# Patient Record
Sex: FEMALE | Race: BLACK OR AFRICAN AMERICAN | NOT HISPANIC OR LATINO | Employment: OTHER | URBAN - METROPOLITAN AREA
[De-identification: names, ages, dates, MRNs, and addresses within clinical notes are randomized per-mention and may not be internally consistent; named-entity substitution may affect disease eponyms.]

---

## 2017-01-23 ENCOUNTER — TELEPHONE (OUTPATIENT)
Dept: SMOKING CESSATION | Facility: CLINIC | Age: 57
End: 2017-01-23

## 2017-01-23 RX ORDER — OXYBUTYNIN CHLORIDE 5 MG/1
TABLET ORAL
Qty: 90 TABLET | Refills: 0 | Status: SHIPPED | OUTPATIENT
Start: 2017-01-23 | End: 2017-03-22 | Stop reason: SDUPTHER

## 2017-01-30 ENCOUNTER — TELEPHONE (OUTPATIENT)
Dept: SMOKING CESSATION | Facility: CLINIC | Age: 57
End: 2017-01-30

## 2017-01-31 ENCOUNTER — TELEPHONE (OUTPATIENT)
Dept: SMOKING CESSATION | Facility: CLINIC | Age: 57
End: 2017-01-31

## 2017-02-14 ENCOUNTER — CLINICAL SUPPORT (OUTPATIENT)
Dept: AUDIOLOGY | Facility: CLINIC | Age: 57
End: 2017-02-14
Payer: COMMERCIAL

## 2017-02-14 ENCOUNTER — OFFICE VISIT (OUTPATIENT)
Dept: OTOLARYNGOLOGY | Facility: CLINIC | Age: 57
End: 2017-02-14
Payer: COMMERCIAL

## 2017-02-14 VITALS
DIASTOLIC BLOOD PRESSURE: 76 MMHG | HEIGHT: 62 IN | SYSTOLIC BLOOD PRESSURE: 133 MMHG | BODY MASS INDEX: 47.87 KG/M2 | WEIGHT: 260.13 LBS

## 2017-02-14 DIAGNOSIS — H69.93 ETD (EUSTACHIAN TUBE DYSFUNCTION), BILATERAL: Primary | ICD-10-CM

## 2017-02-14 DIAGNOSIS — H92.03 OTALGIA OF BOTH EARS: Primary | ICD-10-CM

## 2017-02-14 DIAGNOSIS — R42 DIZZINESS: ICD-10-CM

## 2017-02-14 PROCEDURE — 92557 COMPREHENSIVE HEARING TEST: CPT | Mod: S$GLB,,, | Performed by: AUDIOLOGIST

## 2017-02-14 PROCEDURE — 92567 TYMPANOMETRY: CPT | Mod: S$GLB,,, | Performed by: AUDIOLOGIST

## 2017-02-14 PROCEDURE — 99203 OFFICE O/P NEW LOW 30 MIN: CPT | Mod: S$GLB,,, | Performed by: OTOLARYNGOLOGY

## 2017-02-14 PROCEDURE — 3075F SYST BP GE 130 - 139MM HG: CPT | Mod: S$GLB,,, | Performed by: OTOLARYNGOLOGY

## 2017-02-14 PROCEDURE — 3078F DIAST BP <80 MM HG: CPT | Mod: S$GLB,,, | Performed by: OTOLARYNGOLOGY

## 2017-02-14 PROCEDURE — 99999 PR PBB SHADOW E&M-EST. PATIENT-LVL III: CPT | Mod: PBBFAC,,, | Performed by: OTOLARYNGOLOGY

## 2017-02-14 NOTE — MR AVS SNAPSHOT
[facility] Otorhinol[facility]  [address]  [facility] [address]  Phone: [phone]  Fax: [phone]                  Sonya Armendariz   2017 11:00 AM   Office Visit    Description:  Female : 1960   Provider:  Manny Almeida MD   Department:  [facility] - Otorhinolaryngology           Reason for Visit     Otalgia           Diagnoses this Visit        Comments    Otalgia of both ears                To Do List           Goals (5 Years of Data)     None      [facility]     [facility] -  Assistance  Registered nurses in the [facility] Center provide clinical advisement, health education, appointment booking, and other advisory services.  Call for this free service at [phone].             Medications           Message regarding Medications     Verify the changes and/or additions to your medication regime listed below are the same as discussed with your clinician today.  If any of these changes or additions are incorrect, please notify your healthcare provider.             Verify that the below list of medications is an accurate representation of the medications you are currently taking.  If none reported, the list may be blank. If incorrect, please contact your healthcare provider. Carry this list with you in case of emergency.           Current Medications     aspirin (ECOTRIN) 81 MG EC tablet Take 81 mg by mouth once daily.      atorvastatin (LIPITOR) 20 MG tablet TAKE 1 TABLET BY MOUTH EVERY DAY    fluticasone (FLONASE) 50 mcg/actuation nasal spray SPRAY ONCE IN EACH NOSTRIL EVERY DAY    hyoscyamine (ANASPAZ,LEVSIN) 0.125 mg Tab Take 1 tablet (125 mcg total) by mouth every 6 (six) hours as needed.    lorazepam (ATIVAN) 1 MG tablet Take 0.5 tablets (0.5 mg total) by mouth every 6 (six) hours as needed for Anxiety.    losartan-hydrochlorothiazide 50-12.5 mg (HYZAAR) 50-12.5 mg per tablet Take 1 tablet by mouth once daily.    meclizine  "(ANTIVERT) 25 mg tablet Take 1 tablet (25 mg total) by mouth 2 (two) times daily as needed for Dizziness.    metoprolol succinate (TOPROL-XL) 25 MG 24 hr tablet Take 1 tablet (25 mg total) by mouth once daily.    multivitamin (THERAGRAN) per tablet Take 1 tablet by mouth once daily.    NITROSTAT 0.4 mg SL tablet PLACE 1 TABLET UNDER THE TONGUE EVERY 5 MINUTES AS NEEDED    oxybutynin (DITROPAN) 5 MG Tab TAKE 1 TABLET BY MOUTH THREE TIMES DAILY    rabeprazole (ACIPHEX) 20 mg tablet Take 1 tablet (20 mg total) by mouth once daily.    sucralfate (CARAFATE) 1 gram tablet TAKE 1 TABLET BY MOUTH FOUR TIMES DAILY           Clinical Reference Information           Your Vitals Were     BP Height Weight Last Period BMI    133/76 (BP Location: Right arm, Patient Position: Sitting, BP Method: Automatic) 5' 2" (1.575 m) 118 kg (260 lb 2.3 oz) 06/30/2015 47.58 kg/m2      Blood Pressure          Most Recent Value    BP  133/76      Allergies as of 2/14/2017     Amoxicillin    Plavix [Clopidogrel]      Immunizations Administered on Date of Encounter - 2/14/2017     None      Smoking Cessation     If you would like to quit smoking:   You may be eligible for free services if you are a [place] resident and started smoking cigarettes before September 1, 1988.  Call the [employer]oking [employer]essation [employer] ([employer]) toll free at (673) 614-7020 or (966) 193-9805.   Call 1-800-QUIT-NOW if you do not meet the above criteria.            Language Assistance Services     ATTENTION: Language assistance services are available, free of charge. Please call [phone].      ATENCIÓN: Si habla español, tiene a carbone disposición servicios gratuitos de asistencia lingüística. Llame al [phone].     JOSHUA Ý: N?u b?n nói Ti?ng Vi?t, có các d?ch v? h? tr? ngôn ng? mi?n phí dành cho b?n. G?i s? [phone].         [facility] complies with applicable Federal civil rights laws and does not discriminate on the basis of race, color, national " origin, age, disability, or sex.

## 2017-02-14 NOTE — PROGRESS NOTES
Subjective:       Patient ID: Sonya Armendariz is a 56 y.o. female.    Chief Complaint: Otalgia    Otalgia    There is pain in both ears. This is a chronic problem. The current episode started more than 1 month ago. The problem occurs constantly. The problem has been unchanged. There has been no fever. The pain is mild. Pertinent negatives include no coughing, ear discharge, headaches, hearing loss, neck pain, rash, rhinorrhea or sore throat. She has tried nothing for the symptoms. The treatment provided no relief. There is no history of a chronic ear infection, hearing loss or a tympanostomy tube.     Review of Systems   Constitutional: Negative for activity change, appetite change, fatigue, fever and unexpected weight change.   HENT: Positive for ear pain. Negative for congestion, ear discharge, facial swelling, hearing loss, nosebleeds, postnasal drip, rhinorrhea, sinus pressure, sore throat, tinnitus, trouble swallowing and voice change.    Eyes: Negative for visual disturbance.   Respiratory: Negative for cough, chest tightness, shortness of breath, wheezing and stridor.    Cardiovascular: Negative for chest pain.   Musculoskeletal: Negative for gait problem and neck pain.   Skin: Negative for color change and rash.   Allergic/Immunologic: Negative for environmental allergies.   Neurological: Negative for dizziness, seizures, syncope, facial asymmetry, speech difficulty, weakness, light-headedness, numbness and headaches.   Psychiatric/Behavioral: Negative for agitation and confusion. The patient is not nervous/anxious.        Objective:        Constitutional:   Vital signs are normal. She appears well-developed and well-nourished.     Head:  Normocephalic and atraumatic.     Ears:  Hearing normal to normal and whispered voice; external ear normal without scars, lesions, or masses; ear canal, tympanic membrane, and middle ear normal..     Nose:  Nose normal including turbinates, nasal mucosa, sinuses and  nasal septum.     Mouth/Throat  Oropharynx not clear and moist and abnormal uvula midline. She has dentures.   Noted mal-fitting dentures and deep palpation tenderness overlying TMJ. The discomfort noted is the same as what the patient presented with upon evaluation.      Neck:  Neck normal without thyromegaly masses, asymmetry, normal tracheal structure, crepitus, and tenderness, thyroid normal, trachea normal, phonation normal and full range of motion with neck supple.     Cardiovascular:   Rate and rhythm, heart sounds, and pulses normal.      Pulmonary/Chest:   Effort and breath sounds normal.     Psychiatric:   She has a normal mood and affect. Her behavior is normal.     Neurological:   She has neurological normal, alert and oriented.     Skin:   No abrasions, lacerations, lesions, or rashes.         As a result of this patients history and examination findings, a comprehensive audiogram was ordered to determine the level of hearing/hearing loss.    Hearing thresholds are normal and symmetric bilaterally.      Assessment:       1. Otalgia of both ears        Plan:           1. TMJ Education and pamphlet provided (soft diet, warm compresses)  2. Hearing conservation strongly recommended.  3. Re-check of hearing in 5-10 years or sooner if subjective change noted.  4. F/U up with dentist for denture alignment/dental hygiene  4. F/U with PCP as per schedule.

## 2017-03-21 ENCOUNTER — TELEPHONE (OUTPATIENT)
Dept: GASTROENTEROLOGY | Facility: CLINIC | Age: 57
End: 2017-03-21

## 2017-03-21 RX ORDER — SUCRALFATE 1 G/1
TABLET ORAL
Qty: 120 TABLET | Refills: 0 | Status: SHIPPED | OUTPATIENT
Start: 2017-03-21 | End: 2017-06-05 | Stop reason: SDUPTHER

## 2017-03-21 NOTE — TELEPHONE ENCOUNTER
LM on pt VM to call the office back to schedule a f/u visit. I also stated that her Rx was sent to her pharmacy.

## 2017-03-21 NOTE — TELEPHONE ENCOUNTER
Spoke with Pt and was able to schedule a F/U visit with the NP on 4/11/17 at 9:00am. Verbal agreement.    ----- Message from Jessenia Jon sent at 3/21/2017  2:17 PM CDT -----  Contact: Self/[phone]  Patient is returning your call. Please advise

## 2017-03-21 NOTE — TELEPHONE ENCOUNTER
I refilled patient's carafate.  She has not been seen since 2015.  Please call to schedule routine fu appt

## 2017-03-22 NOTE — TELEPHONE ENCOUNTER
----- Message from Mila Turner sent at 3/22/2017 10:01 AM CDT -----  Contact: [phone]/ self   Pt requesting a refill on rx oxybutynin (DITROPAN) 5 MG Tab sent to [facility] [phone] . Please advise

## 2017-03-22 NOTE — TELEPHONE ENCOUNTER
----- Message from Mila Turner sent at 3/22/2017 12:59 PM CDT -----  Contact: [phone]/ self   Patient called in returning your call. Please advise

## 2017-03-22 NOTE — TELEPHONE ENCOUNTER
----- Message from Mila Turner sent at 3/22/2017 12:59 PM CDT -----  Contact: [phone]/ self   Patient called in returning your call. Please advise

## 2017-03-23 RX ORDER — OXYBUTYNIN CHLORIDE 5 MG/1
5 TABLET ORAL 3 TIMES DAILY
Qty: 90 TABLET | Refills: 0 | Status: SHIPPED | OUTPATIENT
Start: 2017-03-23 | End: 2017-06-08 | Stop reason: SDUPTHER

## 2017-03-23 NOTE — TELEPHONE ENCOUNTER
----- Message from Hina Cohen sent at 3/23/2017  8:57 AM CDT -----  Contact: [phone]/ self   Patient would like refill of oxybutynin (DITROPAN) 5 MG Tab sent to [facility] on [address]. Please advise.

## 2017-04-11 ENCOUNTER — LAB VISIT (OUTPATIENT)
Dept: LAB | Facility: HOSPITAL | Age: 57
End: 2017-04-11
Attending: NURSE PRACTITIONER
Payer: COMMERCIAL

## 2017-04-11 ENCOUNTER — TELEPHONE (OUTPATIENT)
Dept: GASTROENTEROLOGY | Facility: CLINIC | Age: 57
End: 2017-04-11

## 2017-04-11 ENCOUNTER — OFFICE VISIT (OUTPATIENT)
Dept: GASTROENTEROLOGY | Facility: CLINIC | Age: 57
End: 2017-04-11
Payer: COMMERCIAL

## 2017-04-11 VITALS
SYSTOLIC BLOOD PRESSURE: 144 MMHG | BODY MASS INDEX: 46.61 KG/M2 | HEIGHT: 62 IN | DIASTOLIC BLOOD PRESSURE: 91 MMHG | HEART RATE: 74 BPM | WEIGHT: 253.31 LBS

## 2017-04-11 DIAGNOSIS — R11.0 NAUSEA: Primary | ICD-10-CM

## 2017-04-11 DIAGNOSIS — R10.12 ABDOMINAL PAIN, LUQ (LEFT UPPER QUADRANT): ICD-10-CM

## 2017-04-11 DIAGNOSIS — R10.13 ABDOMINAL PAIN, EPIGASTRIC: ICD-10-CM

## 2017-04-11 DIAGNOSIS — R11.0 NAUSEA: ICD-10-CM

## 2017-04-11 LAB
ALBUMIN SERPL BCP-MCNC: 3.8 G/DL
ALP SERPL-CCNC: 76 U/L
ALT SERPL W/O P-5'-P-CCNC: 13 U/L
ANION GAP SERPL CALC-SCNC: 7 MMOL/L
AST SERPL-CCNC: 18 U/L
BASOPHILS # BLD AUTO: 0.01 K/UL
BASOPHILS NFR BLD: 0.2 %
BILIRUB SERPL-MCNC: 0.4 MG/DL
BUN SERPL-MCNC: 9 MG/DL
CALCIUM SERPL-MCNC: 9.6 MG/DL
CHLORIDE SERPL-SCNC: 110 MMOL/L
CO2 SERPL-SCNC: 25 MMOL/L
CREAT SERPL-MCNC: 0.9 MG/DL
DIFFERENTIAL METHOD: ABNORMAL
EOSINOPHIL # BLD AUTO: 0.3 K/UL
EOSINOPHIL NFR BLD: 4.9 %
ERYTHROCYTE [DISTWIDTH] IN BLOOD BY AUTOMATED COUNT: 12.8 %
EST. GFR  (AFRICAN AMERICAN): >60 ML/MIN/1.73 M^2
EST. GFR  (NON AFRICAN AMERICAN): >60 ML/MIN/1.73 M^2
GLUCOSE SERPL-MCNC: 90 MG/DL
HCT VFR BLD AUTO: 42.5 %
HGB BLD-MCNC: 14.7 G/DL
LIPASE SERPL-CCNC: 34 U/L
LYMPHOCYTES # BLD AUTO: 2.5 K/UL
LYMPHOCYTES NFR BLD: 43.6 %
MCH RBC QN AUTO: 31.8 PG
MCHC RBC AUTO-ENTMCNC: 34.6 %
MCV RBC AUTO: 92 FL
MONOCYTES # BLD AUTO: 0.5 K/UL
MONOCYTES NFR BLD: 9.1 %
NEUTROPHILS # BLD AUTO: 2.4 K/UL
NEUTROPHILS NFR BLD: 42 %
PLATELET # BLD AUTO: 209 K/UL
PMV BLD AUTO: 11.4 FL
POTASSIUM SERPL-SCNC: 4 MMOL/L
PROT SERPL-MCNC: 7.4 G/DL
RBC # BLD AUTO: 4.62 M/UL
SODIUM SERPL-SCNC: 142 MMOL/L
WBC # BLD AUTO: 5.74 K/UL

## 2017-04-11 PROCEDURE — 99214 OFFICE O/P EST MOD 30 MIN: CPT | Mod: S$GLB,,, | Performed by: NURSE PRACTITIONER

## 2017-04-11 PROCEDURE — 80053 COMPREHEN METABOLIC PANEL: CPT

## 2017-04-11 PROCEDURE — 36415 COLL VENOUS BLD VENIPUNCTURE: CPT

## 2017-04-11 PROCEDURE — 3080F DIAST BP >= 90 MM HG: CPT | Mod: S$GLB,,, | Performed by: NURSE PRACTITIONER

## 2017-04-11 PROCEDURE — 99999 PR PBB SHADOW E&M-EST. PATIENT-LVL III: CPT | Mod: PBBFAC,,, | Performed by: NURSE PRACTITIONER

## 2017-04-11 PROCEDURE — 1160F RVW MEDS BY RX/DR IN RCRD: CPT | Mod: S$GLB,,, | Performed by: NURSE PRACTITIONER

## 2017-04-11 PROCEDURE — 85025 COMPLETE CBC W/AUTO DIFF WBC: CPT

## 2017-04-11 PROCEDURE — 83690 ASSAY OF LIPASE: CPT

## 2017-04-11 PROCEDURE — 3077F SYST BP >= 140 MM HG: CPT | Mod: S$GLB,,, | Performed by: NURSE PRACTITIONER

## 2017-04-11 NOTE — TELEPHONE ENCOUNTER
LM on Pt VM to call the clinic back    ----- Message from ZEV Gore sent at 4/11/2017  3:15 PM CDT -----  Let pt know that labs are ok

## 2017-04-11 NOTE — PROGRESS NOTES
"Subjective:       Patient ID: Sonya Armendariz is a 56 y.o. female.    Chief Complaint: Follow-up    HPI  Reports increased belching and nausea and vomiting occurring over the last week  Continued bloating and epigastric pain that has been a chronic complaint.  I saw her last year with similar complaints.  She reported at that time that she would have relief in her complaints with the use of PPI and carafate    She reports that she is currently using aciphex and carafate with continued complaints.  She will use TUMS in addition.  She denies diarrhea but reports a looser "grainy" stool.  Denies blood with bowel movements or black stools.   She reports dysphagia episodically and will also note pain with swallowing and have to drink water in order for food to pass.    She had EGD in 2015 revealing gastritis and duodenitis. Biopsies negative for h.pylori. US negative.   Reports concern as her mother and sister both have had pancreatic cancer.  She had colonoscopy 5 years ago with polyp removed.    Review of Systems   Constitutional: Negative.  Negative for activity change, appetite change, fatigue, fever and unexpected weight change.   HENT: Positive for trouble swallowing. Negative for sore throat.    Respiratory: Negative.  Negative for cough, choking and shortness of breath.    Cardiovascular: Negative.  Negative for chest pain.   Gastrointestinal: Positive for abdominal distention, abdominal pain and nausea. Negative for blood in stool, constipation and diarrhea.   Genitourinary: Negative.  Negative for difficulty urinating, dysuria and hematuria.   Musculoskeletal: Negative.  Negative for neck pain and neck stiffness.   Skin: Negative.    Neurological: Negative.  Negative for dizziness, syncope, weakness and light-headedness.   Psychiatric/Behavioral: Negative.        Objective:      Physical Exam   Constitutional: She is oriented to person, place, and time. She appears well-developed and well-nourished. No " distress.   HENT:   Head: Normocephalic.   Eyes: No scleral icterus.   Neck: Neck supple.   Cardiovascular: Normal rate.    Pulmonary/Chest: Effort normal. No respiratory distress.   Abdominal: Soft. Bowel sounds are normal. She exhibits no distension and no mass. There is tenderness in the epigastric area and left upper quadrant.   Musculoskeletal: Normal range of motion.   Neurological: She is alert and oriented to person, place, and time.   Skin: Skin is warm and dry. She is not diaphoretic. No erythema. No pallor.   Psychiatric: She has a normal mood and affect. Her behavior is normal. Judgment and thought content normal.   Vitals reviewed.      Assessment:       1. Nausea    2. Abdominal pain, epigastric    3. Abdominal pain, LUQ (left upper quadrant)        Plan:     Sonya was seen today for follow-up.    Diagnoses and all orders for this visit:    Nausea  -     US Abdomen Complete; Future  -     CBC auto differential; Future  -     Comprehensive metabolic panel; Future  -     Lipase; Future  -     Case request GI: ESOPHAGOGASTRODUODENOSCOPY (EGD), COLONOSCOPY    Abdominal pain, epigastric  -     US Abdomen Complete; Future  -     CBC auto differential; Future  -     Comprehensive metabolic panel; Future  -     Lipase; Future  -     Case request GI: ESOPHAGOGASTRODUODENOSCOPY (EGD), COLONOSCOPY    Abdominal pain, LUQ (left upper quadrant)  -     US Abdomen Complete; Future  -     CBC auto differential; Future  -     Comprehensive metabolic panel; Future  -     Lipase; Future  -     Case request GI: ESOPHAGOGASTRODUODENOSCOPY (EGD), COLONOSCOPY        Will schedule abdominal US and labs.  EGD and colonoscopy in July per patient request.  Continue current medications.  May use maalox prn.  Could consider trial of FDgard.    Add fiber supplement for stool bulking.

## 2017-04-12 ENCOUNTER — TELEPHONE (OUTPATIENT)
Dept: GASTROENTEROLOGY | Facility: CLINIC | Age: 57
End: 2017-04-12

## 2017-04-12 RX ORDER — ATORVASTATIN CALCIUM 20 MG/1
TABLET, FILM COATED ORAL
Qty: 30 TABLET | Refills: 0 | Status: SHIPPED | OUTPATIENT
Start: 2017-04-12 | End: 2017-05-28 | Stop reason: SDUPTHER

## 2017-04-12 NOTE — TELEPHONE ENCOUNTER
Spoke with pt to let her know that her lab results were normal . Verbal Understanding.       ----- Message from Hina Cohen sent at 4/12/2017  9:05 AM CDT -----  Contact: [phone]/ self   Patient called in returning your call. Please advise.

## 2017-05-29 RX ORDER — ATORVASTATIN CALCIUM 20 MG/1
TABLET, FILM COATED ORAL
Qty: 30 TABLET | Refills: 0 | Status: SHIPPED | OUTPATIENT
Start: 2017-05-29 | End: 2017-07-04 | Stop reason: SDUPTHER

## 2017-05-29 NOTE — TELEPHONE ENCOUNTER
----- Message from Jessenia Jon sent at 5/29/2017  9:16 AM CDT -----  Contact: Self/[phone]  Patient said she has an appointment on 5/30/17 but has to reschedule because of her job. She said she is available starting on 6/1/17. Please advise

## 2017-06-06 RX ORDER — SUCRALFATE 1 G/1
TABLET ORAL
Qty: 120 TABLET | Refills: 1 | Status: SHIPPED | OUTPATIENT
Start: 2017-06-06 | End: 2017-12-05

## 2017-06-08 RX ORDER — OXYBUTYNIN CHLORIDE 5 MG/1
5 TABLET ORAL 3 TIMES DAILY
Qty: 90 TABLET | Refills: 0 | Status: SHIPPED | OUTPATIENT
Start: 2017-06-08 | End: 2017-08-16 | Stop reason: SDUPTHER

## 2017-06-14 ENCOUNTER — TELEPHONE (OUTPATIENT)
Dept: GASTROENTEROLOGY | Facility: CLINIC | Age: 57
End: 2017-06-14

## 2017-06-14 NOTE — TELEPHONE ENCOUNTER
Spoke with pt and she informed me that she lost he prop and instruction. Pt also stated that she has not picked up her prep from her pharmacy. I will e-mail prep and information to her e-mail address at [email or web address]. Verbal Understanding.       ----- Message from Jessenia Jon sent at 6/14/2017 11:19 AM CDT -----  Contact: Self/[phone]  Patient said she would like to speak with you regarding questions about up coming procedure. Please advise

## 2017-06-22 ENCOUNTER — HOSPITAL ENCOUNTER (OUTPATIENT)
Dept: RADIOLOGY | Facility: HOSPITAL | Age: 57
Discharge: HOME | End: 2017-06-22
Attending: NURSE PRACTITIONER
Payer: COMMERCIAL

## 2017-06-22 DIAGNOSIS — R10.12 ABDOMINAL PAIN, LUQ (LEFT UPPER QUADRANT): ICD-10-CM

## 2017-06-22 DIAGNOSIS — R11.0 NAUSEA: ICD-10-CM

## 2017-06-22 DIAGNOSIS — R10.13 ABDOMINAL PAIN, EPIGASTRIC: ICD-10-CM

## 2017-06-22 PROCEDURE — 76700 US EXAM ABDOM COMPLETE: CPT | Mod: TC

## 2017-06-22 PROCEDURE — 76700 US EXAM ABDOM COMPLETE: CPT | Mod: 26,,, | Performed by: RADIOLOGY

## 2017-06-22 RX ORDER — POLYETHYLENE GLYCOL 3350, SODIUM SULFATE ANHYDROUS, SODIUM BICARBONATE, SODIUM CHLORIDE, POTASSIUM CHLORIDE 236; 22.74; 6.74; 5.86; 2.97 G/4L; G/4L; G/4L; G/4L; G/4L
4 POWDER, FOR SOLUTION ORAL SEE ADMIN INSTRUCTIONS
Qty: 4000 ML | Refills: 0 | Status: SHIPPED | OUTPATIENT
Start: 2017-06-22 | End: 2017-12-05

## 2017-06-26 ENCOUNTER — OFFICE VISIT (OUTPATIENT)
Dept: FAMILY MEDICINE | Facility: CLINIC | Age: 57
End: 2017-06-26
Payer: COMMERCIAL

## 2017-06-26 VITALS
BODY MASS INDEX: 47.39 KG/M2 | DIASTOLIC BLOOD PRESSURE: 84 MMHG | WEIGHT: 257.5 LBS | HEIGHT: 62 IN | OXYGEN SATURATION: 98 % | SYSTOLIC BLOOD PRESSURE: 138 MMHG | HEART RATE: 77 BPM

## 2017-06-26 DIAGNOSIS — Z00.00 ROUTINE GENERAL MEDICAL EXAMINATION AT A HEALTH CARE FACILITY: ICD-10-CM

## 2017-06-26 DIAGNOSIS — E78.5 HYPERLIPIDEMIA, UNSPECIFIED HYPERLIPIDEMIA TYPE: ICD-10-CM

## 2017-06-26 DIAGNOSIS — R06.09 DOE (DYSPNEA ON EXERTION): ICD-10-CM

## 2017-06-26 DIAGNOSIS — F43.0 ACUTE STRESS REACTION: ICD-10-CM

## 2017-06-26 DIAGNOSIS — L84 CALLUS OF FOOT: Primary | ICD-10-CM

## 2017-06-26 DIAGNOSIS — I10 BENIGN ESSENTIAL HTN: ICD-10-CM

## 2017-06-26 DIAGNOSIS — I25.10 CORONARY ARTERY DISEASE, ANGINA PRESENCE UNSPECIFIED, UNSPECIFIED VESSEL OR LESION TYPE, UNSPECIFIED WHETHER NATIVE OR TRANSPLANTED HEART: ICD-10-CM

## 2017-06-26 PROCEDURE — 99214 OFFICE O/P EST MOD 30 MIN: CPT | Mod: 25,S$GLB,, | Performed by: FAMILY MEDICINE

## 2017-06-26 PROCEDURE — 93005 ELECTROCARDIOGRAM TRACING: CPT | Mod: S$GLB,,, | Performed by: FAMILY MEDICINE

## 2017-06-26 PROCEDURE — 99396 PREV VISIT EST AGE 40-64: CPT | Mod: S$GLB,,, | Performed by: FAMILY MEDICINE

## 2017-06-26 PROCEDURE — 93010 ELECTROCARDIOGRAM REPORT: CPT | Mod: S$GLB,,, | Performed by: INTERNAL MEDICINE

## 2017-06-26 PROCEDURE — 99999 PR PBB SHADOW E&M-EST. PATIENT-LVL IV: CPT | Mod: PBBFAC,,, | Performed by: FAMILY MEDICINE

## 2017-06-26 NOTE — PROGRESS NOTES
"(Portions of this note were dictated using voice recognition software and may contain dictation related errors in spelling/grammar/syntax not found on text review)    CC:   Chief Complaint   Patient presents with    Annual Exam       HPI: 56 y.o. female here for annual health maintenance exam although does have several other concerns    1.  Dyspnea on exertion going on for 4 months, denies any chest pain associated.  Denies any diaphoresis or nausea.  CAD history as below.  She has not seen her cardiologist in about a year, does not have an appointment set up at this time but plans to make an appointment.  No significant weight gain and a very short period of time although she does complain of some "fluid in my feet".  Denies significant dyspnea at rest although she feels like her "breath is not normal".  She attributes this to resuming smoking    2.  Foot pain.  Had calluses that were excised many years ago by podiatry but they recurred.  Sometimes will have some difficulty walking long distances.  She is agreeable to seeing podiatry again for other option on treatment    3.  Under a lot of stress.  She feels like a lot of people depend on her a lot and she has little time to take care of herself.  One of her brothers who lived in [place] was severely affected by flooding their last year.  She had to take him in her home and this can be very stressful as well.  He was involved in a bad car accident this morning and is in the emergency room but is doing okay.  She just found this very overwhelming.  She hangs that despite the stresses, she feels like she has a fairly good disposition most of the time he denies any chronic depressive type symptoms just states that "today was a hard day".    Past Medical History:   Diagnosis Date    CAD (coronary artery disease)     s/p LAD stent    Gastroduodenitis     Helicobacter pylori (H. pylori) infection     Hyperlipidemia     Hypertension     MI (myocardial " infarction)     x2    Obesity     Tobacco use     Urinary tract infection     Vaginal infection        Past Surgical History:   Procedure Laterality Date    NO PAST SURGERIES         Family History   Problem Relation Age of Onset    Hypertension Other      multiple    Diabetes Other      multiple    Stomach cancer Mother     Cancer Father      unknown    Breast cancer Sister     Throat cancer Brother     Cancer Brother      x 2,. unknown    Coronary artery disease Cousin        Social History     Social History    Marital status:      Spouse name: N/A    Number of children: N/A    Years of education: N/A     Occupational History     [employer]     Social History Main Topics    Smoking status: Current Every Day Smoker     Packs/day: 1.00     Years: 40.00     Types: Cigarettes     Last attempt to quit: 6/28/2016    Smokeless tobacco: Never Used    Alcohol use No    Drug use: No    Sexual activity: Yes     Partners: Male     Other Topics Concern    Not on file     Social History Narrative    No narrative on file       ROS:  GENERAL: No fever, chills, fatigability or weight loss.  SKIN: No rashes, no itching.  HEAD: No headaches.  EYES: No visual changes  EARS: No ear pain or changes in hearing.  NOSE: No congestion or rhinorrhea.  MOUTH & THROAT: No hoarseness, change in voice, or sore throat.  NODES: Denies swollen glands.  CHEST: Above.  CARDIOVASCULAR: Above.  ABDOMEN: No nausea, vomiting, or changes in bowel function.  URINARY: No flank pain, dysuria or hematuria.  PERIPHERAL VASCULAR: Above.  MUSCULOSKELETAL: No joint stiffness or swelling. Denies back pain.  NEUROLOGIC: No weakness or numbness.  PSYCHIATRIC: Above    Vital signs reviewed  PE:   APPEARANCE: Well nourished, well developed, in no acute distress.    HEAD: Normocephalic, atraumatic.  EYES: PERRL. EOMI.   Conjunctivae noninjected.  EARS: TM's intact. Light reflex normal. No retraction or  perforation  NOSE: Mucosa pink. Airway clear.  MOUTH & THROAT: No tonsillar enlargement. No pharyngeal erythema or exudate.   NECK: Supple with no cervical lymphadenopathy.  No thyromegaly.  No carotid bruits  CHEST: Good inspiratory effort. Lungs clear to auscultation with no wheezes or crackles.  CARDIOVASCULAR: Normal S1, S2. No rubs, murmurs, or gallops.  ABDOMEN: Bowel sounds normal. Not distended. Soft. No tenderness or masses. No organomegaly.  EXTREMITIES: Trace pitting edema bilateral lower extremity.  She does have some painful calluses noted at the plantar aspect of her first MTP on the left foot      IMPRESSION  Dyspnea on exertion  Acute stress  Callus      PLAN  SCOTT: Patient feels like her smoking could have something to do with this although given her heart disease history, EKG was done in the office.  This is essentially unchanged from prior assessments with normal sinus rhythm and evidence of septal infarct age undetermined.  No acute changes noted on personal review.  She will be set up for stress test since she does not have any immediate upcoming appointment with her cardiologist.  She is advised to continue follow-up with her cardiologist given her CAD history    Acute stress: Discussed in detail.  Patient does not feel that this is a chronic issue although her stresses are fairly chronic.  She feels like her disposition is overall more euthymic and denies any concern for depression.  She just feels like acutely she got overwhelmed today.  She is advised to consider further referral for counseling if symptoms become more persistent    Foot calluses: Discussed podiatry referral for further treatment options.  She is agreeable to this    Orders Placed This Encounter   Procedures    NM Myocardial Perfusion Spect Multi Exer    CBC auto differential    Lipid panel    TSH    Comprehensive metabolic panel    Ambulatory referral to Podiatry    NM Multi Study Stress Exer Card Component    EKG  12-lead

## 2017-06-26 NOTE — PROGRESS NOTES
(Portions of this note were dictated using voice recognition software and may contain dictation related errors in spelling/grammar/syntax not found on text review)    CC:   Chief Complaint   Patient presents with    Annual Exam       HPI: 56 y.o. female LOV with me over 2 years ago.  She presents for an annual exam today but has separate concerns noted in the attached evaluation and management note.  Chronic health conditions are as follows:    CAD: followed by cardiology, LOV with cardiology was last year.  Therapy as below.  Continues to smoke.  Had enrolled in smoking cessation clinic, she did quit for some time but resumed because of some stresses    Hypertension on metoprolol 25 mg daily plus Hyzaar 50/12.5 daily    Hyperlipidemia on Lipitor 20 mg daily    GERD with gastroduodenitis noted on prior testing.  Saw GI in April of this year.  On AcipHex 20 mg daily.  EGD is to be done later this year along with colonoscopy      Past Medical History:   Diagnosis Date    CAD (coronary artery disease)     s/p LAD stent    Gastroduodenitis     Helicobacter pylori (H. pylori) infection     Hyperlipidemia     Hypertension     MI (myocardial infarction)     x2    Obesity     Tobacco use     Urinary tract infection     Vaginal infection        Past Surgical History:   Procedure Laterality Date    NO PAST SURGERIES         Family History   Problem Relation Age of Onset    Hypertension Other      multiple    Diabetes Other      multiple    Stomach cancer Mother     Cancer Father      unknown    Breast cancer Sister     Throat cancer Brother     Cancer Brother      x 2,. unknown    Coronary artery disease Cousin        Social History     Social History    Marital status:      Spouse name: N/A    Number of children: N/A    Years of education: N/A     Occupational History     [employer]     Social History Main Topics    Smoking status: Current Every Day Smoker     Packs/day:  1.00     Years: 40.00     Types: Cigarettes     Last attempt to quit: 6/28/2016    Smokeless tobacco: Never Used    Alcohol use No    Drug use: No    Sexual activity: Yes     Partners: Male     Other Topics Concern    Not on file     Social History Narrative    No narrative on file         HEALTH SCREENINGS  Immunizations:  Tdap 2012     Age/Gender Appropriate screenings:  MMG 7/20/16  GYN:  Pap 2013 Dr. Phelan: benign, trich noted.     Colonoscopy: 7/13/12, 6mm polyp ret 5 yrs.    Lab Results   Component Value Date    WBC 5.74 04/11/2017    HGB 14.7 04/11/2017    HCT 42.5 04/11/2017     04/11/2017    CHOL 218 (H) 06/12/2015    TRIG 156 (H) 06/12/2015    HDL 40 06/12/2015    ALT 13 04/11/2017    AST 18 04/11/2017     04/11/2017    K 4.0 04/11/2017     04/11/2017    CREATININE 0.9 04/11/2017    BUN 9 04/11/2017    CO2 25 04/11/2017    TSH 0.967 06/12/2015    INR 1.0 08/02/2011    HGBA1C 6.1 06/12/2015    LDLCALC 146.8 06/12/2015    GLU 90 04/11/2017         Vital signs reviewed  PE:   APPEARANCE: Well nourished, well developed, in no acute distress.    HEAD: Normocephalic, atraumatic.  EYES: PERRL. EOMI.   Conjunctivae noninjected.  EARS: TM's intact. Light reflex normal. No retraction or perforation.    NOSE: Mucosa pink. Airway clear.  MOUTH & THROAT: No tonsillar enlargement. No pharyngeal erythema or exudate.   NECK: Supple with no cervical lymphadenopathy.  No carotid bruits.  No thyromegaly  CHEST: Good inspiratory effort. Lungs clear to auscultation with no wheezes or crackles.  CARDIOVASCULAR: Normal S1, S2. No rubs, murmurs, or gallops.  ABDOMEN: Bowel sounds normal. Not distended. Soft. No tenderness or masses. No organomegaly.  EXTREMITIES: Trace pitting edema bilateral lower extremities      IMPRESSION  Routine medical exam  CAD  Hypertension  Hyperlipidemia    PLAN   on healthy diet, regular exercise, weight loss  CBC, CMP, lipid, TSH ordered for screening  purposes  Blood pressure controlled.  Continue current therapy  Lipids: Check labs  Maintain routine eye doctor visits and dental visits  Encourage repeat follow-up with tobacco cessation clinic to help with smoking cessation  Reviewed health maintenance with patient.  Immunizations as above.  Her last Pap smear was over 3 years ago.  Discussed current Pap smear guidelines.  She will return in the next 3 months for repeat Pap smear.  Colonoscopy is being set up for this year.  Please see attached evaluation and management note for further details on acute symptomology

## 2017-06-27 ENCOUNTER — OFFICE VISIT (OUTPATIENT)
Dept: PODIATRY | Facility: CLINIC | Age: 57
End: 2017-06-27
Payer: COMMERCIAL

## 2017-06-27 DIAGNOSIS — M79.671 FOOT PAIN, BILATERAL: ICD-10-CM

## 2017-06-27 DIAGNOSIS — B07.0 PLANTAR WART OF BOTH FEET: Primary | ICD-10-CM

## 2017-06-27 DIAGNOSIS — M79.672 FOOT PAIN, BILATERAL: ICD-10-CM

## 2017-06-27 PROCEDURE — 99213 OFFICE O/P EST LOW 20 MIN: CPT | Mod: S$GLB,,, | Performed by: PODIATRIST

## 2017-06-27 PROCEDURE — 99999 PR PBB SHADOW E&M-EST. PATIENT-LVL II: CPT | Mod: PBBFAC,,, | Performed by: PODIATRIST

## 2017-06-27 NOTE — LETTER
June 28, 2017      Sivakumar Wing MD  [address]  [address]  [facility] [address]           [facility]  [address] [facility] [address]  Phone: [phone]          Patient: Sonya Armendariz   MR Number: [id number]   YOB: 1960   Date of Visit: 6/27/2017       Dear Dr. Sivakumar Wing:    Thank you for referring Sonya Armendariz to me for evaluation. Attached you will find relevant portions of my assessment and plan of care.    If you have questions, please do not hesitate to call me. I look forward to following Sonya Armendariz along with you.    Sincerely,    Alfredo Ramirez, DPTRESSA    Enclosure  CC:  No Recipients    If you would like to receive this communication electronically, please contact [email or web address] or [phone] to request more information on [employer] Link access.    For providers and/or their staff who would like to refer a patient to [facility], please contact us through our one-stop-shop provider referral line, Yoseph Patterson, at [phone].    If you feel you have received this communication in error or would no longer like to receive these types of communications, please e-mail [email or web address]

## 2017-06-28 NOTE — PROGRESS NOTES
Subjective:      Patient ID: Sonya Armendariz is a 56 y.o. female.    Chief Complaint: No chief complaint on file.    C/O painful raised calluses on both feet present since 2005 that she routinely shaves down herself for some relief. History of excision per a Podiatrist many years ago, however she states the lesions recurred and got larger. Denies trauma.     There were no vitals filed for this visit.   Past Medical History:   Diagnosis Date    CAD (coronary artery disease)     s/p LAD stent    Gastroduodenitis     Helicobacter pylori (H. pylori) infection     Hyperlipidemia     Hypertension     MI (myocardial infarction)     x2    Obesity     Tobacco use     Urinary tract infection     Vaginal infection        Past Surgical History:   Procedure Laterality Date    NO PAST SURGERIES         Family History   Problem Relation Age of Onset    Hypertension Other      multiple    Diabetes Other      multiple    Stomach cancer Mother     Cancer Father      unknown    Breast cancer Sister     Throat cancer Brother     Cancer Brother      x 2,. unknown    Coronary artery disease Cousin        Social History     Social History    Marital status:      Spouse name: N/A    Number of children: N/A    Years of education: N/A     Occupational History     [employer]     Social History Main Topics    Smoking status: Current Every Day Smoker     Packs/day: 1.00     Years: 40.00     Types: Cigarettes     Last attempt to quit: 6/28/2016    Smokeless tobacco: Never Used    Alcohol use No    Drug use: No    Sexual activity: Yes     Partners: Male     Other Topics Concern    Not on file     Social History Narrative    No narrative on file       Current Outpatient Prescriptions   Medication Sig Dispense Refill    aspirin (ECOTRIN) 81 MG EC tablet Take 81 mg by mouth once daily.        atorvastatin (LIPITOR) 20 MG tablet TAKE 1 TABLET BY MOUTH EVERY DAY 30 tablet 0     fluticasone (FLONASE) 50 mcg/actuation nasal spray SPRAY ONCE IN EACH NOSTRIL EVERY DAY 16 g 3    losartan-hydrochlorothiazide 50-12.5 mg (HYZAAR) 50-12.5 mg per tablet Take 1 tablet by mouth once daily. 30 tablet 6    meclizine (ANTIVERT) 25 mg tablet Take 1 tablet (25 mg total) by mouth 2 (two) times daily as needed for Dizziness. 20 tablet 0    metoprolol succinate (TOPROL-XL) 25 MG 24 hr tablet Take 1 tablet (25 mg total) by mouth once daily. 30 tablet 11    multivitamin (THERAGRAN) per tablet Take 1 tablet by mouth once daily.      NITROSTAT 0.4 mg SL tablet PLACE 1 TABLET UNDER THE TONGUE EVERY 5 MINUTES AS NEEDED 25 tablet 0    oxybutynin (DITROPAN) 5 MG Tab Take 1 tablet (5 mg total) by mouth 3 (three) times daily. 90 tablet 0    polyethylene glycol (GOLYTELY,NULYTELY) 236-22.74-6.74 -5.86 gram suspension Take 4,000 mLs (4 L total) by mouth As instructed. 4000 mL 0    rabeprazole (ACIPHEX) 20 mg tablet Take 1 tablet (20 mg total) by mouth once daily. 30 tablet 11    sucralfate (CARAFATE) 1 gram tablet TAKE 1 TABLET BY MOUTH FOUR TIMES DAILY 120 tablet 1     No current facility-administered medications for this visit.        Review of patient's allergies indicates:   Allergen Reactions    Amoxicillin Diarrhea    Plavix [clopidogrel]          Review of Systems   Constitution: Negative for chills, fever, weakness and malaise/fatigue.   Cardiovascular: Negative for chest pain, claudication and leg swelling.   Respiratory: Negative for cough and shortness of breath.    Skin: Positive for dry skin. Negative for itching, poor wound healing and rash.   Musculoskeletal: Negative for back pain, joint pain, muscle cramps and muscle weakness.   Gastrointestinal: Negative for nausea and vomiting.   Neurological: Negative for numbness and paresthesias.   Psychiatric/Behavioral: Negative for altered mental status.           Objective:      Physical Exam   Constitutional: She is oriented to person, place, and  time. No distress.   Cardiovascular:   Pulses:       Dorsalis pedis pulses are 2+ on the right side, and 2+ on the left side.        Posterior tibial pulses are 2+ on the right side, and 2+ on the left side.   CFT< 3 secs all toes bilateral foot, skin temp warm bilateral foot, diminished digital hair growth bilateral foot, no lower extremity edema bilateral.     Musculoskeletal:   Mild forefoot valgus with plantar flexed first metatarsal bilateral. Supinated foot structure bilateral.    Localized pain on palpation of raised hyperkeratotic lesions x 3 left foot and plantar right fifth met head.   Neurological: She is alert and oriented to person, place, and time. She has normal strength. Gait normal.   Skin: Skin is warm, dry and intact. Capillary refill takes less than 2 seconds. No ecchymosis and no rash noted. She is not diaphoretic. No cyanosis or erythema. Nails show no clubbing.   Raised hyperkeratotic lesions plantar first and fifth metatarsal heads and hallux left foot, and plantar right fifth met head with disappearance of RSTLs, spongy core and pain during squeeze test.    Hyperkeratotic skin with maintenance of RSTLs plantar right hallux and no pain.    No open lesions or macerations bilateral lower extremity.                         Assessment:       Encounter Diagnoses   Name Primary?    Plantar wart of both feet Yes    Foot pain, bilateral          Plan:       Diagnoses and all orders for this visit:    Plantar wart of both feet    Foot pain, bilateral      I counseled the patient on her conditions, their implications and medical management.    Discussed treatment options for painful plantar warts to include OTC salicylic acid, liquid nitrogen and surgical excision in detail. She opted to return for candida injection if available.    Discussed treating contact surfaces and shoes with Lysol and exposing to direct sun light.    Discussed proper foot and hand hygiene.    RTC 1-2 weeks or prn.      .

## 2017-07-03 ENCOUNTER — PATIENT MESSAGE (OUTPATIENT)
Dept: FAMILY MEDICINE | Facility: CLINIC | Age: 57
End: 2017-07-03

## 2017-07-03 ENCOUNTER — LAB VISIT (OUTPATIENT)
Dept: LAB | Facility: HOSPITAL | Age: 57
End: 2017-07-03
Attending: FAMILY MEDICINE
Payer: COMMERCIAL

## 2017-07-03 DIAGNOSIS — Z00.00 ROUTINE GENERAL MEDICAL EXAMINATION AT A HEALTH CARE FACILITY: ICD-10-CM

## 2017-07-03 LAB
ALBUMIN SERPL BCP-MCNC: 3.6 G/DL
ALP SERPL-CCNC: 79 U/L
ALT SERPL W/O P-5'-P-CCNC: 12 U/L
ANION GAP SERPL CALC-SCNC: 7 MMOL/L
AST SERPL-CCNC: 17 U/L
BASOPHILS # BLD AUTO: 0.01 K/UL
BASOPHILS NFR BLD: 0.2 %
BILIRUB SERPL-MCNC: 0.4 MG/DL
BUN SERPL-MCNC: 9 MG/DL
CALCIUM SERPL-MCNC: 9.3 MG/DL
CHLORIDE SERPL-SCNC: 109 MMOL/L
CHOLEST/HDLC SERPL: 4.1 {RATIO}
CO2 SERPL-SCNC: 24 MMOL/L
CREAT SERPL-MCNC: 0.9 MG/DL
DIFFERENTIAL METHOD: NORMAL
EOSINOPHIL # BLD AUTO: 0.3 K/UL
EOSINOPHIL NFR BLD: 5 %
ERYTHROCYTE [DISTWIDTH] IN BLOOD BY AUTOMATED COUNT: 13.4 %
EST. GFR  (AFRICAN AMERICAN): >60 ML/MIN/1.73 M^2
EST. GFR  (NON AFRICAN AMERICAN): >60 ML/MIN/1.73 M^2
GLUCOSE SERPL-MCNC: 106 MG/DL
HCT VFR BLD AUTO: 41.3 %
HDL/CHOLESTEROL RATIO: 24.1 %
HDLC SERPL-MCNC: 145 MG/DL
HDLC SERPL-MCNC: 35 MG/DL
HGB BLD-MCNC: 13.9 G/DL
LDLC SERPL CALC-MCNC: 86.8 MG/DL
LYMPHOCYTES # BLD AUTO: 2.3 K/UL
LYMPHOCYTES NFR BLD: 44 %
MCH RBC QN AUTO: 30.9 PG
MCHC RBC AUTO-ENTMCNC: 33.7 %
MCV RBC AUTO: 92 FL
MONOCYTES # BLD AUTO: 0.4 K/UL
MONOCYTES NFR BLD: 7.3 %
NEUTROPHILS # BLD AUTO: 2.3 K/UL
NEUTROPHILS NFR BLD: 43.3 %
NONHDLC SERPL-MCNC: 110 MG/DL
PLATELET # BLD AUTO: 225 K/UL
PMV BLD AUTO: 11.3 FL
POTASSIUM SERPL-SCNC: 4.2 MMOL/L
PROT SERPL-MCNC: 7.3 G/DL
RBC # BLD AUTO: 4.5 M/UL
SODIUM SERPL-SCNC: 140 MMOL/L
TRIGL SERPL-MCNC: 116 MG/DL
TSH SERPL DL<=0.005 MIU/L-ACNC: 0.64 UIU/ML
WBC # BLD AUTO: 5.23 K/UL

## 2017-07-03 PROCEDURE — 80061 LIPID PANEL: CPT

## 2017-07-03 PROCEDURE — 84443 ASSAY THYROID STIM HORMONE: CPT

## 2017-07-03 PROCEDURE — 85025 COMPLETE CBC W/AUTO DIFF WBC: CPT

## 2017-07-03 PROCEDURE — 36415 COLL VENOUS BLD VENIPUNCTURE: CPT

## 2017-07-03 PROCEDURE — 80053 COMPREHEN METABOLIC PANEL: CPT

## 2017-07-05 RX ORDER — ATORVASTATIN CALCIUM 20 MG/1
TABLET, FILM COATED ORAL
Qty: 30 TABLET | Refills: 11 | Status: SHIPPED | OUTPATIENT
Start: 2017-07-05 | End: 2018-07-07 | Stop reason: SDUPTHER

## 2017-07-06 ENCOUNTER — OFFICE VISIT (OUTPATIENT)
Dept: PODIATRY | Facility: CLINIC | Age: 57
End: 2017-07-06
Payer: COMMERCIAL

## 2017-07-06 VITALS
BODY MASS INDEX: 47.29 KG/M2 | DIASTOLIC BLOOD PRESSURE: 77 MMHG | HEART RATE: 71 BPM | SYSTOLIC BLOOD PRESSURE: 128 MMHG | WEIGHT: 257 LBS | HEIGHT: 62 IN

## 2017-07-06 DIAGNOSIS — B07.0 PLANTAR WART OF BOTH FEET: Primary | ICD-10-CM

## 2017-07-06 PROCEDURE — 99499 UNLISTED E&M SERVICE: CPT | Mod: S$GLB,,, | Performed by: PODIATRIST

## 2017-07-06 PROCEDURE — 17000 DESTRUCT PREMALG LESION: CPT | Mod: S$GLB,,, | Performed by: PODIATRIST

## 2017-07-06 PROCEDURE — 17003 DESTRUCT PREMALG LES 2-14: CPT | Mod: S$GLB,,, | Performed by: PODIATRIST

## 2017-07-06 PROCEDURE — 99999 PR PBB SHADOW E&M-EST. PATIENT-LVL III: CPT | Mod: PBBFAC,,, | Performed by: PODIATRIST

## 2017-07-06 RX ORDER — TRAMADOL HYDROCHLORIDE 50 MG/1
50 TABLET ORAL EVERY 6 HOURS PRN
Qty: 30 TABLET | Refills: 0 | Status: SHIPPED | OUTPATIENT
Start: 2017-07-06 | End: 2017-07-16

## 2017-07-06 NOTE — LETTER
July 8, 2017      Sivakumar Wing MD  [address]  [address]  [facility] [address]           [facility]  [address] [facility] [address]  Phone: [phone]          Patient: Sonya Armendariz   MR Number: [id number]   YOB: 1960   Date of Visit: 7/6/2017       Dear Dr. Sivakumar Wing:    Thank you for referring Sonya Armendariz to me for evaluation. Attached you will find relevant portions of my assessment and plan of care.    If you have questions, please do not hesitate to call me. I look forward to following Sonya Armendariz along with you.    Sincerely,    Alfredo Ramirez, DPTRESSA    Enclosure  CC:  No Recipients    If you would like to receive this communication electronically, please contact [email or web address] or [phone] to request more information on [employer] Link access.    For providers and/or their staff who would like to refer a patient to [facility], please contact us through our one-stop-shop provider referral line, Yoseph Patterson, at [phone].    If you feel you have received this communication in error or would no longer like to receive these types of communications, please e-mail [email or web address]

## 2017-07-08 NOTE — PROGRESS NOTES
"Subjective:      Patient ID: Sonya Armendariz is a 56 y.o. female.    Chief Complaint: Plantar Warts    C/O painful raised calluses on both feet present since 2005 that she routinely shaves down herself for some relief. History of excision per a Podiatrist many years ago, however she states the lesions recurred and got larger. Denies trauma.     7/6/17: Returns for Candida injections to both her feet. No new complaints. Says pain significantly improved since the lesions were trimmed.    Vitals:    07/06/17 0923   BP: 128/77   Pulse: 71   Weight: 116.6 kg (257 lb)   Height: 5' 2" (1.575 m)   PainSc: 0-No pain      Past Medical History:   Diagnosis Date    CAD (coronary artery disease)     s/p LAD stent    Gastroduodenitis     Helicobacter pylori (H. pylori) infection     Hyperlipidemia     Hypertension     MI (myocardial infarction)     x2    Obesity     Tobacco use     Urinary tract infection     Vaginal infection        Past Surgical History:   Procedure Laterality Date    NO PAST SURGERIES         Family History   Problem Relation Age of Onset    Hypertension Other      multiple    Diabetes Other      multiple    Stomach cancer Mother     Cancer Father      unknown    Breast cancer Sister     Throat cancer Brother     Cancer Brother      x 2,. unknown    Coronary artery disease Cousin        Social History     Social History    Marital status:      Spouse name: N/A    Number of children: N/A    Years of education: N/A     Occupational History     [employer]     Social History Main Topics    Smoking status: Current Every Day Smoker     Packs/day: 1.00     Years: 40.00     Types: Cigarettes     Last attempt to quit: 6/28/2016    Smokeless tobacco: Never Used    Alcohol use No    Drug use: No    Sexual activity: Yes     Partners: Male     Other Topics Concern    None     Social History Narrative    None       Current Outpatient Prescriptions   Medication " Sig Dispense Refill    aspirin (ECOTRIN) 81 MG EC tablet Take 81 mg by mouth once daily.        atorvastatin (LIPITOR) 20 MG tablet TAKE 1 TABLET BY MOUTH EVERY DAY 30 tablet 11    fluticasone (FLONASE) 50 mcg/actuation nasal spray SPRAY ONCE IN EACH NOSTRIL EVERY DAY 16 g 3    losartan-hydrochlorothiazide 50-12.5 mg (HYZAAR) 50-12.5 mg per tablet Take 1 tablet by mouth once daily. 30 tablet 6    meclizine (ANTIVERT) 25 mg tablet Take 1 tablet (25 mg total) by mouth 2 (two) times daily as needed for Dizziness. 20 tablet 0    metoprolol succinate (TOPROL-XL) 25 MG 24 hr tablet Take 1 tablet (25 mg total) by mouth once daily. 30 tablet 11    multivitamin (THERAGRAN) per tablet Take 1 tablet by mouth once daily.      NITROSTAT 0.4 mg SL tablet PLACE 1 TABLET UNDER THE TONGUE EVERY 5 MINUTES AS NEEDED 25 tablet 0    oxybutynin (DITROPAN) 5 MG Tab Take 1 tablet (5 mg total) by mouth 3 (three) times daily. 90 tablet 0    polyethylene glycol (GOLYTELY,NULYTELY) 236-22.74-6.74 -5.86 gram suspension Take 4,000 mLs (4 L total) by mouth As instructed. 4000 mL 0    rabeprazole (ACIPHEX) 20 mg tablet Take 1 tablet (20 mg total) by mouth once daily. 30 tablet 11    sucralfate (CARAFATE) 1 gram tablet TAKE 1 TABLET BY MOUTH FOUR TIMES DAILY 120 tablet 1    tramadol (ULTRAM) 50 mg tablet Take 1 tablet (50 mg total) by mouth every 6 (six) hours as needed for Pain. 30 tablet 0     No current facility-administered medications for this visit.        Review of patient's allergies indicates:   Allergen Reactions    Amoxicillin Diarrhea    Plavix [clopidogrel]          Review of Systems   Constitution: Negative for chills, fever, weakness and malaise/fatigue.   Cardiovascular: Negative for chest pain, claudication and leg swelling.   Respiratory: Negative for cough and shortness of breath.    Skin: Positive for dry skin. Negative for itching, poor wound healing and rash.   Musculoskeletal: Negative for back pain, joint  pain, muscle cramps and muscle weakness.   Gastrointestinal: Negative for nausea and vomiting.   Neurological: Negative for numbness and paresthesias.   Psychiatric/Behavioral: Negative for altered mental status.           Objective:      Physical Exam   Constitutional: She is oriented to person, place, and time. No distress.   Cardiovascular:   Pulses:       Dorsalis pedis pulses are 2+ on the right side, and 2+ on the left side.        Posterior tibial pulses are 2+ on the right side, and 2+ on the left side.   CFT< 3 secs all toes bilateral foot, skin temp warm bilateral foot, diminished digital hair growth bilateral foot, no lower extremity edema bilateral.     Musculoskeletal:   Mild forefoot valgus with plantar flexed first metatarsal bilateral. Supinated foot structure bilateral.    Localized pain on palpation of raised hyperkeratotic lesions x 3 left foot and plantar right fifth met head.   Neurological: She is alert and oriented to person, place, and time. She has normal strength. Gait normal.   Skin: Skin is warm, dry and intact. Capillary refill takes less than 2 seconds. No ecchymosis and no rash noted. She is not diaphoretic. No cyanosis or erythema. Nails show no clubbing.   Raised hyperkeratotic lesions plantar first and fifth metatarsal heads and hallux left foot, and plantar right fifth met head with disappearance of RSTLs, spongy core and pain during squeeze test.    Hyperkeratotic skin with maintenance of RSTLs plantar right hallux and no pain.    No open lesions or macerations bilateral lower extremity.                         Assessment:       Encounter Diagnosis   Name Primary?    Plantar wart of both feet Yes         Plan:       Osnya was seen today for plantar warts.    Diagnoses and all orders for this visit:    Plantar wart of both feet    Other orders  -     tramadol (ULTRAM) 50 mg tablet; Take 1 tablet (50 mg total) by mouth every 6 (six) hours as needed for Pain.      I counseled  the patient on her conditions, their implications and medical management.    With the patient's verbal consent the skin overlying each lesion was prepped with alcohol followed by anesthesia with ethyl chloride. 0.1 mg Candida was injected to the SQ deep to each of the lesions described above. She tolerated the procedure well without complication. Band aids were applied over each site.    Discussed good foot hygiene and avoiding barefoot walking, treating her shoe gear with Lysol.    RTC 4 weeks or prn.      .

## 2017-07-10 RX ORDER — LOSARTAN POTASSIUM AND HYDROCHLOROTHIAZIDE 12.5; 5 MG/1; MG/1
1 TABLET ORAL DAILY
Qty: 30 TABLET | Refills: 0 | Status: SHIPPED | OUTPATIENT
Start: 2017-07-10 | End: 2017-08-13 | Stop reason: SDUPTHER

## 2017-07-14 ENCOUNTER — ANESTHESIA EVENT (OUTPATIENT)
Dept: ENDOSCOPY | Facility: HOSPITAL | Age: 57
End: 2017-07-14
Payer: COMMERCIAL

## 2017-07-14 ENCOUNTER — TELEPHONE (OUTPATIENT)
Dept: GASTROENTEROLOGY | Facility: CLINIC | Age: 57
End: 2017-07-14

## 2017-07-14 ENCOUNTER — SURGERY (OUTPATIENT)
Age: 57
End: 2017-07-14

## 2017-07-14 ENCOUNTER — HOSPITAL ENCOUNTER (OUTPATIENT)
Facility: HOSPITAL | Age: 57
Discharge: HOME | End: 2017-07-14
Attending: INTERNAL MEDICINE | Admitting: INTERNAL MEDICINE
Payer: COMMERCIAL

## 2017-07-14 ENCOUNTER — ANESTHESIA (OUTPATIENT)
Dept: ENDOSCOPY | Facility: HOSPITAL | Age: 57
End: 2017-07-14
Payer: COMMERCIAL

## 2017-07-14 VITALS
RESPIRATION RATE: 21 BRPM | HEIGHT: 62 IN | TEMPERATURE: 98 F | OXYGEN SATURATION: 98 % | WEIGHT: 250 LBS | SYSTOLIC BLOOD PRESSURE: 150 MMHG | BODY MASS INDEX: 46.01 KG/M2 | DIASTOLIC BLOOD PRESSURE: 72 MMHG | HEART RATE: 67 BPM

## 2017-07-14 DIAGNOSIS — R10.11 ABDOMINAL PAIN, RUQ (RIGHT UPPER QUADRANT): Primary | ICD-10-CM

## 2017-07-14 DIAGNOSIS — R10.9 ABDOMINAL PAIN, UNSPECIFIED LOCATION: Primary | ICD-10-CM

## 2017-07-14 DIAGNOSIS — R11.0 NAUSEA: ICD-10-CM

## 2017-07-14 PROCEDURE — 88305 TISSUE EXAM BY PATHOLOGIST: CPT | Mod: 26,,, | Performed by: PATHOLOGY

## 2017-07-14 PROCEDURE — 25000003 PHARM REV CODE 250: Performed by: NURSE ANESTHETIST, CERTIFIED REGISTERED

## 2017-07-14 PROCEDURE — 63600175 PHARM REV CODE 636 W HCPCS: Performed by: NURSE ANESTHETIST, CERTIFIED REGISTERED

## 2017-07-14 PROCEDURE — 37000009 HC ANESTHESIA EA ADD 15 MINS: Performed by: INTERNAL MEDICINE

## 2017-07-14 PROCEDURE — 27201012 HC FORCEPS, HOT/COLD, DISP: Performed by: INTERNAL MEDICINE

## 2017-07-14 PROCEDURE — 37000008 HC ANESTHESIA 1ST 15 MINUTES: Performed by: INTERNAL MEDICINE

## 2017-07-14 PROCEDURE — 88305 TISSUE EXAM BY PATHOLOGIST: CPT | Performed by: PATHOLOGY

## 2017-07-14 PROCEDURE — 43235 EGD DIAGNOSTIC BRUSH WASH: CPT | Mod: 51,,, | Performed by: INTERNAL MEDICINE

## 2017-07-14 PROCEDURE — 45380 COLONOSCOPY AND BIOPSY: CPT | Mod: 33,,, | Performed by: INTERNAL MEDICINE

## 2017-07-14 PROCEDURE — 25000003 PHARM REV CODE 250: Performed by: INTERNAL MEDICINE

## 2017-07-14 PROCEDURE — 43235 EGD DIAGNOSTIC BRUSH WASH: CPT

## 2017-07-14 PROCEDURE — 45380 COLONOSCOPY AND BIOPSY: CPT | Performed by: INTERNAL MEDICINE

## 2017-07-14 RX ORDER — LIDOCAINE HCL/PF 100 MG/5ML
SYRINGE (ML) INTRAVENOUS
Status: DISCONTINUED | OUTPATIENT
Start: 2017-07-14 | End: 2017-07-14

## 2017-07-14 RX ORDER — PROPOFOL 10 MG/ML
VIAL (ML) INTRAVENOUS
Status: DISCONTINUED | OUTPATIENT
Start: 2017-07-14 | End: 2017-07-14

## 2017-07-14 RX ORDER — PROPOFOL 10 MG/ML
VIAL (ML) INTRAVENOUS CONTINUOUS PRN
Status: DISCONTINUED | OUTPATIENT
Start: 2017-07-14 | End: 2017-07-14

## 2017-07-14 RX ORDER — SODIUM CHLORIDE 9 MG/ML
INJECTION, SOLUTION INTRAVENOUS CONTINUOUS
Status: DISCONTINUED | OUTPATIENT
Start: 2017-07-14 | End: 2017-07-14 | Stop reason: HOSPADM

## 2017-07-14 RX ADMIN — PROPOFOL 150 MCG/KG/MIN: 10 INJECTION, EMULSION INTRAVENOUS at 02:07

## 2017-07-14 RX ADMIN — TOPICAL ANESTHETIC 1 EACH: 200 SPRAY DENTAL; PERIODONTAL at 02:07

## 2017-07-14 RX ADMIN — LIDOCAINE HYDROCHLORIDE 50 MG: 20 INJECTION, SOLUTION INTRAVENOUS at 02:07

## 2017-07-14 RX ADMIN — PROPOFOL 100 MG: 10 INJECTION, EMULSION INTRAVENOUS at 02:07

## 2017-07-14 RX ADMIN — SODIUM CHLORIDE: 0.9 INJECTION, SOLUTION INTRAVENOUS at 02:07

## 2017-07-14 NOTE — H&P (VIEW-ONLY)
(Portions of this note were dictated using voice recognition software and may contain dictation related errors in spelling/grammar/syntax not found on text review)    CC:   Chief Complaint   Patient presents with    Annual Exam       HPI: 56 y.o. female LOV with me over 2 years ago.  She presents for an annual exam today but has separate concerns noted in the attached evaluation and management note.  Chronic health conditions are as follows:    CAD: followed by cardiology, LOV with cardiology was last year.  Therapy as below.  Continues to smoke.  Had enrolled in smoking cessation clinic, she did quit for some time but resumed because of some stresses    Hypertension on metoprolol 25 mg daily plus Hyzaar 50/12.5 daily    Hyperlipidemia on Lipitor 20 mg daily    GERD with gastroduodenitis noted on prior testing.  Saw GI in April of this year.  On AcipHex 20 mg daily.  EGD is to be done later this year along with colonoscopy      Past Medical History:   Diagnosis Date    CAD (coronary artery disease)     s/p LAD stent    Gastroduodenitis     Helicobacter pylori (H. pylori) infection     Hyperlipidemia     Hypertension     MI (myocardial infarction)     x2    Obesity     Tobacco use     Urinary tract infection     Vaginal infection        Past Surgical History:   Procedure Laterality Date    NO PAST SURGERIES         Family History   Problem Relation Age of Onset    Hypertension Other      multiple    Diabetes Other      multiple    Stomach cancer Mother     Cancer Father      unknown    Breast cancer Sister     Throat cancer Brother     Cancer Brother      x 2,. unknown    Coronary artery disease Cousin        Social History     Social History    Marital status:      Spouse name: N/A    Number of children: N/A    Years of education: N/A     Occupational History     [employer]     Social History Main Topics    Smoking status: Current Every Day Smoker     Packs/day:  1.00     Years: 40.00     Types: Cigarettes     Last attempt to quit: 6/28/2016    Smokeless tobacco: Never Used    Alcohol use No    Drug use: No    Sexual activity: Yes     Partners: Male     Other Topics Concern    Not on file     Social History Narrative    No narrative on file         HEALTH SCREENINGS  Immunizations:  Tdap 2012     Age/Gender Appropriate screenings:  MMG 7/20/16  GYN:  Pap 2013 Dr. Phelan: benign, trich noted.     Colonoscopy: 7/13/12, 6mm polyp ret 5 yrs.    Lab Results   Component Value Date    WBC 5.74 04/11/2017    HGB 14.7 04/11/2017    HCT 42.5 04/11/2017     04/11/2017    CHOL 218 (H) 06/12/2015    TRIG 156 (H) 06/12/2015    HDL 40 06/12/2015    ALT 13 04/11/2017    AST 18 04/11/2017     04/11/2017    K 4.0 04/11/2017     04/11/2017    CREATININE 0.9 04/11/2017    BUN 9 04/11/2017    CO2 25 04/11/2017    TSH 0.967 06/12/2015    INR 1.0 08/02/2011    HGBA1C 6.1 06/12/2015    LDLCALC 146.8 06/12/2015    GLU 90 04/11/2017         Vital signs reviewed  PE:   APPEARANCE: Well nourished, well developed, in no acute distress.    HEAD: Normocephalic, atraumatic.  EYES: PERRL. EOMI.   Conjunctivae noninjected.  EARS: TM's intact. Light reflex normal. No retraction or perforation.    NOSE: Mucosa pink. Airway clear.  MOUTH & THROAT: No tonsillar enlargement. No pharyngeal erythema or exudate.   NECK: Supple with no cervical lymphadenopathy.  No carotid bruits.  No thyromegaly  CHEST: Good inspiratory effort. Lungs clear to auscultation with no wheezes or crackles.  CARDIOVASCULAR: Normal S1, S2. No rubs, murmurs, or gallops.  ABDOMEN: Bowel sounds normal. Not distended. Soft. No tenderness or masses. No organomegaly.  EXTREMITIES: Trace pitting edema bilateral lower extremities      IMPRESSION  Routine medical exam  CAD  Hypertension  Hyperlipidemia    PLAN   on healthy diet, regular exercise, weight loss  CBC, CMP, lipid, TSH ordered for screening  purposes  Blood pressure controlled.  Continue current therapy  Lipids: Check labs  Maintain routine eye doctor visits and dental visits  Encourage repeat follow-up with tobacco cessation clinic to help with smoking cessation  Reviewed health maintenance with patient.  Immunizations as above.  Her last Pap smear was over 3 years ago.  Discussed current Pap smear guidelines.  She will return in the next 3 months for repeat Pap smear.  Colonoscopy is being set up for this year.  Please see attached evaluation and management note for further details on acute symptomology

## 2017-07-14 NOTE — INTERVAL H&P NOTE
The patient has been examined and the H&P has been reviewed:    I concur with the findings and no changes have occurred since H&P was written.    Anesthesia/Surgery risks, benefits and alternative options discussed and understood by patient/family.          Active Hospital Problems    Diagnosis  POA    Nausea [R11.0]  Yes      Resolved Hospital Problems    Diagnosis Date Resolved POA   No resolved problems to display.

## 2017-07-14 NOTE — ANESTHESIA PREPROCEDURE EVALUATION
07/14/2017  Sonya Armendariz is a 56 y.o., female for EGD and colonoscopy under MAC. Pt is morbidly obese with sleep apnea.     Anesthesia Evaluation     I have reviewed the Nursing Notes.   I have reviewed the Medications.     Review of Systems  Anesthesia Hx:  No problems with previous Anesthesia  Denies Personal Hx of Anesthesia complications.   Social:  Smoker, Non-Smoker    Cardiovascular:   Exercise tolerance: good Hypertension Past MI (2010) CAD asymptomatic CABG/stent (stents X 2 in 2010)  Angina (stable) CHF (hx, nml EF last echo) hyperlipidemia ECG has been reviewed.    Pulmonary:   Sleep Apnea, CPAP        Physical Exam  General:  Morbid Obesity       Chest/Lungs:  Chest/Lungs Clear    Heart/Vascular:  Heart Findings: Normal          Lab Results   Component Value Date    WBC 5.23 07/03/2017    HGB 13.9 07/03/2017    HCT 41.3 07/03/2017     07/03/2017    CHOL 145 07/03/2017    TRIG 116 07/03/2017    HDL 35 (L) 07/03/2017    ALT 12 07/03/2017    AST 17 07/03/2017     07/03/2017    K 4.2 07/03/2017     07/03/2017    CREATININE 0.9 07/03/2017    BUN 9 07/03/2017    CO2 24 07/03/2017    TSH 0.636 07/03/2017    INR 1.0 08/02/2011    HGBA1C 6.1 06/12/2015     Normal sinus rhythm  Normal ECG  When compared with ECG of 24-JUL-2015 09:03,  No significant change was found  Confirmed by David PRECIADO, Paulino ARMIJO ([id number]) on 6/29/2017 4:57:04 PM    ECHO CONCLUSIONS     1 - Normal left ventricular systolic function (EF 60-65%).     2 - Normal left ventricular diastolic function.     3 - Normal right ventricular systolic function .     4 - Concentric remodeling.   This document has been electronically    SIGNED BY: Doris Coleman MD On: 07/20/2016 09:42    Anesthesia Plan  Type of Anesthesia, risks & benefits discussed:  Anesthesia Type:  MAC  Patient's Preference:   Intra-op Monitoring Plan:    Intra-op Monitoring Plan Comments:   Post Op Pain Control Plan:   Post Op Pain Control Plan Comments:   Induction:    Beta Blocker:  Patient is on a Beta-Blocker and has received one dose within the past 24 hours (No further documentation required).       Informed Consent: Patient understands risks and agrees with Anesthesia plan.  Questions answered. Anesthesia consent signed with patient.  ASA Score: 3     Day of Surgery Review of History & Physical:            Ready For Surgery From Anesthesia Perspective.

## 2017-07-14 NOTE — ANESTHESIA POSTPROCEDURE EVALUATION
"Anesthesia Post Evaluation    Patient: Sonya Armendariz    Procedure(s) Performed: Procedure(s) (LRB):  COLONOSCOPY (N/A)  ESOPHAGOGASTRODUODENOSCOPY (EGD) (N/A)    Final Anesthesia Type: MAC  Patient location during evaluation: GI PACU  Patient participation: Yes- Able to Participate  Level of consciousness: oriented and awake and alert  Post-procedure vital signs: reviewed and stable  Pain management: adequate  Airway patency: patent  PONV status at discharge: No PONV  Anesthetic complications: no      Cardiovascular status: blood pressure returned to baseline  Respiratory status: unassisted, spontaneous ventilation and room air  Hydration status: euvolemic  Follow-up not needed.        Visit Vitals  BP (!) 140/66   Pulse 71   Temp 36.9 °C (98.5 °F)   Resp 18   Ht 5' 2" (1.575 m)   Wt 113.4 kg (250 lb)   LMP 06/30/2015   SpO2 96%   Breastfeeding? No   BMI 45.73 kg/m²       Pain/Keely Score: Pain Assessment Performed: Yes (7/14/2017 12:40 PM)  Presence of Pain: denies (7/14/2017 12:40 PM)  Pain Rating Prior to Med Admin: 0 (7/14/2017 12:40 PM)  Pain Rating Post Med Admin: 0 (7/14/2017 12:40 PM)      "

## 2017-07-14 NOTE — TRANSFER OF CARE
"Anesthesia Transfer of Care Note    Patient: Sonya Armendariz    Procedure(s) Performed: Procedure(s) (LRB):  COLONOSCOPY (N/A)  ESOPHAGOGASTRODUODENOSCOPY (EGD) (N/A)    Patient location: GI    Anesthesia Type: MAC    Transport from OR: Transported from OR on room air with adequate spontaneous ventilation    Post pain: adequate analgesia    Post assessment: no apparent anesthetic complications    Post vital signs: stable    Level of consciousness: awake and alert    Nausea/Vomiting: no nausea/vomiting    Complications: none          Last vitals:   Visit Vitals  BP (!) 140/66   Pulse 71   Temp 36.9 °C (98.5 °F)   Resp 18   Ht 5' 2" (1.575 m)   Wt 113.4 kg (250 lb)   LMP 06/30/2015   SpO2 96%   Breastfeeding? No   BMI 45.73 kg/m²     "

## 2017-07-24 ENCOUNTER — PATIENT MESSAGE (OUTPATIENT)
Dept: GASTROENTEROLOGY | Facility: CLINIC | Age: 57
End: 2017-07-24

## 2017-08-03 ENCOUNTER — CLINICAL SUPPORT (OUTPATIENT)
Dept: SMOKING CESSATION | Facility: CLINIC | Age: 57
End: 2017-08-03
Payer: COMMERCIAL

## 2017-08-03 DIAGNOSIS — F17.200 NICOTINE DEPENDENCE: Primary | ICD-10-CM

## 2017-08-03 PROCEDURE — 99407 BEHAV CHNG SMOKING > 10 MIN: CPT | Mod: S$GLB,,, | Performed by: INTERNAL MEDICINE

## 2017-08-13 RX ORDER — OXYBUTYNIN CHLORIDE 5 MG/1
TABLET ORAL
Qty: 90 TABLET | Refills: 0 | OUTPATIENT
Start: 2017-08-13

## 2017-08-13 RX ORDER — LOSARTAN POTASSIUM AND HYDROCHLOROTHIAZIDE 12.5; 5 MG/1; MG/1
1 TABLET ORAL DAILY
Qty: 30 TABLET | Refills: 0 | Status: SHIPPED | OUTPATIENT
Start: 2017-08-13 | End: 2017-09-10 | Stop reason: SDUPTHER

## 2017-08-16 ENCOUNTER — TELEPHONE (OUTPATIENT)
Dept: FAMILY MEDICINE | Facility: CLINIC | Age: 57
End: 2017-08-16

## 2017-08-16 DIAGNOSIS — Z12.39 BREAST SCREENING: Primary | ICD-10-CM

## 2017-08-16 RX ORDER — OXYBUTYNIN CHLORIDE 5 MG/1
5 TABLET ORAL 3 TIMES DAILY
Qty: 90 TABLET | Refills: 1 | Status: SHIPPED | OUTPATIENT
Start: 2017-08-16 | End: 2017-12-18 | Stop reason: SDUPTHER

## 2017-08-16 NOTE — TELEPHONE ENCOUNTER
----- Message from Kaia Greene sent at 8/16/2017 10:04 AM CDT -----  No. [phone]   Patient has an appointment with Dr. Vaz on Tuesday, 9/19/17.  She would like to have a mammo done on the same day at 7:00am.   She also has a question about her bladder medication.   Please call.

## 2017-08-16 NOTE — TELEPHONE ENCOUNTER
----- Message from Kasandra Grant sent at 8/16/2017  1:51 PM CDT -----  Contact: Self [phone]  Patient Returning Your Phone Call

## 2017-09-06 ENCOUNTER — TELEPHONE (OUTPATIENT)
Dept: PODIATRY | Facility: CLINIC | Age: 57
End: 2017-09-06

## 2017-09-06 NOTE — TELEPHONE ENCOUNTER
----- Message from Mila Turner sent at 9/6/2017 12:55 PM CDT -----  Contact: [phone]/ self   Patient called in requesting to speak with you. Did not specify why. Please advise.

## 2017-09-08 ENCOUNTER — OFFICE VISIT (OUTPATIENT)
Dept: PODIATRY | Facility: CLINIC | Age: 57
End: 2017-09-08
Payer: COMMERCIAL

## 2017-09-08 VITALS
HEART RATE: 67 BPM | DIASTOLIC BLOOD PRESSURE: 79 MMHG | SYSTOLIC BLOOD PRESSURE: 123 MMHG | HEIGHT: 62 IN | BODY MASS INDEX: 46.01 KG/M2 | WEIGHT: 250 LBS

## 2017-09-08 DIAGNOSIS — M79.671 FOOT PAIN, BILATERAL: ICD-10-CM

## 2017-09-08 DIAGNOSIS — B07.0 PLANTAR WART OF BOTH FEET: Primary | ICD-10-CM

## 2017-09-08 DIAGNOSIS — M79.672 FOOT PAIN, BILATERAL: ICD-10-CM

## 2017-09-08 PROCEDURE — 99999 PR PBB SHADOW E&M-EST. PATIENT-LVL III: CPT | Mod: PBBFAC,,, | Performed by: PODIATRIST

## 2017-09-08 PROCEDURE — 17000 DESTRUCT PREMALG LESION: CPT | Mod: S$GLB,,, | Performed by: PODIATRIST

## 2017-09-08 PROCEDURE — 3078F DIAST BP <80 MM HG: CPT | Mod: S$GLB,,, | Performed by: PODIATRIST

## 2017-09-08 PROCEDURE — 3008F BODY MASS INDEX DOCD: CPT | Mod: S$GLB,,, | Performed by: PODIATRIST

## 2017-09-08 PROCEDURE — 99213 OFFICE O/P EST LOW 20 MIN: CPT | Mod: 25,S$GLB,, | Performed by: PODIATRIST

## 2017-09-08 PROCEDURE — 17003 DESTRUCT PREMALG LES 2-14: CPT | Mod: S$GLB,,, | Performed by: PODIATRIST

## 2017-09-08 PROCEDURE — 3074F SYST BP LT 130 MM HG: CPT | Mod: S$GLB,,, | Performed by: PODIATRIST

## 2017-09-09 NOTE — PROGRESS NOTES
"Subjective:      Patient ID: Sonya Armendariz is a 57 y.o. female.    Chief Complaint: Follow-up    C/O painful raised calluses on both feet present since 2005 that she routinely shaves down herself for some relief. History of excision per a Podiatrist many years ago, however she states the lesions recurred and got larger. Denies trauma.     7/6/17: Returns for Candida injections to both her feet. No new complaints. Says pain significantly improved since the lesions were trimmed.    9/8/17: Returns for candida injections B/L feet. Reports improvement in pain to lesions x4 B/L feet.     Vitals:    09/08/17 1359   BP: 123/79   Pulse: 67   Weight: 113.4 kg (250 lb)   Height: 5' 2" (1.575 m)   PainSc:   8   PainLoc: Foot      Past Medical History:   Diagnosis Date    Anticoagulant long-term use     CAD (coronary artery disease)     s/p LAD stent    CHF (congestive heart failure)     Gastroduodenitis     Helicobacter pylori (H. pylori) infection     Hyperlipidemia     Hypertension     MI (myocardial infarction)     x2    Obesity     Thyroid disease     Tobacco use     Urinary tract infection     Vaginal infection        Past Surgical History:   Procedure Laterality Date    COLONOSCOPY N/A 7/14/2017    Procedure: COLONOSCOPY;  Surgeon: Cathy Collier MD;  [facility];  Service: Endoscopy;  Laterality: N/A;    NO PAST SURGERIES         Family History   Problem Relation Age of Onset    Hypertension Other      multiple    Diabetes Other      multiple    Stomach cancer Mother     Cancer Father      unknown    Breast cancer Sister     Throat cancer Brother     Cancer Brother      x 2,. unknown    Coronary artery disease Cousin        Social History     Social History    Marital status:      Spouse name: N/A    Number of children: N/A    Years of education: N/A     Occupational History     [employer]     Social History Main Topics    Smoking status: " Current Every Day Smoker     Packs/day: 1.00     Years: 40.00     Types: Cigarettes     Last attempt to quit: 6/28/2016    Smokeless tobacco: Never Used    Alcohol use No    Drug use: No    Sexual activity: Yes     Partners: Male     Other Topics Concern    None     Social History Narrative    None       Current Outpatient Prescriptions   Medication Sig Dispense Refill    aspirin (ECOTRIN) 81 MG EC tablet Take 81 mg by mouth once daily.        atorvastatin (LIPITOR) 20 MG tablet TAKE 1 TABLET BY MOUTH EVERY DAY 30 tablet 11    fluticasone (FLONASE) 50 mcg/actuation nasal spray SPRAY ONCE IN EACH NOSTRIL EVERY DAY 16 g 3    losartan-hydrochlorothiazide 50-12.5 mg (HYZAAR) 50-12.5 mg per tablet TAKE 1 TABLET BY MOUTH ONCE DAILY 30 tablet 0    meclizine (ANTIVERT) 25 mg tablet Take 1 tablet (25 mg total) by mouth 2 (two) times daily as needed for Dizziness. 20 tablet 0    metoprolol succinate (TOPROL-XL) 25 MG 24 hr tablet Take 1 tablet (25 mg total) by mouth once daily. 30 tablet 11    multivitamin (THERAGRAN) per tablet Take 1 tablet by mouth once daily.      NITROSTAT 0.4 mg SL tablet PLACE 1 TABLET UNDER THE TONGUE EVERY 5 MINUTES AS NEEDED 25 tablet 0    oxybutynin (DITROPAN) 5 MG Tab Take 1 tablet (5 mg total) by mouth 3 (three) times daily. 90 tablet 1    polyethylene glycol (GOLYTELY,NULYTELY) 236-22.74-6.74 -5.86 gram suspension Take 4,000 mLs (4 L total) by mouth As instructed. 4000 mL 0    rabeprazole (ACIPHEX) 20 mg tablet Take 1 tablet (20 mg total) by mouth once daily. 30 tablet 11    sucralfate (CARAFATE) 1 gram tablet TAKE 1 TABLET BY MOUTH FOUR TIMES DAILY 120 tablet 1     No current facility-administered medications for this visit.        Review of patient's allergies indicates:   Allergen Reactions    Amoxicillin Diarrhea    Plavix [clopidogrel]          Review of Systems   Constitution: Negative for chills, fever, weakness and malaise/fatigue.   Cardiovascular: Negative for chest  pain, claudication and leg swelling.   Respiratory: Negative for cough and shortness of breath.    Skin: Positive for dry skin. Negative for itching, poor wound healing and rash.   Musculoskeletal: Negative for back pain, joint pain, muscle cramps and muscle weakness.   Gastrointestinal: Negative for nausea and vomiting.   Neurological: Negative for numbness and paresthesias.   Psychiatric/Behavioral: Negative for altered mental status.           Objective:      Physical Exam   Constitutional: She is oriented to person, place, and time. No distress.   Cardiovascular:   Pulses:       Dorsalis pedis pulses are 2+ on the right side, and 2+ on the left side.        Posterior tibial pulses are 2+ on the right side, and 2+ on the left side.   CFT< 3 secs all toes bilateral foot, skin temp warm bilateral foot, diminished digital hair growth bilateral foot, no lower extremity edema bilateral.     Musculoskeletal:   Mild forefoot valgus with plantar flexed first metatarsal bilateral. Supinated foot structure bilateral.    Localized pain on palpation of raised hyperkeratotic lesions x 3 left foot and plantar right fifth met head.   Neurological: She is alert and oriented to person, place, and time. She has normal strength. Gait normal.   Skin: Skin is warm, dry and intact. Capillary refill takes less than 2 seconds. No ecchymosis and no rash noted. She is not diaphoretic. No cyanosis or erythema. Nails show no clubbing.   Raised hyperkeratotic lesions plantar first and fifth metatarsal heads and hallux left foot, and plantar right fifth met head with disappearance of RSTLs, spongy core and pain during squeeze test.    Hyperkeratotic skin with maintenance of RSTLs plantar right hallux and no pain.    No open lesions or macerations bilateral lower extremity.                         Assessment:       Encounter Diagnoses   Name Primary?    Plantar wart of both feet Yes    Foot pain, bilateral          Plan:       Sonya was  seen today for follow-up.    Diagnoses and all orders for this visit:    Plantar wart of both feet    Foot pain, bilateral      I counseled the patient on her conditions, their implications and medical management.    With patient's permission a #15 blade was used to trim calluses x4 revealing plantar warts.    With the patient's verbal consent the skin overlying each lesion was prepped with alcohol followed by anesthesia with ethyl chloride. 0.1 mg 1:1 mixture with 1% lidocaine plain. Candida was injected to the SQ deep to each of the lesions (x4) described above. She tolerated the procedure well without complication. Band aids were applied over each site.    Discussed good foot hygiene and avoiding barefoot walking, treating her shoe gear with Lysol.    RTC 4 weeks or prn.      .Cherise Nova DPM PGY-3    I have personally taken the history and examined this patient and agree with the resident's note as stated as above.   Alfredo Ramirez DPM, FACFAS

## 2017-09-10 RX ORDER — LOSARTAN POTASSIUM AND HYDROCHLOROTHIAZIDE 12.5; 5 MG/1; MG/1
1 TABLET ORAL DAILY
Qty: 30 TABLET | Refills: 2 | Status: SHIPPED | OUTPATIENT
Start: 2017-09-10 | End: 2018-01-18 | Stop reason: SDUPTHER

## 2017-10-02 RX ORDER — METOPROLOL SUCCINATE 25 MG/1
TABLET, EXTENDED RELEASE ORAL
Qty: 30 TABLET | Refills: 0 | Status: SHIPPED | OUTPATIENT
Start: 2017-10-02 | End: 2017-11-08 | Stop reason: SDUPTHER

## 2017-10-04 NOTE — PROGRESS NOTES
After Visit Summary   10/4/2017    Jonna Bloom    MRN: [id number]           Patient Information     Date Of Birth          1938        Visit Information        Provider Department      10/4/2017 1:30 PM Goltz, Bennett Ezra, PA-C [facility]        Today's Diagnoses     Obstructive sleep apnea    -  1    Restless legs syndrome (RLS)        Chronic insomnia          Care Instructions    Switch from zolpidem to gabapentin. Take it about 15-30 minutes prior to typical onset of restlessness. Start with 100 mg (1 capsule). You may increase every 5 days by 1 capsule as needed. If you get to 300 mg, you can call me and I will prescribe a 300 mg capsule and you can go up from there ([phone]). The max is 1200 mg.             Follow-ups after your visit        Follow-up notes from your care team     Return in about 2 months (around 12/4/2017) for RLS Recheck, Medication check-up.      Your next 10 appointments already scheduled     Dec 15, 2017  1:30 PM CST   Return Sleep Patient with Bennett Ezra Goltz, PA-C   [facility] ([facility])    [address] [address] [phone]              Who to contact     If you have questions or need follow up information about today's clinic visit or your schedule please contact [facility] directly at [phone].  Normal or non-critical lab and imaging results will be communicated to you by MyChart, letter or phone within 4 business days after the clinic has received the results. If you do not hear from us within 7 days, please contact the clinic through MyChart or phone. If you have a critical or abnormal lab result, we will notify you by phone as soon as possible.  Submit refill requests through [employer] or call your pharmacy and they will forward the refill request to us. Please allow 3 business days for your refill to be completed.          Additional    2/14/2017    AUDIOLOGICAL EVALUATION:    Sonya Armendariz was seen for an audiological evaluation on 2/14/2017 for dizziness.  She first noticed the dizziness in December and reported a recent recurrence.    Pure tone threshold testing revealed normal hearing sensitivity bilaterally.  Speech reception thresholds were obtained at 20dBHL for the right ear and 15dBHL for the left ear.  Speech discrimination scores were obtained at 100% for the right ear and 100% for the left ear.    Tympanometry revealed slight negative pressure peak tympanograms bilaterally.    Recommend:  1.  Otologic evaluation.  2.  Follow up audio as needed.           "Information About Your Visit        MyChart Information     [employer] lets you send messages to your doctor, view your test results, renew your prescriptions, schedule appointments and more. To sign up, go to [email or web address] . Click on \"Log in\" on the left side of the screen, which will take you to the Welcome page. Then click on \"Sign up Now\" on the right side of the page.     You will be asked to enter the access code listed below, as well as some personal information. Please follow the directions to create your username and password.     Your access code is: [email or web address]  Expires: 10/31/2017  3:15 PM     Your access code will  in 90 days. If you need help or a new code, please call your [facility] clinic or [phone].        Care EveryWhere ID     This is your Care EveryWhere ID. This could be used by other organizations to access your [facility] medical records  [id number]        Your Vitals Were     Pulse Respirations Height Pulse Oximetry BMI (Body Mass Index)       58 14 1.702 m (5' 7\") 98% 19.89 kg/m2        Blood Pressure from Last 3 Encounters:   10/04/17 129/74   17 123/73   16 129/72    Weight from Last 3 Encounters:   10/04/17 57.6 kg (127 lb)   17 58.5 kg (129 lb)   16 60.8 kg (134 lb)              Today, you had the following     No orders found for display         Today's Medication Changes          These changes are accurate as of: 10/4/17  2:08 PM.  If you have any questions, ask your nurse or doctor.               Start taking these medicines.        Dose/Directions    gabapentin 100 MG capsule   Commonly known as:  NEURONTIN   Used for:  Restless legs syndrome (RLS)        Take 1 capsule by mouth before typical onset of restlessness. May increase every 5 days by 1 capsule.   Quantity:  90 capsule   Refills:  1         Stop taking these medicines if you haven't already. Please contact your care team if you have questions.     Multi-vitamin Tabs tablet           "      Where to get your medicines      These medications were sent to [id number] - [place], [place] - [address]  [address], [place] [address]     Phone:  [phone]     gabapentin 100 MG capsule                Primary Care Provider Office Phone # Fax #    Isai Gin Castro [phone]       [facility]   [facility] [place] [address]        Equal Access to Services     [facility] : Hadii aad ku hadasho Soomaali, waaxda luqadaha, qaybta kaalmada adeegyada, waxay idiin hayaan adeeg yristaiwojose mcfarland . So [facility] [phone].    ATENCIÓN: Si habla español, tiene a fuentes disposición servicios gratuitos de asistencia lingüística. [facility] [phone].    We comply with applicable federal civil rights laws and [place] laws. We do not discriminate on the basis of race, color, national origin, age, disability, sex, sexual orientation, or gender identity.            Thank you!     Thank you for choosing [facility] [place]  for your care. Our goal is always to provide you with excellent care. Hearing back from our patients is one way we can continue to improve our services. Please take a few minutes to complete the written survey that you may receive in the mail after your visit with us. Thank you!             Your Updated Medication List - Protect others around you: Learn how to safely use, store and throw away your medicines at www.disposemymeds.org.          This list is accurate as of: 10/4/17  2:08 PM.  Always use your most recent med list.                   Brand Name Dispense Instructions for use Diagnosis    ATIVAN PO      Take 0.5 mg by mouth as needed        CALCIUM 600 PO      Take 600 mg by mouth daily        calcium polycarbophil 625 MG tablet    FIBERCON     Take 3 tablets by mouth daily        gabapentin 100 MG capsule    NEURONTIN    90 capsule    Take 1 capsule by mouth before typical onset of restlessness. May increase every 5 days by 1 capsule.     Restless legs syndrome (RLS)       IRON SUPPLEMENT PO           MELATONIN PO      Take by mouth At Bedtime        NONFORMULARY      Biotin 2500mcg, Acidolphilus daily        OMEGA-3 FISH OIL PO      Take 1 g by mouth        RED YEAST RICE PO      Take by mouth daily        Urea 40 % Crea      Externally apply topically 2 times daily

## 2017-11-01 ENCOUNTER — OFFICE VISIT (OUTPATIENT)
Dept: CARDIOLOGY | Facility: CLINIC | Age: 57
End: 2017-11-01
Payer: COMMERCIAL

## 2017-11-01 ENCOUNTER — HOSPITAL ENCOUNTER (OUTPATIENT)
Dept: RADIOLOGY | Facility: HOSPITAL | Age: 57
Discharge: HOME | End: 2017-11-01
Attending: FAMILY MEDICINE
Payer: COMMERCIAL

## 2017-11-01 ENCOUNTER — OFFICE VISIT (OUTPATIENT)
Dept: OBSTETRICS AND GYNECOLOGY | Facility: CLINIC | Age: 57
End: 2017-11-01
Payer: COMMERCIAL

## 2017-11-01 VITALS
SYSTOLIC BLOOD PRESSURE: 124 MMHG | BODY MASS INDEX: 46.78 KG/M2 | HEART RATE: 64 BPM | OXYGEN SATURATION: 98 % | HEIGHT: 62 IN | DIASTOLIC BLOOD PRESSURE: 78 MMHG | WEIGHT: 254.19 LBS

## 2017-11-01 VITALS
BODY MASS INDEX: 46.33 KG/M2 | SYSTOLIC BLOOD PRESSURE: 128 MMHG | DIASTOLIC BLOOD PRESSURE: 78 MMHG | WEIGHT: 253.31 LBS

## 2017-11-01 DIAGNOSIS — I25.10 CORONARY ARTERY DISEASE INVOLVING NATIVE CORONARY ARTERY OF NATIVE HEART WITHOUT ANGINA PECTORIS: Primary | ICD-10-CM

## 2017-11-01 DIAGNOSIS — Z01.419 ENCOUNTER FOR GYNECOLOGICAL EXAMINATION WITHOUT ABNORMAL FINDING: Primary | ICD-10-CM

## 2017-11-01 DIAGNOSIS — Z95.5 HISTORY OF PLACEMENT OF STENT IN LAD CORONARY ARTERY: ICD-10-CM

## 2017-11-01 DIAGNOSIS — I10 ESSENTIAL HYPERTENSION: ICD-10-CM

## 2017-11-01 DIAGNOSIS — Z72.0 TOBACCO USE: ICD-10-CM

## 2017-11-01 DIAGNOSIS — E78.2 MIXED HYPERLIPIDEMIA: ICD-10-CM

## 2017-11-01 DIAGNOSIS — Z12.39 BREAST SCREENING: ICD-10-CM

## 2017-11-01 DIAGNOSIS — N76.0 ACUTE VAGINITIS: ICD-10-CM

## 2017-11-01 DIAGNOSIS — Z12.4 CERVICAL CANCER SCREENING: ICD-10-CM

## 2017-11-01 PROCEDURE — 88175 CYTOPATH C/V AUTO FLUID REDO: CPT

## 2017-11-01 PROCEDURE — 77067 SCR MAMMO BI INCL CAD: CPT | Mod: TC

## 2017-11-01 PROCEDURE — 99999 PR PBB SHADOW E&M-EST. PATIENT-LVL III: CPT | Mod: PBBFAC,,, | Performed by: OBSTETRICS & GYNECOLOGY

## 2017-11-01 PROCEDURE — 99214 OFFICE O/P EST MOD 30 MIN: CPT | Mod: S$GLB,,, | Performed by: INTERNAL MEDICINE

## 2017-11-01 PROCEDURE — 99999 PR PBB SHADOW E&M-EST. PATIENT-LVL III: CPT | Mod: PBBFAC,,, | Performed by: INTERNAL MEDICINE

## 2017-11-01 PROCEDURE — 99396 PREV VISIT EST AGE 40-64: CPT | Mod: S$GLB,,, | Performed by: OBSTETRICS & GYNECOLOGY

## 2017-11-01 PROCEDURE — 77063 BREAST TOMOSYNTHESIS BI: CPT | Mod: 26,,, | Performed by: RADIOLOGY

## 2017-11-01 PROCEDURE — 77067 SCR MAMMO BI INCL CAD: CPT | Mod: 26,,, | Performed by: RADIOLOGY

## 2017-11-01 RX ORDER — FLUCONAZOLE 150 MG/1
150 TABLET ORAL
Qty: 2 TABLET | Refills: 0 | Status: SHIPPED | OUTPATIENT
Start: 2017-11-01 | End: 2017-12-05

## 2017-11-01 NOTE — PROGRESS NOTES
"58 yo postmenopausal female who presents for routine gyn visit.  No episodes of postmenopausal bleeding.  Mammogram was completed today. Results pending per the patient.  No sexual activity since last visit here. Declines STD testing.  Colonoscopy was completed recently and was normal - per the patient.  Reports external vaginal itching. Unsure if it's due to yeast. Reports that she did change "scent" on her laundry detergent recently.    ROS:  GENERAL: Denies weight gain or weight loss. Feeling well overall.   SKIN: Denies rash or lesions.   HEAD: Denies head injury or headache.   CHEST: Denies chest pain or shortness of breath.   CARDIOVASCULAR: Denies palpitations or left sided chest pain.   ABDOMEN: No abdominal pain, constipation, diarrhea, nausea, vomiting or rectal bleeding.   URINARY: No frequency, dysuria, hematuria, or burning on urination.  REPRODUCTIVE: no complaints  BREASTS:  denies pain, lumps, or nipple discharge.   HEMATOLOGIC: No easy bruisability or excessive bleeding.   MUSCULOSKELETAL: Denies joint pain or swelling.   NEUROLOGIC: Denies syncope or weakness.   PSYCHIATRIC: Denies depression, anxiety or mood swings.     PE:   Vitals: /78   Wt 114.9 kg (253 lb 4.9 oz)   LMP 06/30/2015   BMI 46.33 kg/m²   APPEARANCE: Well nourished, well developed, in no acute distress.  SKIN: Normal skin turgor, no lesions.  HEART: Regular rate and rhythm, no murmurs, rubs or gallops.  ABDOMEN: Soft. No tenderness or masses. No hepatosplenomegaly. No hernias.  BREASTS: Symmetrical, no skin changes or visible lesions. No palpable masses, nipple discharge or adenopathy bilaterally.  PELVIC: Normal external female genitalia without lesions. Normal hair distribution. Adequate perineal body, normal urethral meatus. Atrophic vagina without lesions or discharge. Cervix pink and stenotic and without lesions. No significant cystocele or rectocele. Bimanual exam showed uterus normal size, shape, position, mobile and " nontender. Adnexa without masses or tenderness. Urethra and bladder normal.  EXTREMITIES: No clubbing cyanosis or edema.        AP  Routine gyn  -s/p normal breast exam: mammogram uptodate   -s/p normal pelvic exam:   -Pap collected  -STD testing: declined  -colonoscopy: uptodate  -rx for diflucan provided    BHUMI Vaz MD

## 2017-11-01 NOTE — PROGRESS NOTES
Subjective:   Patient ID:  Sonya Armendariz is a 57 y.o. female who presents for follow-up of Follow-up      Problem List Items Addressed This Visit        Cardiac/Vascular    CAD (coronary artery disease) - Primary    Hyperlipidemia    Hypertension    History of placement of stent in LAD coronary artery       Endocrine    BMI 45.0-49.9, adult    Relevant Orders    Ambulatory consult to Bariatric Surgery       Other    Tobacco use         HT, HLP Obese smoker.  CAD with old ASMI 2010 with LEIGH to LAD x2  Had false positive stress test with atypical chest pains 2011 with patent stent, normal EF and minimal CAD    HPI: Patient is here for f/u of CAD with stent to LAD in 2010 after MI x2 2 days apart after reaction to medications. She denies chest pain but have SCOTT. She is able to walk about 2 blocks. She continues to smoke and is back up to 1 pck daily. Weight is stable. She is compliant with medications. 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    Review of Systems   Constitution: Negative.   HENT: Negative.    Eyes: Negative.    Cardiovascular: Negative.    Respiratory: Negative.    Endocrine: Negative.    Hematologic/Lymphatic: Negative.    Skin: Negative.    Musculoskeletal: Negative.    Gastrointestinal: Negative.    Neurological: Negative.      Patient's Medications   New Prescriptions    No medications on file   Previous Medications    ASPIRIN (ECOTRIN) 81 MG EC TABLET    Take 81 mg by mouth once daily.      ATORVASTATIN (LIPITOR) 20 MG TABLET    TAKE 1 TABLET BY MOUTH EVERY DAY    FLUTICASONE (FLONASE) 50 MCG/ACTUATION NASAL SPRAY    SPRAY ONCE IN EACH NOSTRIL EVERY DAY    LOSARTAN-HYDROCHLOROTHIAZIDE 50-12.5 MG (HYZAAR) 50-12.5 MG PER TABLET    TAKE 1 TABLET BY MOUTH ONCE DAILY    MECLIZINE (ANTIVERT) 25 MG TABLET    Take 1 tablet (25 mg total) by mouth 2 (two) times daily as needed for Dizziness.    METOPROLOL SUCCINATE (TOPROL-XL) 25 MG 24 HR TABLET    TAKE 1 TABLET BY MOUTH ONCE DAILY     MULTIVITAMIN (THERAGRAN) PER TABLET    Take 1 tablet by mouth once daily.    NITROSTAT 0.4 MG SL TABLET    PLACE 1 TABLET UNDER THE TONGUE EVERY 5 MINUTES AS NEEDED    OXYBUTYNIN (DITROPAN) 5 MG TAB    Take 1 tablet (5 mg total) by mouth 3 (three) times daily.    POLYETHYLENE GLYCOL (GOLYTELY,NULYTELY) 236-22.74-6.74 -5.86 GRAM SUSPENSION    Take 4,000 mLs (4 L total) by mouth As instructed.    RABEPRAZOLE (ACIPHEX) 20 MG TABLET    Take 1 tablet (20 mg total) by mouth once daily.    SUCRALFATE (CARAFATE) 1 GRAM TABLET    TAKE 1 TABLET BY MOUTH FOUR TIMES DAILY   Modified Medications    No medications on file   Discontinued Medications    No medications on file       Objective:   Physical Exam   Constitutional: She is oriented to person, place, and time. She appears well-developed and well-nourished. No distress.   Examination of the digits showed no clubbing or cyanosis   HENT:   Head: Normocephalic and atraumatic.   Eyes: Conjunctivae are normal. Pupils are equal, round, and reactive to light. Right eye exhibits no discharge.   Neck: Normal range of motion. Neck supple. No JVD present. No thyromegaly present.   No carotid bruits   Cardiovascular: Normal rate, regular rhythm, S1 normal, S2 normal, normal heart sounds, intact distal pulses and normal pulses.  PMI is not displaced.  Exam reveals no gallop, no friction rub and no opening snap.    No murmur heard.  Pulmonary/Chest: Effort normal and breath sounds normal. No respiratory distress. She has no wheezes. She has no rales. She exhibits no tenderness.   Abdominal: Soft. Bowel sounds are normal. She exhibits no distension and no mass. There is no tenderness. There is no guarding.   No hepatosplenomegaly   Musculoskeletal: Normal range of motion. She exhibits no edema or tenderness.   Lymphadenopathy:     She has no cervical adenopathy.   Neurological: She is alert and oriented to person, place, and time.   Skin: Skin is warm. No rash noted. She is not  diaphoretic. No erythema.   Psychiatric: She has a normal mood and affect.   Nursing note and vitals reviewed.      ECGs reviewed-NSR  LABS reviewed-normal  Echo: normal ef    Assessment:     1. Coronary artery disease involving native coronary artery of native heart without angina pectoris    2. Mixed hyperlipidemia    3. Essential hypertension    4. Tobacco use    5. History of placement of stent in LAD coronary artery    6. BMI 45.0-49.9, adult        Plan:     Continue current cardiac medications including asa, lipitor, metoprolol and losartan/HCTZ  Low salt diet  Increase activity as tolerated, weight loss highly encourage  Smoking cessation highly encouraged and risk for repeat MI discussed with patient  F/u in 12 months    Lifestyle Modifications was discussed with patient in details including diet, exercise and abstinence from smoking. Diet plan discussed include low carb diet with Dash recommendation for daily protein, fruits and vegetables. Patient counseled to exercise at least 30 minutes 5 times weekly.

## 2017-11-02 ENCOUNTER — TELEPHONE (OUTPATIENT)
Dept: CARDIOLOGY | Facility: CLINIC | Age: 57
End: 2017-11-02

## 2017-11-02 NOTE — TELEPHONE ENCOUNTER
----- Message from Mila Turner sent at 11/2/2017 10:29 AM CDT -----  Contact: [phone]/ self   Patient called in returning your call. Please advise

## 2017-11-03 ENCOUNTER — TELEPHONE (OUTPATIENT)
Dept: CARDIOLOGY | Facility: CLINIC | Age: 57
End: 2017-11-03

## 2017-11-03 NOTE — TELEPHONE ENCOUNTER
----- Message from Doreen May sent at 11/3/2017  8:54 AM CDT -----  Contact: [phone] self  Patient called in returning your call. Please advise.

## 2017-11-08 ENCOUNTER — PATIENT MESSAGE (OUTPATIENT)
Dept: RESEARCH | Facility: HOSPITAL | Age: 57
End: 2017-11-08

## 2017-11-09 RX ORDER — METOPROLOL SUCCINATE 25 MG/1
TABLET, EXTENDED RELEASE ORAL
Qty: 30 TABLET | Refills: 0 | Status: SHIPPED | OUTPATIENT
Start: 2017-11-09 | End: 2017-12-18 | Stop reason: SDUPTHER

## 2017-12-05 ENCOUNTER — OFFICE VISIT (OUTPATIENT)
Dept: FAMILY MEDICINE | Facility: CLINIC | Age: 57
End: 2017-12-05
Payer: COMMERCIAL

## 2017-12-05 VITALS
WEIGHT: 248.25 LBS | DIASTOLIC BLOOD PRESSURE: 68 MMHG | HEIGHT: 62 IN | HEART RATE: 103 BPM | BODY MASS INDEX: 45.68 KG/M2 | SYSTOLIC BLOOD PRESSURE: 101 MMHG | TEMPERATURE: 102 F | OXYGEN SATURATION: 95 %

## 2017-12-05 DIAGNOSIS — R06.02 SOB (SHORTNESS OF BREATH): ICD-10-CM

## 2017-12-05 DIAGNOSIS — J10.1 INFLUENZA B: Primary | ICD-10-CM

## 2017-12-05 LAB
CTP QC/QA: YES
FLUAV AG NPH QL: NEGATIVE
FLUBV AG NPH QL: POSITIVE

## 2017-12-05 PROCEDURE — 87804 INFLUENZA ASSAY W/OPTIC: CPT | Mod: 59,QW,S$GLB, | Performed by: FAMILY MEDICINE

## 2017-12-05 PROCEDURE — 99214 OFFICE O/P EST MOD 30 MIN: CPT | Mod: 25,S$GLB,, | Performed by: FAMILY MEDICINE

## 2017-12-05 PROCEDURE — 99999 PR PBB SHADOW E&M-EST. PATIENT-LVL III: CPT | Mod: PBBFAC,,, | Performed by: FAMILY MEDICINE

## 2017-12-05 RX ORDER — OSELTAMIVIR PHOSPHATE 75 MG/1
75 CAPSULE ORAL 2 TIMES DAILY
Qty: 10 CAPSULE | Refills: 0 | Status: SHIPPED | OUTPATIENT
Start: 2017-12-05 | End: 2017-12-10

## 2017-12-05 NOTE — PROGRESS NOTES
(Portions of this note were dictated using voice recognition software and may contain dictation related errors in spelling/grammar/syntax not found on text review)    CC:   Chief Complaint   Patient presents with    Cough     since saturday    Sore Throat    Nasal Congestion       HPI: 57 y.o. female Annual exam June 26, 2017.  Presents with urgent care visit today for cough, sore throat, nasal congestion, body aches.  Duration of symptoms between 48 and 72 hours.  She does not have any chest pain but reports some shortness of breath.  Reports productive cough.  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  Symptoms started rather abruptly.  She is taken some over-the-counter remedies like TheraFlu and Yajaira-Gap plus.  She reports body aches.  Reports vomiting.  No diarrhea.  Reports headache . Medical history below.  She did not receive a flu shot this        Past Medical History:   Diagnosis Date    Anticoagulant long-term use     CAD (coronary artery disease)     s/p LAD stent    CHF (congestive heart failure)     Gastroduodenitis     Helicobacter pylori (H. pylori) infection     Hyperlipidemia     Hypertension     MI (myocardial infarction)     x2    Obesity     Thyroid disease     Tobacco use     Urinary tract infection     Vaginal infection        Past Surgical History:   Procedure Laterality Date    COLONOSCOPY N/A 7/14/2017    Procedure: COLONOSCOPY;  Surgeon: Cathy Collier MD;  Location: [facility];  Service: Endoscopy;  Laterality: N/A;    NO PAST SURGERIES         Family History   Problem Relation Age of Onset    Hypertension Other      multiple    Diabetes Other      multiple    Stomach cancer Mother     Cancer Father      unknown    Breast cancer Sister     Throat cancer Brother     Cancer Brother      x 2,. unknown    Coronary artery disease Cousin        Social History     Social History    Marital status:      Spouse name: N/A     Number of children: N/A    Years of education: N/A     Occupational History     [employer]     Social History Main Topics    Smoking status: Current Every Day Smoker     Packs/day: 1.00     Years: 40.00     Types: Cigarettes     Last attempt to quit: 6/28/2016    Smokeless tobacco: Never Used    Alcohol use No    Drug use: No    Sexual activity: Yes     Partners: Male     Other Topics Concern    Not on file     Social History Narrative    No narrative on file     Lab Results   Component Value Date    WBC 5.23 07/03/2017    HGB 13.9 07/03/2017    HCT 41.3 07/03/2017     07/03/2017    CHOL 145 07/03/2017    TRIG 116 07/03/2017    HDL 35 (L) 07/03/2017    ALT 12 07/03/2017    AST 17 07/03/2017     07/03/2017    K 4.2 07/03/2017     07/03/2017    CREATININE 0.9 07/03/2017    CALCIUM 9.3 07/03/2017    BUN 9 07/03/2017    CO2 24 07/03/2017    TSH 0.636 07/03/2017    INR 1.0 08/02/2011    HGBA1C 6.1 06/12/2015    LDLCALC 86.8 07/03/2017     07/03/2017       ]    ROS:  GENERAL: Reports subjective fevers at home  SKIN: No rashes, no itching.  HEAD: Above  EYES: No visual changes  EARS: No ear pain or changes in hearing.  NOSE: No congestion or rhinorrhea.  MOUTH & THROAT: Above  NODES: Denies swollen glands.  CHEST: Above.  CARDIOVASCULAR: Denies chest pain, PND, orthopnea.  ABDOMEN: No nausea, vomiting, or changes in bowel function.  URINARY: No flank pain, dysuria or hematuria.  PERIPHERAL VASCULAR: No claudication or cyanosis.  MUSCULOSKELETAL: Above  NEUROLOGIC: No weakness or numbness.    Vital signs reviewed, temperature is 101.5.  Pulse ox was 95 and later 96% on room air  PE:   APPEARANCE: Well nourished, well developed, in moderate distress, fatigued, appears to increase work of breathing, coughing  HEAD: Normocephalic, atraumatic.  No sinus tenderness  EYES: PERRL. EOMI.   Conjunctivae noninjected.  EARS: TM's intact. Light reflex normal. No retraction or  perforation  NOSE: Mucosa pink. Airway clear.  MOUTH & THROAT: No tonsillar enlargement. No pharyngeal erythema or exudate.   NECK: Supple with no cervical lymphadenopathy.    CHEST: Good inspiratory effort. Lungs clear to auscultation with no wheezes or crackles.  CARDIOVASCULAR: Normal S1, S2. No rubs, murmurs, or gallops.  ABDOMEN: Bowel sounds normal. Not distended. Soft. No tenderness or masses. No organomegaly.  EXTREMITIES: No edema      IMPRESSION  1. SOB (shortness of breath)    2. Influenza B            PLAN  Rapid flu positive for influenza B.  Given symptoms above, despite being just outside the 48 hour treatment window, will treat with Tamiflu 75 mg twice a day for 5 days    Symptomatic treatment with acetaminophen as needed for any aches, Claritin-D for needed for congestion, Mucinex DM if needed for cough.  Would advise holding off on NSAIDs if possible given her heart disease history but sparing use of NSAIDs very temporarily might be acceptable for severe pains.  She does report some shortness of breath and does show an increase work of breathing clinically.  Pulse ox is stable but will send for chest x-ray to rule out any influenza-related pneumonia complication.    Work note given to return next Monday, December 11 to allow enough time to treat, feel better and afebrile prior to returning to work.

## 2017-12-05 NOTE — PATIENT INSTRUCTIONS
SORE THROAT:  You may take throat lozenges such as Halls cough drops for sore throat pain.  Also, gargling with warm salt water may help with pain symptoms also.  Chloraseptic Spray is an over-the-counter anesthetic spray that may provide relief for sore throat pain. Over-the-counter pain relievers such as Tylenol (acetaminophen), Motrin/Advil  (ibuprofen), or Aleve (naproxen) may also provide relief for sore throat pain. These medications will also help for body aches and/or fever.      NASAL CONGESTION  You may try an oral decongestant such as pseudoephedrine ( brand-name Sudafed).  Note that this medication is available only behind the pharmacy counter.  There are some over-the-counter decongestants with a medicine called phenylephrine but these may not be as effective.  There are some antihistamine/decongestant combination medications (for example, Claritin-D, Zyrtec-D, Allegra-D).  Since these medications already have pseudoephedrine in them, you may take these in place of plain Sudafed.  These may be effective additionally if there is early element of allergy symptoms contributing to your nasal congestion. Although there is a caution  about using decongestants in individuals with hypertension, if you are stable on blood pressure medications and her blood pressure is well-controlled, temporary use of these medications should be okay just as long as you continue to monitor your blood pressures.  If your blood pressure is not controlled on medication therapy, you may need to avoid these pseudoephedrine-containing medications and try antihistamines alone such as plain Claritin, Zyrtec, Allegra.     You may also use a device called a Neti Pot to help with nasal congestion.  This is an effective treatment for helping clear out nasal congestion and drainage.  A Neti pot is a device in which a saltwater solution is flushed through the nasal passages to help clear out any congestion and help relieve swelling.   You may buy this at any pharmacy.   It is safe to take even if you  have uncontrolled hypertension or are taking certain medications and cannot take other therapies as listed above. However, it is important that she is sterile or distilled water to mix with the solution given to avoid any chance of contamination during the flushing process.    Cough  Cough is a common symptom with most upper respiratory tract infections. Many times this can occur late in the process of a cold and may be the last symptom to resolve.  This is usually a typical pattern for the upper respiratory virus, and usually not a sign that you have a chest infection.  You may have some coughing with mucus.  While no treatments are proven to be absolutely beneficial for cough, some available therapies include guaifenesin with dextromethorphan ( Mucinex DM, Robitussin-DM). This may help to settle the cough and to thin out any mucus associated with the cough.  It is important to stay well hydrated to avoid having the mucus become very thick and difficult to cough up. Many times the cough aspect of an upper respiratory infection may take up to 2 weeks to completely clear.          Influenza (Adult)    Influenza is also called the flu. It is a viral illness that affects the air passages of your lungs. It is different from the common cold. The flu can easily be passed from one to person to another. It may be spread through the air by coughing and sneezing. Or it can be spread by touching the sick person and then touching your own eyes, nose, or mouth.  The flu starts 1 to 3 days after you are exposed to the flu virus. It may last for 1 to 2 weeks but many people feel tired or fatigued for many weeks afterward. You usually dont need to take antibiotics unless you have a complication. This might be an ear or sinus infection or pneumonia.  Symptoms of the flu may be mild or severe. They can include extreme tiredness (wanting to stay in bed all day),  chills, fevers, muscle aches, soreness with eye movement, headache, and a dry, hacking cough.  Home care  Follow these guidelines when caring for yourself at home:  · Avoid being around cigarette smoke, whether yours or other peoples.  · Acetaminophen or ibuprofen will help ease your fever, muscle aches, and headache. Dont give aspirin to anyone younger than 18 who has the flu. Aspirin can harm the liver.  · Nausea and loss of appetite are common with the flu. Eat light meals. Drink 6 to 8 glasses of liquids every day. Good choices are water, sport drinks, soft drinks without caffeine, juices, tea, and soup. Extra fluids will also help loosen secretions in your nose and lungs.  · Over-the-counter cold medicines will not make the flu go away faster. But the medicines may help with coughing, sore throat, and congestion in your nose and sinuses. Dont use a decongestant if you have high blood pressure.  · Stay home until your fever has been gone for at least 24 hours without using medicine to reduce fever.  Follow-up care  Follow up with your healthcare provider, or as advised, if you are not getting better over the next week.  If you are age 65 or older, talk with your provider about getting a pneumococcal vaccine every 5 years. You should also get this vaccine if you have chronic asthma or COPD. All adults should get a flu vaccine every fall. Ask your provider about this.  When to seek medical advice  Call your healthcare provider right away if any of these occur:  · Cough with lots of colored mucus (sputum) or blood in your mucus  · Chest pain, shortness of breath, wheezing, or trouble breathing  · Severe headache, or face, neck, or ear pain  · New rash with fever  · Fever of 100.4°F (38°C) or higher, or as directed by your healthcare provider  · Confusion, behavior change, or seizure  · Severe weakness or dizziness  · You get a new fever or cough after getting better for a few days  Date Last Reviewed:  1/1/2017  © 5938-7842 [employer]. [address] [place] [address], [place] [address]. All rights reserved. This information is not intended as a substitute for professional medical care. Always follow your healthcare professional's instructions.

## 2017-12-19 RX ORDER — OXYBUTYNIN CHLORIDE 5 MG/1
TABLET ORAL
Qty: 90 TABLET | Refills: 0 | Status: SHIPPED | OUTPATIENT
Start: 2017-12-19 | End: 2018-02-27 | Stop reason: SDUPTHER

## 2017-12-19 RX ORDER — METOPROLOL SUCCINATE 25 MG/1
TABLET, EXTENDED RELEASE ORAL
Qty: 30 TABLET | Refills: 0 | Status: SHIPPED | OUTPATIENT
Start: 2017-12-19 | End: 2018-01-29 | Stop reason: SDUPTHER

## 2018-01-04 RX ORDER — SUCRALFATE 1 G/1
TABLET ORAL
Qty: 120 TABLET | Refills: 1 | Status: SHIPPED | OUTPATIENT
Start: 2018-01-04 | End: 2018-06-20 | Stop reason: SDUPTHER

## 2018-01-18 RX ORDER — LOSARTAN POTASSIUM AND HYDROCHLOROTHIAZIDE 12.5; 5 MG/1; MG/1
1 TABLET ORAL DAILY
Qty: 30 TABLET | Refills: 11 | Status: SHIPPED | OUTPATIENT
Start: 2018-01-18 | End: 2019-01-25 | Stop reason: SDUPTHER

## 2018-01-31 RX ORDER — METOPROLOL SUCCINATE 25 MG/1
TABLET, EXTENDED RELEASE ORAL
Qty: 30 TABLET | Refills: 0 | Status: SHIPPED | OUTPATIENT
Start: 2018-01-31 | End: 2018-03-17 | Stop reason: SDUPTHER

## 2018-02-27 RX ORDER — OXYBUTYNIN CHLORIDE 5 MG/1
TABLET ORAL
Qty: 90 TABLET | Refills: 0 | Status: SHIPPED | OUTPATIENT
Start: 2018-02-27 | End: 2018-05-07 | Stop reason: SDUPTHER

## 2018-02-27 RX ORDER — OXYBUTYNIN CHLORIDE 5 MG/1
TABLET ORAL
Qty: 90 TABLET | Refills: 0 | OUTPATIENT
Start: 2018-02-27

## 2018-03-18 RX ORDER — METOPROLOL SUCCINATE 25 MG/1
TABLET, EXTENDED RELEASE ORAL
Qty: 30 TABLET | Refills: 0 | Status: SHIPPED | OUTPATIENT
Start: 2018-03-18 | End: 2018-04-22 | Stop reason: SDUPTHER

## 2018-04-23 RX ORDER — METOPROLOL SUCCINATE 25 MG/1
TABLET, EXTENDED RELEASE ORAL
Qty: 30 TABLET | Refills: 0 | Status: SHIPPED | OUTPATIENT
Start: 2018-04-23 | End: 2018-05-30 | Stop reason: SDUPTHER

## 2018-05-07 RX ORDER — OXYBUTYNIN CHLORIDE 5 MG/1
TABLET ORAL
Qty: 90 TABLET | Refills: 0 | Status: SHIPPED | OUTPATIENT
Start: 2018-05-07 | End: 2018-07-09 | Stop reason: SDUPTHER

## 2018-05-15 ENCOUNTER — OFFICE VISIT (OUTPATIENT)
Dept: URGENT CARE | Facility: CLINIC | Age: 58
End: 2018-05-15
Payer: COMMERCIAL

## 2018-05-15 VITALS
OXYGEN SATURATION: 96 % | BODY MASS INDEX: 42.33 KG/M2 | HEART RATE: 77 BPM | TEMPERATURE: 98 F | SYSTOLIC BLOOD PRESSURE: 149 MMHG | HEIGHT: 62 IN | WEIGHT: 230 LBS | DIASTOLIC BLOOD PRESSURE: 92 MMHG | RESPIRATION RATE: 18 BRPM

## 2018-05-15 DIAGNOSIS — M54.9 BACK PAIN, UNSPECIFIED BACK LOCATION, UNSPECIFIED BACK PAIN LATERALITY, UNSPECIFIED CHRONICITY: Primary | ICD-10-CM

## 2018-05-15 LAB
BILIRUB UR QL STRIP: NEGATIVE
GLUCOSE UR QL STRIP: NEGATIVE
KETONES UR QL STRIP: NEGATIVE
LEUKOCYTE ESTERASE UR QL STRIP: NEGATIVE
PH, POC UA: 5.5 (ref 5–8)
POC BLOOD, URINE: NEGATIVE
POC NITRATES, URINE: NEGATIVE
PROT UR QL STRIP: NEGATIVE
SP GR UR STRIP: 1.02 (ref 1–1.03)
UROBILINOGEN UR STRIP-ACNC: NORMAL (ref 0.1–1.1)

## 2018-05-15 PROCEDURE — 3008F BODY MASS INDEX DOCD: CPT | Mod: CPTII,S$GLB,, | Performed by: FAMILY MEDICINE

## 2018-05-15 PROCEDURE — 3080F DIAST BP >= 90 MM HG: CPT | Mod: CPTII,S$GLB,, | Performed by: FAMILY MEDICINE

## 2018-05-15 PROCEDURE — 3077F SYST BP >= 140 MM HG: CPT | Mod: CPTII,S$GLB,, | Performed by: FAMILY MEDICINE

## 2018-05-15 PROCEDURE — 99214 OFFICE O/P EST MOD 30 MIN: CPT | Mod: 25,S$GLB,, | Performed by: FAMILY MEDICINE

## 2018-05-15 PROCEDURE — 81003 URINALYSIS AUTO W/O SCOPE: CPT | Mod: QW,S$GLB,, | Performed by: FAMILY MEDICINE

## 2018-05-15 RX ORDER — IBUPROFEN 800 MG/1
800 TABLET ORAL EVERY 8 HOURS PRN
Qty: 30 TABLET | Refills: 1 | Status: SHIPPED | OUTPATIENT
Start: 2018-05-15 | End: 2018-07-31

## 2018-05-15 RX ORDER — CYCLOBENZAPRINE HCL 10 MG
10 TABLET ORAL 3 TIMES DAILY PRN
Qty: 15 TABLET | Refills: 0 | Status: SHIPPED | OUTPATIENT
Start: 2018-05-15 | End: 2018-05-25

## 2018-05-15 NOTE — PATIENT INSTRUCTIONS

## 2018-05-15 NOTE — LETTER
May 15, 2018      [facility]  [address]  [facility] [address]  Phone: [phone]  Fax: [phone]       Patient: Sonya Armendariz   YOB: 1960  Date of Visit: 05/15/2018    To Whom It May Concern:    Neal Armendariz  was at [facility] on 05/15/2018. She may return to work/school on 05/18/2018 with no restrictions. If you have any questions or concerns, or if I can be of further assistance, please do not hesitate to contact me.    Sincerely,          Rudolph Hernandez MD

## 2018-05-15 NOTE — PROGRESS NOTES
"Subjective:       Patient ID: Sonya Armendariz is a 57 y.o. female.    Vitals:  height is 5' 2" (1.575 m) and weight is 104.3 kg (230 lb). Her oral temperature is 97.7 °F (36.5 °C). Her blood pressure is 149/92 (abnormal) and her pulse is 77. Her respiration is 18 and oxygen saturation is 96%.     Chief Complaint: Back Pain    This is a 57 y.o. female with Past Medical History:  No date: Anticoagulant long-term use  No date: CAD (coronary artery disease)      Comment: s/p LAD stent  No date: CHF (congestive heart failure)  No date: Gastroduodenitis  No date: Helicobacter pylori (H. pylori) infection  No date: Hyperlipidemia  No date: Hypertension  No date: MI (myocardial infarction)      Comment: x2  No date: Obesity  No date: Thyroid disease  No date: Tobacco use  No date: Urinary tract infection  No date: Vaginal infection   Past Surgical History:  7/14/2017: COLONOSCOPY N/A      Comment: Procedure: COLONOSCOPY;  Surgeon: Cathy Collier MD;  Location: [facility];  Service:                Endoscopy;  Laterality: N/A;  No date: NO PAST SURGERIES  who presents today with a chief complaint of back pain for 5 days. Lifts heavy pots and pans. Worse with walking. Applied heat which helped. No analgesics.      Back Pain   This is a new problem. The current episode started in the past 7 days. The problem occurs constantly. The problem has been gradually worsening since onset. The pain is present in the lumbar spine. The quality of the pain is described as shooting. The pain radiates to the right knee, right foot and right thigh. The pain is at a severity of 8/10. The pain is severe. The pain is the same all the time. The symptoms are aggravated by twisting. Associated symptoms include leg pain and numbness. Pertinent negatives include no abdominal pain, bladder incontinence, bowel incontinence, dysuria or tingling. Risk factors include obesity. She has tried heat for the symptoms. The treatment " provided mild relief.     Review of Systems   Constitution: Negative for malaise/fatigue.   Skin: Negative for color change and rash.   Musculoskeletal: Positive for back pain. Negative for muscle cramps, muscle weakness and stiffness.   Gastrointestinal: Negative for abdominal pain and bowel incontinence.   Genitourinary: Negative for bladder incontinence, dysuria, hematuria and urgency.   Neurological: Positive for numbness. Negative for disturbances in coordination and tingling.       Objective:      Physical Exam   Constitutional: She appears well-developed and well-nourished.   HENT:   Head: Normocephalic and atraumatic.   Eyes: EOM are normal. Pupils are equal, round, and reactive to light.   Neck: Normal range of motion. Neck supple.   Cardiovascular: Normal rate, regular rhythm and normal heart sounds.    Pulmonary/Chest: Effort normal and breath sounds normal.   Musculoskeletal: She exhibits tenderness ( straight leg raise - right).   Skin: No erythema ( + straight leg raise - right).   Nursing note and vitals reviewed.      Assessment:       1. Back pain, unspecified back location, unspecified back pain laterality, unspecified chronicity        Plan:         Back pain, unspecified back location, unspecified back pain laterality, unspecified chronicity  -     POCT Urinalysis, Dipstick, Automated, W/O Scope  -     ibuprofen (ADVIL,MOTRIN) 800 MG tablet; Take 1 tablet (800 mg total) by mouth every 8 (eight) hours as needed.  Dispense: 30 tablet; Refill: 1  -     cyclobenzaprine (FLEXERIL) 10 MG tablet; Take 1 tablet (10 mg total) by mouth 3 (three) times daily as needed.  Dispense: 15 tablet; Refill: 0

## 2018-05-31 RX ORDER — METOPROLOL SUCCINATE 25 MG/1
TABLET, EXTENDED RELEASE ORAL
Qty: 30 TABLET | Refills: 0 | Status: SHIPPED | OUTPATIENT
Start: 2018-05-31 | End: 2018-07-06 | Stop reason: SDUPTHER

## 2018-06-20 RX ORDER — SUCRALFATE 1 G/1
TABLET ORAL
Qty: 120 TABLET | Refills: 0 | Status: SHIPPED | OUTPATIENT
Start: 2018-06-20 | End: 2018-07-31 | Stop reason: SDUPTHER

## 2018-06-21 ENCOUNTER — TELEPHONE (OUTPATIENT)
Dept: FAMILY MEDICINE | Facility: CLINIC | Age: 58
End: 2018-06-21

## 2018-06-21 NOTE — TELEPHONE ENCOUNTER
----- Message from Khoa Stuart sent at 6/21/2018  3:07 PM CDT -----  Contact: [phone]/self  Pt's 2nd call.   Patient is requesting to speak with you concerning patches for smoking cessation.    Please call and advise

## 2018-06-21 NOTE — TELEPHONE ENCOUNTER
Patient called in requesting nicotine patches.  States she called smoking cessation and was informed to call PCP for prescription.  Please advise.

## 2018-06-21 NOTE — TELEPHONE ENCOUNTER
----- Message from Kasandra Grant sent at 6/21/2018  8:37 AM CDT -----  Contact: Self [phone]  Patient is calling to see if Dr. Wing can call in a prescription for her patches to help her stop smoking, please send to her local pharmacy. Please advice

## 2018-06-22 RX ORDER — IBUPROFEN 200 MG
1 TABLET ORAL DAILY
Qty: 42 PATCH | Refills: 0 | Status: SHIPPED | OUTPATIENT
Start: 2018-06-22 | End: 2019-02-03 | Stop reason: SDUPTHER

## 2018-06-22 RX ORDER — NICOTINE 7MG/24HR
1 PATCH, TRANSDERMAL 24 HOURS TRANSDERMAL DAILY
Qty: 14 PATCH | Refills: 0 | Status: SHIPPED | OUTPATIENT
Start: 2018-06-22 | End: 2019-02-26

## 2018-06-22 RX ORDER — IBUPROFEN 200 MG
1 TABLET ORAL DAILY
Qty: 14 PATCH | Refills: 0 | Status: SHIPPED | OUTPATIENT
Start: 2018-06-22 | End: 2019-02-03

## 2018-06-22 NOTE — TELEPHONE ENCOUNTER
Alek, 3 different prescriptions for patches.  21 mg patch initially daily for 6 weeks, then the 14 mg patch daily for 2 weeks, then the 7 mg patch daily for 2 weeks

## 2018-07-06 RX ORDER — METOPROLOL SUCCINATE 25 MG/1
TABLET, EXTENDED RELEASE ORAL
Qty: 30 TABLET | Refills: 0 | Status: SHIPPED | OUTPATIENT
Start: 2018-07-06 | End: 2018-07-31 | Stop reason: SDUPTHER

## 2018-07-08 RX ORDER — ATORVASTATIN CALCIUM 20 MG/1
TABLET, FILM COATED ORAL
Qty: 30 TABLET | Refills: 11 | Status: SHIPPED | OUTPATIENT
Start: 2018-07-08 | End: 2019-07-26 | Stop reason: SDUPTHER

## 2018-07-09 RX ORDER — OXYBUTYNIN CHLORIDE 5 MG/1
TABLET ORAL
Qty: 90 TABLET | Refills: 0 | Status: SHIPPED | OUTPATIENT
Start: 2018-07-09 | End: 2018-07-31

## 2018-07-31 ENCOUNTER — OFFICE VISIT (OUTPATIENT)
Dept: FAMILY MEDICINE | Facility: CLINIC | Age: 58
End: 2018-07-31
Payer: COMMERCIAL

## 2018-07-31 ENCOUNTER — OFFICE VISIT (OUTPATIENT)
Dept: CARDIOLOGY | Facility: CLINIC | Age: 58
End: 2018-07-31
Payer: COMMERCIAL

## 2018-07-31 VITALS
HEIGHT: 62 IN | HEART RATE: 70 BPM | SYSTOLIC BLOOD PRESSURE: 116 MMHG | DIASTOLIC BLOOD PRESSURE: 74 MMHG | WEIGHT: 258.88 LBS | BODY MASS INDEX: 47.64 KG/M2

## 2018-07-31 VITALS
HEART RATE: 64 BPM | TEMPERATURE: 98 F | SYSTOLIC BLOOD PRESSURE: 120 MMHG | DIASTOLIC BLOOD PRESSURE: 72 MMHG | BODY MASS INDEX: 47.59 KG/M2 | HEIGHT: 62 IN | WEIGHT: 258.63 LBS | OXYGEN SATURATION: 97 %

## 2018-07-31 DIAGNOSIS — I10 ESSENTIAL HYPERTENSION: ICD-10-CM

## 2018-07-31 DIAGNOSIS — I21.02 ST ELEVATION MYOCARDIAL INFARCTION INVOLVING LEFT ANTERIOR DESCENDING (LAD) CORONARY ARTERY: ICD-10-CM

## 2018-07-31 DIAGNOSIS — I25.10 CORONARY ARTERY DISEASE INVOLVING NATIVE CORONARY ARTERY OF NATIVE HEART WITHOUT ANGINA PECTORIS: Primary | ICD-10-CM

## 2018-07-31 DIAGNOSIS — K21.9 GASTROESOPHAGEAL REFLUX DISEASE, ESOPHAGITIS PRESENCE NOT SPECIFIED: ICD-10-CM

## 2018-07-31 DIAGNOSIS — N39.41 URGE INCONTINENCE: ICD-10-CM

## 2018-07-31 DIAGNOSIS — R73.01 IFG (IMPAIRED FASTING GLUCOSE): ICD-10-CM

## 2018-07-31 DIAGNOSIS — Z95.5 HISTORY OF PLACEMENT OF STENT IN LAD CORONARY ARTERY: ICD-10-CM

## 2018-07-31 DIAGNOSIS — Z00.00 ROUTINE GENERAL MEDICAL EXAMINATION AT A HEALTH CARE FACILITY: Primary | ICD-10-CM

## 2018-07-31 DIAGNOSIS — E78.5 HYPERLIPIDEMIA, UNSPECIFIED HYPERLIPIDEMIA TYPE: ICD-10-CM

## 2018-07-31 DIAGNOSIS — I10 HYPERTENSION, UNSPECIFIED TYPE: ICD-10-CM

## 2018-07-31 DIAGNOSIS — E78.2 MIXED HYPERLIPIDEMIA: ICD-10-CM

## 2018-07-31 DIAGNOSIS — Z72.0 TOBACCO USE: ICD-10-CM

## 2018-07-31 DIAGNOSIS — I25.10 CORONARY ARTERY DISEASE INVOLVING NATIVE CORONARY ARTERY OF NATIVE HEART WITHOUT ANGINA PECTORIS: ICD-10-CM

## 2018-07-31 PROCEDURE — 3078F DIAST BP <80 MM HG: CPT | Mod: CPTII,S$GLB,, | Performed by: FAMILY MEDICINE

## 2018-07-31 PROCEDURE — 99214 OFFICE O/P EST MOD 30 MIN: CPT | Mod: S$GLB,,, | Performed by: INTERNAL MEDICINE

## 2018-07-31 PROCEDURE — 3074F SYST BP LT 130 MM HG: CPT | Mod: CPTII,S$GLB,, | Performed by: INTERNAL MEDICINE

## 2018-07-31 PROCEDURE — 3008F BODY MASS INDEX DOCD: CPT | Mod: CPTII,S$GLB,, | Performed by: INTERNAL MEDICINE

## 2018-07-31 PROCEDURE — 99999 PR PBB SHADOW E&M-EST. PATIENT-LVL IV: CPT | Mod: PBBFAC,,, | Performed by: FAMILY MEDICINE

## 2018-07-31 PROCEDURE — 3074F SYST BP LT 130 MM HG: CPT | Mod: CPTII,S$GLB,, | Performed by: FAMILY MEDICINE

## 2018-07-31 PROCEDURE — 3078F DIAST BP <80 MM HG: CPT | Mod: CPTII,S$GLB,, | Performed by: INTERNAL MEDICINE

## 2018-07-31 PROCEDURE — 99999 PR PBB SHADOW E&M-EST. PATIENT-LVL III: CPT | Mod: PBBFAC,,, | Performed by: INTERNAL MEDICINE

## 2018-07-31 PROCEDURE — 99396 PREV VISIT EST AGE 40-64: CPT | Mod: S$GLB,,, | Performed by: FAMILY MEDICINE

## 2018-07-31 RX ORDER — TOLTERODINE 4 MG/1
4 CAPSULE, EXTENDED RELEASE ORAL DAILY
Qty: 30 CAPSULE | Refills: 11 | Status: SHIPPED | OUTPATIENT
Start: 2018-07-31 | End: 2019-02-26

## 2018-07-31 RX ORDER — ORLISTAT 120 MG/1
120 CAPSULE ORAL
Qty: 90 CAPSULE | Refills: 11 | Status: SHIPPED | OUTPATIENT
Start: 2018-07-31 | End: 2019-02-03

## 2018-07-31 RX ORDER — METOPROLOL SUCCINATE 25 MG/1
25 TABLET, EXTENDED RELEASE ORAL DAILY
Qty: 30 TABLET | Refills: 11 | Status: SHIPPED | OUTPATIENT
Start: 2018-07-31 | End: 2019-07-26 | Stop reason: SDUPTHER

## 2018-07-31 NOTE — PATIENT INSTRUCTIONS
Alternative to prescription orlistat (xenical); over the counter DONNIE    Over the counter Rogaine cream (minoxidil) for hair growth      Nutrition: How to Make Healthier Food Choices     Nutrition: How to Make Healthier Food Choices     Why is healthy eating important?  When combined with exercise, a healthy diet can help you lose weight, lower your cholesterol level and improve the way your body functions on a daily basis.  The U.S. Department of Agricultures (USDA) Food Guide Pyramid divides food into 6 basic food groups, consisting of 1) grains, 2) fruits, 3) vegetables, 4) meats and beans, 5) dairy and 6) fats.  The USDA recommends an adult daily diet include the following:  3 ounces of whole grains, and 6 ounces of grains total   2 cups of fruit   2 1/2 cups of vegetables   3 cups fat-free or low-fat dairy   The following are some ways to make healthier food choices and to get the recommended amounts of whole grains, fruits, vegetables and dairy.    Grains  Whole-grain breads are low in fat; they're also high in fiber and complex carbohydrates, which help you feel gatica longer and prevent overeating. Choose breads whose first ingredient says whole in front of the grain, for example, whole wheat flour or whole white flour; enriched or other types of flour have the important fiber and nutrients removed. Choose whole grain breads for sandwiches and as additions to meals.  Avoid rich bakery foods such as donuts, sweet rolls and muffins. These foods can contain more than 50% fat calories. Snacks such as katelyn food cake and gingersnap cookies can satisfy your sweet tooth without adding fat to your diet.  Hot and cold cereals are usually low in fat. But instant cereals with cream may contain high-fat oils or butterfat. Granola cereals may also contain high-fat oils and extra sugars. Look for low-sugar options for both instant and granola cereals.  Avoid fried snacks such as potato chips and tortilla chips. Try  "the low-fat or baked versions instead.  Instead of this: Try this:   Croissants, biscuits, white breads and rolls Low-fat whole grain breads and rolls (wheat, rye and pumpernickel)   Doughnuts, pastries and scones English muffins and small whole grain bagels   Fried tortillas Soft tortillas (corn or whole wheat)   Sugar cereals and regular granola Oatmeal, low-fat granola and whole-grain cereal   Snack crackers Crackers (animal, israel, rye, soda, saltine, oyster)   Potato or corn chips and buttered popcorn Pretzels (unsalted) and popcorn (unbuttered)   White pasta Whole-wheat pasta   White rice Brown rice   Fried rice, or pasta and rice mixes that contain high-fat sauces Rice or pasta (without egg yolk) with vegetable sauces   All-purpose white flour 100% whole-wheat flour     Fruits and Vegetables  Fruits and vegetables are naturally low in fat. They add flavor and variety to your diet. They also contain fiber, vitamins and minerals.  Margarine, butter, mayonnaise and sour cream add fat to vegetables and fruits. Try using nonfat or low-fat versions of these foods. You can also use nonfat or low-fat yogurt or herbs as seasonings instead.  Instead of this: Try this:   Fried vegetables or vegetables served with cream, cheese or butter sauces All vegetables raw, steamed, broiled, baked or tossed with a very small amount of olive oil and salt and pepper   Coconut Fruit (fresh)   French fries, hash browns and potato   chips Baked white or sweet potatoes     Meat, Poultry and Fish  Beef, Pork, Veal and Lamb  Baking, broiling and roasting are the healthiest ways to prepare meat. Lean cuts can be pan-broiled or stir-fried. Use either a nonstick pan or nonstick spray coating instead of butter or margarine.  Trim outside fat before cooking. Trim any inside, separable fat before eating. Select low-fat, lean cuts of meat. Lean beef and veal cuts have the word "loin" or "round" in their names. Lean pork cuts have the word " ""loin" or "leg" in their names.  Use herbs, spices, fresh vegetables and nonfat marinades to season meat. Avoid high-fat sauces and gravies.  Poultry  Baking, broiling and roasting are the healthiest ways to prepare poultry. Skinless poultry can be pan-broiled or stir-fried. Use either a nonstick pan or nonstick spray coating instead of butter or margarine.  Remove skin and visible fat before cooking. Chicken breasts are a good choice because they are low in fat and high in protein. Use domestic goose and duck only once in a while because both are high in fat.  Fish  Poaching, steaming, baking and broiling are the healthiest ways to prepare fish. Fresh fish should have a clear color, a moist look, a clean smell and firm, springy flesh. If good-quality fresh fish isn't available, buy frozen fish.  Most seafood is high in healthy polyunsaturated fat. Omega-3 fatty acids are also found in some fatty fish, such as salmon and cold-water trout. They may help lower the risk of heart disease in some people.  Cross-over Foods  Dry beans, peas and lentils offer protein and fiber without the cholesterol and fat of meats. Once in a while, try substituting beans for meat in a favorite recipe, such as lasagna or chili.  TVP, or textured vegetable protein, is widely available in many foods. Vegetarian "hot dogs," "hamburger" and "chicken nuggets" are low-fat, cholesterol-free alternatives to meat.  Instead of this: Try this:   Regular or breaded fish sticks or cakes, fish canned in oil, seafood prepared with butter or served in high-fat sauce Fish (fresh, frozen, canned in water), low-fat fish sticks or cakes and shellfish (such as shrimp)   Prime and marbled cuts Select-grade lean beef (round, sirloin and loin)   Pork spare ribs and vega Lean pork (tenderloin and loin chop) and turkey vega   Regular ground beef Lean or extra-lean ground beef, ground chicken and turkey breast   Lunch meats such as pepperoni, salami, bologna and " liverwurst Lean lunch meats such as turkey, chicken and ham   Regular hot dogs or sausage Fat-free hot dogs and turkey dogs     Dairy  Choose skim milk or low-fat buttermilk. Substitute evaporated skim milk for cream in recipes for soups, sauces and coffee.  Try low-fat cheeses. Skim ricotta can replace cream cheese on a bagel or in a vegetable dip. Use part-skim cheeses in recipes. Use 1% cottage cheese for salads and cooking. String cheese is a low-fat, high-calcium snack option.  Plain nonfat yogurt can replace sour cream in many recipes. (To maintain texture, stir 1 tablespoon of cornstarch into each cup of yogurt that you use in cooking.) Try mixing frozen nonfat or low-fat yogurt with fruit for dessert.  Skim sherbet is an alternative to ice cream. Soft-serve and regular ice creams are also lower in fat than premium styles.  Instead of this: Try this:   Whole or 2% milk Non-fat or 1% milk   Evaporated milk Evaporated non-fat milk   Regular buttermilk Buttermilk made from non-fat (or 1%) milk   Yogurt made with whole milk Nonfat or low-fat yogurt   Regular cheese (examples: American, blue, Brie, cheddar, Mario and Parmesan) Low-fat cheese with less than 3 grams of fat per serving (example: natural cheese, processed cheese and nondairy cheese such as soy cheese)   Regular cottage cheese Low-fat, nonfat, and dry-curd cottage cheese with less than 2% fat   Regular cream cheese Low-fat cream cheese (no more than 3 grams of fat per ounce)   Regular ice cream Sorbet, sherbet and nonfat or low-fat ice cream (no more than 3 grams of fat per 1/2 cup serving)     Fats, Oils and Sweets  Eating too many high-fat foods not only adds excess calories (which can lead to obesity and weight gain), but can increase your risk factor for several diseases. Heart disease, diabetes, certain types of cancer and osteoarthritis have all been linked to diets too high in fat. If you consume too much saturated and trans fats, you are more  "likely to develop high cholesterol and coronary artery disease.  Sugar-sweetened drinks, such as fruit juice, fruit drinks, regular soft drinks, sports drinks, energy drinks, sweetened or flavored milk and sweetened iced tea can add lots of sugar and calories to your diet. But staying hydrated is important for good health. Substitute water, zero-calorie flavored water, non-fat or reduced-fat milk, unsweetened tea or diet soda for sweetened drinks. Talk with your family doctor or a dietitian if you have questions about your diet or healthy eating for your family.  Instead of this: Try this:   Cookies Fig bars, gingersnaps and molasses cookies   Shortening, butter or margarine Olive, soybean and canola oils   Regular mayonnaise Nonfat or light mayonnaise   Regular salad dressing Nonfat or light salad dressing   Using fat (including butter) to grease pan Nonstick cooking spray       What it Takes to Lose Weight     What it Takes to Lose Weight     What does it take to lose weight?  To lose weight, you have to eat fewer calories than your body uses. Calories are the amount of energy in the food you eat. Some foods have more calories than others. For example, foods that are high in fat and sugar are also high in calories. If you eat more calories than your body uses, the extra calories will be stored as body excess fat.  A pound of fat is about 3,500 calories. To lose 1 pound of fat in a week, you have to eat 3,500 fewer calories (that is 500 fewer calories a day), or you have to "burn off" an extra 3,500 calories. You can burn off calories by exercising or just by being more active. (Talk to your family doctor before you begin any type of exercise program. Your doctor can help you determine what kind of exercise program is right for you.)  The best way to lose weight and keep it off is to eat fewer calories and be active. If you cut 250 calories from your diet each day and exercise enough to burn off 250 calories, that " "adds up to 500 fewer calories in one day. If you do this for 7 days, you can lose 1 pound of fat in a week.  Many experts believe you should not try to lose more than 2 pounds per week. Losing more than 2 pounds in a week usually means that you are losing water weight and lean muscle mass instead of losing excess fat. If you do this, you will have less energy, and you will most likely gain the weight back.    How often should I eat?  Most people can eat 3 regular meals and 1 snack every day. The 3 meals should be about the same size and should be low in fat. Try to eat 1 to 2 cups of fruits and vegetables, 2 to 3 ounces of whole grains and 1 to 2 ounces of meat (or a meat alternative) at most meals.  Some people benefit more if they eat 5 to 6 smaller meals throughout the day, about 2 to 3 hours apart. For example, their first meal of the day might be a cup of low-fat or nonfat yogurt and a banana. Three hours later they might eat a simple deli sandwich with whole-grain bread and fat-free mayonnaise.  Eat breakfast and don't skip meals. While skipping meals may help you lose weight in the beginning, it fails in the long run. Skipping meals may make you feel too hungry later in the day, causing you to overeat at your next meal.  After about a month of eating a normal breakfast, lunch and a light dinner, your body will adjust.    What is so bad about high-fat foods?  Foods high in fat are usually high in calories, which could lead to unwanted weight gain. Consuming too much saturated and trans fats may increase LDL cholesterol ("bad" cholesterol) level, and increase your risk of heart disease. The USDA suggests that you eat no more than 20 grams of saturated fat, and that you limit the amount of trans fat to as close to 0 grams as possible. Click here for more information on reading nutrition labels.  It is important to remember that some fats can be beneficial to your overall health. "Good" fat, such as " "polyunsaturated and monounsaturated fats are found in fish, nuts, and low- or nonfat dairy products.    What are empty calories?  Some foods are referred to as "empty calories" because they add lots of calories to your diet without providing much nutritional benefit. An example is sugar-sweetened drinks, such as fruit juice, fruit drinks, regular soft drinks, sports drinks, energy drinks, sweetened or flavored milk and sweetened iced tea. These drinks can add lots of sugar and calories to your diet.  But staying hydrated is important for good health. To reduce the calories and sugar in your diet, substitute water, zero-calorie flavored water, non-fat or reduced-fat milk, unsweetened tea or diet soda for sweetened drinks. Talk with your family doctor or a dietitian if you have questions about your diet or healthy eating for your family.    Can I trust nutrition information I get from newspapers and magazines?  Nutrition tips and diet information from different sources often conflict with each other. You should always check with your doctor first. Also, keep in mind the following advice:  There is no "magic bullet" when it comes to nutrition. There isn't one single diet that works for every person. You need to find an eating plan that works for you.   Good nutrition doesn't come in a vitamin supplement. Only take a vitamin with your doctor's recommendation, as your body benefits the most when you eat healthy foods.   Eating all different kinds of foods is best for your body, so learn to try new foods.   Fad diets offer short-term changes, but good health comes from long-term effort and commitment.   Stories from people who have used a diet program or product, especially in commercials and [employer], are advertisements. These people are usually paid to endorse what the ad is selling. Remember, regained weight or other problems that develop after someone has completed the diet program are never talked about in those " ads.     Will diet drugs help?  Although diet drugs may help you lose weight at first, they usually don't help you keep the weight off and may have damaging side effects. Most diet pills have not been tested by the Food and Drug Administration, which means you can't be sure if the drugs are safe. Taking drugs also does not help you learn how to change your eating and exercise habits. Making lasting changes in these habits is the way to lose weight and keep it off.

## 2018-07-31 NOTE — PROGRESS NOTES
(Portions of this note were dictated using voice recognition software and may contain dictation related errors in spelling/grammar/syntax not found on text review)    CC:   Chief Complaint   Patient presents with    Annual Exam       HPI: 57 y.o. female     CAD: followed by cardiology, LOV with cardiology was last year.  Therapy as below.  Continues to smoke.  Had enrolled in smoking cessation clinic, she did quit for some time but resumed because of some stresses.  Currently back on nicotine patches to try to quit again.  Denies any current dyspnea on exertion, or chest pain     Hypertension on metoprolol 25 mg daily plus Hyzaar 50/12.5 daily     Hyperlipidemia on Lipitor 20 mg daily     GERD with gastroduodenitis noted on prior testing.  Saw GI in April of 2017.  Was on AcipHex in the past but currently maintained on sucralfate.  Sounds like she takes this on a regular basis instead of just p.r.n...      Concerned about her weight.  States that she was prescribed orlistat from her cardiologist but has not tried filled this yet.  Not sure how much it would cost.  Does admit to on healthy diet, no significant exercise.  Pre contemplative about making lifestyle management changes    Does notice some urinary incontinence, has been on Ditropan regularly but does not find that is helping.  Does get some urgent continence and also some stress incontinence symptoms.  No dysuria.  Symptoms are fairly chronic in nature        Past Medical History:   Diagnosis Date    Anticoagulant long-term use     CAD (coronary artery disease)     s/p LAD stent    CHF (congestive heart failure)     Gastroduodenitis     Helicobacter pylori (H. pylori) infection     Hyperlipidemia     Hypertension     MI (myocardial infarction)     x2    Obesity     Thyroid disease     Tobacco use     Urinary tract infection     Vaginal infection        Past Surgical History:   Procedure Laterality Date    COLONOSCOPY N/A 7/14/2017     Procedure: COLONOSCOPY;  Surgeon: Cathy Collier MD;  Location[facility];  Service: Endoscopy;  Laterality: N/A;    NO PAST SURGERIES         Family History   Problem Relation Age of Onset    Hypertension Other         multiple    Diabetes Other         multiple    Stomach cancer Mother     Cancer Father         unknown    Breast cancer Sister     Throat cancer Brother     Cancer Brother         x 2,. unknown    Coronary artery disease Cousin        Social History     Social History    Marital status:      Spouse name: N/A    Number of children: N/A    Years of education: N/A     Occupational History     [employer]     Social History Main Topics    Smoking status: Current Every Day Smoker     Packs/day: 1.00     Years: 40.00     Types: Cigarettes     Last attempt to quit: 6/28/2016    Smokeless tobacco: Never Used      Comment: Restarted 1 .5 yrs. ago    Alcohol use No    Drug use: No    Sexual activity: Yes     Partners: Male     Other Topics Concern    Not on file     Social History Narrative    No narrative on file     Lab Results   Component Value Date    WBC 5.23 07/03/2017    HGB 13.9 07/03/2017    HCT 41.3 07/03/2017     07/03/2017    CHOL 145 07/03/2017    TRIG 116 07/03/2017    HDL 35 (L) 07/03/2017    ALT 12 07/03/2017    AST 17 07/03/2017     07/03/2017    K 4.2 07/03/2017     07/03/2017    CREATININE 0.9 07/03/2017    CALCIUM 9.3 07/03/2017    BUN 9 07/03/2017    CO2 24 07/03/2017    TSH 0.636 07/03/2017    INR 1.0 08/02/2011    HGBA1C 6.1 06/12/2015    LDLCALC 86.8 07/03/2017     07/03/2017           Vital signs reviewed  PE:   APPEARANCE: Well nourished, well developed, in no acute distress.    HEAD: Normocephalic, atraumatic.  EYES: PERRL. EOMI.   Conjunctivae noninjected.  EARS: TM's intact. Light reflex normal. No retraction or perforation  NOSE: Mucosa pink. Airway clear.  MOUTH & THROAT: No tonsillar enlargement. No  pharyngeal erythema or exudate.   NECK: Supple with no cervical lymphadenopathy.  No carotid bruits.  No thyromegaly  CHEST: Good inspiratory effort. Lungs clear to auscultation with no wheezes or crackles.  CARDIOVASCULAR: Normal S1, S2. No rubs, murmurs, or gallops.  ABDOMEN: Bowel sounds normal. Not distended. Soft. No tenderness or masses. No organomegaly.  EXTREMITIES: No edema, cyanosis, or clubbing.      IMPRESSION  1. Routine general medical examination at a health care facility    2. Hyperlipidemia, unspecified hyperlipidemia type    3. BMI 45.0-49.9, adult    4. Coronary artery disease involving native coronary artery of native heart without angina pectoris    5. IFG (impaired fasting glucose)    6. Hypertension, unspecified type    7. Urge incontinence    8. Gastroesophageal reflux disease, esophagitis presence not specified            PLAN  Significant counseling on lifestyle management including healthy diet and regular exercise the patient is pre contemplative to making any changes at this time.  Discussed positive affect on her other health parameters    Hypertension controlled.  Continue current therapy    IFG:  Recheck labs    CAD:  Continue secondary prevention.  No acute symptoms    GERD:  Offered trial back of H2 antagonist or PPI as she was on in the past with sucralfate just as an add on treatment if needed but patient declines any further adjustment of her therapy    UI/SI: urine testing, trial of detrol LA 4 mg daily instead of short actiing ditropan (tried XR ditropan in past but didn't help)    HEALTH SCREENINGS  Immunizations:  Tdap 2012   Offered PVX but pt declines. Counseling provided    Age/Gender Appropriate screenings:  MMG 11/01/2017  GYN:  Pap 2017 Dr. Vaz       Colonoscopy:  7/14/17:2 diminutive polyps rectum (hyperplastic), diverticulosis sigmoid and descending colon

## 2018-07-31 NOTE — PROGRESS NOTES
Subjective:   Patient ID:  Sonya Armendariz is a 57 y.o. female who presents for follow-up of Follow-up      Problem List Items Addressed This Visit        Cardiac/Vascular    MI (myocardial infarction)    CAD (coronary artery disease) - Primary    Hyperlipidemia    Hypertension    History of placement of stent in LAD coronary artery       Endocrine    BMI 45.0-49.9, adult       Other    Tobacco use          CAD with old ASMI 2010 with LEIGH to LAD x2  Had false positive stress test with atypical chest pains 2011 with patent stent, normal EF and minimal CAD    HPI: Patient is here for f/u of CAD with stent to LAD in 2010 after MI x2, 2 days apart after reaction to medications. She is doing well since previous visit 1 year ago. She denies chest pain but have SCOTT. She is able to walk about 2 blocks. She continues to smoke and is down to 0.5 pck daily. Weight is up 4 lbs.  She is compliant with medications. She is not active.     Review of Systems   Constitution: Negative.   HENT: Negative.    Eyes: Negative.    Cardiovascular: Negative.    Respiratory: Negative.    Endocrine: Negative.    Hematologic/Lymphatic: Negative.    Skin: Negative.    Musculoskeletal: Negative.    Gastrointestinal: Negative.    Neurological: Negative.      Patient's Medications   New Prescriptions    No medications on file   Previous Medications    ASPIRIN (ECOTRIN) 81 MG EC TABLET    Take 81 mg by mouth once daily.      ATORVASTATIN (LIPITOR) 20 MG TABLET    TAKE 1 TABLET BY MOUTH EVERY DAY    FLUTICASONE (FLONASE) 50 MCG/ACTUATION NASAL SPRAY    SPRAY ONCE IN EACH NOSTRIL EVERY DAY    LOSARTAN-HYDROCHLOROTHIAZIDE 50-12.5 MG (HYZAAR) 50-12.5 MG PER TABLET    TAKE 1 TABLET BY MOUTH ONCE DAILY    METOPROLOL SUCCINATE (TOPROL-XL) 25 MG 24 HR TABLET    TAKE 1 TABLET BY MOUTH ONCE DAILY    MULTIVITAMIN (THERAGRAN) PER TABLET    Take 1 tablet by mouth once daily.    NICOTINE (NICODERM CQ) 14 MG/24 HR    Place 1 patch onto the skin once daily.  For 2 weeks following the 21 mg patch    NICOTINE (NICODERM CQ) 21 MG/24 HR    Place 1 patch onto the skin once daily. For 6 weeks    NICOTINE (NICODERM CQ) 7 MG/24 HR    Place 1 patch onto the skin once daily. For 2 weeks following the 14 mg patch    NITROSTAT 0.4 MG SL TABLET    PLACE 1 TABLET UNDER THE TONGUE EVERY 5 MINUTES AS NEEDED    OXYBUTYNIN (DITROPAN) 5 MG TAB    TAKE 1 TABLET(5 MG) BY MOUTH THREE TIMES DAILY    SUCRALFATE (CARAFATE) 1 GRAM TABLET    TAKE 1 TABLET BY MOUTH FOUR TIMES DAILY   Modified Medications    No medications on file   Discontinued Medications    IBUPROFEN (ADVIL,MOTRIN) 800 MG TABLET    Take 1 tablet (800 mg total) by mouth every 8 (eight) hours as needed.       Objective:   Physical Exam   Constitutional: She is oriented to person, place, and time. She appears well-developed and well-nourished. No distress.   Examination of the digits showed no clubbing or cyanosis   HENT:   Head: Normocephalic and atraumatic.   Eyes: Conjunctivae are normal. Pupils are equal, round, and reactive to light. Right eye exhibits no discharge.   Neck: Normal range of motion. Neck supple. No JVD present. No thyromegaly present.   No carotid bruits   Cardiovascular: Normal rate, regular rhythm, S1 normal, S2 normal, normal heart sounds, intact distal pulses and normal pulses.  PMI is not displaced.  Exam reveals no gallop, no friction rub and no opening snap.    No murmur heard.  Pulmonary/Chest: Effort normal and breath sounds normal. No respiratory distress. She has no wheezes. She has no rales. She exhibits no tenderness.   Abdominal: Soft. Bowel sounds are normal. She exhibits no distension and no mass. There is no tenderness. There is no guarding.   No hepatosplenomegaly   Musculoskeletal: Normal range of motion. She exhibits no edema or tenderness.   Lymphadenopathy:     She has no cervical adenopathy.   Neurological: She is alert and oriented to person, place, and time.   Skin: Skin is warm. No rash  noted. She is not diaphoretic. No erythema.   Psychiatric: She has a normal mood and affect.   Nursing note and vitals reviewed.      ECGs reviewed-NSR  LABS reviewed-normal  Echo: normal ef    Assessment:     1. Coronary artery disease involving native coronary artery of native heart without angina pectoris    2. ST elevation myocardial infarction involving left anterior descending (LAD) coronary artery    3. Mixed hyperlipidemia    4. Essential hypertension    5. History of placement of stent in LAD coronary artery    6. BMI 45.0-49.9, adult    7. Tobacco use        Plan:     Continue current cardiac medications including asa, lipitor, metoprolol and losartan/HCTZ  Low salt diet  Increase activity as tolerated, weight loss highly encourage  Smoking cessation highly encouraged and risk for repeat MI discussed with patient  F/u in 1 year    Lifestyle Modifications was discussed with patient in details including diet, exercise and abstinence from smoking. Diet plan discussed include low carb diet with Dash recommendation for daily protein, fruits and vegetables. Patient counseled to exercise at least 30 minutes 5 times weekly.

## 2018-08-01 RX ORDER — SUCRALFATE 1 G/1
TABLET ORAL
Qty: 120 TABLET | Refills: 0 | Status: SHIPPED | OUTPATIENT
Start: 2018-08-01 | End: 2019-02-26

## 2018-08-02 ENCOUNTER — LAB VISIT (OUTPATIENT)
Dept: LAB | Facility: HOSPITAL | Age: 58
End: 2018-08-02
Attending: INTERNAL MEDICINE
Payer: COMMERCIAL

## 2018-08-02 DIAGNOSIS — Z00.00 ROUTINE GENERAL MEDICAL EXAMINATION AT A HEALTH CARE FACILITY: ICD-10-CM

## 2018-08-02 DIAGNOSIS — E78.5 HYPERLIPIDEMIA, UNSPECIFIED HYPERLIPIDEMIA TYPE: ICD-10-CM

## 2018-08-02 DIAGNOSIS — I25.10 CORONARY ARTERY DISEASE INVOLVING NATIVE CORONARY ARTERY OF NATIVE HEART WITHOUT ANGINA PECTORIS: ICD-10-CM

## 2018-08-02 DIAGNOSIS — N39.41 URGE INCONTINENCE: ICD-10-CM

## 2018-08-02 DIAGNOSIS — I10 HYPERTENSION, UNSPECIFIED TYPE: ICD-10-CM

## 2018-08-02 DIAGNOSIS — R73.01 IFG (IMPAIRED FASTING GLUCOSE): ICD-10-CM

## 2018-08-02 LAB
ALBUMIN SERPL BCP-MCNC: 3.8 G/DL
ALBUMIN SERPL BCP-MCNC: 3.8 G/DL
ALP SERPL-CCNC: 77 U/L
ALP SERPL-CCNC: 77 U/L
ALT SERPL W/O P-5'-P-CCNC: 17 U/L
ALT SERPL W/O P-5'-P-CCNC: 17 U/L
ANION GAP SERPL CALC-SCNC: 7 MMOL/L
ANION GAP SERPL CALC-SCNC: 7 MMOL/L
AST SERPL-CCNC: 19 U/L
AST SERPL-CCNC: 19 U/L
BASOPHILS # BLD AUTO: 0.02 K/UL
BASOPHILS NFR BLD: 0.4 %
BILIRUB SERPL-MCNC: 0.6 MG/DL
BILIRUB SERPL-MCNC: 0.6 MG/DL
BUN SERPL-MCNC: 10 MG/DL
BUN SERPL-MCNC: 10 MG/DL
CALCIUM SERPL-MCNC: 9.7 MG/DL
CALCIUM SERPL-MCNC: 9.7 MG/DL
CHLORIDE SERPL-SCNC: 109 MMOL/L
CHLORIDE SERPL-SCNC: 109 MMOL/L
CHOLEST SERPL-MCNC: 152 MG/DL
CHOLEST/HDLC SERPL: 3.5 {RATIO}
CO2 SERPL-SCNC: 24 MMOL/L
CO2 SERPL-SCNC: 24 MMOL/L
CREAT SERPL-MCNC: 0.8 MG/DL
CREAT SERPL-MCNC: 0.8 MG/DL
DIFFERENTIAL METHOD: ABNORMAL
EOSINOPHIL # BLD AUTO: 0.3 K/UL
EOSINOPHIL NFR BLD: 5.7 %
ERYTHROCYTE [DISTWIDTH] IN BLOOD BY AUTOMATED COUNT: 13.3 %
EST. GFR  (AFRICAN AMERICAN): >60 ML/MIN/1.73 M^2
EST. GFR  (AFRICAN AMERICAN): >60 ML/MIN/1.73 M^2
EST. GFR  (NON AFRICAN AMERICAN): >60 ML/MIN/1.73 M^2
EST. GFR  (NON AFRICAN AMERICAN): >60 ML/MIN/1.73 M^2
ESTIMATED AVG GLUCOSE: 120 MG/DL
GLUCOSE SERPL-MCNC: 110 MG/DL
GLUCOSE SERPL-MCNC: 110 MG/DL
HBA1C MFR BLD HPLC: 5.8 %
HCT VFR BLD AUTO: 42.5 %
HDLC SERPL-MCNC: 44 MG/DL
HDLC SERPL: 28.9 %
HGB BLD-MCNC: 14.2 G/DL
LDLC SERPL CALC-MCNC: 85.4 MG/DL
LYMPHOCYTES # BLD AUTO: 2.3 K/UL
LYMPHOCYTES NFR BLD: 43 %
MCH RBC QN AUTO: 31.8 PG
MCHC RBC AUTO-ENTMCNC: 33.4 G/DL
MCV RBC AUTO: 95 FL
MONOCYTES # BLD AUTO: 0.5 K/UL
MONOCYTES NFR BLD: 8.3 %
NEUTROPHILS # BLD AUTO: 2.3 K/UL
NEUTROPHILS NFR BLD: 42.6 %
NONHDLC SERPL-MCNC: 108 MG/DL
PLATELET # BLD AUTO: 194 K/UL
PMV BLD AUTO: 11.5 FL
POTASSIUM SERPL-SCNC: 4.2 MMOL/L
POTASSIUM SERPL-SCNC: 4.2 MMOL/L
PROT SERPL-MCNC: 7.2 G/DL
PROT SERPL-MCNC: 7.2 G/DL
RBC # BLD AUTO: 4.47 M/UL
SODIUM SERPL-SCNC: 140 MMOL/L
SODIUM SERPL-SCNC: 140 MMOL/L
TRIGL SERPL-MCNC: 113 MG/DL
TSH SERPL DL<=0.005 MIU/L-ACNC: 0.47 UIU/ML
WBC # BLD AUTO: 5.4 K/UL

## 2018-08-02 PROCEDURE — 84443 ASSAY THYROID STIM HORMONE: CPT

## 2018-08-02 PROCEDURE — 83036 HEMOGLOBIN GLYCOSYLATED A1C: CPT

## 2018-08-02 PROCEDURE — 36415 COLL VENOUS BLD VENIPUNCTURE: CPT

## 2018-08-02 PROCEDURE — 80053 COMPREHEN METABOLIC PANEL: CPT

## 2018-08-02 PROCEDURE — 85025 COMPLETE CBC W/AUTO DIFF WBC: CPT

## 2018-08-02 PROCEDURE — 80061 LIPID PANEL: CPT

## 2018-08-11 ENCOUNTER — PATIENT MESSAGE (OUTPATIENT)
Dept: FAMILY MEDICINE | Facility: CLINIC | Age: 58
End: 2018-08-11

## 2018-11-02 ENCOUNTER — HOSPITAL ENCOUNTER (OUTPATIENT)
Dept: RADIOLOGY | Facility: HOSPITAL | Age: 58
Discharge: HOME | End: 2018-11-02
Attending: FAMILY MEDICINE
Payer: COMMERCIAL

## 2018-11-02 ENCOUNTER — TELEPHONE (OUTPATIENT)
Dept: FAMILY MEDICINE | Facility: CLINIC | Age: 58
End: 2018-11-02

## 2018-11-02 DIAGNOSIS — Z12.31 ENCOUNTER FOR SCREENING MAMMOGRAM FOR BREAST CANCER: Primary | ICD-10-CM

## 2018-11-02 DIAGNOSIS — Z12.31 ENCOUNTER FOR SCREENING MAMMOGRAM FOR BREAST CANCER: ICD-10-CM

## 2018-11-02 PROCEDURE — 77067 SCR MAMMO BI INCL CAD: CPT | Mod: TC

## 2018-11-02 PROCEDURE — 77063 BREAST TOMOSYNTHESIS BI: CPT | Mod: 26,,, | Performed by: RADIOLOGY

## 2018-11-02 PROCEDURE — 77063 BREAST TOMOSYNTHESIS BI: CPT | Mod: TC

## 2018-11-02 PROCEDURE — 77067 SCR MAMMO BI INCL CAD: CPT | Mod: 26,,, | Performed by: RADIOLOGY

## 2018-11-02 NOTE — TELEPHONE ENCOUNTER
----- Message from Ivory Sebastian sent at 11/2/2018  9:47 AM CDT -----  Contact: self  Patient requesting Mammogram would like appointment today after 2pm if possible.   Please call pt at [phone]

## 2018-12-10 RX ORDER — SUCRALFATE 1 G/1
TABLET ORAL
Qty: 120 TABLET | Refills: 0 | OUTPATIENT
Start: 2018-12-10

## 2018-12-23 ENCOUNTER — PATIENT MESSAGE (OUTPATIENT)
Dept: FAMILY MEDICINE | Facility: CLINIC | Age: 58
End: 2018-12-23

## 2018-12-23 DIAGNOSIS — Z12.39 BREAST CANCER SCREENING, HIGH RISK PATIENT: Primary | ICD-10-CM

## 2018-12-24 NOTE — TELEPHONE ENCOUNTER
Elevated breast cancer risk score, mmg was neg but will place consult with breast center to discuss potential benefit from more indepth screening protocol.

## 2019-01-25 RX ORDER — LOSARTAN POTASSIUM AND HYDROCHLOROTHIAZIDE 12.5; 5 MG/1; MG/1
1 TABLET ORAL DAILY
Qty: 30 TABLET | Refills: 5 | Status: SHIPPED | OUTPATIENT
Start: 2019-01-25 | End: 2019-07-26 | Stop reason: SDUPTHER

## 2019-02-03 ENCOUNTER — HOSPITAL ENCOUNTER (EMERGENCY)
Facility: HOSPITAL | Age: 59
Discharge: HOME | End: 2019-02-03
Attending: EMERGENCY MEDICINE
Payer: COMMERCIAL

## 2019-02-03 VITALS
HEIGHT: 62 IN | WEIGHT: 210 LBS | SYSTOLIC BLOOD PRESSURE: 170 MMHG | HEART RATE: 77 BPM | TEMPERATURE: 98 F | OXYGEN SATURATION: 96 % | DIASTOLIC BLOOD PRESSURE: 84 MMHG | BODY MASS INDEX: 38.64 KG/M2 | RESPIRATION RATE: 16 BRPM

## 2019-02-03 DIAGNOSIS — M25.561 ACUTE PAIN OF RIGHT KNEE: Primary | ICD-10-CM

## 2019-02-03 DIAGNOSIS — R52 PAIN: ICD-10-CM

## 2019-02-03 DIAGNOSIS — M79.606 LEG PAIN: ICD-10-CM

## 2019-02-03 PROCEDURE — 63600175 PHARM REV CODE 636 W HCPCS: Performed by: EMERGENCY MEDICINE

## 2019-02-03 PROCEDURE — 96372 THER/PROPH/DIAG INJ SC/IM: CPT | Mod: 59

## 2019-02-03 PROCEDURE — 99284 EMERGENCY DEPT VISIT MOD MDM: CPT | Mod: 25

## 2019-02-03 PROCEDURE — 25000003 PHARM REV CODE 250: Performed by: EMERGENCY MEDICINE

## 2019-02-03 RX ORDER — HYDROCODONE BITARTRATE AND ACETAMINOPHEN 5; 325 MG/1; MG/1
1 TABLET ORAL
Status: COMPLETED | OUTPATIENT
Start: 2019-02-03 | End: 2019-02-03

## 2019-02-03 RX ORDER — KETOROLAC TROMETHAMINE 30 MG/ML
30 INJECTION, SOLUTION INTRAMUSCULAR; INTRAVENOUS
Status: COMPLETED | OUTPATIENT
Start: 2019-02-03 | End: 2019-02-03

## 2019-02-03 RX ORDER — TRAMADOL HYDROCHLORIDE 50 MG/1
50 TABLET ORAL EVERY 6 HOURS PRN
Qty: 12 TABLET | Refills: 0 | Status: SHIPPED | OUTPATIENT
Start: 2019-02-03 | End: 2019-02-26

## 2019-02-03 RX ORDER — IBUPROFEN 200 MG
TABLET ORAL
Qty: 42 PATCH | Refills: 0 | Status: SHIPPED | OUTPATIENT
Start: 2019-02-03 | End: 2019-06-28 | Stop reason: SDUPTHER

## 2019-02-03 RX ADMIN — KETOROLAC TROMETHAMINE 30 MG: 30 INJECTION, SOLUTION INTRAMUSCULAR at 04:02

## 2019-02-03 RX ADMIN — HYDROCODONE BITARTRATE AND ACETAMINOPHEN 1 TABLET: 5; 325 TABLET ORAL at 04:02

## 2019-02-03 NOTE — ED PROVIDER NOTES
Encounter Date: 2/3/2019    SCRIBE #1 NOTE: I, Ashley Jo, am scribing for, and in the presence of,  Dr. Soares. I have scribed the entire note.       History     Chief Complaint   Patient presents with    Leg Pain     pt presents to ed wt c/o right leg pain beginning 2 days ago with worsening pain and swellling beginning this morning. right calf larger than left. denies numbness or tingling. denies injury. distal pulses palpable at triage. + increase in pain with ambulation     Leg Swelling     Time seen by provider: 3:54 PM    This is a 58 y.o. female who presents with complaint of right leg pain since this morning. Pt states that the pain is mainly in her knee and calf but also runs down the back of her leg from her buttock to her ankle. Pt denies any numbness/tingling, recent trauma, lifting or straining, or any other concerning symptom. She notes that she can not bend her knee properly and that the pain is worsened with ambulation. Hx MI, HLD, and HTN.        The history is provided by the patient.     Review of patient's allergies indicates:   Allergen Reactions    Amoxicillin Diarrhea    Plavix [clopidogrel]      Past Medical History:   Diagnosis Date    Anticoagulant long-term use     CAD (coronary artery disease)     s/p LAD stent    CHF (congestive heart failure)     Gastroduodenitis     Helicobacter pylori (H. pylori) infection     Hyperlipidemia     Hypertension     MI (myocardial infarction)     x2    Obesity     Thyroid disease     Tobacco use     Urinary tract infection     Vaginal infection      Past Surgical History:   Procedure Laterality Date    COLONOSCOPY N/A 7/14/2017    [facility]    ESOPHAGOGASTRODUODENOSCOPY (EGD) N/A 7/14/2017    [facility]    ESOPHAGOGASTRODUODENOSCOPY (EGD) N/A 12/23/2014    [facility]    NO PAST SURGERIES       Family History    Problem Relation Age of Onset    Hypertension Other         multiple    Diabetes Other         multiple    Stomach cancer Mother     Cancer Father         unknown    Breast cancer Sister     Throat cancer Brother     Cancer Brother         x 2,. unknown    Coronary artery disease Cousin      Social History     Tobacco Use    Smoking status: Current Every Day Smoker     Packs/day: 1.00     Years: 40.00     Pack years: 40.00     Types: Cigarettes     Last attempt to quit: 2016     Years since quittin.6    Smokeless tobacco: Never Used    Tobacco comment: Restarted 1 .5 yrs. ago   Substance Use Topics    Alcohol use: No     Alcohol/week: 0.0 oz    Drug use: No     Review of Systems   Musculoskeletal: Positive for arthralgias, gait problem and myalgias.   All other systems reviewed and are negative.      Physical Exam     Initial Vitals [19 1450]   BP Pulse Resp Temp SpO2   (!) 151/82 82 20 98.5 °F (36.9 °C) 99 %      MAP       --         Physical Exam    Nursing note and vitals reviewed.  Constitutional: She appears well-developed and well-nourished. She is not diaphoretic. No distress.   Obese   HENT:   Head: Normocephalic and atraumatic.   Eyes: Conjunctivae and EOM are normal.   Neck: Normal range of motion. Neck supple.   Cardiovascular: Normal rate, regular rhythm, normal heart sounds and intact distal pulses.   Distal pulses (DP/PT) 2/4   Pulmonary/Chest: Breath sounds normal. No respiratory distress.   Abdominal: Soft. There is no tenderness.   Musculoskeletal: Normal range of motion. She exhibits tenderness. She exhibits no edema.   Minimal edema of right calf  DP/PT is 2/4  Flexion and passive ROM in normal range  No laxity of joint  MCL and LCL strain test negative   No definite effusion  No hip irritability with ROM  gait is antalgic  Positive pain with active knee flexion though able to flex at least ~50 degrees   Neurological: She is alert and oriented to person, place, and  time. She has normal strength and normal reflexes. No sensory deficit.   Skin: Skin is warm and dry. Capillary refill takes less than 2 seconds. No erythema.   No erythema, no skin changes  No increase in warmth or overlying erythema of knee joint         ED Course   Procedures  Labs Reviewed - No data to display         X-Rays:   Independently Interpreted Readings:   Other Readings:  Reviewed by myself, read by radiology.     Imaging Results          X-Ray Tibia Fibula 2 View Right (Final result)  Result time 02/03/19 16:25:25    Final result by Odessa Ayala MD (02/03/19 16:25:25)                 Impression:      No fracture or dislocation.      Electronically signed by: Odessa Ayala MD  Date:    02/03/2019  Time:    16:25             Narrative:    EXAMINATION:  XR TIBIA FIBULA 2 VIEW RIGHT    CLINICAL HISTORY:  Pain, unspecified    TECHNIQUE:  AP and lateral views of the right tibia and fibula were performed.    COMPARISON:  None.    FINDINGS:  No bone, joint or soft tissue abnormality.                               X-Ray Knee 3 View Right (In process)                US Lower Extremity Veins Right (Final result)  Result time 02/03/19 15:13:04    Final result by Rogers Rivera MD (02/03/19 15:13:04)                 Impression:      No evidence of deep venous thrombosis in the right lower extremity.      Electronically signed by: Rogers Rivera MD  Date:    02/03/2019  Time:    15:13             Narrative:    EXAMINATION:  US LOWER EXTREMITY VEINS RIGHT    CLINICAL HISTORY:  Pain in leg, unspecified    TECHNIQUE:  Duplex and color flow Doppler evaluation and graded compression of the right lower extremity veins was performed.    COMPARISON:  None    FINDINGS:  Right thigh veins: The common femoral, femoral, popliteal, upper greater saphenous, and deep femoral veins are patent and free of thrombus. The veins are normally compressible and have normal phasic flow and augmentation response.    Right calf veins:  The visualized calf veins are patent.    Contralateral CFV: The contralateral (left) common femoral vein is patent and free of thrombus.    Miscellaneous: None                                Medical Decision Making:   Initial Assessment:   This is a 58 y.o. female who presents with complaint of right leg pain since this morning.  Clinical Tests:   Radiological Study: Ordered and Reviewed  ED Management:  6:07 PM  Patient improved. Low clinical suspicion for septic joint. Discussed possibility of a lumbar radiculopathy though patient also demonstrates pain in the knee aspect as well. Questionable edema to right calf. No DVT on today's study. No evidence of a circulatory issue. Patient non-toxic in appearance. Will discharge to home and recommend close follow-up with PCP/Orthopedist.               Attending Attestation:           Physician Attestation for Scribe:  Physician Attestation Statement for Scribe #1: I, Dr. Soares, reviewed documentation, as scribed by Ashley Jo in my presence, and it is both accurate and complete.                    Clinical Impression:     1. Acute pain of right knee    2. Leg pain    3. Leg edema                                  Andreas Soares MD  02/03/19 [id number]

## 2019-02-03 NOTE — ED NOTES
Pt reports that pain is constant and worsens with weight bearing.  Friend at bedside, head of bed adjusted for comfort

## 2019-02-03 NOTE — ED NOTES
Pt presents with c/o soreness to right knee x 1 week. Today she woke up to pain/swelling to right knee and right calf now radiating to left buttock. Pedal and popliteal pulses palpable. Pt rates pain 7/10.

## 2019-02-19 ENCOUNTER — TELEPHONE (OUTPATIENT)
Dept: FAMILY MEDICINE | Facility: CLINIC | Age: 59
End: 2019-02-19

## 2019-02-19 NOTE — TELEPHONE ENCOUNTER
Patient requesting to speak with you regarding getting a new prescription written for cyclobenzaprine (FLEXERIL) 5 MG tablet.  Please advise         Returning pt call. No answer left message requesting a call back.

## 2019-02-19 NOTE — TELEPHONE ENCOUNTER
----- Message from Estella Thakkar sent at 2/19/2019  9:01 AM CST -----  Contact: [phone]/self  Patient requesting to speak with you regarding getting a new prescription written for cyclobenzaprine (FLEXERIL) 5 MG tablet.  Please advise

## 2019-02-20 ENCOUNTER — TELEPHONE (OUTPATIENT)
Dept: FAMILY MEDICINE | Facility: CLINIC | Age: 59
End: 2019-02-20

## 2019-02-20 NOTE — TELEPHONE ENCOUNTER
----- Message from Aide Hernandez sent at 2/19/2019  1:22 PM CST -----  Contact: [phone]/ self   Patient called in returning your call. Please advise.

## 2019-02-20 NOTE — TELEPHONE ENCOUNTER
----- Message from Kasandra Junior sent at 2/20/2019 10:48 AM CST -----  Contact: Self [phone]  Patient Returning Your Phone Call

## 2019-02-20 NOTE — TELEPHONE ENCOUNTER
----- Message from Gena Wu sent at 2/20/2019  2:40 PM CST -----  Contact: self / [phone]  Patient is returning a call from your office. Please advise

## 2019-02-20 NOTE — TELEPHONE ENCOUNTER
Patient scheduled for 2/26/2019 at 2:20pm.  Needs evaluation before being prescribed flexeril due to it being a few years since last prescription was filled.  Patient verbalized understanding.

## 2019-02-26 ENCOUNTER — OFFICE VISIT (OUTPATIENT)
Dept: FAMILY MEDICINE | Facility: CLINIC | Age: 59
End: 2019-02-26
Payer: COMMERCIAL

## 2019-02-26 VITALS
SYSTOLIC BLOOD PRESSURE: 108 MMHG | DIASTOLIC BLOOD PRESSURE: 77 MMHG | HEART RATE: 87 BPM | TEMPERATURE: 99 F | BODY MASS INDEX: 45.88 KG/M2 | WEIGHT: 249.31 LBS | OXYGEN SATURATION: 99 % | HEIGHT: 62 IN

## 2019-02-26 DIAGNOSIS — I25.10 CORONARY ARTERY DISEASE INVOLVING NATIVE CORONARY ARTERY OF NATIVE HEART WITHOUT ANGINA PECTORIS: ICD-10-CM

## 2019-02-26 DIAGNOSIS — I10 HYPERTENSION, UNSPECIFIED TYPE: ICD-10-CM

## 2019-02-26 DIAGNOSIS — S46.819A STRAIN OF TRAPEZIUS MUSCLE, UNSPECIFIED LATERALITY, INITIAL ENCOUNTER: Primary | ICD-10-CM

## 2019-02-26 DIAGNOSIS — M17.11 ARTHRITIS OF RIGHT KNEE: ICD-10-CM

## 2019-02-26 PROCEDURE — 3074F PR MOST RECENT SYSTOLIC BLOOD PRESSURE < 130 MM HG: ICD-10-PCS | Mod: CPTII,S$GLB,, | Performed by: FAMILY MEDICINE

## 2019-02-26 PROCEDURE — 99999 PR PBB SHADOW E&M-EST. PATIENT-LVL IV: CPT | Mod: PBBFAC,,, | Performed by: FAMILY MEDICINE

## 2019-02-26 PROCEDURE — 3078F PR MOST RECENT DIASTOLIC BLOOD PRESSURE < 80 MM HG: ICD-10-PCS | Mod: CPTII,S$GLB,, | Performed by: FAMILY MEDICINE

## 2019-02-26 PROCEDURE — 3008F PR BODY MASS INDEX (BMI) DOCUMENTED: ICD-10-PCS | Mod: CPTII,S$GLB,, | Performed by: FAMILY MEDICINE

## 2019-02-26 PROCEDURE — 3078F DIAST BP <80 MM HG: CPT | Mod: CPTII,S$GLB,, | Performed by: FAMILY MEDICINE

## 2019-02-26 PROCEDURE — 99215 PR OFFICE/OUTPT VISIT, EST, LEVL V, 40-54 MIN: ICD-10-PCS | Mod: S$GLB,,, | Performed by: FAMILY MEDICINE

## 2019-02-26 PROCEDURE — 99215 OFFICE O/P EST HI 40 MIN: CPT | Mod: S$GLB,,, | Performed by: FAMILY MEDICINE

## 2019-02-26 PROCEDURE — 99999 PR PBB SHADOW E&M-EST. PATIENT-LVL IV: ICD-10-PCS | Mod: PBBFAC,,, | Performed by: FAMILY MEDICINE

## 2019-02-26 PROCEDURE — 3074F SYST BP LT 130 MM HG: CPT | Mod: CPTII,S$GLB,, | Performed by: FAMILY MEDICINE

## 2019-02-26 PROCEDURE — 3008F BODY MASS INDEX DOCD: CPT | Mod: CPTII,S$GLB,, | Performed by: FAMILY MEDICINE

## 2019-02-26 RX ORDER — NITROGLYCERIN 0.4 MG/1
TABLET SUBLINGUAL
Qty: 25 TABLET | Refills: 0 | Status: SHIPPED | OUTPATIENT
Start: 2019-02-26 | End: 2022-04-08

## 2019-02-26 RX ORDER — CYCLOBENZAPRINE HCL 5 MG
5 TABLET ORAL 3 TIMES DAILY PRN
Qty: 30 TABLET | Refills: 0 | Status: SHIPPED | OUTPATIENT
Start: 2019-02-26 | End: 2019-10-30 | Stop reason: SDUPTHER

## 2019-02-26 NOTE — PROGRESS NOTES
(Portions of this note were dictated using voice recognition software and may contain dictation related errors in spelling/grammar/syntax not found on text review)    CC:   Chief Complaint   Patient presents with    Spasms     LDCT lung screen, flu       HPI: 58 y.o. female Annual exam in  July 2018.  Here for urgent care concern for muscle spasms.  States that for the past 2 months she has had muscle pain in her upper back and shoulder region proximally.  No pain down her arms.  States that her muscle spasm.  She had an old prescription of Flexeril and was requesting renewal but given no recent evaluation for this was advised appointment to come in.  Denies any trauma.  Does occasional lifting at work.  No dedicated exercise.  No paresthesias.  Did take ibuprofen once since she was out of the Flexeril but we did discuss given her CAD history that she should not take NSAIDs if at all possible      Chronic medical history below.  Reviewed medical chart.  Went to ED on 02/03/2019 with right leg pain and swelling. No reviewed as follows:  Presented with concern of right leg pain 2 days prior to evaluation along with swelling.  Pain worse with ambulation.  Exam showed pain with active knee flexion, no erythema or skin changes, no increase in warmth, minimal edema to right calf.  Venous Doppler was negative for DVT.  Knee x-ray showed tricompartmental degenerative disease.  Scattered vascular calcifications noted. Ill-defined hyperdensity along medial patellar facet concerning for chondrocalcinosis.  No bony erosions.  Tibia/fibula films were negative for fracture.  Consideration of primary knee related pain.  Was discharged home .  She states that she is not aware of any arthritis of the knee.  We looked at her knee images together in the office and pointed out moderate degenerative disease with significant osteophytes noted specifically on sunrise view.  Sometimes does report some pain in the popliteal region.   Recently she has been using some support stockings and she feels like calf pain is slightly better.  Does standup for 7 hr at a time at work, again as above no dedicated exercise when she gets home.  Trying to lose weight by eating 1 meal a day.  Current weight is 249 lb.  She has a 210 lb weight designation listed on 2019 but I think that this may be   erroneously entered as her prior weight in 2018 was 258 and she denies any significant weight loss since that time frame.  Denies any chest pain.  Sometimes feels short of breath mildly but states that she did go back to smoking, trying to quit with nicotine patches.    Also has  nitroglycerin tablets.  Has not needed this but would like a refill of this    Past Medical History:   Diagnosis Date    Anticoagulant long-term use     CAD (coronary artery disease)     s/p LAD stent    CHF (congestive heart failure)     Gastroduodenitis     Helicobacter pylori (H. pylori) infection     Hyperlipidemia     Hypertension     MI (myocardial infarction)     x2    Obesity     Tobacco use     Urinary tract infection     Vaginal infection        Past Surgical History:   Procedure Laterality Date    COLONOSCOPY N/A 2017    [facility]    ESOPHAGOGASTRODUODENOSCOPY (EGD) N/A 2017    [facility]    ESOPHAGOGASTRODUODENOSCOPY (EGD) N/A 2014    [facility]    NO PAST SURGERIES         Family History   Problem Relation Age of Onset    Hypertension Other         multiple    Diabetes Other         multiple    Stomach cancer Mother     Cancer Father         unknown    Breast cancer Sister     Throat cancer Brother     Cancer Brother         x 2,. unknown    Coronary artery disease Cousin        Social History     Socioeconomic History    Marital status:      Spouse name: Not on file    Number of children: Not  on file    Years of education: Not on file    Highest education level: Not on file   Social Needs    Financial resource strain: Not on file    Food insecurity - worry: Not on file    Food insecurity - inability: Not on file    Transportation needs - medical: Not on file    Transportation needs - non-medical: Not on file   Occupational History     Employer: [employer]   Tobacco Use    Smoking status: Current Every Day Smoker     Packs/day: 1.00     Years: 40.00     Pack years: 40.00     Types: Cigarettes     Last attempt to quit: 2016     Years since quittin.6    Smokeless tobacco: Never Used    Tobacco comment: Restarted 1 .5 yrs. ago   Substance and Sexual Activity    Alcohol use: No     Alcohol/week: 0.0 oz    Drug use: No    Sexual activity: Yes     Partners: Male   Other Topics Concern    Not on file   Social History Narrative    Not on file     Lab Results   Component Value Date    WBC 5.40 2018    HGB 14.2 2018    HCT 42.5 2018     2018    CHOL 152 2018    TRIG 113 2018    HDL 44 2018    ALT 17 2018    ALT 17 2018    AST 19 2018    AST 19 2018     2018     2018    K 4.2 2018    K 4.2 2018     2018     2018    CREATININE 0.8 2018    CREATININE 0.8 2018    CALCIUM 9.7 2018    CALCIUM 9.7 2018    ALBUMIN 3.8 2018    ALBUMIN 3.8 2018    BUN 10 2018    BUN 10 2018    CO2 24 2018    CO2 24 2018    TSH 0.471 2018    INR 1.0 2011    HGBA1C 5.8 (H) 2018    LDLCALC 85.4 2018     2018     2018           ROS:  GENERAL: No fever, chills, fatigability or weight loss.  SKIN: No rashes, no itching.  HEAD: No headaches.  EYES: No visual changes  EARS: No ear pain or changes in hearing.  NOSE: No congestion or rhinorrhea.  MOUTH & THROAT: No  hoarseness, change in voice, or sore throat.  NODES: Denies swollen glands.  CHEST:  Above  CARDIOVASCULAR: Denies chest pain, PND, orthopnea.  ABDOMEN: No nausea, vomiting, or changes in bowel function.  URINARY: No flank pain, dysuria or hematuria.  PERIPHERAL VASCULAR:  Above.  MUSCULOSKELETAL:  Above  NEUROLOGIC: No weakness or numbness.    Vital signs reviewed  PE:   APPEARANCE: Well nourished, well developed, in no acute distress.    HEAD: Normocephalic, atraumatic.  EYES: PERRL. EOMI.   Conjunctivae noninjected.  EARS: TM's intact. Light reflex normal. No retraction or perforation  NOSE: Mucosa pink. Airway clear.  MOUTH & THROAT: No tonsillar enlargement. No pharyngeal erythema or exudate.   NECK: Supple with no cervical lymphadenopathy.    CHEST: Good inspiratory effort. Lungs clear to auscultation with no wheezes or crackles.  CARDIOVASCULAR: Normal S1, S2. No rubs, murmurs, or gallops.  ABDOMEN: Bowel sounds normal. Not distended. Soft. No tenderness or masses. No organomegaly.  EXTREMITIES: No edema, cyanosis, or clubbing.  MSK:  She does have bilateral trapezius tenderness to palpation along with some slight tenderness to palpation of midline thoracic and lumbar spine.  Has bilateral paraspinal muscle tenderness of the lumbar spine.  No arm tenderness to palpation.  Strength testing demonstrates normal  strength, biceps strength, deltoid strength bilaterally. Normal neck range of motion without restriction or pain. 2+ biceps, brachioradialis, patellar, ankle reflexes.  Lower extremity testing demonstrates 5/5 hip flexor strength bilaterally. 5/5 quad strength bilaterally. 4+/5 hamstring strength bilaterally.    Knee exam demonstrates no effusion on the right.  No popliteal fullness.  She does have lateral and medial joint line tenderness. Negative Lachman's and Rocio's test.  Negative varus or valgus stress pain or laxity.  No skin changes overlying        IMPRESSION  1. Strain of trapezius  muscle, unspecified laterality, initial encounter    2. Arthritis of right knee    3. BMI 45.0-49.9, adult    4. Coronary artery disease involving native coronary artery of native heart without angina pectoris    5. Hypertension, unspecified type            PLAN  Likely trapezius strain.  Advise heat application.  Will refill Flexeril but counseled against routine chronic use of this.  Have advise neck and shoulder stretching and conditioning exercises.  Advise proper lifting technique at work    Right leg pain.  Reviewed ER documentation and imaging as discussed above.  I reviewed her knee film images with her in the office today.  Her ultrasound was negative for DVT and patient was reassured.  However, we did point out her knee degenerative disease and likely contribution of her leg pain from this.  Have strongly advised weight loss.  Discussed not skipping meals but eating small healthy meals throughout the day.  Also advise regular aerobic exercise.  We did discuss that standing up for 7 hr a day will not constitute aerobic exercise.  Avoid NSAIDs secondary to her heart disease history.  She was given some exercises on quadriceps and hamstrings conditioning.    Hypertension stable.  Continue Hyzaar    CAD:  No acute issues but have refilled her nitroglycerin tablets to have on hand in case she starts with anginal pain.       HEALTH SCREENINGS  Immunizations:  Tdap 2012   Offered PVX but pt declines. Counseling provided     Age/Gender Appropriate screenings:  MMG 11/2018  GYN:  Pap 2017 Dr. Vaz       Colonoscopy:  7/14/17:2 diminutive polyps rectum (hyperplastic), diverticulosis sigmoid and descending colon

## 2019-02-26 NOTE — PATIENT INSTRUCTIONS
Shoulder Clock Exercise    To start, stand tall with your ears, shoulders, and hips in line. Your feet should be slightly apart, positioned just under your hips. Focus your eyes directly in front of you.  this position for a few seconds before starting your exercise. This helps increase your awareness of proper posture.  · Imagine that your right shoulder is the center of a clock. With the outer point of your shoulder, roll it around to slowly trace the outer edge of the clock.  · Move clockwise first, then counterclockwise.  · Repeat 3 to 5 times. Switch shoulders.   Date Last Reviewed: 10/2/2015  © 8148-2903 [employer]. [address] [place] [address]. All rights reserved. This information is not intended as a substitute for professional medical care. Always follow your healthcare professional's instructions.        Shoulder Shrug Exercise    To start, sit in a chair with your feet flat on the floor. Shift your weight slightly forward to avoid rounding your back. Relax. Keep your ears, shoulders, and hips aligned:  · Raise both of your shoulders as high as you can, as if you were trying to touch them to your ears. Keep your head and neck still and relaxed.  · Hold for a count of 10. Release.  · Repeat 5 times.  For your safety, check with your healthcare provider before starting an exercise program.   Date Last Reviewed: 8/16/2015  © 6149-9938 [employer]. [address] [place] [address]. All rights reserved. This information is not intended as a substitute for professional medical care. Always follow your healthcare professional's instructions.        Neck Exercises: Neck Flex  To start, sit in a chair with your feet flat on the floor. Your weight should be slightly forward so that youre balanced evenly on your buttocks. Relax your shoulders and keep your head level. Avoid arching your back or rounding your shoulders. Using a chair with arms may help you  keep your balance:  · Rest the back of your left hand against your lower back. Place your right palm on the top of your head.  · Gently pull your head forward and down until you feel a stretch in the muscles in the back of your neck. Dont force the motion.  · Hold for 20 seconds, then return to starting position. Switch arms.    Date Last Reviewed: 10/2/2015  © 4188-0030 [employer]. [address] [place] [address]. All rights reserved. This information is not intended as a substitute for professional medical care. Always follow your healthcare professional's instructions.        Neck Clock (Flexibility)    1. Lie on your back on the floor, with your knees bent and your feet flat on the floor. Place a rolled-up towel or neck roll under your neck.  2. Close your eyes and imagine a clock face. With your nose, slowly trace the outer edge of the clock in a clockwise direction. Move your neck smoothly. Dont force your head or neck.  3. Repeat 5 times, or as instructed.  4. Then switch to a counterclockwise direction, and repeat the exercise 5 times, or as instructed.     Challenge yourself  You can also do this exercise while sitting at your work desk. Sit up straight with your back supported firmly against your chair. You can do the exercise several times throughout your day.   Date Last Reviewed: 3/10/2016  © 8300-7447 [employer]. [address] [place] [address]. All rights reserved. This information is not intended as a substitute for professional medical care. Always follow your healthcare professional's instructions.        Head Tilt / Upper Trapezius Stretch (Flexibility)    5. Sit up straight in a chair with your head and neck in a neutral position, ears in line with shoulders. Hold the edge of your chair seat with your right hand. Tuck your chin in slightly.  6. Tilt your head to the left, while looking straight ahead.  7. Put your left hand on the right side of  your head. Gently pull your head to the left. Hold for 30 to 60 seconds. Use gentle pressure to increase the stretch. Dont force your head into position.  8. Return your head and neck to the neutral position.  9. Repeat this exercise 2 times, or as instructed.  10. Switch sides and repeat 2 times, or as instructed.  Challenge yourself  Tuck one end of a towel under your left arm. Then bring the other end over your right shoulder. Pull the towel down on your right shoulder with both hands as you side-bend your head to the left. Repeat with the other side.   Date Last Reviewed: 3/10/2016  © 3008-0578 [employer]. [address] [place] [address], [place] [address]. All rights reserved. This information is not intended as a substitute for professional medical care. Always follow your healthcare professional's instructions.        LOW BACK PAIN EXERCISES    Exercises that stretch and strengthen the muscles of your abdomen and spine can help prevent back problems. Strong back and abdominal muscles help you keep good posture, with your spine in its correct position.  If your muscles are tight, take a warm shower or bath before doing the exercises. Exercise on a rug or mat. Wear loose clothing. Dont wear shoes. Stop doing any exercise that causes pain until you have talked with your healthcare provider.  Ask your provider or physical therapist to help you develop an exercise program. Ask your provider how many times a week you need to do the exercises. Remember to start slowly.   Exercises  These exercises are intended only as suggestions. Be sure to check with your provider before starting the exercises.   Standing hamstring stretch: Put the heel of one leg on a stool about 15 inches high. Keep your leg straight. Lean forward, bending at the hips until you feel a mild stretch in the back of your thigh. Make sure you do not roll your shoulders or bend at the waist when doing this. You want to stretch your leg, not  your lower back. Hold the stretch for 15 to 30 seconds. Repeat with each leg 3 times.   Cat and camel: Get down on your hands and knees. Let your stomach sag, allowing your back to curve downward. Hold this position for 5 seconds. Then arch your back and hold for 5 seconds. Do 2 sets of 15.   Quadruped arm and leg raise: Get down on your hands and knees. Pull in your belly button and tighten your abdominal muscles to stiffen your spine. While keeping your abdominals tight, raise one arm and the opposite leg away from you. Hold this position for 5 seconds. Lower your arm and leg slowly and change sides. Do this 10 times on each side.   Pelvic tilt: Lie on your back with your knees bent and your feet flat on the floor. Pull your belly button in towards your spine and push your lower back into the floor, flattening your back. Hold this position for 15 seconds, then relax. Repeat 5 to 10 times.   Partial curl: Lie on your back with your knees bent and your feet flat on the floor. Draw in your abdomen and tighten your stomach muscles. With your hands stretched out in front of you, curl your upper body forward until your shoulders clear the floor. Hold this position for 3 seconds. Don't hold your breath. It helps to breathe out as you lift your shoulders. Relax back to the floor. Repeat 10 times. Build to 2 sets of 15. To challenge yourself, clasp your hands behind your head and keep your elbows out to your sides.   Gluteal stretch: Lie on your back with both knees bent. Rest your right ankle over the knee of your left leg. Grasp the thigh of the left leg and pull toward your chest. You will feel a stretch along the buttocks and possibly along the outside of your hip. Hold the stretch for 15 to 30 seconds. Then repeat the exercise with your left ankle over your right knee. Do the exercise 3 times with each leg.   Extension exercise   Lie face down on the floor for 5 minutes. If this hurts too much, lie face down with a  pillow under your stomach. This should relieve your leg or back pain. When you can lie on your stomach for 5 minutes without a pillow, you can continue with Part B of this exercise.   After lying on your stomach for 5 minutes, prop yourself up on your elbows for another 5 minutes. If you can do this without having more leg or buttock pain, you can start doing part C of this exercise.   Lie on your stomach with your hands under your shoulders. Then press down on your hands and extend your elbows while keeping your hips flat on the floor. Hold for 1 second and lower yourself to the floor. Do 3 to 5 sets of 10 repetitions. Rest for 1 minute between sets. You should have no pain in your legs when you do this, but it is normal to feel some pain in your lower back.   Do this exercise several times a day.  Side plank: Lie on your side with your legs, hips, and shoulders in a straight line. Prop yourself up onto your forearm with your elbow directly under your shoulder. Lift your hips off the floor and balance on your forearm and the outside of your foot. Try to hold this position for 15 seconds and then slowly lower your hip to the ground. Switch sides and repeat. Work up to holding for 1 minute. This exercise can be made easier by starting with your knees and hips flexed toward your chest.            Back Exercises: Leg Pull        To start, lie on your back with your knees bent and feet flat on the floor. Dont press your neck or lower back to the floor. Breathe deeply. You should feel comfortable and relaxed in this position.  · Pull one knee to your chest.  · Hold for 30-60 seconds. Return to starting position.  · Repeat 2 times.  · Switch legs.  · For a double leg pull, pull both legs to your chest at the same time. Repeat 2 times.  For your safety, check with your healthcare provider before starting an exercise program.   © 2273-9465 [employer]. [address] [place] [address], [place] [address]. All rights  reserved. This information is not intended as a substitute for professional medical care. Always follow your healthcare professional's instructions.

## 2019-03-06 ENCOUNTER — TELEPHONE (OUTPATIENT)
Dept: FAMILY MEDICINE | Facility: CLINIC | Age: 59
End: 2019-03-06

## 2019-03-06 RX ORDER — TOLTERODINE 4 MG/1
4 CAPSULE, EXTENDED RELEASE ORAL DAILY
Qty: 30 CAPSULE | Refills: 11 | Status: SHIPPED | OUTPATIENT
Start: 2019-03-06 | End: 2020-01-16

## 2019-03-06 NOTE — TELEPHONE ENCOUNTER
Patient states when she came in for her last appointment, she stated that she was no longer taking Detrol however that was incorrect.  Would need another prescription since the original has been discontinued at the pharmacy.

## 2019-06-27 ENCOUNTER — TELEPHONE (OUTPATIENT)
Dept: FAMILY MEDICINE | Facility: CLINIC | Age: 59
End: 2019-06-27

## 2019-06-27 NOTE — TELEPHONE ENCOUNTER
Advised that she can use OTC Cortisone.  Patient states she has been taking Benedryl but it only takes the edge off for a while.  Patient scheduled for 6/28/2019 at 11:20am for evaluation.

## 2019-06-27 NOTE — TELEPHONE ENCOUNTER
----- Message from Luanne Maldonado sent at 6/27/2019  9:07 AM CDT -----  Contact: Self/ [phone]  Patient called in asking is prescription can be called in for itchy skin.    Please call.

## 2019-06-28 ENCOUNTER — OFFICE VISIT (OUTPATIENT)
Dept: FAMILY MEDICINE | Facility: CLINIC | Age: 59
End: 2019-06-28
Payer: COMMERCIAL

## 2019-06-28 VITALS
WEIGHT: 255.06 LBS | SYSTOLIC BLOOD PRESSURE: 130 MMHG | OXYGEN SATURATION: 99 % | HEART RATE: 70 BPM | DIASTOLIC BLOOD PRESSURE: 86 MMHG | BODY MASS INDEX: 46.93 KG/M2 | TEMPERATURE: 98 F | HEIGHT: 62 IN

## 2019-06-28 DIAGNOSIS — L29.9 PRURITIC CONDITION: ICD-10-CM

## 2019-06-28 DIAGNOSIS — F32.1 CURRENT MODERATE EPISODE OF MAJOR DEPRESSIVE DISORDER WITHOUT PRIOR EPISODE: Primary | ICD-10-CM

## 2019-06-28 PROCEDURE — 3075F PR MOST RECENT SYSTOLIC BLOOD PRESS GE 130-139MM HG: ICD-10-PCS | Mod: CPTII,S$GLB,, | Performed by: FAMILY MEDICINE

## 2019-06-28 PROCEDURE — 3075F SYST BP GE 130 - 139MM HG: CPT | Mod: CPTII,S$GLB,, | Performed by: FAMILY MEDICINE

## 2019-06-28 PROCEDURE — 3079F DIAST BP 80-89 MM HG: CPT | Mod: CPTII,S$GLB,, | Performed by: FAMILY MEDICINE

## 2019-06-28 PROCEDURE — 3079F PR MOST RECENT DIASTOLIC BLOOD PRESSURE 80-89 MM HG: ICD-10-PCS | Mod: CPTII,S$GLB,, | Performed by: FAMILY MEDICINE

## 2019-06-28 PROCEDURE — 99999 PR PBB SHADOW E&M-EST. PATIENT-LVL IV: CPT | Mod: PBBFAC,,, | Performed by: FAMILY MEDICINE

## 2019-06-28 PROCEDURE — 99214 PR OFFICE/OUTPT VISIT, EST, LEVL IV, 30-39 MIN: ICD-10-PCS | Mod: S$GLB,,, | Performed by: FAMILY MEDICINE

## 2019-06-28 PROCEDURE — 3008F BODY MASS INDEX DOCD: CPT | Mod: CPTII,S$GLB,, | Performed by: FAMILY MEDICINE

## 2019-06-28 PROCEDURE — 99214 OFFICE O/P EST MOD 30 MIN: CPT | Mod: S$GLB,,, | Performed by: FAMILY MEDICINE

## 2019-06-28 PROCEDURE — 3008F PR BODY MASS INDEX (BMI) DOCUMENTED: ICD-10-PCS | Mod: CPTII,S$GLB,, | Performed by: FAMILY MEDICINE

## 2019-06-28 PROCEDURE — 99999 PR PBB SHADOW E&M-EST. PATIENT-LVL IV: ICD-10-PCS | Mod: PBBFAC,,, | Performed by: FAMILY MEDICINE

## 2019-06-28 RX ORDER — ESCITALOPRAM OXALATE 10 MG/1
10 TABLET ORAL DAILY
Qty: 30 TABLET | Refills: 11 | Status: SHIPPED | OUTPATIENT
Start: 2019-06-28 | End: 2020-07-24 | Stop reason: SDUPTHER

## 2019-06-28 RX ORDER — IBUPROFEN 200 MG
TABLET ORAL
Qty: 42 PATCH | Refills: 0 | Status: SHIPPED | OUTPATIENT
Start: 2019-06-28 | End: 2019-10-08 | Stop reason: SDUPTHER

## 2019-06-28 RX ORDER — HYDROXYZINE HYDROCHLORIDE 25 MG/1
25 TABLET, FILM COATED ORAL 3 TIMES DAILY PRN
Qty: 60 TABLET | Refills: 3 | Status: ON HOLD | OUTPATIENT
Start: 2019-06-28 | End: 2022-04-01 | Stop reason: CLARIF

## 2019-06-28 NOTE — PATIENT INSTRUCTIONS
"Counselin. Check with your work human resources dept for "employee assistance program (EAP)" services that usually include a number of free mental health counseling sessions per year    2. You can check with [employer] regarding counselors/social workers on their plan that you can make an appointment with.         CETAPHIL cream moisturizer--apply daily         "

## 2019-06-28 NOTE — PROGRESS NOTES
(Portions of this note were dictated using voice recognition software and may contain dictation related errors in spelling/grammar/syntax not found on text review)    CC:   Chief Complaint   Patient presents with    itching     pneum, shingles       HPI: 58 y.o. female annual exam in July 2018.  Here for urgent care visit for itching.  Started a month ago.  Itching head to toe.  No rash.  No change in skin care products, medications, clothing.  On further reflection, she feels like this could be related to depression and anxiety.  She states that she  from her  about 8-9 months ago.  This has happened before but feels like they will not get back together at this point.  She also followed for bankruptcy recently.  Finds herself sitting her room all day and not going out much.  She has been eating more.  She has been smoking more up to 1 and half packs a day.  She does feel nervous and anxious at times.  Has not been sleeping.  Describes low energy, anhedonia.  She denies any suicidal ideation.  Dealt with depression at 1 time in the past when she had her heart attack and got on some antidepressants for few months did seem to help.  She does not recall which she was taking at that time.  She denies any knowledge of significant adverse effects.  After further discussion she is interested in trying pharmacotherapy again.  She is not in any kind of counseling.  Does have some family members and friends around but feels guilty about talking to them about her problems because they have their own issues to deal with.    Past Medical History:   Diagnosis Date    Anticoagulant long-term use     CAD (coronary artery disease)     s/p LAD stent    CHF (congestive heart failure)     Gastroduodenitis     Helicobacter pylori (H. pylori) infection     Hyperlipidemia     Hypertension     MI (myocardial infarction)     x2    Obesity     Tobacco use     Urinary tract infection     Vaginal infection         Past Surgical History:   Procedure Laterality Date    COLONOSCOPY N/A 7/14/2017    [facility]    ESOPHAGOGASTRODUODENOSCOPY (EGD) N/A 7/14/2017    [facility]    ESOPHAGOGASTRODUODENOSCOPY (EGD) N/A 12/23/2014    [facility]    NO PAST SURGERIES         Family History   Problem Relation Age of Onset    Hypertension Other         multiple    Diabetes Other         multiple    Stomach cancer Mother     Cancer Father         unknown    Breast cancer Sister     Throat cancer Brother     Cancer Brother         x 2,. unknown    Coronary artery disease Cousin        Social History     Tobacco Use    Smoking status: Current Every Day Smoker     Packs/day: 1.00     Years: 40.00     Pack years: 40.00     Types: Cigarettes     Last attempt to quit: 6/28/2016     Years since quitting: 3.0    Smokeless tobacco: Never Used    Tobacco comment: Restarted 1 .5 yrs. ago   Substance Use Topics    Alcohol use: No     Alcohol/week: 0.0 oz    Drug use: No     Lab Results   Component Value Date    WBC 5.40 08/02/2018    HGB 14.2 08/02/2018    HCT 42.5 08/02/2018    MCV 95 08/02/2018     08/02/2018    CHOL 152 08/02/2018    TRIG 113 08/02/2018    HDL 44 08/02/2018    ALT 17 08/02/2018    ALT 17 08/02/2018    AST 19 08/02/2018    AST 19 08/02/2018    BILITOT 0.6 08/02/2018    BILITOT 0.6 08/02/2018    ALKPHOS 77 08/02/2018    ALKPHOS 77 08/02/2018     08/02/2018     08/02/2018    K 4.2 08/02/2018    K 4.2 08/02/2018     08/02/2018     08/02/2018    CREATININE 0.8 08/02/2018    CREATININE 0.8 08/02/2018    CALCIUM 9.7 08/02/2018    CALCIUM 9.7 08/02/2018    ALBUMIN 3.8 08/02/2018    ALBUMIN 3.8 08/02/2018    BUN 10 08/02/2018    BUN 10 08/02/2018    CO2 24 08/02/2018    CO2 24 08/02/2018    TSH 0.471 08/02/2018    INR 1.0 08/02/2011    HGBA1C 5.8 (H) 08/02/2018    LDLCALC  85.4 08/02/2018     08/02/2018     08/02/2018           ROS:  GENERAL:  Fatigue  SKIN:  Above  HEAD: No headaches.  EYES: No visual changes  EARS: No ear pain or changes in hearing.  NOSE: No congestion or rhinorrhea.  MOUTH & THROAT: No hoarseness, change in voice, or sore throat.  NODES: Denies swollen glands.  CHEST: Denies SCOTT, cyanosis, wheezing, cough and sputum production.  CARDIOVASCULAR: Denies chest pain, PND, orthopnea.  ABDOMEN: No nausea, vomiting, or changes in bowel function.  URINARY: No flank pain, dysuria or hematuria.  PERIPHERAL VASCULAR: No claudication or cyanosis.  MUSCULOSKELETAL: No joint stiffness or swelling. Denies back pain.  NEUROLOGIC: No weakness or numbness.  PSYCHIATRIC:  Above    Vital signs reviewed  PE:   APPEARANCE: Well nourished, well developed.  Tearful during conversation.  HEAD: Normocephalic, atraumatic.  EYES:  Conjunctival injection from crying.  NECK: Supple with no cervical lymphadenopathy.    CHEST: Good inspiratory effort. Lungs clear to auscultation with no wheezes or crackles.  CARDIOVASCULAR: Normal S1, S2. No rubs, murmurs, or gallops.  ABDOMEN: Bowel sounds normal. Not distended. Soft. No tenderness or masses. No organomegaly.  EXTREMITIES: No edema.  PSYCHIATRIC:  Mood is depressed.  Affect is flattened.  Patient is tearful.  SKIN:  No significant rashes noted on arms, legs, back, abdomen although patient does describe pruritus in these locations .  She does have some dry skin generalized.    IMPRESSION  1. Current moderate episode of major depressive disorder without prior episode    2. Pruritic condition            PLAN  Consideration that her chronic parotid is conditions could be stemming from underlying major depression and anxiety.  She also has been smoking more and does have some dry skin which could be contributing.    Advise Cetaphil applied after bathing, avoidance of excessively hot bath water    Advise treatment of underlying major  depression.  Discussed talk therapy, list of counselors in the area provided.  She can check into her job for EA services for counseling if possible.  Will start pharmacotherapy with Lexapro 10 mg daily.  Risks and benefits and time to action discussed.  Can prescribe hydroxyzine more acutely as needed for itching or anxiety or sleep.    Follow-up in 3-4 weeks or sooner as needed for any worsening issues

## 2019-07-26 ENCOUNTER — OFFICE VISIT (OUTPATIENT)
Dept: FAMILY MEDICINE | Facility: CLINIC | Age: 59
End: 2019-07-26
Payer: COMMERCIAL

## 2019-07-26 VITALS
HEIGHT: 62 IN | OXYGEN SATURATION: 96 % | WEIGHT: 255.06 LBS | BODY MASS INDEX: 46.93 KG/M2 | DIASTOLIC BLOOD PRESSURE: 76 MMHG | TEMPERATURE: 98 F | HEART RATE: 71 BPM | SYSTOLIC BLOOD PRESSURE: 138 MMHG

## 2019-07-26 DIAGNOSIS — L29.9 PRURITIC CONDITION: ICD-10-CM

## 2019-07-26 DIAGNOSIS — I25.10 CORONARY ARTERY DISEASE INVOLVING NATIVE CORONARY ARTERY OF NATIVE HEART WITHOUT ANGINA PECTORIS: ICD-10-CM

## 2019-07-26 DIAGNOSIS — E78.5 HYPERLIPIDEMIA, UNSPECIFIED HYPERLIPIDEMIA TYPE: ICD-10-CM

## 2019-07-26 DIAGNOSIS — I10 HYPERTENSION, UNSPECIFIED TYPE: ICD-10-CM

## 2019-07-26 DIAGNOSIS — Z00.00 ROUTINE GENERAL MEDICAL EXAMINATION AT A HEALTH CARE FACILITY: Primary | ICD-10-CM

## 2019-07-26 DIAGNOSIS — F32.1 CURRENT MODERATE EPISODE OF MAJOR DEPRESSIVE DISORDER WITHOUT PRIOR EPISODE: ICD-10-CM

## 2019-07-26 DIAGNOSIS — R73.01 IFG (IMPAIRED FASTING GLUCOSE): ICD-10-CM

## 2019-07-26 PROCEDURE — 99396 PREV VISIT EST AGE 40-64: CPT | Mod: S$GLB,,, | Performed by: FAMILY MEDICINE

## 2019-07-26 PROCEDURE — 99999 PR PBB SHADOW E&M-EST. PATIENT-LVL IV: ICD-10-PCS | Mod: PBBFAC,,, | Performed by: FAMILY MEDICINE

## 2019-07-26 PROCEDURE — 99396 PR PREVENTIVE VISIT,EST,40-64: ICD-10-PCS | Mod: S$GLB,,, | Performed by: FAMILY MEDICINE

## 2019-07-26 PROCEDURE — 3078F DIAST BP <80 MM HG: CPT | Mod: CPTII,S$GLB,, | Performed by: FAMILY MEDICINE

## 2019-07-26 PROCEDURE — 3075F SYST BP GE 130 - 139MM HG: CPT | Mod: CPTII,S$GLB,, | Performed by: FAMILY MEDICINE

## 2019-07-26 PROCEDURE — 3075F PR MOST RECENT SYSTOLIC BLOOD PRESS GE 130-139MM HG: ICD-10-PCS | Mod: CPTII,S$GLB,, | Performed by: FAMILY MEDICINE

## 2019-07-26 PROCEDURE — 3078F PR MOST RECENT DIASTOLIC BLOOD PRESSURE < 80 MM HG: ICD-10-PCS | Mod: CPTII,S$GLB,, | Performed by: FAMILY MEDICINE

## 2019-07-26 PROCEDURE — 99999 PR PBB SHADOW E&M-EST. PATIENT-LVL IV: CPT | Mod: PBBFAC,,, | Performed by: FAMILY MEDICINE

## 2019-07-26 RX ORDER — ATORVASTATIN CALCIUM 20 MG/1
20 TABLET, FILM COATED ORAL DAILY
Qty: 30 TABLET | Refills: 11 | Status: SHIPPED | OUTPATIENT
Start: 2019-07-26 | End: 2020-01-02 | Stop reason: SDUPTHER

## 2019-07-26 RX ORDER — LOSARTAN POTASSIUM AND HYDROCHLOROTHIAZIDE 12.5; 5 MG/1; MG/1
1 TABLET ORAL DAILY
Qty: 30 TABLET | Refills: 11 | Status: SHIPPED | OUTPATIENT
Start: 2019-07-26 | End: 2020-08-30 | Stop reason: SDUPTHER

## 2019-07-26 RX ORDER — METOPROLOL SUCCINATE 25 MG/1
25 TABLET, EXTENDED RELEASE ORAL DAILY
Qty: 30 TABLET | Refills: 11 | Status: SHIPPED | OUTPATIENT
Start: 2019-07-26 | End: 2020-08-30 | Stop reason: SDUPTHER

## 2019-07-26 NOTE — PROGRESS NOTES
(Portions of this note were dictated using voice recognition software and may contain dictation related errors in spelling/grammar/syntax not found on text review)    CC:   Chief Complaint   Patient presents with    Follow-up       HPI: 58 y.o. female     Seen urgently last month for itching head to toe for a month.  On further discussion sounded like this may be more psychogenic in nature as patient had describe significant depression and anxiety.  Multiple stressors.  As discussed at last visit.  Started on Lexapro 10 mg a day.  Feels that this is working very well for anxiety and depression.  Still itching however, slight improvement though with treatment above.  Did not get the Cetaphil cream as discussed at last visit.  Has tried to decrease temperature of her bath water..    She was prescribed hydroxyzine more acutely as needed for itching or anxiety or sleep, although never picked this up.     CAD: followed by cardiology, LOV with cardiology was last year. .  Continues to smoke.  Had enrolled in smoking cessation clinic, have counseled patient on quitting.   Denies any current dyspnea on exertion, or chest pain     Hypertension on metoprolol 25 mg daily plus Hyzaar 50/12.5 daily.  Blood pressure is controlled     Hyperlipidemia on Lipitor 20 mg daily     GERD with gastroduodenitis noted on prior testing.  Saw GI in April of 2017.  Was on AcipHex in the past but currently maintained on sucralfate.  Sounds like she takes this on a regular basis instead of just p.r.n...      On healthy diet, no significant exercise      Occasional Detrol for urinary incontinence         Past Medical History:   Diagnosis Date    Anticoagulant long-term use     CAD (coronary artery disease)     s/p LAD stent    CHF (congestive heart failure)     Gastroduodenitis     Helicobacter pylori (H. pylori) infection     Hyperlipidemia     Hypertension     MI (myocardial infarction)     x2    Obesity     Tobacco use     Urinary  tract infection     Vaginal infection        Past Surgical History:   Procedure Laterality Date    COLONOSCOPY N/A 7/14/2017    [facility]    ESOPHAGOGASTRODUODENOSCOPY (EGD) N/A 7/14/2017    [facility]    ESOPHAGOGASTRODUODENOSCOPY (EGD) N/A 12/23/2014    [facility]    NO PAST SURGERIES         Family History   Problem Relation Age of Onset    Hypertension Other         multiple    Diabetes Other         multiple    Stomach cancer Mother     Cancer Father         unknown    Breast cancer Sister     Throat cancer Brother     Cancer Brother         x 2,. unknown    Coronary artery disease Cousin        Social History     Tobacco Use    Smoking status: Current Every Day Smoker     Packs/day: 1.00     Years: 40.00     Pack years: 40.00     Types: Cigarettes     Last attempt to quit: 6/28/2016     Years since quitting: 3.0    Smokeless tobacco: Never Used    Tobacco comment: Restarted 1 .5 yrs. ago   Substance Use Topics    Alcohol use: No     Alcohol/week: 0.0 oz    Drug use: No     Lab Results   Component Value Date    WBC 5.40 08/02/2018    HGB 14.2 08/02/2018    HCT 42.5 08/02/2018    MCV 95 08/02/2018     08/02/2018    CHOL 152 08/02/2018    TRIG 113 08/02/2018    HDL 44 08/02/2018    ALT 17 08/02/2018    ALT 17 08/02/2018    AST 19 08/02/2018    AST 19 08/02/2018    BILITOT 0.6 08/02/2018    BILITOT 0.6 08/02/2018    ALKPHOS 77 08/02/2018    ALKPHOS 77 08/02/2018     08/02/2018     08/02/2018    K 4.2 08/02/2018    K 4.2 08/02/2018     08/02/2018     08/02/2018    CREATININE 0.8 08/02/2018    CREATININE 0.8 08/02/2018    CALCIUM 9.7 08/02/2018    CALCIUM 9.7 08/02/2018    ALBUMIN 3.8 08/02/2018    ALBUMIN 3.8 08/02/2018    BUN 10 08/02/2018    BUN 10 08/02/2018    CO2 24 08/02/2018    CO2 24 08/02/2018    TSH 0.471 08/02/2018    INR 1.0 08/02/2011     HGBA1C 5.8 (H) 08/02/2018    LDLCALC 85.4 08/02/2018     08/02/2018     08/02/2018               Vital signs reviewed  PE:   APPEARANCE: Well nourished, well developed, in no acute distress.    HEAD: Normocephalic, atraumatic.  EYES: PERRL. EOMI.   Conjunctivae noninjected.  EARS: TM's intact. Light reflex normal. No retraction or perforation  NOSE: Mucosa pink. Airway clear.  MOUTH & THROAT: No tonsillar enlargement. No pharyngeal erythema or exudate.   NECK: Supple with no cervical lymphadenopathy.  No carotid bruits.  No thyromegaly  CHEST: Good inspiratory effort. Lungs clear to auscultation with no wheezes or crackles.  CARDIOVASCULAR: Normal S1, S2. No rubs, murmurs, or gallops.  ABDOMEN: Bowel sounds normal. Not distended. Soft. No tenderness or masses. No organomegaly.  EXTREMITIES: No edema, cyanosis, or clubbing.  PSYCHIATRIC:  Affect improved.  Mood more euthymic  SKIN:  No significant rashes noted    IMPRESSION  1. Routine general medical examination at a health care facility    2. Coronary artery disease involving native coronary artery of native heart without angina pectoris    3. IFG (impaired fasting glucose)    4. Hyperlipidemia, unspecified hyperlipidemia type    5. Hypertension, unspecified type    6. Pruritic condition    7. Current moderate episode of major depressive disorder without prior episode    8. BMI 45.0-49.9, adult            PLAN  Orders Placed This Encounter   Procedures    CBC auto differential    Comprehensive metabolic panel    Lipid panel    TSH    Hemoglobin A1c     Hypertension controlled.  Continue current therapy    IFG:  Will reassess labs    Hyperlipidemia:  Continue statin.  Reassess labs    Depression and anxiety with pruritus coinciding.  Significant improvement at least of depression anxiety with Lexapro 10 mg.  Still with some pruritus, slightly improved from addition of med.  Advised again importance of doing daily moisturization with Cetaphil.   Will check labs above to assess for any metabolic abnormalities like hepatic dysfunction or thyroid dysfunction that could be contributing    CAD:  Currently asymptomatic.  Secondary prevention to be continued.    Strong counseling on importance of healthy diet, exercise, weight loss        HEALTH SCREENINGS  Immunizations:  Tdap 2012   Offered PVX but pt declines. Counseling provided     Age/Gender Appropriate screenings:  MMG 11/02/2018  GYN:  Pap 2017 Dr. Vaz       Colonoscopy:  7/14/17:2 diminutive polyps rectum (hyperplastic), diverticulosis sigmoid and descending colon

## 2019-07-29 ENCOUNTER — LAB VISIT (OUTPATIENT)
Dept: LAB | Facility: HOSPITAL | Age: 59
End: 2019-07-29
Attending: FAMILY MEDICINE
Payer: COMMERCIAL

## 2019-07-29 DIAGNOSIS — I25.10 CORONARY ARTERY DISEASE INVOLVING NATIVE CORONARY ARTERY OF NATIVE HEART WITHOUT ANGINA PECTORIS: ICD-10-CM

## 2019-07-29 DIAGNOSIS — R73.01 IFG (IMPAIRED FASTING GLUCOSE): ICD-10-CM

## 2019-07-29 DIAGNOSIS — I10 HYPERTENSION, UNSPECIFIED TYPE: ICD-10-CM

## 2019-07-29 DIAGNOSIS — E78.5 HYPERLIPIDEMIA, UNSPECIFIED HYPERLIPIDEMIA TYPE: ICD-10-CM

## 2019-07-29 LAB
ALBUMIN SERPL BCP-MCNC: 3.8 G/DL (ref 3.5–5.2)
ALP SERPL-CCNC: 84 U/L (ref 55–135)
ALT SERPL W/O P-5'-P-CCNC: 13 U/L (ref 10–44)
ANION GAP SERPL CALC-SCNC: 8 MMOL/L (ref 8–16)
AST SERPL-CCNC: 17 U/L (ref 10–40)
BASOPHILS # BLD AUTO: 0.01 K/UL (ref 0–0.2)
BASOPHILS NFR BLD: 0.2 % (ref 0–1.9)
BILIRUB SERPL-MCNC: 0.5 MG/DL (ref 0.1–1)
BUN SERPL-MCNC: 11 MG/DL (ref 6–20)
CALCIUM SERPL-MCNC: 9.8 MG/DL (ref 8.7–10.5)
CHLORIDE SERPL-SCNC: 107 MMOL/L (ref 95–110)
CHOLEST SERPL-MCNC: 149 MG/DL (ref 120–199)
CHOLEST/HDLC SERPL: 4.1 {RATIO} (ref 2–5)
CO2 SERPL-SCNC: 24 MMOL/L (ref 23–29)
CREAT SERPL-MCNC: 0.9 MG/DL (ref 0.5–1.4)
DIFFERENTIAL METHOD: ABNORMAL
EOSINOPHIL # BLD AUTO: 0.2 K/UL (ref 0–0.5)
EOSINOPHIL NFR BLD: 4.9 % (ref 0–8)
ERYTHROCYTE [DISTWIDTH] IN BLOOD BY AUTOMATED COUNT: 12.9 % (ref 11.5–14.5)
EST. GFR  (AFRICAN AMERICAN): >60 ML/MIN/1.73 M^2
EST. GFR  (NON AFRICAN AMERICAN): >60 ML/MIN/1.73 M^2
ESTIMATED AVG GLUCOSE: 123 MG/DL (ref 68–131)
GLUCOSE SERPL-MCNC: 107 MG/DL (ref 70–110)
HBA1C MFR BLD HPLC: 5.9 % (ref 4–5.6)
HCT VFR BLD AUTO: 42.2 % (ref 37–48.5)
HDLC SERPL-MCNC: 36 MG/DL (ref 40–75)
HDLC SERPL: 24.2 % (ref 20–50)
HGB BLD-MCNC: 13.9 G/DL (ref 12–16)
LDLC SERPL CALC-MCNC: 85 MG/DL (ref 63–159)
LYMPHOCYTES # BLD AUTO: 2.4 K/UL (ref 1–4.8)
LYMPHOCYTES NFR BLD: 51.1 % (ref 18–48)
MCH RBC QN AUTO: 30.6 PG (ref 27–31)
MCHC RBC AUTO-ENTMCNC: 32.9 G/DL (ref 32–36)
MCV RBC AUTO: 93 FL (ref 82–98)
MONOCYTES # BLD AUTO: 0.2 K/UL (ref 0.3–1)
MONOCYTES NFR BLD: 4.7 % (ref 4–15)
NEUTROPHILS # BLD AUTO: 1.8 K/UL (ref 1.8–7.7)
NEUTROPHILS NFR BLD: 39.1 % (ref 38–73)
NONHDLC SERPL-MCNC: 113 MG/DL
PLATELET # BLD AUTO: 202 K/UL (ref 150–350)
PMV BLD AUTO: 11.2 FL (ref 9.2–12.9)
POTASSIUM SERPL-SCNC: 4.4 MMOL/L (ref 3.5–5.1)
PROT SERPL-MCNC: 7 G/DL (ref 6–8.4)
RBC # BLD AUTO: 4.54 M/UL (ref 4–5.4)
SODIUM SERPL-SCNC: 139 MMOL/L (ref 136–145)
T4 FREE SERPL-MCNC: 0.88 NG/DL (ref 0.71–1.51)
TRIGL SERPL-MCNC: 140 MG/DL (ref 30–150)
TSH SERPL DL<=0.005 MIU/L-ACNC: 0.34 UIU/ML (ref 0.4–4)
WBC # BLD AUTO: 4.66 K/UL (ref 3.9–12.7)

## 2019-07-29 PROCEDURE — 84443 ASSAY THYROID STIM HORMONE: CPT

## 2019-07-29 PROCEDURE — 84439 ASSAY OF FREE THYROXINE: CPT

## 2019-07-29 PROCEDURE — 85025 COMPLETE CBC W/AUTO DIFF WBC: CPT

## 2019-07-29 PROCEDURE — 80053 COMPREHEN METABOLIC PANEL: CPT

## 2019-07-29 PROCEDURE — 36415 COLL VENOUS BLD VENIPUNCTURE: CPT

## 2019-07-29 PROCEDURE — 80061 LIPID PANEL: CPT

## 2019-07-29 PROCEDURE — 83036 HEMOGLOBIN GLYCOSYLATED A1C: CPT

## 2019-08-10 ENCOUNTER — PATIENT MESSAGE (OUTPATIENT)
Dept: FAMILY MEDICINE | Facility: CLINIC | Age: 59
End: 2019-08-10

## 2019-08-10 NOTE — TELEPHONE ENCOUNTER
Dear Sonya Armendariz    Your recent labs were reviewed and released to your account. Your lab results were overall within normal range.  The diabetes test was not suggestive of diabetes but could be considered borderline.  Healthy diet and regular exercise and weight loss should help with this.  The thyroid test was very minimally abnormal but should not need any further treatment at this time.  Many times a thyroid test can fluctuate when we checked the lab.  Will check this with you next set of labs just to make sure it has normalized by next time.  Please let me know if you have any questions.    Sivakumar Wing MD

## 2019-10-08 RX ORDER — IBUPROFEN 200 MG
TABLET ORAL
Qty: 42 PATCH | Refills: 0 | Status: ON HOLD | OUTPATIENT
Start: 2019-10-08 | End: 2022-04-08 | Stop reason: HOSPADM

## 2019-10-30 RX ORDER — CYCLOBENZAPRINE HCL 5 MG
TABLET ORAL
Qty: 30 TABLET | Refills: 0 | Status: SHIPPED | OUTPATIENT
Start: 2019-10-30 | End: 2020-12-08 | Stop reason: SDUPTHER

## 2020-01-02 RX ORDER — ATORVASTATIN CALCIUM 20 MG/1
TABLET, FILM COATED ORAL
Qty: 30 TABLET | Refills: 11 | Status: SHIPPED | OUTPATIENT
Start: 2020-01-02 | End: 2020-08-30 | Stop reason: SDUPTHER

## 2020-01-15 ENCOUNTER — TELEPHONE (OUTPATIENT)
Dept: FAMILY MEDICINE | Facility: CLINIC | Age: 60
End: 2020-01-15

## 2020-01-15 NOTE — TELEPHONE ENCOUNTER
----- Message from Danelle Adam sent at 1/15/2020 11:58 AM CST -----  Contact: pt  Pt missed a call and would like the nurse to return their call.        Pt can be reached at [phone].

## 2020-01-15 NOTE — TELEPHONE ENCOUNTER
----- Message from Jing Woods sent at 1/15/2020 10:29 AM CST -----  Contact: self, [phone]  Patient requests to be seen on Friday if possible and prefers to see her PCP only. States she wants to come in for her knee and a couple of other issues. Please advise.

## 2020-01-16 ENCOUNTER — PATIENT MESSAGE (OUTPATIENT)
Dept: FAMILY MEDICINE | Facility: CLINIC | Age: 60
End: 2020-01-16

## 2020-01-16 ENCOUNTER — HOSPITAL ENCOUNTER (OUTPATIENT)
Dept: RADIOLOGY | Facility: HOSPITAL | Age: 60
Discharge: HOME | End: 2020-01-16
Attending: FAMILY MEDICINE
Payer: COMMERCIAL

## 2020-01-16 ENCOUNTER — OFFICE VISIT (OUTPATIENT)
Dept: FAMILY MEDICINE | Facility: CLINIC | Age: 60
End: 2020-01-16
Payer: COMMERCIAL

## 2020-01-16 VITALS
TEMPERATURE: 99 F | DIASTOLIC BLOOD PRESSURE: 84 MMHG | WEIGHT: 258.81 LBS | BODY MASS INDEX: 47.62 KG/M2 | HEIGHT: 62 IN | OXYGEN SATURATION: 97 % | SYSTOLIC BLOOD PRESSURE: 130 MMHG | HEART RATE: 78 BPM

## 2020-01-16 DIAGNOSIS — R39.15 URGENCY OF URINATION: ICD-10-CM

## 2020-01-16 DIAGNOSIS — F17.200 TOBACCO DEPENDENCE: ICD-10-CM

## 2020-01-16 DIAGNOSIS — Z12.31 SCREENING MAMMOGRAM, ENCOUNTER FOR: ICD-10-CM

## 2020-01-16 DIAGNOSIS — R73.01 IFG (IMPAIRED FASTING GLUCOSE): ICD-10-CM

## 2020-01-16 DIAGNOSIS — M25.561 CHRONIC PAIN OF RIGHT KNEE: ICD-10-CM

## 2020-01-16 DIAGNOSIS — R06.09 DOE (DYSPNEA ON EXERTION): ICD-10-CM

## 2020-01-16 DIAGNOSIS — M25.561 CHRONIC PAIN OF RIGHT KNEE: Primary | ICD-10-CM

## 2020-01-16 DIAGNOSIS — G89.29 CHRONIC PAIN OF RIGHT KNEE: ICD-10-CM

## 2020-01-16 DIAGNOSIS — I25.10 CORONARY ARTERY DISEASE, ANGINA PRESENCE UNSPECIFIED, UNSPECIFIED VESSEL OR LESION TYPE, UNSPECIFIED WHETHER NATIVE OR TRANSPLANTED HEART: ICD-10-CM

## 2020-01-16 DIAGNOSIS — R06.00 DYSPNEA, UNSPECIFIED TYPE: ICD-10-CM

## 2020-01-16 DIAGNOSIS — G89.29 CHRONIC PAIN OF RIGHT KNEE: Primary | ICD-10-CM

## 2020-01-16 DIAGNOSIS — R79.89 ABNORMAL TSH: ICD-10-CM

## 2020-01-16 PROCEDURE — 99215 OFFICE O/P EST HI 40 MIN: CPT | Mod: 25,S$GLB,, | Performed by: FAMILY MEDICINE

## 2020-01-16 PROCEDURE — 3075F SYST BP GE 130 - 139MM HG: CPT | Mod: CPTII,S$GLB,, | Performed by: FAMILY MEDICINE

## 2020-01-16 PROCEDURE — 3079F PR MOST RECENT DIASTOLIC BLOOD PRESSURE 80-89 MM HG: ICD-10-PCS | Mod: CPTII,S$GLB,, | Performed by: FAMILY MEDICINE

## 2020-01-16 PROCEDURE — 3008F PR BODY MASS INDEX (BMI) DOCUMENTED: ICD-10-PCS | Mod: CPTII,S$GLB,, | Performed by: FAMILY MEDICINE

## 2020-01-16 PROCEDURE — 73562 X-RAY EXAM OF KNEE 3: CPT | Mod: TC,FY,RT

## 2020-01-16 PROCEDURE — 3075F PR MOST RECENT SYSTOLIC BLOOD PRESS GE 130-139MM HG: ICD-10-PCS | Mod: CPTII,S$GLB,, | Performed by: FAMILY MEDICINE

## 2020-01-16 PROCEDURE — 3079F DIAST BP 80-89 MM HG: CPT | Mod: CPTII,S$GLB,, | Performed by: FAMILY MEDICINE

## 2020-01-16 PROCEDURE — 3008F BODY MASS INDEX DOCD: CPT | Mod: CPTII,S$GLB,, | Performed by: FAMILY MEDICINE

## 2020-01-16 PROCEDURE — 93010 EKG 12-LEAD: ICD-10-PCS | Mod: S$GLB,,, | Performed by: INTERNAL MEDICINE

## 2020-01-16 PROCEDURE — 73562 X-RAY EXAM OF KNEE 3: CPT | Mod: 26,RT,, | Performed by: RADIOLOGY

## 2020-01-16 PROCEDURE — 93010 ELECTROCARDIOGRAM REPORT: CPT | Mod: S$GLB,,, | Performed by: INTERNAL MEDICINE

## 2020-01-16 PROCEDURE — 93005 EKG 12-LEAD: ICD-10-PCS | Mod: S$GLB,,, | Performed by: FAMILY MEDICINE

## 2020-01-16 PROCEDURE — 99999 PR PBB SHADOW E&M-EST. PATIENT-LVL V: ICD-10-PCS | Mod: PBBFAC,,, | Performed by: FAMILY MEDICINE

## 2020-01-16 PROCEDURE — 99215 PR OFFICE/OUTPT VISIT, EST, LEVL V, 40-54 MIN: ICD-10-PCS | Mod: 25,S$GLB,, | Performed by: FAMILY MEDICINE

## 2020-01-16 PROCEDURE — 99999 PR PBB SHADOW E&M-EST. PATIENT-LVL V: CPT | Mod: PBBFAC,,, | Performed by: FAMILY MEDICINE

## 2020-01-16 PROCEDURE — 93005 ELECTROCARDIOGRAM TRACING: CPT | Mod: S$GLB,,, | Performed by: FAMILY MEDICINE

## 2020-01-16 PROCEDURE — 73562 XR KNEE 3 VIEW RIGHT: ICD-10-PCS | Mod: 26,RT,, | Performed by: RADIOLOGY

## 2020-01-16 RX ORDER — DICLOFENAC SODIUM 10 MG/G
2 GEL TOPICAL 3 TIMES DAILY PRN
Qty: 100 G | Refills: 3 | Status: ON HOLD | OUTPATIENT
Start: 2020-01-16 | End: 2022-03-21 | Stop reason: HOSPADM

## 2020-01-16 RX ORDER — DICLOFENAC SODIUM 10 MG/G
2 GEL TOPICAL DAILY
Qty: 100 G | Refills: 0 | Status: SHIPPED | OUTPATIENT
Start: 2020-01-16 | End: 2020-01-16

## 2020-01-16 NOTE — PROGRESS NOTES
(Portions of this note were dictated using voice recognition software and may contain dictation related errors in spelling/grammar/syntax not found on text review)    CC:   Chief Complaint   Patient presents with    Knee Pain       HPI: 59 y.o. female Seen last in July of 2019 for medical follow-up.  Here for urgent care visit for fluid on the knee.  Multiple other medical problems    1.  Right knee pain. Went to ER last year in February for acute pain.  X-ray showed degenerative change, possible chondrocalcinosis.  Symptoms had improved although 2 months ago she started back with right knee pain and some swelling. No heat or redness.  Pain worse when standing up out of the seat and walking.  Has some pain in the front and back of the knee.  No trauma related.  No left knee symptoms.  Today it actually feels better.  She has not taking any medication for this.    2.  Has gained weight.  Still smoking.  Would like referral to smoking cessation again.  Has a history of CAD and CHF.  States that she has been getting some muscle spasms in my chest, and sometimes shortness of breath.  She feels like it could be just from smoking and weight gain.  Was followed up with Cardiology but has not seen in a while.  No recent stress testing noted on file.  Last EKG from 2017    3. Has urinary urgency, chronic.  History of UTI in the past.  States that she often times cannot make it to the bathroom in time because of urgency.  Denies any dysuria.  No other urine color or consistency changes.  No fevers or chills or systemic symptoms related to this.  Was prescribed Detrol, does not really feel like it is helping    4.  Would like referral to Bariatric Clinic to discuss possible bariatric surgery        Past Medical History:   Diagnosis Date    Anticoagulant long-term use     CAD (coronary artery disease)     s/p LAD stent    CHF (congestive heart failure)     Gastroduodenitis     Helicobacter pylori (H. pylori) infection      Hyperlipidemia     Hypertension     MI (myocardial infarction)     x2    Obesity     Tobacco use     Urinary tract infection     Vaginal infection        Past Surgical History:   Procedure Laterality Date    COLONOSCOPY N/A 7/14/2017    Procedure: COLONOSCOPY;  Surgeon: Cathy Collier MD;  Location: [facility];  Service: Endoscopy;  Laterality: N/A;    NO PAST SURGERIES         Family History   Problem Relation Age of Onset    Hypertension Other         multiple    Diabetes Other         multiple    Stomach cancer Mother     Cancer Father         unknown    Breast cancer Sister     Throat cancer Brother     Cancer Brother         x 2,. unknown    Coronary artery disease Cousin        Social History     Tobacco Use    Smoking status: Current Every Day Smoker     Packs/day: 1.00     Years: 40.00     Pack years: 40.00     Types: Cigarettes     Last attempt to quit: 6/28/2016     Years since quitting: 3.5    Smokeless tobacco: Never Used    Tobacco comment: Restarted 1 .5 yrs. ago   Substance Use Topics    Alcohol use: No     Alcohol/week: 0.0 standard drinks    Drug use: No       Lab Results   Component Value Date    WBC 4.66 07/29/2019    HGB 13.9 07/29/2019    HCT 42.2 07/29/2019    MCV 93 07/29/2019     07/29/2019    CHOL 149 07/29/2019    TRIG 140 07/29/2019    HDL 36 (L) 07/29/2019    ALT 13 07/29/2019    AST 17 07/29/2019    BILITOT 0.5 07/29/2019    ALKPHOS 84 07/29/2019     07/29/2019    K 4.4 07/29/2019     07/29/2019    CREATININE 0.9 07/29/2019    ESTGFRAFRICA >60 07/29/2019    EGFRNONAA >60 07/29/2019    CALCIUM 9.8 07/29/2019    ALBUMIN 3.8 07/29/2019    BUN 11 07/29/2019    CO2 24 07/29/2019    TSH 0.339 (L) 07/29/2019    INR 1.0 08/02/2011    HGBA1C 5.9 (H) 07/29/2019    LDLCALC 85.0 07/29/2019     07/29/2019                 ROS:  GENERAL: No fever, chills.  Positive for weight gain.  SKIN: No rashes, no itching.  HEAD: No headaches.  EYES: No  visual changes  EARS: No ear pain or changes in hearing.  NOSE: No congestion or rhinorrhea.  MOUTH & THROAT: No hoarseness, change in voice, or sore throat.  NODES: Denies swollen glands.  CHEST:  Above.  CARDIOVASCULAR:  Above  ABDOMEN: No nausea, vomiting, or changes in bowel function.  URINARY:  Above.  PERIPHERAL VASCULAR: No claudication or cyanosis.  MUSCULOSKELETAL:  Above  NEUROLOGIC: No weakness or numbness.    Vital signs reviewed  PE:   APPEARANCE: Well nourished, well developed, in no acute distress.    HEAD: Normocephalic, atraumatic.  EYES: PERRL. EOMI.   Conjunctivae noninjected.  EARS: TM's intact. Light reflex normal. No retraction or perforation  NOSE: Mucosa pink. Airway clear.  MOUTH & THROAT: No tonsillar enlargement. No pharyngeal erythema or exudate.   NECK: Supple with no cervical lymphadenopathy.  No carotid bruits.  No thyromegaly  CHEST: Good inspiratory effort. Lungs clear to auscultation with no wheezes or crackles.  CARDIOVASCULAR: Normal S1, S2. No rubs, murmurs, or gallops.  ABDOMEN: Bowel sounds normal. Not distended. Soft. No tenderness or masses. No organomegaly.  EXTREMITIES: No edema  MSK:  Right knee exam demonstrates patellar crepitus to flexion extension passively.  Pain at quad tendon and patellar tendon.  Pain at both lateral and medial joint lines.  Popliteal pain but no fullness.  No erythema.  No increased warmth of the right knee.  Lachman's and Rocio's test do not objectively show any laxity or clicking concerns although she does complain of some pain during these maneuvers.  Mild varus stress pain at the lateral patella border but not at the lateral joint line.  No laxity on varus or valgus stress.  Does show some easy muscle fatigue on quad and hamstring testing on the right      IMPRESSION  1. Chronic pain of right knee    2. Tobacco dependence    3. SCOTT (dyspnea on exertion)    4. Urgency of urination    5. Abnormal TSH    6. IFG (impaired fasting glucose)    7.  Coronary artery disease, angina presence unspecified, unspecified vessel or lesion type, unspecified whether native or transplanted heart    8. Dyspnea, unspecified type    9. BMI 45.0-49.9, adult            PLAN  Right knee pain:  Update x-ray.  Advised on quad and hamstring exercises.  Advised on weight loss.  Literature provided.  Diclofenac gel topically applied 3 to 4 times a day as needed.  Avoid oral NSAIDs given her CAD history.  Could consider intra-articular CS injection later if needed for continued pain along with physical therapy consideration    SCOTT, patient attributes to weight gain and tobacco use.  However, given her CAD history, EKG was done in the office today, independently reviewed:  Normal sinus rhythm, normal axis and intervals.  No ectopy.   Septal infarct age undetermined.  No acute ST or T-wave changes.  Compared to 2017 study, unchanged    Will send for nuclear stress test.  Feels like she would be able to walk on a treadmill but can convert to pharmacologic if her leg is still bothering her and prohibiting proper treadmill test    Update labs below including urine studies to check for infection.  Will send over Myrbetriq in place of her Detrol to see if this helps with her urge incontinence especially if UTI not noted    Abnormal TSH noted on prior lab.  Update as below    She would also like referral to Bariatric Clinic, provided      HEALTH SCREENINGS  Immunizations:  Tdap 2012   Offered PVX but pt declines. Counseling provided   Flu      Age/Gender Appropriate screenings:  MMG 11/02/2018, repeat ordered  GYN:  Pap 2017 Dr. Vaz       Colonoscopy:  7/14/17:2 diminutive polyps rectum (hyperplastic), diverticulosis sigmoid and descending colon

## 2020-01-31 ENCOUNTER — HOSPITAL ENCOUNTER (OUTPATIENT)
Dept: CARDIOLOGY | Facility: HOSPITAL | Age: 60
Discharge: HOME | End: 2020-01-31
Attending: FAMILY MEDICINE
Payer: COMMERCIAL

## 2020-01-31 ENCOUNTER — HOSPITAL ENCOUNTER (OUTPATIENT)
Dept: RADIOLOGY | Facility: HOSPITAL | Age: 60
Discharge: HOME | End: 2020-01-31
Attending: FAMILY MEDICINE
Payer: COMMERCIAL

## 2020-01-31 DIAGNOSIS — R06.09 DOE (DYSPNEA ON EXERTION): ICD-10-CM

## 2020-01-31 DIAGNOSIS — R06.00 DYSPNEA, UNSPECIFIED TYPE: ICD-10-CM

## 2020-01-31 DIAGNOSIS — I25.10 CORONARY ARTERY DISEASE, ANGINA PRESENCE UNSPECIFIED, UNSPECIFIED VESSEL OR LESION TYPE, UNSPECIFIED WHETHER NATIVE OR TRANSPLANTED HEART: ICD-10-CM

## 2020-01-31 DIAGNOSIS — Z12.31 SCREENING MAMMOGRAM, ENCOUNTER FOR: ICD-10-CM

## 2020-01-31 LAB
CV STRESS BASE HR: 68 BPM
DIASTOLIC BLOOD PRESSURE: 73 MMHG
OHS CV CPX 85 PERCENT MAX PREDICTED HEART RATE MALE: 132
OHS CV CPX MAX PREDICTED HEART RATE: 155
OHS CV CPX PATIENT IS FEMALE: 1
OHS CV CPX PATIENT IS MALE: 0
OHS CV CPX PEAK DIASTOLIC BLOOD PRESSURE: 74 MMHG
OHS CV CPX PEAK HEAR RATE: 105 BPM
OHS CV CPX PEAK RATE PRESSURE PRODUCT: NORMAL
OHS CV CPX PEAK SYSTOLIC BLOOD PRESSURE: 130 MMHG
OHS CV CPX PERCENT MAX PREDICTED HEART RATE ACHIEVED: 68
OHS CV CPX RATE PRESSURE PRODUCT PRESENTING: 8296
STRESS ECHO TARGET HR: 138 BPM
SYSTOLIC BLOOD PRESSURE: 122 MMHG

## 2020-01-31 PROCEDURE — 77067 MAMMO DIGITAL SCREENING BILAT WITH TOMOSYNTHESIS_CAD: ICD-10-PCS | Mod: 26,,, | Performed by: RADIOLOGY

## 2020-01-31 PROCEDURE — 77067 SCR MAMMO BI INCL CAD: CPT | Mod: TC

## 2020-01-31 PROCEDURE — 77063 BREAST TOMOSYNTHESIS BI: CPT | Mod: 26,,, | Performed by: RADIOLOGY

## 2020-01-31 PROCEDURE — 93016 CV STRESS TEST SUPVJ ONLY: CPT | Mod: ,,, | Performed by: INTERNAL MEDICINE

## 2020-01-31 PROCEDURE — 77063 MAMMO DIGITAL SCREENING BILAT WITH TOMOSYNTHESIS_CAD: ICD-10-PCS | Mod: 26,,, | Performed by: RADIOLOGY

## 2020-01-31 PROCEDURE — 78452 HT MUSCLE IMAGE SPECT MULT: CPT | Mod: 26,,, | Performed by: RADIOLOGY

## 2020-01-31 PROCEDURE — 77067 SCR MAMMO BI INCL CAD: CPT | Mod: 26,,, | Performed by: RADIOLOGY

## 2020-01-31 PROCEDURE — 78452 NM MYOCARDIAL PERFUSION SPECT MULTI PHARM: ICD-10-PCS | Mod: 26,,, | Performed by: RADIOLOGY

## 2020-01-31 PROCEDURE — 93018 CV STRESS TEST I&R ONLY: CPT | Mod: ,,, | Performed by: INTERNAL MEDICINE

## 2020-01-31 PROCEDURE — 93017 CV STRESS TEST TRACING ONLY: CPT

## 2020-01-31 PROCEDURE — 93018 TREADMILL STRESS TEST (CUPID ONLY): ICD-10-PCS | Mod: ,,, | Performed by: INTERNAL MEDICINE

## 2020-01-31 PROCEDURE — 93016 TREADMILL STRESS TEST (CUPID ONLY): ICD-10-PCS | Mod: ,,, | Performed by: INTERNAL MEDICINE

## 2020-01-31 PROCEDURE — 63600175 PHARM REV CODE 636 W HCPCS: Performed by: FAMILY MEDICINE

## 2020-01-31 RX ORDER — REGADENOSON 0.08 MG/ML
0.4 INJECTION, SOLUTION INTRAVENOUS ONCE
Status: DISCONTINUED | OUTPATIENT
Start: 2020-01-31 | End: 2020-01-31 | Stop reason: SDUPTHER

## 2020-01-31 RX ORDER — REGADENOSON 0.08 MG/ML
0.4 INJECTION, SOLUTION INTRAVENOUS ONCE
Status: DISCONTINUED | OUTPATIENT
Start: 2020-01-31 | End: 2020-01-31

## 2020-01-31 RX ORDER — REGADENOSON 0.08 MG/ML
0.4 INJECTION, SOLUTION INTRAVENOUS ONCE
Status: COMPLETED | OUTPATIENT
Start: 2020-01-31 | End: 2020-01-31

## 2020-01-31 RX ADMIN — REGADENOSON 0.4 MG: 0.08 INJECTION, SOLUTION INTRAVENOUS at 11:01

## 2020-02-03 ENCOUNTER — PATIENT MESSAGE (OUTPATIENT)
Dept: FAMILY MEDICINE | Facility: CLINIC | Age: 60
End: 2020-02-03

## 2020-02-03 NOTE — TELEPHONE ENCOUNTER
Maday Armendariz    Your recent stress test did not show any new area of decreased blood flow.  There is some scarring noted likely from your prior heart attack.  There are no new findings that suggest a new blockage however.    Sivakumar Wing MD

## 2020-02-06 ENCOUNTER — TELEPHONE (OUTPATIENT)
Dept: SMOKING CESSATION | Facility: CLINIC | Age: 60
End: 2020-02-06

## 2020-02-06 NOTE — TELEPHONE ENCOUNTER
Left a message regarding a missed Smoking Cessation appointment and gave my number to reschedule. [phone].

## 2020-02-13 ENCOUNTER — TELEPHONE (OUTPATIENT)
Dept: SMOKING CESSATION | Facility: CLINIC | Age: 60
End: 2020-02-13

## 2020-02-13 NOTE — TELEPHONE ENCOUNTER
Left a message regarding a missed Smoking Cessation appointment and gave my number to reschedule. [phone].

## 2020-02-27 ENCOUNTER — TELEPHONE (OUTPATIENT)
Dept: SMOKING CESSATION | Facility: CLINIC | Age: 60
End: 2020-02-27

## 2020-02-27 NOTE — TELEPHONE ENCOUNTER
Left a message regarding a missed Smoking Cessation appointment. Left my name and number to call back to ang Wells [phone].

## 2020-03-02 ENCOUNTER — TELEPHONE (OUTPATIENT)
Dept: FAMILY MEDICINE | Facility: CLINIC | Age: 60
End: 2020-03-02

## 2020-03-02 RX ORDER — SOLIFENACIN SUCCINATE 5 MG/1
5 TABLET, FILM COATED ORAL DAILY
Qty: 30 TABLET | Refills: 11 | Status: SHIPPED | OUTPATIENT
Start: 2020-03-02 | End: 2021-03-03 | Stop reason: SDUPTHER

## 2020-03-02 NOTE — TELEPHONE ENCOUNTER
Called pt to let her know that Dr osborne sent can try VESIcare 5 mg daily.  If this is not enough we can increase to 10 mg daily.  Sent to pharmacy on file, left a message requesting a call back

## 2020-03-02 NOTE — TELEPHONE ENCOUNTER
Patient states the Mybetriq is too expensive and has not filled it.  She is requesting an alternative.  Please advise.

## 2020-03-02 NOTE — TELEPHONE ENCOUNTER
----- Message from Kelsi Wilcox sent at 3/2/2020  4:51 PM CST -----  Type:  Patient Returning Call    Who Called: patient  Who Left Message for Patient: Ada  Does the patient know what this is regarding?: prescriptions  Would the patient rather a call back or a response via [facility]? call  Best Call Back Number:[phone]  Additional Information: no

## 2020-03-02 NOTE — TELEPHONE ENCOUNTER
Okay, can try VESIcare 5 mg daily.  If this is not enough we can increase to 10 mg daily.  Sent to pharmacy on file

## 2020-03-04 ENCOUNTER — TELEPHONE (OUTPATIENT)
Dept: SMOKING CESSATION | Facility: CLINIC | Age: 60
End: 2020-03-04

## 2020-03-04 NOTE — TELEPHONE ENCOUNTER
Left a message regarding a missed Smoking Cessation appointment. Left my name and number to call back to ang Wells [phone].

## 2020-10-15 ENCOUNTER — TELEPHONE (OUTPATIENT)
Dept: INTERNAL MEDICINE | Facility: CLINIC | Age: 60
End: 2020-10-15

## 2020-10-15 DIAGNOSIS — Z12.31 ENCOUNTER FOR SCREENING MAMMOGRAM FOR BREAST CANCER: Primary | ICD-10-CM

## 2020-12-08 RX ORDER — CYCLOBENZAPRINE HCL 5 MG
TABLET ORAL
Qty: 30 TABLET | Refills: 0 | Status: ON HOLD | OUTPATIENT
Start: 2020-12-08 | End: 2022-04-01 | Stop reason: CLARIF

## 2020-12-15 ENCOUNTER — TELEPHONE (OUTPATIENT)
Dept: FAMILY MEDICINE | Facility: CLINIC | Age: 60
End: 2020-12-15

## 2020-12-16 ENCOUNTER — TELEPHONE (OUTPATIENT)
Dept: FAMILY MEDICINE | Facility: CLINIC | Age: 60
End: 2020-12-16

## 2021-01-04 ENCOUNTER — PATIENT MESSAGE (OUTPATIENT)
Dept: ADMINISTRATIVE | Facility: HOSPITAL | Age: 61
End: 2021-01-04

## 2021-03-03 ENCOUNTER — OFFICE VISIT (OUTPATIENT)
Dept: FAMILY MEDICINE | Facility: CLINIC | Age: 61
End: 2021-03-03
Payer: COMMERCIAL

## 2021-03-03 VITALS
HEART RATE: 71 BPM | OXYGEN SATURATION: 97 % | DIASTOLIC BLOOD PRESSURE: 78 MMHG | WEIGHT: 276.88 LBS | HEIGHT: 62 IN | SYSTOLIC BLOOD PRESSURE: 138 MMHG | BODY MASS INDEX: 50.95 KG/M2

## 2021-03-03 DIAGNOSIS — F17.200 TOBACCO DEPENDENCE: ICD-10-CM

## 2021-03-03 DIAGNOSIS — Z00.00 ROUTINE GENERAL MEDICAL EXAMINATION AT A HEALTH CARE FACILITY: Primary | ICD-10-CM

## 2021-03-03 DIAGNOSIS — R73.09 ELEVATED HEMOGLOBIN A1C: ICD-10-CM

## 2021-03-03 DIAGNOSIS — I10 HYPERTENSION, UNSPECIFIED TYPE: ICD-10-CM

## 2021-03-03 DIAGNOSIS — R73.01 IFG (IMPAIRED FASTING GLUCOSE): ICD-10-CM

## 2021-03-03 DIAGNOSIS — R32 URINARY INCONTINENCE, UNSPECIFIED TYPE: ICD-10-CM

## 2021-03-03 DIAGNOSIS — I25.10 CORONARY ARTERY DISEASE INVOLVING NATIVE CORONARY ARTERY OF NATIVE HEART WITHOUT ANGINA PECTORIS: ICD-10-CM

## 2021-03-03 PROCEDURE — 3075F SYST BP GE 130 - 139MM HG: CPT | Mod: CPTII,S$GLB,, | Performed by: FAMILY MEDICINE

## 2021-03-03 PROCEDURE — 99999 PR PBB SHADOW E&M-EST. PATIENT-LVL IV: CPT | Mod: PBBFAC,,, | Performed by: FAMILY MEDICINE

## 2021-03-03 PROCEDURE — 99396 PREV VISIT EST AGE 40-64: CPT | Mod: S$GLB,,, | Performed by: FAMILY MEDICINE

## 2021-03-03 PROCEDURE — 3075F PR MOST RECENT SYSTOLIC BLOOD PRESS GE 130-139MM HG: ICD-10-PCS | Mod: CPTII,S$GLB,, | Performed by: FAMILY MEDICINE

## 2021-03-03 PROCEDURE — 99999 PR PBB SHADOW E&M-EST. PATIENT-LVL IV: ICD-10-PCS | Mod: PBBFAC,,, | Performed by: FAMILY MEDICINE

## 2021-03-03 PROCEDURE — 3008F PR BODY MASS INDEX (BMI) DOCUMENTED: ICD-10-PCS | Mod: CPTII,S$GLB,, | Performed by: FAMILY MEDICINE

## 2021-03-03 PROCEDURE — 1126F AMNT PAIN NOTED NONE PRSNT: CPT | Mod: S$GLB,,, | Performed by: FAMILY MEDICINE

## 2021-03-03 PROCEDURE — 99396 PR PREVENTIVE VISIT,EST,40-64: ICD-10-PCS | Mod: S$GLB,,, | Performed by: FAMILY MEDICINE

## 2021-03-03 PROCEDURE — 3008F BODY MASS INDEX DOCD: CPT | Mod: CPTII,S$GLB,, | Performed by: FAMILY MEDICINE

## 2021-03-03 PROCEDURE — 3078F PR MOST RECENT DIASTOLIC BLOOD PRESSURE < 80 MM HG: ICD-10-PCS | Mod: CPTII,S$GLB,, | Performed by: FAMILY MEDICINE

## 2021-03-03 PROCEDURE — 3078F DIAST BP <80 MM HG: CPT | Mod: CPTII,S$GLB,, | Performed by: FAMILY MEDICINE

## 2021-03-03 PROCEDURE — 1126F PR PAIN SEVERITY QUANTIFIED, NO PAIN PRESENT: ICD-10-PCS | Mod: S$GLB,,, | Performed by: FAMILY MEDICINE

## 2021-03-03 RX ORDER — BUPROPION HYDROCHLORIDE 150 MG/1
150 TABLET, EXTENDED RELEASE ORAL 2 TIMES DAILY
Qty: 60 TABLET | Refills: 11 | Status: SHIPPED | OUTPATIENT
Start: 2021-03-03 | End: 2022-03-30 | Stop reason: SDUPTHER

## 2021-03-03 RX ORDER — SOLIFENACIN SUCCINATE 10 MG/1
10 TABLET, FILM COATED ORAL DAILY
Qty: 30 TABLET | Refills: 11 | Status: SHIPPED | OUTPATIENT
Start: 2021-03-03 | End: 2022-03-27 | Stop reason: SDUPTHER

## 2021-03-03 RX ORDER — ASPIRIN 81 MG/1
81 TABLET ORAL DAILY
Status: ON HOLD | COMMUNITY
Start: 2021-03-03 | End: 2022-03-23 | Stop reason: HOSPADM

## 2021-03-05 ENCOUNTER — IMMUNIZATION (OUTPATIENT)
Dept: PRIMARY CARE CLINIC | Facility: CLINIC | Age: 61
End: 2021-03-05
Payer: COMMERCIAL

## 2021-03-05 DIAGNOSIS — Z23 NEED FOR VACCINATION: Primary | ICD-10-CM

## 2021-03-05 PROCEDURE — 91303 PR SARSCOV2 VAC AD26 .5ML IM: ICD-10-PCS | Mod: S$GLB,,, | Performed by: INTERNAL MEDICINE

## 2021-03-05 PROCEDURE — 91303 PR SARSCOV2 VAC AD26 .5ML IM: CPT | Mod: S$GLB,,, | Performed by: INTERNAL MEDICINE

## 2021-03-05 PROCEDURE — 0031A PR IMMUNIZ ADMIN, SARS-COV-2 COVID-19 VACC, 5X10VP/0.5ML: CPT | Mod: CV19,S$GLB,, | Performed by: INTERNAL MEDICINE

## 2021-03-05 PROCEDURE — 0031A PR IMMUNIZ ADMIN, SARS-COV-2 COVID-19 VACC, 5X10VP/0.5ML: ICD-10-PCS | Mod: CV19,S$GLB,, | Performed by: INTERNAL MEDICINE

## 2021-03-13 ENCOUNTER — HOSPITAL ENCOUNTER (OUTPATIENT)
Dept: RADIOLOGY | Facility: HOSPITAL | Age: 61
Discharge: HOME | End: 2021-03-13
Attending: FAMILY MEDICINE
Payer: COMMERCIAL

## 2021-03-13 VITALS — HEIGHT: 62 IN | WEIGHT: 276 LBS | BODY MASS INDEX: 50.79 KG/M2

## 2021-03-13 DIAGNOSIS — Z12.31 ENCOUNTER FOR SCREENING MAMMOGRAM FOR BREAST CANCER: ICD-10-CM

## 2021-03-13 PROCEDURE — 77063 BREAST TOMOSYNTHESIS BI: CPT | Mod: 26,,, | Performed by: INTERNAL MEDICINE

## 2021-03-13 PROCEDURE — 77067 SCR MAMMO BI INCL CAD: CPT | Mod: 26,,, | Performed by: INTERNAL MEDICINE

## 2021-03-13 PROCEDURE — 77063 MAMMO DIGITAL SCREENING BILAT WITH TOMO: ICD-10-PCS | Mod: 26,,, | Performed by: INTERNAL MEDICINE

## 2021-03-13 PROCEDURE — 77067 SCR MAMMO BI INCL CAD: CPT | Mod: TC

## 2021-03-13 PROCEDURE — 77067 MAMMO DIGITAL SCREENING BILAT WITH TOMO: ICD-10-PCS | Mod: 26,,, | Performed by: INTERNAL MEDICINE

## 2021-03-14 ENCOUNTER — PATIENT MESSAGE (OUTPATIENT)
Dept: FAMILY MEDICINE | Facility: CLINIC | Age: 61
End: 2021-03-14

## 2021-03-16 ENCOUNTER — CLINICAL SUPPORT (OUTPATIENT)
Dept: SMOKING CESSATION | Facility: CLINIC | Age: 61
End: 2021-03-16
Payer: COMMERCIAL

## 2021-03-16 VITALS
WEIGHT: 276 LBS | SYSTOLIC BLOOD PRESSURE: 154 MMHG | HEIGHT: 62 IN | BODY MASS INDEX: 50.79 KG/M2 | OXYGEN SATURATION: 95 % | HEART RATE: 72 BPM | DIASTOLIC BLOOD PRESSURE: 87 MMHG

## 2021-03-16 DIAGNOSIS — F17.210 MODERATE CIGARETTE SMOKER (10-19 PER DAY): Primary | ICD-10-CM

## 2021-03-16 PROCEDURE — 99999 PR PBB SHADOW E&M-EST. PATIENT-LVL II: CPT | Mod: PBBFAC,,,

## 2021-03-16 PROCEDURE — 99999 PR PBB SHADOW E&M-EST. PATIENT-LVL II: ICD-10-PCS | Mod: PBBFAC,,,

## 2021-03-16 PROCEDURE — 99404 PREV MED CNSL INDIV APPRX 60: CPT | Mod: S$GLB,,,

## 2021-03-16 PROCEDURE — 99404 PR PREVENT COUNSEL,INDIV,60 MIN: ICD-10-PCS | Mod: S$GLB,,,

## 2021-03-16 RX ORDER — IBUPROFEN 200 MG
2 TABLET ORAL DAILY
Qty: 28 PATCH | Refills: 0 | Status: SHIPPED | OUTPATIENT
Start: 2021-03-16 | End: 2021-04-05 | Stop reason: SDUPTHER

## 2021-03-16 RX ORDER — DM/P-EPHED/ACETAMINOPH/DOXYLAM 30-7.5/3
2 LIQUID (ML) ORAL
Qty: 100 LOZENGE | Refills: 0 | Status: ON HOLD | OUTPATIENT
Start: 2021-03-16 | End: 2022-03-21 | Stop reason: HOSPADM

## 2021-03-30 ENCOUNTER — TELEPHONE (OUTPATIENT)
Dept: SMOKING CESSATION | Facility: CLINIC | Age: 61
End: 2021-03-30

## 2021-04-01 ENCOUNTER — PATIENT MESSAGE (OUTPATIENT)
Dept: ADMINISTRATIVE | Facility: HOSPITAL | Age: 61
End: 2021-04-01

## 2021-04-05 ENCOUNTER — CLINICAL SUPPORT (OUTPATIENT)
Dept: SMOKING CESSATION | Facility: CLINIC | Age: 61
End: 2021-04-05
Payer: COMMERCIAL

## 2021-04-05 DIAGNOSIS — F17.210 MODERATE CIGARETTE SMOKER (10-19 PER DAY): ICD-10-CM

## 2021-04-05 PROCEDURE — 99999 PR PBB SHADOW E&M-EST. PATIENT-LVL I: ICD-10-PCS | Mod: PBBFAC,,,

## 2021-04-05 PROCEDURE — 99999 PR PBB SHADOW E&M-EST. PATIENT-LVL I: CPT | Mod: PBBFAC,,,

## 2021-04-05 RX ORDER — IBUPROFEN 200 MG
2 TABLET ORAL DAILY
Qty: 28 PATCH | Refills: 0 | Status: SHIPPED | OUTPATIENT
Start: 2021-04-05 | End: 2021-04-19 | Stop reason: SDUPTHER

## 2021-04-19 ENCOUNTER — CLINICAL SUPPORT (OUTPATIENT)
Dept: SMOKING CESSATION | Facility: CLINIC | Age: 61
End: 2021-04-19
Payer: COMMERCIAL

## 2021-04-19 VITALS — OXYGEN SATURATION: 97 %

## 2021-04-19 DIAGNOSIS — F17.210 MODERATE CIGARETTE SMOKER (10-19 PER DAY): ICD-10-CM

## 2021-04-19 PROCEDURE — 99999 PR PBB SHADOW E&M-EST. PATIENT-LVL I: ICD-10-PCS | Mod: PBBFAC,,,

## 2021-04-19 PROCEDURE — 99402 PREV MED CNSL INDIV APPRX 30: CPT | Mod: S$GLB,,,

## 2021-04-19 PROCEDURE — 99402 PR PREVENT COUNSEL,INDIV,30 MIN: ICD-10-PCS | Mod: S$GLB,,,

## 2021-04-19 PROCEDURE — 99999 PR PBB SHADOW E&M-EST. PATIENT-LVL I: CPT | Mod: PBBFAC,,,

## 2021-04-19 RX ORDER — MICONAZOLE NITRATE 2 %
2 CREAM (GRAM) TOPICAL
Qty: 100 EACH | Refills: 0 | Status: SHIPPED | OUTPATIENT
Start: 2021-04-19 | End: 2021-11-16 | Stop reason: SDUPTHER

## 2021-04-19 RX ORDER — IBUPROFEN 200 MG
2 TABLET ORAL DAILY
Qty: 28 PATCH | Refills: 0 | Status: SHIPPED | OUTPATIENT
Start: 2021-04-19 | End: 2021-05-13 | Stop reason: SDUPTHER

## 2021-04-26 ENCOUNTER — TELEPHONE (OUTPATIENT)
Dept: FAMILY MEDICINE | Facility: CLINIC | Age: 61
End: 2021-04-26

## 2021-05-13 ENCOUNTER — CLINICAL SUPPORT (OUTPATIENT)
Dept: SMOKING CESSATION | Facility: CLINIC | Age: 61
End: 2021-05-13
Payer: COMMERCIAL

## 2021-05-13 DIAGNOSIS — F17.210 MODERATE CIGARETTE SMOKER (10-19 PER DAY): ICD-10-CM

## 2021-05-13 DIAGNOSIS — F17.210 CIGARETTE NICOTINE DEPENDENCE, UNCOMPLICATED: Primary | ICD-10-CM

## 2021-05-13 PROCEDURE — 99402 PREV MED CNSL INDIV APPRX 30: CPT | Mod: S$GLB,,,

## 2021-05-13 PROCEDURE — 99999 PR PBB SHADOW E&M-EST. PATIENT-LVL I: CPT | Mod: PBBFAC,,,

## 2021-05-13 PROCEDURE — 99402 PR PREVENT COUNSEL,INDIV,30 MIN: ICD-10-PCS | Mod: S$GLB,,,

## 2021-05-13 PROCEDURE — 99999 PR PBB SHADOW E&M-EST. PATIENT-LVL I: ICD-10-PCS | Mod: PBBFAC,,,

## 2021-05-13 RX ORDER — IBUPROFEN 200 MG
1 TABLET ORAL DAILY
Qty: 28 PATCH | Refills: 0 | Status: SHIPPED | OUTPATIENT
Start: 2021-05-13 | End: 2021-11-16 | Stop reason: SDUPTHER

## 2021-06-01 ENCOUNTER — TELEPHONE (OUTPATIENT)
Dept: SMOKING CESSATION | Facility: CLINIC | Age: 61
End: 2021-06-01

## 2021-06-23 ENCOUNTER — TELEPHONE (OUTPATIENT)
Dept: SMOKING CESSATION | Facility: CLINIC | Age: 61
End: 2021-06-23

## 2021-07-06 ENCOUNTER — PATIENT MESSAGE (OUTPATIENT)
Dept: ADMINISTRATIVE | Facility: HOSPITAL | Age: 61
End: 2021-07-06

## 2021-07-28 RX ORDER — ESCITALOPRAM OXALATE 10 MG/1
10 TABLET ORAL DAILY
Qty: 30 TABLET | Refills: 11 | Status: SHIPPED | OUTPATIENT
Start: 2021-07-28 | End: 2022-05-01

## 2021-09-24 RX ORDER — LOSARTAN POTASSIUM AND HYDROCHLOROTHIAZIDE 12.5; 5 MG/1; MG/1
1 TABLET ORAL DAILY
Qty: 30 TABLET | Refills: 11 | Status: SHIPPED | OUTPATIENT
Start: 2021-09-24 | End: 2022-04-08

## 2021-10-14 DIAGNOSIS — I25.10 CORONARY ARTERY DISEASE INVOLVING NATIVE CORONARY ARTERY OF NATIVE HEART WITHOUT ANGINA PECTORIS: ICD-10-CM

## 2021-10-14 RX ORDER — METOPROLOL SUCCINATE 25 MG/1
25 TABLET, EXTENDED RELEASE ORAL DAILY
Qty: 30 TABLET | Refills: 11 | Status: ON HOLD | OUTPATIENT
Start: 2021-10-14 | End: 2022-04-08 | Stop reason: HOSPADM

## 2021-10-21 ENCOUNTER — CLINICAL SUPPORT (OUTPATIENT)
Dept: SMOKING CESSATION | Facility: CLINIC | Age: 61
End: 2021-10-21
Payer: COMMERCIAL

## 2021-10-21 DIAGNOSIS — F17.200 NICOTINE DEPENDENCE: Primary | ICD-10-CM

## 2021-10-21 PROCEDURE — 99407 BEHAV CHNG SMOKING > 10 MIN: CPT | Mod: S$GLB,,,

## 2021-10-21 PROCEDURE — 99407 PR TOBACCO USE CESSATION INTENSIVE >10 MINUTES: ICD-10-PCS | Mod: S$GLB,,,

## 2021-10-22 ENCOUNTER — PATIENT OUTREACH (OUTPATIENT)
Dept: ADMINISTRATIVE | Facility: HOSPITAL | Age: 61
End: 2021-10-22

## 2021-11-02 ENCOUNTER — TELEPHONE (OUTPATIENT)
Dept: SMOKING CESSATION | Facility: CLINIC | Age: 61
End: 2021-11-02
Payer: COMMERCIAL

## 2021-11-16 ENCOUNTER — TELEPHONE (OUTPATIENT)
Dept: SMOKING CESSATION | Facility: CLINIC | Age: 61
End: 2021-11-16
Payer: COMMERCIAL

## 2021-11-16 ENCOUNTER — CLINICAL SUPPORT (OUTPATIENT)
Dept: SMOKING CESSATION | Facility: CLINIC | Age: 61
End: 2021-11-16
Payer: COMMERCIAL

## 2021-11-16 VITALS — WEIGHT: 276 LBS | BODY MASS INDEX: 50.79 KG/M2 | HEIGHT: 62 IN

## 2021-11-16 DIAGNOSIS — F17.210 MODERATE CIGARETTE SMOKER (10-19 PER DAY): Primary | ICD-10-CM

## 2021-11-16 PROCEDURE — 99999 PR PBB SHADOW E&M-EST. PATIENT-LVL II: CPT | Mod: PBBFAC,,,

## 2021-11-16 PROCEDURE — 99404 PR PREVENT COUNSEL,INDIV,60 MIN: ICD-10-PCS | Mod: S$GLB,,,

## 2021-11-16 PROCEDURE — 99404 PREV MED CNSL INDIV APPRX 60: CPT | Mod: S$GLB,,,

## 2021-11-16 PROCEDURE — 99999 PR PBB SHADOW E&M-EST. PATIENT-LVL II: ICD-10-PCS | Mod: PBBFAC,,,

## 2021-11-16 RX ORDER — MICONAZOLE NITRATE 2 %
2 CREAM (GRAM) TOPICAL
Qty: 100 EACH | Refills: 0 | Status: ON HOLD | OUTPATIENT
Start: 2021-11-16 | End: 2022-03-21 | Stop reason: HOSPADM

## 2021-11-16 RX ORDER — IBUPROFEN 200 MG
2 TABLET ORAL DAILY
Qty: 56 PATCH | Refills: 0 | Status: ON HOLD | OUTPATIENT
Start: 2021-11-16 | End: 2022-03-21 | Stop reason: HOSPADM

## 2021-11-18 ENCOUNTER — PATIENT OUTREACH (OUTPATIENT)
Dept: ADMINISTRATIVE | Facility: HOSPITAL | Age: 61
End: 2021-11-18
Payer: COMMERCIAL

## 2021-11-30 ENCOUNTER — TELEPHONE (OUTPATIENT)
Dept: SMOKING CESSATION | Facility: CLINIC | Age: 61
End: 2021-11-30
Payer: COMMERCIAL

## 2021-12-16 ENCOUNTER — TELEPHONE (OUTPATIENT)
Dept: SMOKING CESSATION | Facility: CLINIC | Age: 61
End: 2021-12-16
Payer: COMMERCIAL

## 2022-01-06 ENCOUNTER — PATIENT OUTREACH (OUTPATIENT)
Dept: ADMINISTRATIVE | Facility: OTHER | Age: 62
End: 2022-01-06
Payer: COMMERCIAL

## 2022-01-06 NOTE — PROGRESS NOTES
Health Maintenance Due   Topic Date Due    Pneumococcal Vaccines (Age 0-64) (1 of 2 - PPSV23) Never done    LDCT Lung Screen  Never done    Shingles Vaccine (1 of 2) Never done    Cervical Cancer Screening  11/01/2020    COVID-19 Vaccine (2 - Booster for Angela series) 04/30/2021    Influenza Vaccine (1) Never done     Updates were requested from care everywhere.  Chart was reviewed for overdue Proactive [facility] Encounters ([facility]) topics (CRS, Breast Cancer Screening, Eye exam)  Health Maintenance has been updated.  LINKS immunization registry triggered.  Immunizations were reconciled.

## 2022-01-10 ENCOUNTER — PATIENT MESSAGE (OUTPATIENT)
Dept: ADMINISTRATIVE | Facility: HOSPITAL | Age: 62
End: 2022-01-10
Payer: COMMERCIAL

## 2022-01-10 ENCOUNTER — OFFICE VISIT (OUTPATIENT)
Dept: OBSTETRICS AND GYNECOLOGY | Facility: CLINIC | Age: 62
End: 2022-01-10
Payer: COMMERCIAL

## 2022-01-10 VITALS
HEIGHT: 62 IN | SYSTOLIC BLOOD PRESSURE: 142 MMHG | DIASTOLIC BLOOD PRESSURE: 85 MMHG | WEIGHT: 271.81 LBS | BODY MASS INDEX: 50.02 KG/M2

## 2022-01-10 DIAGNOSIS — Z12.31 VISIT FOR SCREENING MAMMOGRAM: ICD-10-CM

## 2022-01-10 DIAGNOSIS — Z01.419 ROUTINE GYNECOLOGICAL EXAMINATION: Primary | ICD-10-CM

## 2022-01-10 DIAGNOSIS — Z12.4 CERVICAL CANCER SCREENING: ICD-10-CM

## 2022-01-10 PROCEDURE — 3008F PR BODY MASS INDEX (BMI) DOCUMENTED: ICD-10-PCS | Mod: CPTII,S$GLB,, | Performed by: OBSTETRICS & GYNECOLOGY

## 2022-01-10 PROCEDURE — 99386 PREV VISIT NEW AGE 40-64: CPT | Mod: S$GLB,,, | Performed by: OBSTETRICS & GYNECOLOGY

## 2022-01-10 PROCEDURE — 3008F BODY MASS INDEX DOCD: CPT | Mod: CPTII,S$GLB,, | Performed by: OBSTETRICS & GYNECOLOGY

## 2022-01-10 PROCEDURE — 1159F PR MEDICATION LIST DOCUMENTED IN MEDICAL RECORD: ICD-10-PCS | Mod: CPTII,S$GLB,, | Performed by: OBSTETRICS & GYNECOLOGY

## 2022-01-10 PROCEDURE — 99999 PR PBB SHADOW E&M-EST. PATIENT-LVL III: CPT | Mod: PBBFAC,,, | Performed by: OBSTETRICS & GYNECOLOGY

## 2022-01-10 PROCEDURE — 99386 PR PREVENTIVE VISIT,NEW,40-64: ICD-10-PCS | Mod: S$GLB,,, | Performed by: OBSTETRICS & GYNECOLOGY

## 2022-01-10 PROCEDURE — 3079F PR MOST RECENT DIASTOLIC BLOOD PRESSURE 80-89 MM HG: ICD-10-PCS | Mod: CPTII,S$GLB,, | Performed by: OBSTETRICS & GYNECOLOGY

## 2022-01-10 PROCEDURE — 87624 HPV HI-RISK TYP POOLED RSLT: CPT | Performed by: OBSTETRICS & GYNECOLOGY

## 2022-01-10 PROCEDURE — 3079F DIAST BP 80-89 MM HG: CPT | Mod: CPTII,S$GLB,, | Performed by: OBSTETRICS & GYNECOLOGY

## 2022-01-10 PROCEDURE — 3077F PR MOST RECENT SYSTOLIC BLOOD PRESSURE >= 140 MM HG: ICD-10-PCS | Mod: CPTII,S$GLB,, | Performed by: OBSTETRICS & GYNECOLOGY

## 2022-01-10 PROCEDURE — 99999 PR PBB SHADOW E&M-EST. PATIENT-LVL III: ICD-10-PCS | Mod: PBBFAC,,, | Performed by: OBSTETRICS & GYNECOLOGY

## 2022-01-10 PROCEDURE — 88142 CYTOPATH C/V THIN LAYER: CPT | Performed by: OBSTETRICS & GYNECOLOGY

## 2022-01-10 PROCEDURE — 1159F MED LIST DOCD IN RCRD: CPT | Mod: CPTII,S$GLB,, | Performed by: OBSTETRICS & GYNECOLOGY

## 2022-01-10 PROCEDURE — 3077F SYST BP >= 140 MM HG: CPT | Mod: CPTII,S$GLB,, | Performed by: OBSTETRICS & GYNECOLOGY

## 2022-01-10 NOTE — PROGRESS NOTES
"62 yo postmenopausal female who presents for routine gyn visit.  No episodes of postmenopausal bleeding.  Mammogram was completed in march 2021.   in the last 2 yrs.  No sexual activity since that time. Declines STD testing.  Colonoscopy was completed in her 50s.   No gyn complaints today.    ROS:  GENERAL: Denies weight gain or weight loss. Feeling well overall.   SKIN: Denies rash or lesions.   HEAD: Denies head injury or headache.   CHEST: Denies chest pain or shortness of breath.   CARDIOVASCULAR: Denies palpitations or left sided chest pain.   ABDOMEN: No abdominal pain, constipation, diarrhea, nausea, vomiting or rectal bleeding.   URINARY: No frequency, dysuria, hematuria, or burning on urination.  REPRODUCTIVE: no complaints  BREASTS:  denies pain, lumps, or nipple discharge.   HEMATOLOGIC: No easy bruisability or excessive bleeding.   MUSCULOSKELETAL: Denies joint pain or swelling.   NEUROLOGIC: Denies syncope or weakness.   PSYCHIATRIC: Denies depression, anxiety or mood swings.     PE:   Vitals: BP (!) 142/85   Ht 5' 2" (1.575 m)   Wt 123.3 kg (271 lb 13.2 oz)   LMP 06/30/2015   BMI 49.72 kg/m²   APPEARANCE: Well nourished, well developed, in no acute distress.  SKIN: Normal skin turgor, no lesions.  ABDOMEN: Soft. No tenderness or masses. No hepatosplenomegaly. No hernias. pbese  BREASTS: declined exam  PELVIC: Normal external female genitalia without lesions. Normal hair distribution. Adequate perineal body, normal urethral meatus. Atrophic vagina without lesions or discharge. Cervix pink and stenotic and without lesions. No significant cystocele or rectocele. Bimanual exam showed uterus normal size, shape, position, mobile and nontender. Adnexa without masses or tenderness. Urethra and bladder normal.  EXTREMITIES: No clubbing cyanosis or edema.        AP  Routine gyn  -s/p normal breast exam: mammogram uptodate - due for March 2022 - ordered  -s/p normal pelvic exam:   -Pap and hpv " collected  -STD testing: declined  -colonoscopy: kleber Vaz MD

## 2022-01-15 LAB
FINAL PATHOLOGIC DIAGNOSIS: NORMAL
Lab: NORMAL

## 2022-01-20 LAB
HPV HR 12 DNA SPEC QL NAA+PROBE: NEGATIVE
HPV16 AG SPEC QL: NEGATIVE
HPV18 DNA SPEC QL NAA+PROBE: NEGATIVE

## 2022-01-25 NOTE — PROGRESS NOTES
Pap and hpv are normal    Your pelvic exam will still need to occur once a year!    See you then!    Dr sullivan

## 2022-01-28 ENCOUNTER — OFFICE VISIT (OUTPATIENT)
Dept: FAMILY MEDICINE | Facility: CLINIC | Age: 62
End: 2022-01-28
Payer: COMMERCIAL

## 2022-01-28 ENCOUNTER — TELEPHONE (OUTPATIENT)
Dept: FAMILY MEDICINE | Facility: CLINIC | Age: 62
End: 2022-01-28
Payer: COMMERCIAL

## 2022-01-28 DIAGNOSIS — R42 VERTIGO: Primary | ICD-10-CM

## 2022-01-28 DIAGNOSIS — F32.1 CURRENT MODERATE EPISODE OF MAJOR DEPRESSIVE DISORDER WITHOUT PRIOR EPISODE: ICD-10-CM

## 2022-01-28 DIAGNOSIS — I25.10 CORONARY ARTERY DISEASE INVOLVING NATIVE CORONARY ARTERY OF NATIVE HEART WITHOUT ANGINA PECTORIS: ICD-10-CM

## 2022-01-28 DIAGNOSIS — R73.01 IFG (IMPAIRED FASTING GLUCOSE): ICD-10-CM

## 2022-01-28 DIAGNOSIS — I10 HYPERTENSION, UNSPECIFIED TYPE: ICD-10-CM

## 2022-01-28 DIAGNOSIS — F17.200 TOBACCO DEPENDENCE: ICD-10-CM

## 2022-01-28 PROCEDURE — 99214 PR OFFICE/OUTPT VISIT, EST, LEVL IV, 30-39 MIN: ICD-10-PCS | Mod: 95,,, | Performed by: FAMILY MEDICINE

## 2022-01-28 PROCEDURE — 99214 OFFICE O/P EST MOD 30 MIN: CPT | Mod: 95,,, | Performed by: FAMILY MEDICINE

## 2022-01-28 RX ORDER — MECLIZINE HCL 12.5 MG 12.5 MG/1
12.5 TABLET ORAL 3 TIMES DAILY PRN
Qty: 30 TABLET | Refills: 0 | Status: ON HOLD | OUTPATIENT
Start: 2022-01-28 | End: 2022-04-01 | Stop reason: CLARIF

## 2022-01-28 NOTE — TELEPHONE ENCOUNTER
----- Message from Jessica Ya sent at 1/28/2022  7:42 AM CST -----  Regarding: advice  Contact: [phone]  Type:  Needs Medical Advice    Who Called:  self   Symptoms (please be specific):  vertito again   How long has patient had these symptoms:   2 weeks   Pharmacy name and phone #:   [id number] [place], [place] - [address] AT [facility] [address];  Would the patient rather a call back or a response via [facility]?  call  Best Call Back Number:  [phone]  Additional Information:  requesting a virtual visit

## 2022-01-28 NOTE — TELEPHONE ENCOUNTER
Returned call to patient regarding message below.  Scheduled first available per patients request.

## 2022-01-28 NOTE — PATIENT INSTRUCTIONS
Patient Education       Vestibular Exercises   About this topic   A condition called benign paroxysmal positional vertigo, or BPPV, can cause dizziness. Inside of your inner ear, you have fluid. Small crystals float in the fluid. With this problem, the crystals get stuck in the inner ear. When you move your head, you may have more signs. Sometimes, special exercises can help move the crystals and make this problem better. Always ask your doctor before you do any exercises. Your doctor or a physical therapist can help you find the best exercises for your problem.  General   Before starting with a program, ask your doctor if you are healthy enough to do these exercises. Your doctor may have you work with a  or physical therapist to make a safe exercise program to meet your needs. Some of these exercises could make your problems worse at first. Ask your doctor or physical therapist how often and how long you should do these exercises. These are often done a few times each day until 2 days after the dizziness has ended.  Epley Maneuver   This is mostly used when there is a diagnosis of a particular side having a problem.  If your right side has a problem:  · Have a pillow towards the end of the bed where your feet most often are. Sit with your left side closest to the side of the bed. Sit far enough away from the pillow that it will end up being under your shoulders when you lie back. Tilt your head slightly back while doing this exercise. Be sure to hold each position for 30 seconds before moving to the next position.  · Start sitting up with your legs extended in front of you. Turn your head assisted between looking straight forward and looking over your right shoulder. This is a 45 degree angle. Hold this position.  · Lie back quickly with your head in the same position. Turn your head assisted to the right and tilted back a little bit. Hold this position.  · Now roll your head to the left. Your head should now  be tilted back a little bit. Turn your head to the left, looking retirement between straight ahead and over your left shoulder. This is a 45 degree angle. Hold this position.  · Now, roll onto your left shoulder. Point your head down to the left at a 45 degree angle. Hold this position.  · Finish by sitting up on the side of the bed. Do this 3 times throughout the day.  If your left side has a problem:  · Have a pillow towards the end of the bed where your feet most often are. Sit with your right side closest to the side of the bed. Sit far enough away from the pillow that it will end up being under your shoulders when you lie back. Tilt your head slightly back while doing this exercise. Be sure to hold each position for 30 seconds before moving to the next position.  · Start sitting up with your legs extended in front of you. Turn your head retirement between looking straight forward and looking over your left shoulder. This is a 45 degree angle. Hold this position.  · Lie back quickly with your head in the same position. Turn your head retirement to the left and tilted back a little bit. Hold this position.  · Now, roll your head to the right. Tilt your head back a little bit. Turn your head to the right looking retirement between straight ahead and over your right shoulder. This is a 45 degree angle. Hold this position.  · Now, roll onto your right shoulder. Point your head down to the right at a 45 degree angle. Hold this position.  · Finish by sitting up on the side of the bed. Do this 3 times throughout the day.  Rawls-Daroff Exercises   This is mostly used when there is not a diagnosis of a particular side having a problem.  · Sit at the edge of the bed in the middle of one side.  · Now, lie down on your right side with your head angled upwards 45 degrees. This is looking retirement between straight ahead and over your right shoulder. Hold this position for 30 seconds or until the dizziness ends.  · Sit back up with your  head straight and hold that position for 30 seconds.  · Now, lie down on your left side with your head angled upwards 45 degrees. This is looking longterm between straight ahead and over your left shoulder. Hold this position for 30 seconds or until the dizziness ends.  · Sit back up with your head straight and hold that position for 30 seconds. Repeat these steps 5 times in a row, 3 times each day.         What problems could happen?   · Your dizziness does not go away or gets worse  · You have a different kind of dizziness  · You have more nausea  · Sudden change in hearing  · New onset of ear pain  · New onset of ringing in the ears  · Fluid discharge from the ears  Where can I learn more?   [employer]  http://www.brainandspine.org.uk/fxcvxijggh-hpprwechifqdcu-wsdstdbcq   Last Reviewed Date   2018-03-27  Consumer Information Use and Disclaimer   This information is not specific medical advice and does not replace information you receive from your health care provider. This is only a brief summary of general information. It does NOT include all information about conditions, illnesses, injuries, tests, procedures, treatments, therapies, discharge instructions or life-style choices that may apply to you. You must talk with your health care provider for complete information about your health and treatment options. This information should not be used to decide whether or not to accept your health care providers advice, instructions or recommendations. Only your health care provider has the knowledge and training to provide advice that is right for you.  Copyright   Copyright © 2021 [employer], Inc. and its affiliates and/or licensors. All rights reserved.        Modified Epley maneuver for self-treatment of benign positional vertigo (right)    This maneuver should be carried out three times a day. Repeat this daily until you are free from positional vertigo for 24 hours.  Reproduced from  http://www.[facility].de/ch/neuro/englishL.htm  Graphic 08290 Version 2.0          Modified Epley maneuver for self-treatment of benign positional vertigo (left)    This maneuver should be carried out three times a day. Repeat this daily until you are free from positional vertigo for 24 hours.  Reproduced from Http://www.[facility]/ch/neuro/englishL.htm  Graphic 90680 Version 2.0

## 2022-03-14 ENCOUNTER — HOSPITAL ENCOUNTER (OUTPATIENT)
Dept: RADIOLOGY | Facility: HOSPITAL | Age: 62
Discharge: HOME | End: 2022-03-14
Attending: OBSTETRICS & GYNECOLOGY
Payer: COMMERCIAL

## 2022-03-14 DIAGNOSIS — Z12.31 VISIT FOR SCREENING MAMMOGRAM: ICD-10-CM

## 2022-03-14 PROCEDURE — 77067 MAMMO DIGITAL SCREENING BILAT WITH TOMO: ICD-10-PCS | Mod: 26,,, | Performed by: RADIOLOGY

## 2022-03-14 PROCEDURE — 77067 SCR MAMMO BI INCL CAD: CPT | Mod: 26,,, | Performed by: RADIOLOGY

## 2022-03-14 PROCEDURE — 77063 BREAST TOMOSYNTHESIS BI: CPT | Mod: 26,,, | Performed by: RADIOLOGY

## 2022-03-14 PROCEDURE — 77067 SCR MAMMO BI INCL CAD: CPT | Mod: TC

## 2022-03-14 PROCEDURE — 77063 MAMMO DIGITAL SCREENING BILAT WITH TOMO: ICD-10-PCS | Mod: 26,,, | Performed by: RADIOLOGY

## 2022-03-14 PROCEDURE — 77063 BREAST TOMOSYNTHESIS BI: CPT | Mod: TC

## 2022-03-18 ENCOUNTER — HOSPITAL ENCOUNTER (INPATIENT)
Facility: HOSPITAL | Age: 62
LOS: 1 days | Discharge: HOME | DRG: 287 | End: 2022-03-23
Attending: EMERGENCY MEDICINE | Admitting: STUDENT IN AN ORGANIZED HEALTH CARE EDUCATION/TRAINING PROGRAM
Payer: COMMERCIAL

## 2022-03-18 DIAGNOSIS — I25.110 CORONARY ARTERY DISEASE INVOLVING NATIVE CORONARY ARTERY OF NATIVE HEART WITH UNSTABLE ANGINA PECTORIS: ICD-10-CM

## 2022-03-18 DIAGNOSIS — Z01.818 PRE-OP EVALUATION: ICD-10-CM

## 2022-03-18 DIAGNOSIS — G47.33 OSA (OBSTRUCTIVE SLEEP APNEA): Primary | ICD-10-CM

## 2022-03-18 DIAGNOSIS — R07.89 OTHER CHEST PAIN: ICD-10-CM

## 2022-03-18 DIAGNOSIS — R07.9 CHEST PAIN: ICD-10-CM

## 2022-03-18 LAB
ALBUMIN SERPL BCP-MCNC: 3.7 G/DL (ref 3.5–5.2)
ALP SERPL-CCNC: 85 U/L (ref 55–135)
ALT SERPL W/O P-5'-P-CCNC: 15 U/L (ref 10–44)
ANION GAP SERPL CALC-SCNC: 10 MMOL/L (ref 8–16)
AST SERPL-CCNC: 19 U/L (ref 10–40)
BASOPHILS # BLD AUTO: 0.02 K/UL (ref 0–0.2)
BASOPHILS NFR BLD: 0.3 % (ref 0–1.9)
BILIRUB SERPL-MCNC: 0.6 MG/DL (ref 0.1–1)
BNP SERPL-MCNC: 42 PG/ML (ref 0–99)
BUN SERPL-MCNC: 10 MG/DL (ref 6–20)
BUN SERPL-MCNC: 10 MG/DL (ref 6–30)
CALCIUM SERPL-MCNC: 9.4 MG/DL (ref 8.7–10.5)
CHLORIDE SERPL-SCNC: 106 MMOL/L (ref 95–110)
CHLORIDE SERPL-SCNC: 107 MMOL/L (ref 95–110)
CO2 SERPL-SCNC: 22 MMOL/L (ref 23–29)
CREAT SERPL-MCNC: 0.9 MG/DL (ref 0.5–1.4)
CREAT SERPL-MCNC: 0.9 MG/DL (ref 0.5–1.4)
DIFFERENTIAL METHOD: ABNORMAL
EOSINOPHIL # BLD AUTO: 0.2 K/UL (ref 0–0.5)
EOSINOPHIL NFR BLD: 3.9 % (ref 0–8)
ERYTHROCYTE [DISTWIDTH] IN BLOOD BY AUTOMATED COUNT: 12.7 % (ref 11.5–14.5)
EST. GFR  (AFRICAN AMERICAN): >60 ML/MIN/1.73 M^2
EST. GFR  (NON AFRICAN AMERICAN): >60 ML/MIN/1.73 M^2
GLUCOSE SERPL-MCNC: 106 MG/DL (ref 70–110)
GLUCOSE SERPL-MCNC: 106 MG/DL (ref 70–110)
HCT VFR BLD AUTO: 43.4 % (ref 37–48.5)
HCT VFR BLD CALC: 43 %PCV (ref 36–54)
HCV AB SERPL QL IA: NEGATIVE
HGB BLD-MCNC: 14.2 G/DL (ref 12–16)
HIV 1+2 AB+HIV1 P24 AG SERPL QL IA: NEGATIVE
IMM GRANULOCYTES # BLD AUTO: 0.01 K/UL (ref 0–0.04)
IMM GRANULOCYTES NFR BLD AUTO: 0.2 % (ref 0–0.5)
LYMPHOCYTES # BLD AUTO: 2.7 K/UL (ref 1–4.8)
LYMPHOCYTES NFR BLD: 45.3 % (ref 18–48)
MCH RBC QN AUTO: 31.6 PG (ref 27–31)
MCHC RBC AUTO-ENTMCNC: 32.7 G/DL (ref 32–36)
MCV RBC AUTO: 96 FL (ref 82–98)
MONOCYTES # BLD AUTO: 0.5 K/UL (ref 0.3–1)
MONOCYTES NFR BLD: 8.5 % (ref 4–15)
NEUTROPHILS # BLD AUTO: 2.5 K/UL (ref 1.8–7.7)
NEUTROPHILS NFR BLD: 41.8 % (ref 38–73)
NRBC BLD-RTO: 0 /100 WBC
PLATELET # BLD AUTO: 201 K/UL (ref 150–450)
PMV BLD AUTO: 11 FL (ref 9.2–12.9)
POC IONIZED CALCIUM: 1.03 MMOL/L (ref 1.06–1.42)
POC TCO2 (MEASURED): 21 MMOL/L (ref 23–29)
POTASSIUM BLD-SCNC: 3.7 MMOL/L (ref 3.5–5.1)
POTASSIUM SERPL-SCNC: 3.7 MMOL/L (ref 3.5–5.1)
PROT SERPL-MCNC: 7 G/DL (ref 6–8.4)
RBC # BLD AUTO: 4.5 M/UL (ref 4–5.4)
SAMPLE: ABNORMAL
SODIUM BLD-SCNC: 141 MMOL/L (ref 136–145)
SODIUM SERPL-SCNC: 138 MMOL/L (ref 136–145)
TROPONIN I SERPL DL<=0.01 NG/ML-MCNC: <0.006 NG/ML (ref 0–0.03)
TROPONIN I SERPL DL<=0.01 NG/ML-MCNC: <0.006 NG/ML (ref 0–0.03)
WBC # BLD AUTO: 5.91 K/UL (ref 3.9–12.7)

## 2022-03-18 PROCEDURE — 85025 COMPLETE CBC W/AUTO DIFF WBC: CPT | Performed by: STUDENT IN AN ORGANIZED HEALTH CARE EDUCATION/TRAINING PROGRAM

## 2022-03-18 PROCEDURE — 99285 PR EMERGENCY DEPT VISIT,LEVEL V: ICD-10-PCS | Mod: ,,, | Performed by: EMERGENCY MEDICINE

## 2022-03-18 PROCEDURE — 96372 THER/PROPH/DIAG INJ SC/IM: CPT | Performed by: STUDENT IN AN ORGANIZED HEALTH CARE EDUCATION/TRAINING PROGRAM

## 2022-03-18 PROCEDURE — 84484 ASSAY OF TROPONIN QUANT: CPT | Mod: 91 | Performed by: STUDENT IN AN ORGANIZED HEALTH CARE EDUCATION/TRAINING PROGRAM

## 2022-03-18 PROCEDURE — 99220 PR INITIAL OBSERVATION CARE,LEVL III: ICD-10-PCS | Mod: ,,, | Performed by: STUDENT IN AN ORGANIZED HEALTH CARE EDUCATION/TRAINING PROGRAM

## 2022-03-18 PROCEDURE — G0378 HOSPITAL OBSERVATION PER HR: HCPCS

## 2022-03-18 PROCEDURE — 93010 ELECTROCARDIOGRAM REPORT: CPT | Mod: 76,,, | Performed by: INTERNAL MEDICINE

## 2022-03-18 PROCEDURE — 86803 HEPATITIS C AB TEST: CPT | Performed by: EMERGENCY MEDICINE

## 2022-03-18 PROCEDURE — 99285 EMERGENCY DEPT VISIT HI MDM: CPT | Mod: 25

## 2022-03-18 PROCEDURE — 93010 EKG 12-LEAD: ICD-10-PCS | Mod: ,,, | Performed by: INTERNAL MEDICINE

## 2022-03-18 PROCEDURE — 87389 HIV-1 AG W/HIV-1&-2 AB AG IA: CPT | Performed by: EMERGENCY MEDICINE

## 2022-03-18 PROCEDURE — 93010 ELECTROCARDIOGRAM REPORT: CPT | Mod: ,,, | Performed by: INTERNAL MEDICINE

## 2022-03-18 PROCEDURE — 63600175 PHARM REV CODE 636 W HCPCS: Performed by: STUDENT IN AN ORGANIZED HEALTH CARE EDUCATION/TRAINING PROGRAM

## 2022-03-18 PROCEDURE — 93005 ELECTROCARDIOGRAM TRACING: CPT

## 2022-03-18 PROCEDURE — 83880 ASSAY OF NATRIURETIC PEPTIDE: CPT | Performed by: STUDENT IN AN ORGANIZED HEALTH CARE EDUCATION/TRAINING PROGRAM

## 2022-03-18 PROCEDURE — 80047 BASIC METABLC PNL IONIZED CA: CPT

## 2022-03-18 PROCEDURE — 80053 COMPREHEN METABOLIC PANEL: CPT | Performed by: STUDENT IN AN ORGANIZED HEALTH CARE EDUCATION/TRAINING PROGRAM

## 2022-03-18 PROCEDURE — 99220 PR INITIAL OBSERVATION CARE,LEVL III: CPT | Mod: ,,, | Performed by: STUDENT IN AN ORGANIZED HEALTH CARE EDUCATION/TRAINING PROGRAM

## 2022-03-18 PROCEDURE — 99285 EMERGENCY DEPT VISIT HI MDM: CPT | Mod: ,,, | Performed by: EMERGENCY MEDICINE

## 2022-03-18 RX ORDER — BUPROPION HYDROCHLORIDE 150 MG/1
150 TABLET ORAL DAILY
Refills: 11 | Status: DISCONTINUED | OUTPATIENT
Start: 2022-03-19 | End: 2022-03-23 | Stop reason: HOSPADM

## 2022-03-18 RX ORDER — MECLIZINE HCL 12.5 MG 12.5 MG/1
12.5 TABLET ORAL 3 TIMES DAILY PRN
Status: DISCONTINUED | OUTPATIENT
Start: 2022-03-18 | End: 2022-03-23 | Stop reason: HOSPADM

## 2022-03-18 RX ORDER — OXYBUTYNIN CHLORIDE 5 MG/1
5 TABLET, EXTENDED RELEASE ORAL DAILY
Refills: 11 | Status: DISCONTINUED | OUTPATIENT
Start: 2022-03-19 | End: 2022-03-23 | Stop reason: HOSPADM

## 2022-03-18 RX ORDER — METOPROLOL SUCCINATE 25 MG/1
25 TABLET, EXTENDED RELEASE ORAL DAILY
Status: DISCONTINUED | OUTPATIENT
Start: 2022-03-19 | End: 2022-03-23 | Stop reason: HOSPADM

## 2022-03-18 RX ORDER — ESCITALOPRAM OXALATE 10 MG/1
10 TABLET ORAL DAILY
Status: DISCONTINUED | OUTPATIENT
Start: 2022-03-19 | End: 2022-03-23 | Stop reason: HOSPADM

## 2022-03-18 RX ORDER — HYDROXYZINE HYDROCHLORIDE 25 MG/1
25 TABLET, FILM COATED ORAL 3 TIMES DAILY PRN
Status: DISCONTINUED | OUTPATIENT
Start: 2022-03-18 | End: 2022-03-23 | Stop reason: HOSPADM

## 2022-03-18 RX ORDER — GLUCAGON 1 MG
1 KIT INJECTION
Status: DISCONTINUED | OUTPATIENT
Start: 2022-03-18 | End: 2022-03-23 | Stop reason: HOSPADM

## 2022-03-18 RX ORDER — CYCLOBENZAPRINE HCL 5 MG
10 TABLET ORAL 3 TIMES DAILY PRN
Status: DISCONTINUED | OUTPATIENT
Start: 2022-03-18 | End: 2022-03-23 | Stop reason: HOSPADM

## 2022-03-18 RX ORDER — ENOXAPARIN SODIUM 100 MG/ML
40 INJECTION SUBCUTANEOUS EVERY 12 HOURS
Status: DISCONTINUED | OUTPATIENT
Start: 2022-03-18 | End: 2022-03-23 | Stop reason: HOSPADM

## 2022-03-18 RX ORDER — ATORVASTATIN CALCIUM 20 MG/1
20 TABLET, FILM COATED ORAL DAILY
Status: DISCONTINUED | OUTPATIENT
Start: 2022-03-19 | End: 2022-03-22

## 2022-03-18 RX ORDER — LOSARTAN POTASSIUM AND HYDROCHLOROTHIAZIDE 12.5; 5 MG/1; MG/1
1 TABLET ORAL DAILY
Status: DISCONTINUED | OUTPATIENT
Start: 2022-03-19 | End: 2022-03-23 | Stop reason: HOSPADM

## 2022-03-18 RX ORDER — ASPIRIN 81 MG/1
81 TABLET ORAL DAILY
Status: DISCONTINUED | OUTPATIENT
Start: 2022-03-19 | End: 2022-03-22

## 2022-03-18 RX ORDER — FLUTICASONE PROPIONATE 50 MCG
1 SPRAY, SUSPENSION (ML) NASAL DAILY
Status: DISCONTINUED | OUTPATIENT
Start: 2022-03-19 | End: 2022-03-23 | Stop reason: HOSPADM

## 2022-03-18 RX ORDER — NALOXONE HCL 0.4 MG/ML
0.02 VIAL (ML) INJECTION
Status: DISCONTINUED | OUTPATIENT
Start: 2022-03-18 | End: 2022-03-23 | Stop reason: HOSPADM

## 2022-03-18 RX ORDER — IBUPROFEN 200 MG
16 TABLET ORAL
Status: DISCONTINUED | OUTPATIENT
Start: 2022-03-18 | End: 2022-03-23 | Stop reason: HOSPADM

## 2022-03-18 RX ORDER — SODIUM CHLORIDE 0.9 % (FLUSH) 0.9 %
10 SYRINGE (ML) INJECTION EVERY 12 HOURS PRN
Status: DISCONTINUED | OUTPATIENT
Start: 2022-03-18 | End: 2022-03-23 | Stop reason: HOSPADM

## 2022-03-18 RX ORDER — IBUPROFEN 200 MG
24 TABLET ORAL
Status: DISCONTINUED | OUTPATIENT
Start: 2022-03-18 | End: 2022-03-23 | Stop reason: HOSPADM

## 2022-03-18 RX ADMIN — ENOXAPARIN SODIUM 40 MG: 40 INJECTION SUBCUTANEOUS at 09:03

## 2022-03-18 NOTE — Clinical Note
The catheter was inserted into the ostium   right coronary artery. An angiography was performed of the right coronary arteries.

## 2022-03-18 NOTE — PROGRESS NOTES
Indication: dvt ppx  Appropriate dose based on indication: enoxaparin 40 mg twice daily   CrCl, Hgb, Hematocrit, Platelets: stable,  Obesity :BMI>40 kg/m2

## 2022-03-18 NOTE — SUBJECTIVE & OBJECTIVE
Past Medical History:   Diagnosis Date    Anticoagulant long-term use     CAD (coronary artery disease)     s/p LAD stent    CHF (congestive heart failure)     Gastroduodenitis     Helicobacter pylori (H. pylori) infection     Hyperlipidemia     Hypertension     MI (myocardial infarction)     x2    Obesity     Tobacco use     Urinary tract infection     Vaginal infection        Past Surgical History:   Procedure Laterality Date    COLONOSCOPY N/A 7/14/2017    Procedure: COLONOSCOPY;  Surgeon: Cathy Collier MD;  Location: [facility];  Service: Endoscopy;  Laterality: N/A;    NO PAST SURGERIES         Review of patient's allergies indicates:   Allergen Reactions    Amoxicillin Diarrhea    Plavix [clopidogrel]        No current facility-administered medications on file prior to encounter.     Current Outpatient Medications on File Prior to Encounter   Medication Sig    aspirin (ECOTRIN) 81 MG EC tablet Take 1 tablet (81 mg total) by mouth once daily.    atorvastatin (LIPITOR) 20 MG tablet TAKE 1 TABLET(20 MG) BY MOUTH EVERY DAY    buPROPion (WELLBUTRIN SR) 150 MG TBSR 12 hr tablet Take 1 tablet (150 mg total) by mouth 2 (two) times daily. (Patient not taking: No sig reported)    cyclobenzaprine (FLEXERIL) 5 MG tablet TAKE 1 TABLET(5 MG) BY MOUTH THREE TIMES DAILY AS NEEDED FOR MUSCLE SPASMS    diclofenac sodium (VOLTAREN) 1 % Gel Apply 2 g topically 3 (three) times daily as needed. (Patient not taking: No sig reported)    EScitalopram oxalate (LEXAPRO) 10 MG tablet Take 1 tablet (10 mg total) by mouth once daily.    fluticasone (FLONASE) 50 mcg/actuation nasal spray SPRAY ONCE IN EACH NOSTRIL EVERY DAY    hydrOXYzine HCl (ATARAX) 25 MG tablet Take 1 tablet (25 mg total) by mouth 3 (three) times daily as needed for Itching or Anxiety.    losartan-hydrochlorothiazide 50-12.5 mg (HYZAAR) 50-12.5 mg per tablet Take 1 tablet by mouth once daily.    meclizine (ANTIVERT) 12.5 mg tablet Take 1 tablet (12.5 mg total) by  mouth 3 (three) times daily as needed for Nausea.    metoprolol succinate (TOPROL-XL) 25 MG 24 hr tablet Take 1 tablet (25 mg total) by mouth once daily.    multivitamin (THERAGRAN) per tablet Take 1 tablet by mouth once daily.    nicotine (NICODERM CQ) 21 mg/24 hr APPLY 1 PATCH EXTERNALLY TO THE SKIN EVERY DAY FOR 6 WEEKS    nicotine (NICODERM CQ) 21 mg/24 hr Place 2 patches onto the skin once daily. Dose appropriate. Please fill with US Script ID [id number]   Group [id number] Bin [id number]    nicotine polacrilex 2 MG Lozg Take 1 lozenge (2 mg total) by mouth as needed (as needed). Dispense Fruit flavor please, DO NOT CHEW UP. Can take 1 per hour as needed in place of a cigarette. ID in note.    nicotine, polacrilex, (NICORETTE) 2 mg Gum Take 1 each (2 mg total) by mouth as needed (as needed). Dispence FRUIT flavor please. Can take 1-2 per hour in place of a cigarette.    nitroGLYCERIN (NITROSTAT) 0.4 MG SL tablet PLACE 1 TABLET UNDER THE TONGUE EVERY 5 MINUTES AS NEEDED    solifenacin (VESICARE) 10 MG tablet Take 1 tablet (10 mg total) by mouth once daily. For bladder urgency     Family History       Problem Relation (Age of Onset)    Breast cancer Sister    Cancer Father, Brother    Coronary artery disease Cousin    Diabetes Other    Hypertension Other    Stomach cancer Mother    Throat cancer Brother          Tobacco Use    Smoking status: Current Every Day Smoker     Packs/day: 1.00     Years: 40.00     Pack years: 40.00     Types: Cigarettes    Smokeless tobacco: Never Used    Tobacco comment: Restarted 1 .5 yrs. ago   Substance and Sexual Activity    Alcohol use: No     Alcohol/week: 0.0 standard drinks    Drug use: No    Sexual activity: Yes     Partners: Male     Review of Systems   Constitutional:  Negative for activity change, appetite change and fatigue.   HENT:  Negative for congestion.    Respiratory:  Positive for shortness of breath. Negative for cough, chest tightness and wheezing.    Cardiovascular:   Positive for chest pain. Negative for palpitations and leg swelling.   Gastrointestinal:  Negative for abdominal pain, diarrhea and nausea.   Genitourinary:  Negative for difficulty urinating, dysuria and urgency.   Musculoskeletal:  Negative for arthralgias and back pain.   Skin:  Negative for rash.   Neurological:  Negative for dizziness, weakness and light-headedness.   Objective:     Vital Signs (Most Recent):  Temp: 97.9 °F (36.6 °C) (03/18/22 1048)  Pulse: 60 (03/18/22 1401)  Resp: 19 (03/18/22 1401)  BP: (!) 149/71 (03/18/22 1303)  SpO2: 98 % (03/18/22 1401)   Vital Signs (24h Range):  Temp:  [97.9 °F (36.6 °C)] 97.9 °F (36.6 °C)  Pulse:  [60-68] 60  Resp:  [14-19] 19  SpO2:  [97 %-99 %] 98 %  BP: (124-160)/(71-83) 149/71     Weight: 120.2 kg (265 lb)  Body mass index is 48.47 kg/m².    Physical Exam  Constitutional:       General: She is not in acute distress.     Appearance: She is well-developed. She is not ill-appearing, toxic-appearing or diaphoretic.   HENT:      Head: Atraumatic. No abrasion, contusion or laceration.      Nose: Nose normal.      Mouth/Throat:      Pharynx: No oropharyngeal exudate.   Eyes:      General: No scleral icterus.        Right eye: No discharge.         Left eye: No discharge.      Conjunctiva/sclera: Conjunctivae normal.      Pupils: Pupils are equal, round, and reactive to light.   Neck:      Vascular: No JVD.   Cardiovascular:      Rate and Rhythm: Normal rate and regular rhythm.      Heart sounds: Normal heart sounds. No murmur heard.    No friction rub. No gallop.   Pulmonary:      Effort: Pulmonary effort is normal. No accessory muscle usage or respiratory distress.      Breath sounds: Normal breath sounds. No wheezing.      Comments: Able to lie flat with no dyspnea  Abdominal:      General: Bowel sounds are normal. There is no distension.      Palpations: Abdomen is soft. There is no mass.      Tenderness: There is no abdominal tenderness. There is no guarding or  rebound.   Musculoskeletal:         General: No tenderness or deformity. Normal range of motion.      Cervical back: Normal range of motion and neck supple. No rigidity.   Lymphadenopathy:      Cervical: No cervical adenopathy.   Skin:     General: Skin is warm and dry.      Capillary Refill: Capillary refill takes less than 2 seconds.      Findings: No bruising, ecchymosis, erythema, petechiae or rash.      Nails: There is no clubbing.   Neurological:      Mental Status: She is alert and oriented to person, place, and time.      GCS: GCS eye subscore is 4. GCS verbal subscore is 5. GCS motor subscore is 6.      Cranial Nerves: No cranial nerve deficit.      Sensory: No sensory deficit.      Motor: No abnormal muscle tone.      Coordination: Coordination normal.      Deep Tendon Reflexes: Reflexes normal.   Psychiatric:         Speech: Speech normal.         Behavior: Behavior normal.         Thought Content: Thought content normal.         Judgment: Judgment normal.         CRANIAL NERVES     CN III, IV, VI   Pupils are equal, round, and reactive to light.     Significant Labs: All pertinent labs within the past 24 hours have been reviewed.    Significant Imaging: I have reviewed all pertinent imaging results/findings within the past 24 hours.

## 2022-03-18 NOTE — H&P
"[facility]  [facility]  History & Physical    Patient Name: Sonya Armendariz  MRN: [id number]  Patient Class: OP- Observation  Admission Date: 3/18/2022  Attending Physician: Eamon Harp MD   Primary Care Provider: Sivakumar Wing MD         Patient information was obtained from patient.     Subjective:     Principal Problem:chest pain    Chief Complaint:   Chief Complaint   Patient presents with    Chest Pain     X 1 hour, hx 2 MIs, +SOB        HPI: Ms. Armendariz is a 62 yo F with a h/o CAD s/p LAD stent, ?CHF, htn, hld, and vertigo who presents with chest pain. Pt reports chest pain came on acutely this morning while she was at work. Was not exerting herself. Describes pain as an "sore" substernal pressure that feels similar to when she previously had an MI. She took 3 nitros at home which partially relieved the pain. Received , 4th nitro, and nitro paste from EMS which completely resolved the pain. Notes associated diaphoresis and dyspnea. Denies cough or orthopnea. No change in recent exercise tolerance. She does note she had been noticing this same pain in the evenings before going to bed for about the past three weeks. Last stress test was SPECT on 1/31/20 which was negative. Reports adherence to all meds. Smokes 12 cigarettes per day.      Past Medical History:   Diagnosis Date    Anticoagulant long-term use     CAD (coronary artery disease)     s/p LAD stent    CHF (congestive heart failure)     Gastroduodenitis     Helicobacter pylori (H. pylori) infection     Hyperlipidemia     Hypertension     MI (myocardial infarction)     x2    Obesity     Tobacco use     Urinary tract infection     Vaginal infection        Past Surgical History:   Procedure Laterality Date    COLONOSCOPY N/A 7/14/2017    Procedure: COLONOSCOPY;  Surgeon: Cathy Collier MD;  Location: [facility];  Service: Endoscopy;  Laterality: N/A;    NO PAST SURGERIES         Review of patient's " allergies indicates:   Allergen Reactions    Amoxicillin Diarrhea    Plavix [clopidogrel]        No current facility-administered medications on file prior to encounter.     Current Outpatient Medications on File Prior to Encounter   Medication Sig    aspirin (ECOTRIN) 81 MG EC tablet Take 1 tablet (81 mg total) by mouth once daily.    atorvastatin (LIPITOR) 20 MG tablet TAKE 1 TABLET(20 MG) BY MOUTH EVERY DAY    buPROPion (WELLBUTRIN SR) 150 MG TBSR 12 hr tablet Take 1 tablet (150 mg total) by mouth 2 (two) times daily. (Patient not taking: No sig reported)    cyclobenzaprine (FLEXERIL) 5 MG tablet TAKE 1 TABLET(5 MG) BY MOUTH THREE TIMES DAILY AS NEEDED FOR MUSCLE SPASMS    diclofenac sodium (VOLTAREN) 1 % Gel Apply 2 g topically 3 (three) times daily as needed. (Patient not taking: No sig reported)    EScitalopram oxalate (LEXAPRO) 10 MG tablet Take 1 tablet (10 mg total) by mouth once daily.    fluticasone (FLONASE) 50 mcg/actuation nasal spray SPRAY ONCE IN EACH NOSTRIL EVERY DAY    hydrOXYzine HCl (ATARAX) 25 MG tablet Take 1 tablet (25 mg total) by mouth 3 (three) times daily as needed for Itching or Anxiety.    losartan-hydrochlorothiazide 50-12.5 mg (HYZAAR) 50-12.5 mg per tablet Take 1 tablet by mouth once daily.    meclizine (ANTIVERT) 12.5 mg tablet Take 1 tablet (12.5 mg total) by mouth 3 (three) times daily as needed for Nausea.    metoprolol succinate (TOPROL-XL) 25 MG 24 hr tablet Take 1 tablet (25 mg total) by mouth once daily.    multivitamin (THERAGRAN) per tablet Take 1 tablet by mouth once daily.    nicotine (NICODERM CQ) 21 mg/24 hr APPLY 1 PATCH EXTERNALLY TO THE SKIN EVERY DAY FOR 6 WEEKS    nicotine (NICODERM CQ) 21 mg/24 hr Place 2 patches onto the skin once daily. Dose appropriate. Please fill with US Script ID [id number]   Group [id number] Bin [id number]    nicotine polacrilex 2 MG Lozg Take 1 lozenge (2 mg total) by mouth as needed (as needed). Dispense Fruit flavor please, DO NOT  CHEW UP. Can take 1 per hour as needed in place of a cigarette. ID in note.    nicotine, polacrilex, (NICORETTE) 2 mg Gum Take 1 each (2 mg total) by mouth as needed (as needed). Dispence FRUIT flavor please. Can take 1-2 per hour in place of a cigarette.    nitroGLYCERIN (NITROSTAT) 0.4 MG SL tablet PLACE 1 TABLET UNDER THE TONGUE EVERY 5 MINUTES AS NEEDED    solifenacin (VESICARE) 10 MG tablet Take 1 tablet (10 mg total) by mouth once daily. For bladder urgency     Family History       Problem Relation (Age of Onset)    Breast cancer Sister    Cancer Father, Brother    Coronary artery disease Cousin    Diabetes Other    Hypertension Other    Stomach cancer Mother    Throat cancer Brother          Tobacco Use    Smoking status: Current Every Day Smoker     Packs/day: 1.00     Years: 40.00     Pack years: 40.00     Types: Cigarettes    Smokeless tobacco: Never Used    Tobacco comment: Restarted 1 .5 yrs. ago   Substance and Sexual Activity    Alcohol use: No     Alcohol/week: 0.0 standard drinks    Drug use: No    Sexual activity: Yes     Partners: Male     Review of Systems   Constitutional:  Negative for activity change, appetite change and fatigue.   HENT:  Negative for congestion.    Respiratory:  Positive for shortness of breath. Negative for cough, chest tightness and wheezing.    Cardiovascular:  Positive for chest pain. Negative for palpitations and leg swelling.   Gastrointestinal:  Negative for abdominal pain, diarrhea and nausea.   Genitourinary:  Negative for difficulty urinating, dysuria and urgency.   Musculoskeletal:  Negative for arthralgias and back pain.   Skin:  Negative for rash.   Neurological:  Negative for dizziness, weakness and light-headedness.   Objective:     Vital Signs (Most Recent):  Temp: 97.9 °F (36.6 °C) (03/18/22 1048)  Pulse: 60 (03/18/22 1401)  Resp: 19 (03/18/22 1401)  BP: (!) 149/71 (03/18/22 1303)  SpO2: 98 % (03/18/22 1401)   Vital Signs (24h Range):  Temp:  [97.9 °F  (36.6 °C)] 97.9 °F (36.6 °C)  Pulse:  [60-68] 60  Resp:  [14-19] 19  SpO2:  [97 %-99 %] 98 %  BP: (124-160)/(71-83) 149/71     Weight: 120.2 kg (265 lb)  Body mass index is 48.47 kg/m².    Physical Exam  Constitutional:       General: She is not in acute distress.     Appearance: She is well-developed. She is not ill-appearing, toxic-appearing or diaphoretic.   HENT:      Head: Atraumatic. No abrasion, contusion or laceration.      Nose: Nose normal.      Mouth/Throat:      Pharynx: No oropharyngeal exudate.   Eyes:      General: No scleral icterus.        Right eye: No discharge.         Left eye: No discharge.      Conjunctiva/sclera: Conjunctivae normal.      Pupils: Pupils are equal, round, and reactive to light.   Neck:      Vascular: No JVD.   Cardiovascular:      Rate and Rhythm: Normal rate and regular rhythm.      Heart sounds: Normal heart sounds. No murmur heard.    No friction rub. No gallop.   Pulmonary:      Effort: Pulmonary effort is normal. No accessory muscle usage or respiratory distress.      Breath sounds: Normal breath sounds. No wheezing.      Comments: Able to lie flat with no dyspnea  Abdominal:      General: Bowel sounds are normal. There is no distension.      Palpations: Abdomen is soft. There is no mass.      Tenderness: There is no abdominal tenderness. There is no guarding or rebound.   Musculoskeletal:         General: No tenderness or deformity. Normal range of motion.      Cervical back: Normal range of motion and neck supple. No rigidity.   Lymphadenopathy:      Cervical: No cervical adenopathy.   Skin:     General: Skin is warm and dry.      Capillary Refill: Capillary refill takes less than 2 seconds.      Findings: No bruising, ecchymosis, erythema, petechiae or rash.      Nails: There is no clubbing.   Neurological:      Mental Status: She is alert and oriented to person, place, and time.      GCS: GCS eye subscore is 4. GCS verbal subscore is 5. GCS motor subscore is 6.       Cranial Nerves: No cranial nerve deficit.      Sensory: No sensory deficit.      Motor: No abnormal muscle tone.      Coordination: Coordination normal.      Deep Tendon Reflexes: Reflexes normal.   Psychiatric:         Speech: Speech normal.         Behavior: Behavior normal.         Thought Content: Thought content normal.         Judgment: Judgment normal.         CRANIAL NERVES     CN III, IV, VI   Pupils are equal, round, and reactive to light.     Significant Labs: All pertinent labs within the past 24 hours have been reviewed.    Significant Imaging: I have reviewed all pertinent imaging results/findings within the past 24 hours.    Assessment/Plan:     Other chest pain  HEART 5. Concerning history given prior MI and ongoing risk factors. Trop/EKG negative x 2.  - Admit, monitor on tele  - Will need inpatient stress test, likely Spect. Unfortunately this cannot be done until Monday  - Risk stratification labs      Current moderate episode of major depressive disorder without prior episode  Continue lexapro, WB        Tobacco use   on cessation      Hypertension  - Continue losartan/hctz, toprol      CAD (coronary artery disease)  Continue ASA, atorva toprol        VTE Risk Mitigation (From admission, onward)         Ordered     enoxaparin injection 40 mg  Every 12 hours         03/18/22 1553     IP VTE HIGH RISK PATIENT  Once         03/18/22 1553     Place sequential compression device  Until discontinued         03/18/22 1553                   Eamon Harp MD  Department of Hospital Medicine   [facility] Dept

## 2022-03-18 NOTE — ED PROVIDER NOTES
Encounter Date: 3/18/2022       History     Chief Complaint   Patient presents with    Chest Pain     X 1 hour, hx 2 MIs, +SOB     61-year-old female with history of coronary artery disease presents with an episode of chest discomfort.  It was a pressure-like pain.  She took nitroglycerin and it may have had slight affect.  She is not used to taking her nitroglycerin.  Paramedics gave her nitroglycerin her chest pain had resolved.  She has not been having chest pain lately.  This is a new phenomenon.  It was pressure like.  There was no shortness of breath.  There is no radiation of the pain.  Currently she is asymptomatic.        Review of patient's allergies indicates:   Allergen Reactions    Amoxicillin Diarrhea    Plavix [clopidogrel]      Past Medical History:   Diagnosis Date    Anticoagulant long-term use     CAD (coronary artery disease)     s/p LAD stent    CHF (congestive heart failure)     Gastroduodenitis     Helicobacter pylori (H. pylori) infection     Hyperlipidemia     Hypertension     MI (myocardial infarction)     x2    Obesity     Tobacco use     Urinary tract infection     Vaginal infection      Past Surgical History:   Procedure Laterality Date    COLONOSCOPY N/A 7/14/2017    Procedure: COLONOSCOPY;  Surgeon: Cathy Collier MD;  Location: [facility];  Service: Endoscopy;  Laterality: N/A;    NO PAST SURGERIES       Family History   Problem Relation Age of Onset    Hypertension Other         multiple    Diabetes Other         multiple    Stomach cancer Mother     Cancer Father         unknown    Breast cancer Sister     Throat cancer Brother     Cancer Brother         x 2,. unknown    Coronary artery disease Cousin      Social History     Tobacco Use    Smoking status: Current Every Day Smoker     Packs/day: 1.00     Years: 40.00     Pack years: 40.00     Types: Cigarettes    Smokeless tobacco: Never Used    Tobacco comment: Restarted 1 .5 yrs. ago   Substance  Use Topics    Alcohol use: No     Alcohol/week: 0.0 standard drinks    Drug use: No     Review of Systems   Constitutional: Negative for fever.   HENT: Negative for sore throat.    Respiratory: Negative for shortness of breath.    Cardiovascular: Positive for chest pain.   Gastrointestinal: Negative for nausea.   Genitourinary: Negative for dysuria.   Musculoskeletal: Negative for back pain.   Skin: Negative for rash.   Neurological: Negative for weakness.   Hematological: Does not bruise/bleed easily.       Physical Exam     Initial Vitals [03/18/22 1048]   BP Pulse Resp Temp SpO2   131/80 64 14 97.9 °F (36.6 °C) 99 %      MAP       --         Physical Exam    Constitutional: She appears well-developed and well-nourished. She is not diaphoretic. No distress.   HENT:   Head: Normocephalic and atraumatic.   Eyes: Conjunctivae are normal.   Neck: Neck supple. No tracheal deviation present. No JVD present.   Normal range of motion.  Cardiovascular: Normal rate, regular rhythm, normal heart sounds and intact distal pulses.   Equal radial pulses   Pulmonary/Chest: Breath sounds normal. No respiratory distress. She has no wheezes. She has no rhonchi. She has no rales.   Abdominal: Abdomen is soft. Bowel sounds are normal. She exhibits no distension. There is no abdominal tenderness. There is no rebound.   Musculoskeletal:         General: No edema.      Cervical back: Normal range of motion and neck supple.      Comments: No sign of DVT     Neurological: She is alert. She has normal strength. No sensory deficit. GCS score is 15. GCS eye subscore is 4. GCS verbal subscore is 5. GCS motor subscore is 6.   Skin: Skin is warm. No rash noted.   Psychiatric: She has a normal mood and affect.         ED Course   Procedures  Labs Reviewed   CBC W/ AUTO DIFFERENTIAL - Abnormal; Notable for the following components:       Result Value    MCH 31.6 (*)     All other components within normal limits    Narrative:     Release to  patient->Immediate   COMPREHENSIVE METABOLIC PANEL - Abnormal; Notable for the following components:    CO2 22 (*)     All other components within normal limits    Narrative:     Release to patient->Immediate   ISTAT PROCEDURE - Abnormal; Notable for the following components:    POC TCO2 (MEASURED) 21 (*)     POC Ionized Calcium 1.03 (*)     All other components within normal limits   HIV 1 / 2 ANTIBODY    Narrative:     Release to patient->Immediate   HEPATITIS C ANTIBODY    Narrative:     Release to patient->Immediate   TROPONIN I    Narrative:     Release to patient->Immediate   B-TYPE NATRIURETIC PEPTIDE    Narrative:     Release to patient->Immediate   TROPONIN I     EKG Readings: (Independently Interpreted)   Normal sinus rhythm without ischemic changes     ECG Results          EKG 12-lead (Final result)  Result time 03/20/22 00:35:39    Final result by Interface, Lab In [facility] (03/20/22 00:35:39)                 Narrative:    Test Reason : R07.9,    Vent. Rate : 066 BPM     Atrial Rate : 066 BPM     P-R Int : 150 ms          QRS Dur : 070 ms      QT Int : 418 ms       P-R-T Axes : 062 054 060 degrees     QTc Int : 438 ms    Age and gender specific analysis  Normal sinus rhythm  Septal infarct (cited on or before 16-JAN-2020)  Abnormal ECG  When compared with ECG of 18-MAR-2022 10:54,  ST now depressed in Inferior leads  Confirmed by SENA CISNEROS MD ([id number]) on 3/20/2022 12:35:29 AM    Referred By: [email or web address]   SELF           Confirmed By:SENA CISNEROS MD                             EKG 12-lead (Final result)  Result time 03/20/22 01:02:55    Final result by Interface, Lab In [facility] (03/20/22 01:02:55)                 Narrative:    Test Reason : R07.9,    Vent. Rate : 062 BPM     Atrial Rate : 062 BPM     P-R Int : 144 ms          QRS Dur : 072 ms      QT Int : 440 ms       P-R-T Axes : 062 062 069 degrees     QTc Int : 446 ms    Normal sinus rhythm  When compared with ECG of 16-JAN-2020 15:30,  No significant  "change was found  Confirmed by SENA CISNEROS MD ([id number]) on 3/20/2022 1:02:45 AM    Referred By: [email or web address]   SELF           Confirmed By:SENA CISNEROS MD                            Imaging Results          X-Ray Chest AP Portable (Final result)  Result time 03/18/22 11:51:43    Final result by Jacob Mar MD (03/18/22 11:51:43)                 Impression:      No acute cardiopulmonary finding identified on this limited single view.      Electronically signed by: Jacob Mar MD  Date:    03/18/2022  Time:    11:51             Narrative:    EXAMINATION:  XR CHEST AP PORTABLE    CLINICAL HISTORY:  Provided history is "Chest Pain;  ".    TECHNIQUE:  One view of the chest.    COMPARISON:  07/30/2011.    FINDINGS:  Exam quality is limited by soft tissue attenuation of the x-ray beam and portable technique.  Cardiac wires overlie the chest.  Cardiomediastinal silhouette is not enlarged.  No focal consolidation.  No sizable pleural effusion.  No pneumothorax.                                 Medications   aspirin EC tablet 81 mg (81 mg Oral Given 3/20/22 0853)   atorvastatin tablet 20 mg (20 mg Oral Given 3/20/22 0853)   buPROPion TB24 tablet 150 mg (150 mg Oral Given 3/20/22 0853)   cyclobenzaprine tablet 10 mg (has no administration in time range)   EScitalopram oxalate tablet 10 mg (10 mg Oral Given 3/20/22 0853)   fluticasone propionate 50 mcg/actuation nasal spray 50 mcg (50 mcg Each Nostril Not Given 3/20/22 0900)   hydrOXYzine HCL tablet 25 mg (has no administration in time range)   losartan-hydrochlorothiazide 50-12.5 mg per tablet 1 tablet (0 tablets Oral Hold 3/20/22 0900)   meclizine tablet 12.5 mg (has no administration in time range)   metoprolol succinate (TOPROL-XL) 24 hr tablet 25 mg (25 mg Oral Given 3/20/22 0853)   multivitamin tablet (1 tablet Oral Given 3/20/22 0852)   oxybutynin 24 hr tablet 5 mg (5 mg Oral Given 3/20/22 0853)   sodium chloride 0.9% flush 10 mL (has no administration in time " range)   naloxone 0.4 mg/mL injection 0.02 mg (has no administration in time range)   glucose chewable tablet 16 g (has no administration in time range)   glucose chewable tablet 24 g (has no administration in time range)   dextrose 10% bolus 125 mL (has no administration in time range)   dextrose 10% bolus 250 mL (has no administration in time range)   glucagon (human recombinant) injection 1 mg (has no administration in time range)   enoxaparin injection 40 mg (40 mg Subcutaneous Given 3/20/22 0853)     Medical Decision Making:   Initial Assessment:   Patient history coronary artery disease presents with chest discomfort.  Her pain is resolved.  Will do a rule out and placed observation to Internal Medicine.  Highly doubt PE or aortic dissection.  Clinical Tests:   Lab Tests: Reviewed and Ordered  Radiological Study: Ordered and Reviewed  Medical Tests: Reviewed and Ordered  Other:   I have discussed this case with another health care provider.       <> Summary of the Discussion: Discussed with Medicine for observation             ED Course as of 03/20/22 1753   Fri Mar 18, 2022   1203 WBC: 5.91 [EL]   1203 Hemoglobin: 14.2 [EL]   1203 POC Creatinine: 0.9 [EL]   1203 POC Potassium: 3.7 [EL]   1203 BNP: 42 [EL]   1203 X-Ray Chest AP Portable  Normal cardiac silhouette. Normal mediastinum. No pulmonary edema. No pneumonia. No pneumothorax. No pleural effusion. [EL]   1204 Comprehensive metabolic panel(!)  Normal [EL]   1208 Troponin I: <0.006 [EL]      ED Course User Index  [EL] Jaquelin Flores MD             Clinical Impression:   Final diagnoses:  [R07.9] Chest pain          ED Disposition Condition    Observation               Bobo Arias MD  03/20/22 1813

## 2022-03-18 NOTE — ASSESSMENT & PLAN NOTE
HEART 5. Concerning history given prior MI and ongoing risk factors. Trop/EKG negative x 2.  - Admit, monitor on tele  - Will need inpatient stress test, likely Spect. Unfortunately this cannot be done until Monday  - Risk stratification labs

## 2022-03-18 NOTE — Clinical Note
The catheter was repositioned into the left ventricle. Hemodynamics were performed.  and Pullback was recorded.

## 2022-03-18 NOTE — ED NOTES
iSTAT Chem 8    Na+ 141   K+ 3.7   Cl 107   iCa 1.03   TCO2 21   Glu 106   BUN 10   Creat 0.9   Hct% 43

## 2022-03-18 NOTE — HPI
"Ms. Armendariz is a 62 yo F with a h/o CAD s/p LAD stent, ?CHF, htn, hld, and vertigo who presents with chest pain. Pt reports chest pain came on acutely this morning while she was at work. Was not exerting herself. Describes pain as an "sore" substernal pressure that feels similar to when she previously had an MI. She took 3 nitros at home which partially relieved the pain. Received , 4th nitro, and nitro paste from EMS which completely resolved the pain. Notes associated diaphoresis and dyspnea. Denies cough or orthopnea. No change in recent exercise tolerance. She does note she had been noticing this same pain in the evenings before going to bed for about the past three weeks. Last stress test was SPECT on 1/31/20 which was negative. Reports adherence to all meds. Smokes 12 cigarettes per day.  "

## 2022-03-18 NOTE — ED TRIAGE NOTES
Two patient identifiers have been checked and are correct.    Pt comes to ED via EMS after c/o CP with SOB. Pt received Aspirin 324 and nitro per EMS with relief. Hx of 2 MI with 2 stents placed.   Appearance: Pt awake, alert & oriented to person, place & time. Pt in no acute distress at present time. Pt is clean and well groomed with clothes appropriately fastened.   Skin: Skin warm, dry & intact. Color consistent with ethnicity. Mucous membranes moist. No breakdown or brusing noted.   Musculoskeletal: Patient moving all extremities well, no obvious swelling or deformities noted.   Respiratory: Respirations spontaneous, even, and non-labored. Visible chest rise noted. Airway is open and patent. No accessory muscle use noted.   Neurologic: Sensation is intact. Speech is clear and appropriate. Eyes open spontaneously, behavior appropriate to situation, follows commands, facial expression symmetrical, bilateral hand grasp equal and even, purposeful motor response noted.  Cardiac: All peripheral pulses present. No Bilateral lower extremity edema. Cap refill is <3 seconds.  Abdomen: Abdomen soft, non-tender to palpation.   : Pt reports no dysuria or hematuria.

## 2022-03-19 LAB
ANION GAP SERPL CALC-SCNC: 8 MMOL/L (ref 8–16)
BUN SERPL-MCNC: 10 MG/DL (ref 6–20)
CALCIUM SERPL-MCNC: 9.1 MG/DL (ref 8.7–10.5)
CHLORIDE SERPL-SCNC: 104 MMOL/L (ref 95–110)
CHOLEST SERPL-MCNC: 130 MG/DL (ref 120–199)
CHOLEST/HDLC SERPL: 4.1 {RATIO} (ref 2–5)
CO2 SERPL-SCNC: 26 MMOL/L (ref 23–29)
CREAT SERPL-MCNC: 0.9 MG/DL (ref 0.5–1.4)
EST. GFR  (AFRICAN AMERICAN): >60 ML/MIN/1.73 M^2
EST. GFR  (NON AFRICAN AMERICAN): >60 ML/MIN/1.73 M^2
ESTIMATED AVG GLUCOSE: 128 MG/DL (ref 68–131)
GLUCOSE SERPL-MCNC: 132 MG/DL (ref 70–110)
HBA1C MFR BLD: 6.1 % (ref 4–5.6)
HDLC SERPL-MCNC: 32 MG/DL (ref 40–75)
HDLC SERPL: 24.6 % (ref 20–50)
LDLC SERPL CALC-MCNC: 74.2 MG/DL (ref 63–159)
MAGNESIUM SERPL-MCNC: 1.8 MG/DL (ref 1.6–2.6)
NONHDLC SERPL-MCNC: 98 MG/DL
POTASSIUM SERPL-SCNC: 3.9 MMOL/L (ref 3.5–5.1)
SODIUM SERPL-SCNC: 138 MMOL/L (ref 136–145)
TRIGL SERPL-MCNC: 119 MG/DL (ref 30–150)
TSH SERPL DL<=0.005 MIU/L-ACNC: 0.76 UIU/ML (ref 0.4–4)

## 2022-03-19 PROCEDURE — 96372 THER/PROPH/DIAG INJ SC/IM: CPT | Performed by: STUDENT IN AN ORGANIZED HEALTH CARE EDUCATION/TRAINING PROGRAM

## 2022-03-19 PROCEDURE — 25000003 PHARM REV CODE 250: Performed by: STUDENT IN AN ORGANIZED HEALTH CARE EDUCATION/TRAINING PROGRAM

## 2022-03-19 PROCEDURE — 83036 HEMOGLOBIN GLYCOSYLATED A1C: CPT | Performed by: STUDENT IN AN ORGANIZED HEALTH CARE EDUCATION/TRAINING PROGRAM

## 2022-03-19 PROCEDURE — 99225 PR SUBSEQUENT OBSERVATION CARE,LEVEL II: ICD-10-PCS | Mod: ,,, | Performed by: STUDENT IN AN ORGANIZED HEALTH CARE EDUCATION/TRAINING PROGRAM

## 2022-03-19 PROCEDURE — 80048 BASIC METABOLIC PNL TOTAL CA: CPT | Performed by: STUDENT IN AN ORGANIZED HEALTH CARE EDUCATION/TRAINING PROGRAM

## 2022-03-19 PROCEDURE — 80061 LIPID PANEL: CPT | Performed by: STUDENT IN AN ORGANIZED HEALTH CARE EDUCATION/TRAINING PROGRAM

## 2022-03-19 PROCEDURE — 63600175 PHARM REV CODE 636 W HCPCS: Performed by: STUDENT IN AN ORGANIZED HEALTH CARE EDUCATION/TRAINING PROGRAM

## 2022-03-19 PROCEDURE — 36415 COLL VENOUS BLD VENIPUNCTURE: CPT | Performed by: STUDENT IN AN ORGANIZED HEALTH CARE EDUCATION/TRAINING PROGRAM

## 2022-03-19 PROCEDURE — 84443 ASSAY THYROID STIM HORMONE: CPT | Performed by: STUDENT IN AN ORGANIZED HEALTH CARE EDUCATION/TRAINING PROGRAM

## 2022-03-19 PROCEDURE — 83735 ASSAY OF MAGNESIUM: CPT | Performed by: STUDENT IN AN ORGANIZED HEALTH CARE EDUCATION/TRAINING PROGRAM

## 2022-03-19 PROCEDURE — 99225 PR SUBSEQUENT OBSERVATION CARE,LEVEL II: CPT | Mod: ,,, | Performed by: STUDENT IN AN ORGANIZED HEALTH CARE EDUCATION/TRAINING PROGRAM

## 2022-03-19 PROCEDURE — G0378 HOSPITAL OBSERVATION PER HR: HCPCS

## 2022-03-19 RX ADMIN — ATORVASTATIN CALCIUM 20 MG: 20 TABLET, FILM COATED ORAL at 09:03

## 2022-03-19 RX ADMIN — THERA TABS 1 TABLET: TAB at 09:03

## 2022-03-19 RX ADMIN — BUPROPION HYDROCHLORIDE 150 MG: 150 TABLET, FILM COATED, EXTENDED RELEASE ORAL at 09:03

## 2022-03-19 RX ADMIN — ENOXAPARIN SODIUM 40 MG: 40 INJECTION SUBCUTANEOUS at 09:03

## 2022-03-19 RX ADMIN — LOSARTAN POTASSIUM AND HYDROCHLOROTHIAZIDE 1 TABLET: 50; 12.5 TABLET, FILM COATED ORAL at 09:03

## 2022-03-19 RX ADMIN — OXYBUTYNIN CHLORIDE 5 MG: 5 TABLET, EXTENDED RELEASE ORAL at 09:03

## 2022-03-19 RX ADMIN — ESCITALOPRAM OXALATE 10 MG: 10 TABLET ORAL at 09:03

## 2022-03-19 RX ADMIN — ASPIRIN 81 MG: 81 TABLET, DELAYED RELEASE ORAL at 09:03

## 2022-03-19 RX ADMIN — METOPROLOL SUCCINATE 25 MG: 25 TABLET, EXTENDED RELEASE ORAL at 09:03

## 2022-03-19 NOTE — ASSESSMENT & PLAN NOTE
HEART 5. Concerning history given prior MI and ongoing risk factors. Trop/EKG negative x 2.  - Admit, monitor on tele  - Will plan for Spect/Lexiscan Monday AM. NPOAM Sunday night.

## 2022-03-19 NOTE — SUBJECTIVE & OBJECTIVE
Interval History:   NAEON.This morning Ms. Armendariz feels well with no acute concerns. No additional episodes of chest pain. She is amenable to staying in the hospital until she can get a stress test on Monday.    Review of Systems   Constitutional:  Negative for activity change, appetite change and fatigue.   HENT:  Negative for congestion.    Respiratory:  Negative for cough, chest tightness, shortness of breath and wheezing.    Cardiovascular:  Negative for chest pain, palpitations and leg swelling.   Gastrointestinal:  Negative for abdominal pain, diarrhea and nausea.   Genitourinary:  Negative for difficulty urinating, dysuria and urgency.   Musculoskeletal:  Negative for arthralgias and back pain.   Skin:  Negative for rash.   Neurological:  Negative for dizziness, weakness and light-headedness.   Objective:     Vital Signs (Most Recent):  Temp: 97.1 °F (36.2 °C) (03/19/22 1125)  Pulse: 63 (03/19/22 1205)  Resp: 16 (03/19/22 1125)  BP: (!) 123/99 (03/19/22 1125)  SpO2: (!) 94 % (03/19/22 1125)   Vital Signs (24h Range):  Temp:  [97.1 °F (36.2 °C)-98.6 °F (37 °C)] 97.1 °F (36.2 °C)  Pulse:  [52-75] 63  Resp:  [16-23] 16  SpO2:  [92 %-98 %] 94 %  BP: (111-150)/(60-99) 123/99     Weight: 120.2 kg (265 lb)  Body mass index is 48.47 kg/m².  No intake or output data in the 24 hours ending 03/19/22 1338   Physical Exam  Constitutional:       General: She is not in acute distress.     Appearance: She is well-developed. She is not ill-appearing, toxic-appearing or diaphoretic.   HENT:      Head: Atraumatic. No abrasion, contusion or laceration.      Nose: Nose normal.      Mouth/Throat:      Pharynx: No oropharyngeal exudate.   Eyes:      General: No scleral icterus.        Right eye: No discharge.         Left eye: No discharge.      Conjunctiva/sclera: Conjunctivae normal.      Pupils: Pupils are equal, round, and reactive to light.   Neck:      Vascular: No JVD.   Cardiovascular:      Rate and Rhythm: Normal rate and  regular rhythm.      Heart sounds: Normal heart sounds. No murmur heard.    No friction rub. No gallop.   Pulmonary:      Effort: Pulmonary effort is normal. No accessory muscle usage or respiratory distress.      Breath sounds: Normal breath sounds. No wheezing.      Comments: Able to lie flat with no dyspnea  Abdominal:      General: Bowel sounds are normal. There is no distension.      Palpations: Abdomen is soft. There is no mass.      Tenderness: There is no abdominal tenderness. There is no guarding or rebound.   Musculoskeletal:         General: No tenderness or deformity. Normal range of motion.      Cervical back: Normal range of motion and neck supple. No rigidity.   Lymphadenopathy:      Cervical: No cervical adenopathy.   Skin:     General: Skin is warm and dry.      Capillary Refill: Capillary refill takes less than 2 seconds.      Findings: No bruising, ecchymosis, erythema, petechiae or rash.      Nails: There is no clubbing.   Neurological:      Mental Status: She is alert and oriented to person, place, and time.      GCS: GCS eye subscore is 4. GCS verbal subscore is 5. GCS motor subscore is 6.      Cranial Nerves: No cranial nerve deficit.      Sensory: No sensory deficit.      Motor: No abnormal muscle tone.      Coordination: Coordination normal.      Deep Tendon Reflexes: Reflexes normal.   Psychiatric:         Speech: Speech normal.         Behavior: Behavior normal.         Thought Content: Thought content normal.         Judgment: Judgment normal.       Significant Labs: All pertinent labs within the past 24 hours have been reviewed.    Significant Imaging: I have reviewed all pertinent imaging results/findings within the past 24 hours.

## 2022-03-19 NOTE — PROGRESS NOTES
"[facility] - Tele[facility] Stepdown ([address] Medicine  Progress Note    Patient Name: Sonya Armendariz  MRN: [id number]  Patient Class: OP- Observation   Admission Date: 3/18/2022  Length of Stay: 0 days  Attending Physician: Eamon Harp MD  Primary Care Provider: Sivakumar Wing MD        Subjective:     Principal Problem:<principal problem not specified>        HPI:  Ms. Armendariz is a 62 yo F with a h/o CAD s/p LAD stent, ?CHF, htn, hld, and vertigo who presents with chest pain. Pt reports chest pain came on acutely this morning while she was at work. Was not exerting herself. Describes pain as an "sore" substernal pressure that feels similar to when she previously had an MI. She took 3 nitros at home which partially relieved the pain. Received , 4th nitro, and nitro paste from EMS which completely resolved the pain. Notes associated diaphoresis and dyspnea. Denies cough or orthopnea. No change in recent exercise tolerance. She does note she had been noticing this same pain in the evenings before going to bed for about the past three weeks. Last stress test was SPECT on 1/31/20 which was negative. Reports adherence to all meds. Smokes 12 cigarettes per day.      Overview/Hospital Course:  No notes on file    Interval History:   NAEON.This morning Ms. Armendariz feels well with no acute concerns. No additional episodes of chest pain. She is amenable to staying in the hospital until she can get a stress test on Monday.    Review of Systems   Constitutional:  Negative for activity change, appetite change and fatigue.   HENT:  Negative for congestion.    Respiratory:  Negative for cough, chest tightness, shortness of breath and wheezing.    Cardiovascular:  Negative for chest pain, palpitations and leg swelling.   Gastrointestinal:  Negative for abdominal pain, diarrhea and nausea.   Genitourinary:  Negative for difficulty urinating, dysuria and urgency.   Musculoskeletal:  Negative for arthralgias and back " pain.   Skin:  Negative for rash.   Neurological:  Negative for dizziness, weakness and light-headedness.   Objective:     Vital Signs (Most Recent):  Temp: 97.1 °F (36.2 °C) (03/19/22 1125)  Pulse: 63 (03/19/22 1205)  Resp: 16 (03/19/22 1125)  BP: (!) 123/99 (03/19/22 1125)  SpO2: (!) 94 % (03/19/22 1125)   Vital Signs (24h Range):  Temp:  [97.1 °F (36.2 °C)-98.6 °F (37 °C)] 97.1 °F (36.2 °C)  Pulse:  [52-75] 63  Resp:  [16-23] 16  SpO2:  [92 %-98 %] 94 %  BP: (111-150)/(60-99) 123/99     Weight: 120.2 kg (265 lb)  Body mass index is 48.47 kg/m².  No intake or output data in the 24 hours ending 03/19/22 1338   Physical Exam  Constitutional:       General: She is not in acute distress.     Appearance: She is well-developed. She is not ill-appearing, toxic-appearing or diaphoretic.   HENT:      Head: Atraumatic. No abrasion, contusion or laceration.      Nose: Nose normal.      Mouth/Throat:      Pharynx: No oropharyngeal exudate.   Eyes:      General: No scleral icterus.        Right eye: No discharge.         Left eye: No discharge.      Conjunctiva/sclera: Conjunctivae normal.      Pupils: Pupils are equal, round, and reactive to light.   Neck:      Vascular: No JVD.   Cardiovascular:      Rate and Rhythm: Normal rate and regular rhythm.      Heart sounds: Normal heart sounds. No murmur heard.    No friction rub. No gallop.   Pulmonary:      Effort: Pulmonary effort is normal. No accessory muscle usage or respiratory distress.      Breath sounds: Normal breath sounds. No wheezing.      Comments: Able to lie flat with no dyspnea  Abdominal:      General: Bowel sounds are normal. There is no distension.      Palpations: Abdomen is soft. There is no mass.      Tenderness: There is no abdominal tenderness. There is no guarding or rebound.   Musculoskeletal:         General: No tenderness or deformity. Normal range of motion.      Cervical back: Normal range of motion and neck supple. No rigidity.   Lymphadenopathy:       Cervical: No cervical adenopathy.   Skin:     General: Skin is warm and dry.      Capillary Refill: Capillary refill takes less than 2 seconds.      Findings: No bruising, ecchymosis, erythema, petechiae or rash.      Nails: There is no clubbing.   Neurological:      Mental Status: She is alert and oriented to person, place, and time.      GCS: GCS eye subscore is 4. GCS verbal subscore is 5. GCS motor subscore is 6.      Cranial Nerves: No cranial nerve deficit.      Sensory: No sensory deficit.      Motor: No abnormal muscle tone.      Coordination: Coordination normal.      Deep Tendon Reflexes: Reflexes normal.   Psychiatric:         Speech: Speech normal.         Behavior: Behavior normal.         Thought Content: Thought content normal.         Judgment: Judgment normal.       Significant Labs: All pertinent labs within the past 24 hours have been reviewed.    Significant Imaging: I have reviewed all pertinent imaging results/findings within the past 24 hours.      Assessment/Plan:      Other chest pain  HEART 5. Concerning history given prior MI and ongoing risk factors. Trop/EKG negative x 2.  - Admit, monitor on tele  - Will plan for Spect/Lexiscan Monday AM. NPOAM Sunday night.    Current moderate episode of major depressive disorder without prior episode  Continue lexapro, WB        Tobacco use   on cessation      Hypertension  - Continue losartan/hctz, toprol      CAD (coronary artery disease)  Continue ASA, atorva, toprol        VTE Risk Mitigation (From admission, onward)         Ordered     enoxaparin injection 40 mg  Every 12 hours         03/18/22 1553     IP VTE HIGH RISK PATIENT  Once         03/18/22 1553     Place sequential compression device  Until discontinued         03/18/22 1553                Discharge Planning   BLUE:      Code Status: Full Code   Is the patient medically ready for discharge?:     Reason for patient still in hospital (select all that apply): Patient trending  condition                     Eamon Harp MD  Department of Hospital Medicine   [facility] - Tele[facility] ([place])

## 2022-03-20 PROCEDURE — G0378 HOSPITAL OBSERVATION PER HR: HCPCS

## 2022-03-20 PROCEDURE — 99225 PR SUBSEQUENT OBSERVATION CARE,LEVEL II: ICD-10-PCS | Mod: ,,, | Performed by: STUDENT IN AN ORGANIZED HEALTH CARE EDUCATION/TRAINING PROGRAM

## 2022-03-20 PROCEDURE — 96372 THER/PROPH/DIAG INJ SC/IM: CPT | Performed by: STUDENT IN AN ORGANIZED HEALTH CARE EDUCATION/TRAINING PROGRAM

## 2022-03-20 PROCEDURE — 94761 N-INVAS EAR/PLS OXIMETRY MLT: CPT

## 2022-03-20 PROCEDURE — 94760 N-INVAS EAR/PLS OXIMETRY 1: CPT

## 2022-03-20 PROCEDURE — 99225 PR SUBSEQUENT OBSERVATION CARE,LEVEL II: CPT | Mod: ,,, | Performed by: STUDENT IN AN ORGANIZED HEALTH CARE EDUCATION/TRAINING PROGRAM

## 2022-03-20 PROCEDURE — 25000003 PHARM REV CODE 250: Performed by: STUDENT IN AN ORGANIZED HEALTH CARE EDUCATION/TRAINING PROGRAM

## 2022-03-20 PROCEDURE — 63600175 PHARM REV CODE 636 W HCPCS: Performed by: STUDENT IN AN ORGANIZED HEALTH CARE EDUCATION/TRAINING PROGRAM

## 2022-03-20 RX ADMIN — BUPROPION HYDROCHLORIDE 150 MG: 150 TABLET, FILM COATED, EXTENDED RELEASE ORAL at 08:03

## 2022-03-20 RX ADMIN — ASPIRIN 81 MG: 81 TABLET, DELAYED RELEASE ORAL at 08:03

## 2022-03-20 RX ADMIN — ESCITALOPRAM OXALATE 10 MG: 10 TABLET ORAL at 08:03

## 2022-03-20 RX ADMIN — ENOXAPARIN SODIUM 40 MG: 40 INJECTION SUBCUTANEOUS at 08:03

## 2022-03-20 RX ADMIN — ATORVASTATIN CALCIUM 20 MG: 20 TABLET, FILM COATED ORAL at 08:03

## 2022-03-20 RX ADMIN — OXYBUTYNIN CHLORIDE 5 MG: 5 TABLET, EXTENDED RELEASE ORAL at 08:03

## 2022-03-20 RX ADMIN — METOPROLOL SUCCINATE 25 MG: 25 TABLET, EXTENDED RELEASE ORAL at 08:03

## 2022-03-20 RX ADMIN — THERA TABS 1 TABLET: TAB at 08:03

## 2022-03-20 NOTE — DISCHARGE INSTRUCTIONS
Sleep hygiene instructions:  - Avoid all caffeine after noon  - Avoid screens for 1 hour before bedtime  - Don't get in bed until 8 hours before you plan to wake up  - Wake up at the same time on the weekends as you do during the weekdays  - No naps during the day  - If above measures are ineffective -> talk to your doctor about trying a sleep medicine

## 2022-03-20 NOTE — PLAN OF CARE
Problem: Adult Inpatient Plan of Care  Goal: Plan of Care Review  Outcome: Ongoing, Progressing  Goal: Optimal Comfort and Wellbeing  Outcome: Ongoing, Progressing   Medications given per MAR. No acute distress noted or complains throughout shift. Safety precautions in place.

## 2022-03-20 NOTE — PROGRESS NOTES
"[facility] - Telemetry Stepdown ([address] Medicine  Progress Note    Patient Name: Sonya Armendariz  MRN: [id number]  Patient Class: OP- Observation   Admission Date: 3/18/2022  Length of Stay: 0 days  Attending Physician: Eamon Harp MD  Primary Care Provider: Sivakumar Wing MD        Subjective:     Principal Problem:<principal problem not specified>        HPI:  Ms. Armendariz is a 60 yo F with a h/o CAD s/p LAD stent, ?CHF, htn, hld, and vertigo who presents with chest pain. Pt reports chest pain came on acutely this morning while she was at work. Was not exerting herself. Describes pain as an "sore" substernal pressure that feels similar to when she previously had an MI. She took 3 nitros at home which partially relieved the pain. Received , 4th nitro, and nitro paste from EMS which completely resolved the pain. Notes associated diaphoresis and dyspnea. Denies cough or orthopnea. No change in recent exercise tolerance. She does note she had been noticing this same pain in the evenings before going to bed for about the past three weeks. Last stress test was SPECT on 1/31/20 which was negative. Reports adherence to all meds. Smokes 12 cigarettes per day.      Overview/Hospital Course:  No notes on file    Interval History:   NAEON. Reports she had some chest pain yesterday after eating dinner that resolved quickly, did not notify nurses. Otherwise feels well today. Notes high stress level recently, taking on a lot. Sleep has been poor. Reviewed sleep hygiene techniques. Denies symptoms of dep/anx. Previously had a CPAP machine.    Review of Systems   Constitutional:  Negative for activity change, appetite change and fatigue.   HENT:  Negative for congestion.    Respiratory:  Negative for cough, chest tightness, shortness of breath and wheezing.    Cardiovascular:  Negative for chest pain, palpitations and leg swelling.   Gastrointestinal:  Negative for abdominal pain, diarrhea and nausea. "   Genitourinary:  Negative for difficulty urinating, dysuria and urgency.   Musculoskeletal:  Negative for arthralgias and back pain.   Skin:  Negative for rash.   Neurological:  Negative for dizziness, weakness and light-headedness.   Objective:     Vital Signs (Most Recent):  Temp: 98.7 °F (37.1 °C) (03/20/22 1109)  Pulse: 61 (Simultaneous filing. User may not have seen previous data.) (03/20/22 1109)  Resp: 16 (03/20/22 1109)  BP: 125/69 (03/20/22 1109)  SpO2: 95 % (03/20/22 1109) Vital Signs (24h Range):  Temp:  [97.1 °F (36.2 °C)-98.7 °F (37.1 °C)] 98.7 °F (37.1 °C)  Pulse:  [57-90] 61  Resp:  [16-20] 16  SpO2:  [92 %-96 %] 95 %  BP: (109-132)/(57-69) 125/69     Weight: 120.2 kg (265 lb)  Body mass index is 48.47 kg/m².  No intake or output data in the 24 hours ending 03/20/22 1403   Physical Exam  Constitutional:       General: She is not in acute distress.     Appearance: She is well-developed. She is not ill-appearing, toxic-appearing or diaphoretic.   HENT:      Head: Atraumatic. No abrasion, contusion or laceration.      Nose: Nose normal.      Mouth/Throat:      Pharynx: No oropharyngeal exudate.   Eyes:      General: No scleral icterus.        Right eye: No discharge.         Left eye: No discharge.      Conjunctiva/sclera: Conjunctivae normal.      Pupils: Pupils are equal, round, and reactive to light.   Neck:      Vascular: No JVD.   Cardiovascular:      Rate and Rhythm: Normal rate and regular rhythm.      Heart sounds: Normal heart sounds. No murmur heard.    No friction rub. No gallop.   Pulmonary:      Effort: Pulmonary effort is normal. No accessory muscle usage or respiratory distress.      Breath sounds: Normal breath sounds. No wheezing.      Comments: Able to lie flat with no dyspnea  Abdominal:      General: Bowel sounds are normal. There is no distension.      Palpations: Abdomen is soft. There is no mass.      Tenderness: There is no abdominal tenderness. There is no guarding or rebound.    Musculoskeletal:         General: No tenderness or deformity. Normal range of motion.      Cervical back: Normal range of motion and neck supple. No rigidity.   Lymphadenopathy:      Cervical: No cervical adenopathy.   Skin:     General: Skin is warm and dry.      Capillary Refill: Capillary refill takes less than 2 seconds.      Findings: No bruising, ecchymosis, erythema, petechiae or rash.      Nails: There is no clubbing.   Neurological:      Mental Status: She is alert and oriented to person, place, and time.      GCS: GCS eye subscore is 4. GCS verbal subscore is 5. GCS motor subscore is 6.      Cranial Nerves: No cranial nerve deficit.      Sensory: No sensory deficit.      Motor: No abnormal muscle tone.      Coordination: Coordination normal.      Deep Tendon Reflexes: Reflexes normal.   Psychiatric:         Speech: Speech normal.         Behavior: Behavior normal.         Thought Content: Thought content normal.         Judgment: Judgment normal.       Significant Labs: All pertinent labs within the past 24 hours have been reviewed.    Significant Imaging: I have reviewed all pertinent imaging results/findings within the past 24 hours.      Assessment/Plan:      Other chest pain  HEART 5. Concerning history given prior MI and ongoing risk factors. Trop/EKG negative x 2.  - Admit, monitor on tele  - Will plan for Spect/Lexiscan Monday AM. NPOAM Sunday night.    Current moderate episode of major depressive disorder without prior episode  Continue lexapro, WB        JONH (obstructive sleep apnea)  Previously had a CPAP machine  - Refer to sleep medicine on discharge  - Reviewed sleep hygiene techniques      Tobacco use   on cessation      Hypertension  - Continue losartan/hctz, toprol      CAD (coronary artery disease)  Continue ASA, atorva, toprol        VTE Risk Mitigation (From admission, onward)         Ordered     enoxaparin injection 40 mg  Every 12 hours         03/18/22 1553     IP VTE HIGH  RISK PATIENT  Once         03/18/22 1553     Place sequential compression device  Until discontinued         03/18/22 1553                Discharge Planning   BLUE: 3/21/2022     Code Status: Full Code   Is the patient medically ready for discharge?: No    Reason for patient still in hospital (select all that apply): Patient trending condition                     Eamon Harp MD  Department of Hospital Medicine   [facility] - Telemetry Stepdown ([place])

## 2022-03-20 NOTE — ASSESSMENT & PLAN NOTE
Previously had a CPAP machine  - Refer to sleep medicine on discharge  - Reviewed sleep hygiene techniques

## 2022-03-20 NOTE — NURSING
POC progressing. No acute events during shift. Remains chest pain free. VSS. POC discussed, verbalizes understanding. Call light within reach.

## 2022-03-20 NOTE — SUBJECTIVE & OBJECTIVE
Interval History:   NAEON. Reports she had some chest pain yesterday after eating dinner that resolved quickly, did not notify nurses. Otherwise feels well today. Notes high stress level recently, taking on a lot. Sleep has been poor. Reviewed sleep hygiene techniques. Denies symptoms of dep/anx. Previously had a CPAP machine.    Review of Systems   Constitutional:  Negative for activity change, appetite change and fatigue.   HENT:  Negative for congestion.    Respiratory:  Negative for cough, chest tightness, shortness of breath and wheezing.    Cardiovascular:  Negative for chest pain, palpitations and leg swelling.   Gastrointestinal:  Negative for abdominal pain, diarrhea and nausea.   Genitourinary:  Negative for difficulty urinating, dysuria and urgency.   Musculoskeletal:  Negative for arthralgias and back pain.   Skin:  Negative for rash.   Neurological:  Negative for dizziness, weakness and light-headedness.   Objective:     Vital Signs (Most Recent):  Temp: 98.7 °F (37.1 °C) (03/20/22 1109)  Pulse: 61 (Simultaneous filing. User may not have seen previous data.) (03/20/22 1109)  Resp: 16 (03/20/22 1109)  BP: 125/69 (03/20/22 1109)  SpO2: 95 % (03/20/22 1109) Vital Signs (24h Range):  Temp:  [97.1 °F (36.2 °C)-98.7 °F (37.1 °C)] 98.7 °F (37.1 °C)  Pulse:  [57-90] 61  Resp:  [16-20] 16  SpO2:  [92 %-96 %] 95 %  BP: (109-132)/(57-69) 125/69     Weight: 120.2 kg (265 lb)  Body mass index is 48.47 kg/m².  No intake or output data in the 24 hours ending 03/20/22 1403   Physical Exam  Constitutional:       General: She is not in acute distress.     Appearance: She is well-developed. She is not ill-appearing, toxic-appearing or diaphoretic.   HENT:      Head: Atraumatic. No abrasion, contusion or laceration.      Nose: Nose normal.      Mouth/Throat:      Pharynx: No oropharyngeal exudate.   Eyes:      General: No scleral icterus.        Right eye: No discharge.         Left eye: No discharge.       Conjunctiva/sclera: Conjunctivae normal.      Pupils: Pupils are equal, round, and reactive to light.   Neck:      Vascular: No JVD.   Cardiovascular:      Rate and Rhythm: Normal rate and regular rhythm.      Heart sounds: Normal heart sounds. No murmur heard.    No friction rub. No gallop.   Pulmonary:      Effort: Pulmonary effort is normal. No accessory muscle usage or respiratory distress.      Breath sounds: Normal breath sounds. No wheezing.      Comments: Able to lie flat with no dyspnea  Abdominal:      General: Bowel sounds are normal. There is no distension.      Palpations: Abdomen is soft. There is no mass.      Tenderness: There is no abdominal tenderness. There is no guarding or rebound.   Musculoskeletal:         General: No tenderness or deformity. Normal range of motion.      Cervical back: Normal range of motion and neck supple. No rigidity.   Lymphadenopathy:      Cervical: No cervical adenopathy.   Skin:     General: Skin is warm and dry.      Capillary Refill: Capillary refill takes less than 2 seconds.      Findings: No bruising, ecchymosis, erythema, petechiae or rash.      Nails: There is no clubbing.   Neurological:      Mental Status: She is alert and oriented to person, place, and time.      GCS: GCS eye subscore is 4. GCS verbal subscore is 5. GCS motor subscore is 6.      Cranial Nerves: No cranial nerve deficit.      Sensory: No sensory deficit.      Motor: No abnormal muscle tone.      Coordination: Coordination normal.      Deep Tendon Reflexes: Reflexes normal.   Psychiatric:         Speech: Speech normal.         Behavior: Behavior normal.         Thought Content: Thought content normal.         Judgment: Judgment normal.       Significant Labs: All pertinent labs within the past 24 hours have been reviewed.    Significant Imaging: I have reviewed all pertinent imaging results/findings within the past 24 hours.

## 2022-03-21 ENCOUNTER — TELEPHONE (OUTPATIENT)
Dept: SLEEP MEDICINE | Facility: CLINIC | Age: 62
End: 2022-03-21
Payer: COMMERCIAL

## 2022-03-21 LAB
CV STRESS BASE HR: 59 BPM
DIASTOLIC BLOOD PRESSURE: 69 MMHG
OHS CV CPX 1 MINUTE RECOVERY HEART RATE: 76 BPM
OHS CV CPX 85 PERCENT MAX PREDICTED HEART RATE MALE: 130
OHS CV CPX MAX PREDICTED HEART RATE: 153
OHS CV CPX PATIENT IS FEMALE: 1
OHS CV CPX PATIENT IS MALE: 0
OHS CV CPX PEAK DIASTOLIC BLOOD PRESSURE: 77 MMHG
OHS CV CPX PEAK HEAR RATE: 75 BPM
OHS CV CPX PEAK RATE PRESSURE PRODUCT: 9000
OHS CV CPX PEAK SYSTOLIC BLOOD PRESSURE: 120 MMHG
OHS CV CPX PERCENT MAX PREDICTED HEART RATE ACHIEVED: 49
OHS CV CPX RATE PRESSURE PRODUCT PRESENTING: 6313
SYSTOLIC BLOOD PRESSURE: 107 MMHG

## 2022-03-21 PROCEDURE — 96372 THER/PROPH/DIAG INJ SC/IM: CPT | Performed by: STUDENT IN AN ORGANIZED HEALTH CARE EDUCATION/TRAINING PROGRAM

## 2022-03-21 PROCEDURE — 63600175 PHARM REV CODE 636 W HCPCS: Performed by: STUDENT IN AN ORGANIZED HEALTH CARE EDUCATION/TRAINING PROGRAM

## 2022-03-21 PROCEDURE — 99225 PR SUBSEQUENT OBSERVATION CARE,LEVEL II: ICD-10-PCS | Mod: ,,, | Performed by: STUDENT IN AN ORGANIZED HEALTH CARE EDUCATION/TRAINING PROGRAM

## 2022-03-21 PROCEDURE — G0378 HOSPITAL OBSERVATION PER HR: HCPCS

## 2022-03-21 PROCEDURE — 25000003 PHARM REV CODE 250: Performed by: STUDENT IN AN ORGANIZED HEALTH CARE EDUCATION/TRAINING PROGRAM

## 2022-03-21 PROCEDURE — 99225 PR SUBSEQUENT OBSERVATION CARE,LEVEL II: CPT | Mod: ,,, | Performed by: STUDENT IN AN ORGANIZED HEALTH CARE EDUCATION/TRAINING PROGRAM

## 2022-03-21 RX ORDER — SODIUM CHLORIDE 0.9 % (FLUSH) 0.9 %
10 SYRINGE (ML) INJECTION
Status: DISCONTINUED | OUTPATIENT
Start: 2022-03-21 | End: 2022-03-23 | Stop reason: HOSPADM

## 2022-03-21 RX ORDER — REGADENOSON 0.08 MG/ML
0.4 INJECTION, SOLUTION INTRAVENOUS
Status: COMPLETED | OUTPATIENT
Start: 2022-03-21 | End: 2022-03-21

## 2022-03-21 RX ADMIN — BUPROPION HYDROCHLORIDE 150 MG: 150 TABLET, FILM COATED, EXTENDED RELEASE ORAL at 09:03

## 2022-03-21 RX ADMIN — ESCITALOPRAM OXALATE 10 MG: 10 TABLET ORAL at 09:03

## 2022-03-21 RX ADMIN — LOSARTAN POTASSIUM AND HYDROCHLOROTHIAZIDE 1 TABLET: 50; 12.5 TABLET, FILM COATED ORAL at 09:03

## 2022-03-21 RX ADMIN — THERA TABS 1 TABLET: TAB at 09:03

## 2022-03-21 RX ADMIN — REGADENOSON 0.4 MG: 0.08 INJECTION, SOLUTION INTRAVENOUS at 01:03

## 2022-03-21 RX ADMIN — ASPIRIN 81 MG: 81 TABLET, DELAYED RELEASE ORAL at 09:03

## 2022-03-21 RX ADMIN — ENOXAPARIN SODIUM 40 MG: 40 INJECTION SUBCUTANEOUS at 08:03

## 2022-03-21 RX ADMIN — ATORVASTATIN CALCIUM 20 MG: 20 TABLET, FILM COATED ORAL at 09:03

## 2022-03-21 RX ADMIN — OXYBUTYNIN CHLORIDE 5 MG: 5 TABLET, EXTENDED RELEASE ORAL at 09:03

## 2022-03-21 RX ADMIN — ENOXAPARIN SODIUM 40 MG: 40 INJECTION SUBCUTANEOUS at 09:03

## 2022-03-21 RX ADMIN — METOPROLOL SUCCINATE 25 MG: 25 TABLET, EXTENDED RELEASE ORAL at 09:03

## 2022-03-21 NOTE — ASSESSMENT & PLAN NOTE
HEART 5. Concerning history given prior MI and ongoing risk factors. Trop/EKG negative x 2.  - Admit, monitor on tele  - Spect/Lexiscan today positive for Mild reversible anterior wall defect comprising approximately 15-20% of the LV myocardium consistent with ischemia  - Consulting cardiology. Will make NPOAM for possible intervention.

## 2022-03-21 NOTE — PROGRESS NOTES
"[facility] - Telemetry Stepdown ([address] Medicine  Progress Note    Patient Name: Sonya Armendariz  MRN: [id number]  Patient Class: OP- Observation   Admission Date: 3/18/2022  Length of Stay: 0 days  Attending Physician: Eamon Harp MD  Primary Care Provider: Sivakumar Wing MD        Subjective:     Principal Problem:<principal problem not specified>        HPI:  Ms. Armendariz is a 62 yo F with a h/o CAD s/p LAD stent, ?CHF, htn, hld, and vertigo who presents with chest pain. Pt reports chest pain came on acutely this morning while she was at work. Was not exerting herself. Describes pain as an "sore" substernal pressure that feels similar to when she previously had an MI. She took 3 nitros at home which partially relieved the pain. Received , 4th nitro, and nitro paste from EMS which completely resolved the pain. Notes associated diaphoresis and dyspnea. Denies cough or orthopnea. No change in recent exercise tolerance. She does note she had been noticing this same pain in the evenings before going to bed for about the past three weeks. Last stress test was SPECT on 1/31/20 which was negative. Reports adherence to all meds. Smokes 12 cigarettes per day.      Overview/Hospital Course:  Ms. Armendariz was admitted to Medicine on telemetry. Trop and EKG were trended and remained negative/wnl. No tele events were recorded. She had no additional episodes of chest pain. She received a Lexiscan/Spect which was negative for inducible ischemia. Most likely etiology of chest pain is 2/2 GERD or stress-induced. Referred for close f/u with PCP and cardiology. Referral to sleep medicine also placed as pt has JONH but no CPAP.      Interval History:   NAEON. Feeling well and hoping to leave today. No acute concerns. No additional chest pain.    SPECT done today was positive for area of reversible ischemia. Consulting cards.    Review of Systems   Constitutional:  Negative for activity change, appetite change and " fatigue.   HENT:  Negative for congestion.    Respiratory:  Negative for cough, chest tightness, shortness of breath and wheezing.    Cardiovascular:  Negative for chest pain, palpitations and leg swelling.   Gastrointestinal:  Negative for abdominal pain, diarrhea and nausea.   Genitourinary:  Negative for difficulty urinating, dysuria and urgency.   Musculoskeletal:  Negative for arthralgias and back pain.   Skin:  Negative for rash.   Neurological:  Negative for dizziness, weakness and light-headedness.   Objective:     Vital Signs (Most Recent):  Temp: 97.7 °F (36.5 °C) (03/21/22 1604)  Pulse: 88 (03/21/22 1604)  Resp: 16 (03/21/22 1604)  BP: 118/68 (03/21/22 1604)  SpO2: 99 % (03/21/22 1604) Vital Signs (24h Range):  Temp:  [97.2 °F (36.2 °C)-98.3 °F (36.8 °C)] 97.7 °F (36.5 °C)  Pulse:  [59-89] 88  Resp:  [16-20] 16  SpO2:  [93 %-99 %] 99 %  BP: (103-127)/(58-69) 118/68     Weight: 120.2 kg (265 lb)  Body mass index is 48.47 kg/m².    Intake/Output Summary (Last 24 hours) at 3/21/2022 1657  Last data filed at 3/20/2022 2000  Gross per 24 hour   Intake 120 ml   Output --   Net 120 ml      Physical Exam  Constitutional:       General: She is not in acute distress.     Appearance: She is well-developed. She is not ill-appearing, toxic-appearing or diaphoretic.   HENT:      Head: Atraumatic. No abrasion, contusion or laceration.      Nose: Nose normal.      Mouth/Throat:      Pharynx: No oropharyngeal exudate.   Eyes:      General: No scleral icterus.        Right eye: No discharge.         Left eye: No discharge.      Conjunctiva/sclera: Conjunctivae normal.      Pupils: Pupils are equal, round, and reactive to light.   Neck:      Vascular: No JVD.   Cardiovascular:      Rate and Rhythm: Normal rate and regular rhythm.      Heart sounds: Normal heart sounds. No murmur heard.    No friction rub. No gallop.   Pulmonary:      Effort: Pulmonary effort is normal. No accessory muscle usage or respiratory distress.       Breath sounds: Normal breath sounds. No wheezing.      Comments: Able to lie flat with no dyspnea  Abdominal:      General: Bowel sounds are normal. There is no distension.      Palpations: Abdomen is soft. There is no mass.      Tenderness: There is no abdominal tenderness. There is no guarding or rebound.   Musculoskeletal:         General: No tenderness or deformity. Normal range of motion.      Cervical back: Normal range of motion and neck supple. No rigidity.   Lymphadenopathy:      Cervical: No cervical adenopathy.   Skin:     General: Skin is warm and dry.      Capillary Refill: Capillary refill takes less than 2 seconds.      Findings: No bruising, ecchymosis, erythema, petechiae or rash.      Nails: There is no clubbing.   Neurological:      Mental Status: She is alert and oriented to person, place, and time.      GCS: GCS eye subscore is 4. GCS verbal subscore is 5. GCS motor subscore is 6.      Cranial Nerves: No cranial nerve deficit.      Sensory: No sensory deficit.      Motor: No abnormal muscle tone.      Coordination: Coordination normal.      Deep Tendon Reflexes: Reflexes normal.   Psychiatric:         Speech: Speech normal.         Behavior: Behavior normal.         Thought Content: Thought content normal.         Judgment: Judgment normal.       Significant Labs: All pertinent labs within the past 24 hours have been reviewed.    Significant Imaging: I have reviewed all pertinent imaging results/findings within the past 24 hours.      Assessment/Plan:      Other chest pain  HEART 5. Concerning history given prior MI and ongoing risk factors. Trop/EKG negative x 2.  - Admit, monitor on tele  - Spect/Lexiscan today positive for Mild reversible anterior wall defect comprising approximately 15-20% of the LV myocardium consistent with ischemia  - Consulting cardiology. Will make NPOAM for possible intervention.    Current moderate episode of major depressive disorder without prior episode  Continue  lexapro, WB        JONH (obstructive sleep apnea)  Previously had a CPAP machine  - Refer to sleep medicine on discharge  - Reviewed sleep hygiene techniques      Tobacco use   on cessation      Hypertension  - Continue losartan/hctz, toprol      CAD (coronary artery disease)  Continue ASA, atorva, toprol        VTE Risk Mitigation (From admission, onward)         Ordered     enoxaparin injection 40 mg  Every 12 hours         03/18/22 1553     IP VTE HIGH RISK PATIENT  Once         03/18/22 1553     Place sequential compression device  Until discontinued         03/18/22 1553                Discharge Planning   BLUE: 3/21/2022     Code Status: Full Code   Is the patient medically ready for discharge?: No    Reason for patient still in hospital (select all that apply): Consult recommendations  Discharge Plan A: Home                  Eamon Harp MD  Department of Hospital Medicine   [facility] - Telemetry Stepdown ([place]-[facility])     pre op anxiety

## 2022-03-21 NOTE — SUBJECTIVE & OBJECTIVE
Interval History:   NAEON. Feeling well and hoping to leave today. No acute concerns. No additional chest pain.    SPECT done today was positive for area of reversible ischemia. Consulting cards.    Review of Systems   Constitutional:  Negative for activity change, appetite change and fatigue.   HENT:  Negative for congestion.    Respiratory:  Negative for cough, chest tightness, shortness of breath and wheezing.    Cardiovascular:  Negative for chest pain, palpitations and leg swelling.   Gastrointestinal:  Negative for abdominal pain, diarrhea and nausea.   Genitourinary:  Negative for difficulty urinating, dysuria and urgency.   Musculoskeletal:  Negative for arthralgias and back pain.   Skin:  Negative for rash.   Neurological:  Negative for dizziness, weakness and light-headedness.   Objective:     Vital Signs (Most Recent):  Temp: 97.7 °F (36.5 °C) (03/21/22 1604)  Pulse: 88 (03/21/22 1604)  Resp: 16 (03/21/22 1604)  BP: 118/68 (03/21/22 1604)  SpO2: 99 % (03/21/22 1604) Vital Signs (24h Range):  Temp:  [97.2 °F (36.2 °C)-98.3 °F (36.8 °C)] 97.7 °F (36.5 °C)  Pulse:  [59-89] 88  Resp:  [16-20] 16  SpO2:  [93 %-99 %] 99 %  BP: (103-127)/(58-69) 118/68     Weight: 120.2 kg (265 lb)  Body mass index is 48.47 kg/m².    Intake/Output Summary (Last 24 hours) at 3/21/2022 1657  Last data filed at 3/20/2022 2000  Gross per 24 hour   Intake 120 ml   Output --   Net 120 ml      Physical Exam  Constitutional:       General: She is not in acute distress.     Appearance: She is well-developed. She is not ill-appearing, toxic-appearing or diaphoretic.   HENT:      Head: Atraumatic. No abrasion, contusion or laceration.      Nose: Nose normal.      Mouth/Throat:      Pharynx: No oropharyngeal exudate.   Eyes:      General: No scleral icterus.        Right eye: No discharge.         Left eye: No discharge.      Conjunctiva/sclera: Conjunctivae normal.      Pupils: Pupils are equal, round, and reactive to light.   Neck:       Vascular: No JVD.   Cardiovascular:      Rate and Rhythm: Normal rate and regular rhythm.      Heart sounds: Normal heart sounds. No murmur heard.    No friction rub. No gallop.   Pulmonary:      Effort: Pulmonary effort is normal. No accessory muscle usage or respiratory distress.      Breath sounds: Normal breath sounds. No wheezing.      Comments: Able to lie flat with no dyspnea  Abdominal:      General: Bowel sounds are normal. There is no distension.      Palpations: Abdomen is soft. There is no mass.      Tenderness: There is no abdominal tenderness. There is no guarding or rebound.   Musculoskeletal:         General: No tenderness or deformity. Normal range of motion.      Cervical back: Normal range of motion and neck supple. No rigidity.   Lymphadenopathy:      Cervical: No cervical adenopathy.   Skin:     General: Skin is warm and dry.      Capillary Refill: Capillary refill takes less than 2 seconds.      Findings: No bruising, ecchymosis, erythema, petechiae or rash.      Nails: There is no clubbing.   Neurological:      Mental Status: She is alert and oriented to person, place, and time.      GCS: GCS eye subscore is 4. GCS verbal subscore is 5. GCS motor subscore is 6.      Cranial Nerves: No cranial nerve deficit.      Sensory: No sensory deficit.      Motor: No abnormal muscle tone.      Coordination: Coordination normal.      Deep Tendon Reflexes: Reflexes normal.   Psychiatric:         Speech: Speech normal.         Behavior: Behavior normal.         Thought Content: Thought content normal.         Judgment: Judgment normal.       Significant Labs: All pertinent labs within the past 24 hours have been reviewed.    Significant Imaging: I have reviewed all pertinent imaging results/findings within the past 24 hours.

## 2022-03-21 NOTE — NURSING NOTE
Patient verified by 2 identifiers and allergies reviewed.  22 g IV IN place to Rt Hand.  Lexiscan testing explained to patient, patient verbalized understanding, consent obtained & testing completed.  Pt tolerated testing well without complaints.  IV flushed post testing.  Post study discharge instructions reviewed with patient, patient verbalized understanding.  Patient taken to [facility] by [employer] for completion of pictures.

## 2022-03-21 NOTE — PLAN OF CARE
[facility] - Telemetry Stepdown ([place]-[facility])  Initial Discharge Assessment       Primary Care Provider: Sivakumar Wing MD    Admission Diagnosis: Chest pain [R07.9]    Admission Date: 3/18/2022  Expected Discharge Date: 3/21/2022         Payor: BLUE CROSS BLUE SHIELD / Plan: [employer] [place] / Product Type: Commercial /     Extended Emergency Contact Information  Primary Emergency Contact: Artemio Armendariz  Address: [address] [place] [address] [facility]  Mobile Phone: [phone]  Relation: Spouse    Discharge Plan A: (P) Home  Discharge Plan B: (P) Home      [id number] - [facility] [address] AT [facility] [address]  [address]  [facility] [address]  Phone: [phone] Fax: [phone]                 SW completed Discharge Planning Assessment with patient via bedside. Discharge planning booklet given to patient/family and whiteboard updated with BLUE and phone #. All questions answered.    Patient reported that she will need transportation upon discharge.     Patient reported that she lives at home with her brother and prior to hospitalization she was independent with her ADL's. Patient reported that she is not on dialysis and she does not go to a Coumadin clinic.     Patient lives in a [facility] with 0 steps to enter.         Doris Jade LMSW  [facility]  Ext. [phone]

## 2022-03-21 NOTE — HOSPITAL COURSE
Ms. Armendariz was admitted to Medicine on telemetry. Trop and EKG were trended and remained negative/wnl. No tele events were recorded. She had no additional episodes of chest pain. NM perfusion scan revealed a reversible defect. LHC on 3/22 showed extensive disease as detailed below. CTS consulted and plans to do outpatient CABG since she received Brilinta prior to the LHC, scheduled for 4/1. She was started on  on discharge. CT chest, TTE, and carotid ultrasound obtained prior to discharge per CTS request.    [facility] 3/22:  LVEDP=16mmHg  60% distal LM stenosis  80% ostial LCx stenosis  80% proximal LCx stenosis (distal to OM1) origin)  The estimated blood loss was <50 mL.

## 2022-03-22 ENCOUNTER — TELEPHONE (OUTPATIENT)
Dept: SLEEP MEDICINE | Facility: CLINIC | Age: 62
End: 2022-03-22
Payer: COMMERCIAL

## 2022-03-22 LAB
ABO + RH BLD: NORMAL
ANION GAP SERPL CALC-SCNC: 9 MMOL/L (ref 8–16)
BASOPHILS # BLD AUTO: 0.01 K/UL (ref 0–0.2)
BASOPHILS NFR BLD: 0.2 % (ref 0–1.9)
BLD GP AB SCN CELLS X3 SERPL QL: NORMAL
BUN SERPL-MCNC: 11 MG/DL (ref 6–20)
CALCIUM SERPL-MCNC: 9.5 MG/DL (ref 8.7–10.5)
CHLORIDE SERPL-SCNC: 106 MMOL/L (ref 95–110)
CO2 SERPL-SCNC: 25 MMOL/L (ref 23–29)
CREAT SERPL-MCNC: 0.8 MG/DL (ref 0.5–1.4)
DIFFERENTIAL METHOD: NORMAL
EOSINOPHIL # BLD AUTO: 0.2 K/UL (ref 0–0.5)
EOSINOPHIL NFR BLD: 4.2 % (ref 0–8)
ERYTHROCYTE [DISTWIDTH] IN BLOOD BY AUTOMATED COUNT: 12.6 % (ref 11.5–14.5)
EST. GFR  (AFRICAN AMERICAN): >60 ML/MIN/1.73 M^2
EST. GFR  (NON AFRICAN AMERICAN): >60 ML/MIN/1.73 M^2
GLUCOSE SERPL-MCNC: 105 MG/DL (ref 70–110)
HCT VFR BLD AUTO: 44 % (ref 37–48.5)
HGB BLD-MCNC: 14.3 G/DL (ref 12–16)
IMM GRANULOCYTES # BLD AUTO: 0.02 K/UL (ref 0–0.04)
IMM GRANULOCYTES NFR BLD AUTO: 0.4 % (ref 0–0.5)
LYMPHOCYTES # BLD AUTO: 2.1 K/UL (ref 1–4.8)
LYMPHOCYTES NFR BLD: 39.3 % (ref 18–48)
MCH RBC QN AUTO: 30.5 PG (ref 27–31)
MCHC RBC AUTO-ENTMCNC: 32.5 G/DL (ref 32–36)
MCV RBC AUTO: 94 FL (ref 82–98)
MONOCYTES # BLD AUTO: 0.4 K/UL (ref 0.3–1)
MONOCYTES NFR BLD: 7.9 % (ref 4–15)
NEUTROPHILS # BLD AUTO: 2.6 K/UL (ref 1.8–7.7)
NEUTROPHILS NFR BLD: 48 % (ref 38–73)
NRBC BLD-RTO: 0 /100 WBC
PLATELET # BLD AUTO: 222 K/UL (ref 150–450)
PMV BLD AUTO: 11.2 FL (ref 9.2–12.9)
POTASSIUM SERPL-SCNC: 4.2 MMOL/L (ref 3.5–5.1)
RBC # BLD AUTO: 4.69 M/UL (ref 4–5.4)
SODIUM SERPL-SCNC: 140 MMOL/L (ref 136–145)
WBC # BLD AUTO: 5.45 K/UL (ref 3.9–12.7)

## 2022-03-22 PROCEDURE — 25000003 PHARM REV CODE 250: Performed by: STUDENT IN AN ORGANIZED HEALTH CARE EDUCATION/TRAINING PROGRAM

## 2022-03-22 PROCEDURE — 85025 COMPLETE CBC W/AUTO DIFF WBC: CPT | Performed by: STUDENT IN AN ORGANIZED HEALTH CARE EDUCATION/TRAINING PROGRAM

## 2022-03-22 PROCEDURE — 80048 BASIC METABOLIC PNL TOTAL CA: CPT | Performed by: STUDENT IN AN ORGANIZED HEALTH CARE EDUCATION/TRAINING PROGRAM

## 2022-03-22 PROCEDURE — 63600175 PHARM REV CODE 636 W HCPCS: Performed by: INTERNAL MEDICINE

## 2022-03-22 PROCEDURE — 11000001 HC ACUTE MED/SURG PRIVATE ROOM

## 2022-03-22 PROCEDURE — 25000003 PHARM REV CODE 250: Performed by: INTERNAL MEDICINE

## 2022-03-22 PROCEDURE — 63600175 PHARM REV CODE 636 W HCPCS: Performed by: STUDENT IN AN ORGANIZED HEALTH CARE EDUCATION/TRAINING PROGRAM

## 2022-03-22 PROCEDURE — 99225 PR SUBSEQUENT OBSERVATION CARE,LEVEL II: CPT | Mod: ,,, | Performed by: STUDENT IN AN ORGANIZED HEALTH CARE EDUCATION/TRAINING PROGRAM

## 2022-03-22 PROCEDURE — C1894 INTRO/SHEATH, NON-LASER: HCPCS | Performed by: INTERNAL MEDICINE

## 2022-03-22 PROCEDURE — 99152 MOD SED SAME PHYS/QHP 5/>YRS: CPT | Performed by: INTERNAL MEDICINE

## 2022-03-22 PROCEDURE — 25500020 PHARM REV CODE 255: Performed by: INTERNAL MEDICINE

## 2022-03-22 PROCEDURE — C1769 GUIDE WIRE: HCPCS | Performed by: INTERNAL MEDICINE

## 2022-03-22 PROCEDURE — 99225 PR SUBSEQUENT OBSERVATION CARE,LEVEL II: ICD-10-PCS | Mod: ,,, | Performed by: STUDENT IN AN ORGANIZED HEALTH CARE EDUCATION/TRAINING PROGRAM

## 2022-03-22 PROCEDURE — 99234 HOSP IP/OBS SM DT SF/LOW 45: CPT | Mod: 25,,, | Performed by: INTERNAL MEDICINE

## 2022-03-22 PROCEDURE — 99234 PR OBSERV/HOSP SAME DATE,LEVL III: ICD-10-PCS | Mod: 25,,, | Performed by: INTERNAL MEDICINE

## 2022-03-22 PROCEDURE — C1887 CATHETER, GUIDING: HCPCS | Performed by: INTERNAL MEDICINE

## 2022-03-22 PROCEDURE — 93458 PR CATH PLACE/CORON ANGIO, IMG SUPER/INTERP,W LEFT HEART VENTRICULOGRAPHY: ICD-10-PCS | Mod: 26,,, | Performed by: INTERNAL MEDICINE

## 2022-03-22 PROCEDURE — 93458 L HRT ARTERY/VENTRICLE ANGIO: CPT | Mod: 26,,, | Performed by: INTERNAL MEDICINE

## 2022-03-22 PROCEDURE — 36415 COLL VENOUS BLD VENIPUNCTURE: CPT | Performed by: STUDENT IN AN ORGANIZED HEALTH CARE EDUCATION/TRAINING PROGRAM

## 2022-03-22 PROCEDURE — 86850 RBC ANTIBODY SCREEN: CPT | Performed by: STUDENT IN AN ORGANIZED HEALTH CARE EDUCATION/TRAINING PROGRAM

## 2022-03-22 PROCEDURE — 93458 L HRT ARTERY/VENTRICLE ANGIO: CPT | Performed by: INTERNAL MEDICINE

## 2022-03-22 RX ORDER — SODIUM CHLORIDE 9 MG/ML
INJECTION, SOLUTION INTRAVENOUS ONCE
Status: COMPLETED | OUTPATIENT
Start: 2022-03-22 | End: 2022-03-22

## 2022-03-22 RX ORDER — DIPHENHYDRAMINE HCL 50 MG
50 CAPSULE ORAL
Status: DISCONTINUED | OUTPATIENT
Start: 2022-03-22 | End: 2022-03-22 | Stop reason: HOSPADM

## 2022-03-22 RX ORDER — ATORVASTATIN CALCIUM 40 MG/1
40 TABLET, FILM COATED ORAL DAILY
Status: DISCONTINUED | OUTPATIENT
Start: 2022-03-22 | End: 2022-03-23 | Stop reason: HOSPADM

## 2022-03-22 RX ORDER — HEPARIN SOD,PORCINE/0.9 % NACL 1000/500ML
INTRAVENOUS SOLUTION INTRAVENOUS
Status: DISCONTINUED | OUTPATIENT
Start: 2022-03-22 | End: 2022-03-22 | Stop reason: HOSPADM

## 2022-03-22 RX ORDER — FENTANYL CITRATE 50 UG/ML
INJECTION, SOLUTION INTRAMUSCULAR; INTRAVENOUS
Status: DISCONTINUED | OUTPATIENT
Start: 2022-03-22 | End: 2022-03-22 | Stop reason: HOSPADM

## 2022-03-22 RX ORDER — SODIUM CHLORIDE 9 MG/ML
INJECTION, SOLUTION INTRAVENOUS CONTINUOUS
Status: ACTIVE | OUTPATIENT
Start: 2022-03-22 | End: 2022-03-22

## 2022-03-22 RX ORDER — HEPARIN SODIUM 1000 [USP'U]/ML
INJECTION, SOLUTION INTRAVENOUS; SUBCUTANEOUS
Status: DISCONTINUED | OUTPATIENT
Start: 2022-03-22 | End: 2022-03-22 | Stop reason: HOSPADM

## 2022-03-22 RX ORDER — ASPIRIN 325 MG
325 TABLET ORAL DAILY
Status: DISCONTINUED | OUTPATIENT
Start: 2022-03-23 | End: 2022-03-23 | Stop reason: HOSPADM

## 2022-03-22 RX ORDER — LIDOCAINE HYDROCHLORIDE 20 MG/ML
INJECTION, SOLUTION EPIDURAL; INFILTRATION; INTRACAUDAL; PERINEURAL
Status: DISCONTINUED | OUTPATIENT
Start: 2022-03-22 | End: 2022-03-22 | Stop reason: HOSPADM

## 2022-03-22 RX ORDER — MIDAZOLAM HYDROCHLORIDE 2 MG/2ML
INJECTION, SOLUTION INTRAMUSCULAR; INTRAVENOUS
Status: DISCONTINUED | OUTPATIENT
Start: 2022-03-22 | End: 2022-03-22 | Stop reason: HOSPADM

## 2022-03-22 RX ADMIN — ENOXAPARIN SODIUM 40 MG: 40 INJECTION SUBCUTANEOUS at 10:03

## 2022-03-22 RX ADMIN — METOPROLOL SUCCINATE 25 MG: 25 TABLET, EXTENDED RELEASE ORAL at 11:03

## 2022-03-22 RX ADMIN — SODIUM CHLORIDE: 0.9 INJECTION, SOLUTION INTRAVENOUS at 11:03

## 2022-03-22 RX ADMIN — TICAGRELOR 180 MG: 90 TABLET ORAL at 11:03

## 2022-03-22 RX ADMIN — ASPIRIN 81 MG: 81 TABLET, DELAYED RELEASE ORAL at 11:03

## 2022-03-22 NOTE — NURSING
Pt transferred from cath lab via stretcher.  Vss.  Post op orders and assessment initiated.  Pt arousable to voice, follows commands.  Pt in no acute distress and verbalizes no complaints. Will continue to monitor.

## 2022-03-22 NOTE — CONSULTS
"[facility] - Tele[facility] Stepdown ([place] [address])  Interventional Cardiology  Consult Note    Patient Name: Sonya Armendariz  MRN: [id number]  Admission Date: 3/18/2022  Hospital Length of Stay: 0 days  Code Status: Full Code   Attending Provider: Eamon Harp MD  Consulting Provider: Renetta Willams MD  Primary Care Physician: Sivakumar Wing MD  Principal Problem:<principal problem not specified>    Patient information was obtained from patient and ER records.     Inpatient consult to Interventional Cardiology  Consult performed by: Renetta Willams MD  Consult ordered by: Eamon Harp MD        Subjective:     Chief Complaint:  Chest pain.      HPI:  Ms. Armendariz is a 61 AAF with following problem list:   History of MI X2 (2012 s/p reported PCI LAD, unknown anatomy at [facility] following by adverse reaction to plavix 2 days later resulting MI)   HTN   Current smoker   Obesity   Pre diabetic    Who is admitted after exertional substernal chest pressure while working on Friday relieved by SL nitro X 3. She states she has been having ongoing chest pressure and dyspnea for past two weeks when she is picking up heavy things at work (30 lbs). She is a school lunch lady and has been ignoring her chest tightness because she thought she just needed further "breathing treatments." She has been chest pain free since 3/18/22 however had SPECT stress showing large anterior wall defect. In terms of MI history, states she had similar pain however increased severity in 2012 and had PCI to LAD but does not have stent card and no caths in system to review. 2 days later, she had an unclear allergic reaction to plavix which caused "another heart attack". CAD + include obesity, HTN, smoking, post menopausal and immobility.    SPECT  Images demonstrate a mild defect within the anterior wall comprising 15-20% of the LV myocardium.  There is otherwise preserved uptake in the remainder of the left ventricle.  On the resting images, there " is mismatched distribution of the tracer throughout the left ventricle with improved counts at the anterior wall.    The gated post-stress images reveal normal wall motion and thickening with an estimated post-stress LVEF of 66 %. The LV cavity is not dilated with an end-diastolic volume of 118 ml (normal is less than 140 mL) and an end-systolic volume of 41 ml (normal is less than 70 mL).   Volume measurements may be underestimated and ejection fractions overestimated in patients with small hearts.  Impression:  1. Mild reversible anterior wall defect comprising approximately 15-20% of the LV myocardium consistent with ischemia.  2. The global left ventricular systolic function is preserved with an LV ejection fraction of 66 % and no evidence of LV dilatation. Wall motion is normal.  This report was flagged in Epic as abnormal.          [facility] 4/13/2010      Per my independent interpretation ostial RCA  with L to R collaterals from OM/Lcx. Ostial LCx/OM  disease .     Past Medical History:   Diagnosis Date    Anticoagulant long-term use     CAD (coronary artery disease)     s/p LAD stent    CHF (congestive heart failure)     Gastroduodenitis     Helicobacter pylori (H. pylori) infection     Hyperlipidemia     Hypertension     MI (myocardial infarction)     x2    Obesity     Tobacco use     Urinary tract infection     Vaginal infection        Past Surgical History:   Procedure Laterality Date    COLONOSCOPY N/A 7/14/2017    Procedure: COLONOSCOPY;  Surgeon: Cathy Collier MD;  Location: [facility];  Service: Endoscopy;  Laterality: N/A;    NO PAST SURGERIES         Review of patient's allergies indicates:   Allergen Reactions    Amoxicillin Diarrhea    Plavix [clopidogrel]        PTA Medications   Medication Sig    aspirin (ECOTRIN) 81 MG EC tablet Take 1 tablet (81 mg total) by mouth once daily.    atorvastatin (LIPITOR) 20 MG tablet TAKE 1 TABLET(20 MG) BY MOUTH EVERY DAY    buPROPion  (WELLBUTRIN SR) 150 MG TBSR 12 hr tablet Take 1 tablet (150 mg total) by mouth 2 (two) times daily. (Patient not taking: No sig reported)    cyclobenzaprine (FLEXERIL) 5 MG tablet TAKE 1 TABLET(5 MG) BY MOUTH THREE TIMES DAILY AS NEEDED FOR MUSCLE SPASMS    EScitalopram oxalate (LEXAPRO) 10 MG tablet Take 1 tablet (10 mg total) by mouth once daily.    fluticasone (FLONASE) 50 mcg/actuation nasal spray SPRAY ONCE IN EACH NOSTRIL EVERY DAY    hydrOXYzine HCl (ATARAX) 25 MG tablet Take 1 tablet (25 mg total) by mouth 3 (three) times daily as needed for Itching or Anxiety.    losartan-hydrochlorothiazide 50-12.5 mg (HYZAAR) 50-12.5 mg per tablet Take 1 tablet by mouth once daily.    meclizine (ANTIVERT) 12.5 mg tablet Take 1 tablet (12.5 mg total) by mouth 3 (three) times daily as needed for Nausea.    metoprolol succinate (TOPROL-XL) 25 MG 24 hr tablet Take 1 tablet (25 mg total) by mouth once daily.    multivitamin (THERAGRAN) per tablet Take 1 tablet by mouth once daily.    nicotine (NICODERM CQ) 21 mg/24 hr APPLY 1 PATCH EXTERNALLY TO THE SKIN EVERY DAY FOR 6 WEEKS    nitroGLYCERIN (NITROSTAT) 0.4 MG SL tablet PLACE 1 TABLET UNDER THE TONGUE EVERY 5 MINUTES AS NEEDED    solifenacin (VESICARE) 10 MG tablet Take 1 tablet (10 mg total) by mouth once daily. For bladder urgency    [DISCONTINUED] diclofenac sodium (VOLTAREN) 1 % Gel Apply 2 g topically 3 (three) times daily as needed. (Patient not taking: No sig reported)    [DISCONTINUED] nicotine (NICODERM CQ) 21 mg/24 hr Place 2 patches onto the skin once daily. Dose appropriate. Please fill with US Script ID [id number]   Group [id number] Bin [id number]    [DISCONTINUED] nicotine polacrilex 2 MG Lozg Take 1 lozenge (2 mg total) by mouth as needed (as needed). Dispense Fruit flavor please, DO NOT CHEW UP. Can take 1 per hour as needed in place of a cigarette. ID in note.    [DISCONTINUED] nicotine, polacrilex, (NICORETTE) 2 mg Gum Take 1 each (2 mg total) by mouth  as needed (as needed). Dispence FRUIT flavor please. Can take 1-2 per hour in place of a cigarette.     Family History       Problem Relation (Age of Onset)    Breast cancer Sister    Cancer Father, Brother    Coronary artery disease Cousin    Diabetes Other    Hypertension Other    Stomach cancer Mother    Throat cancer Brother          Tobacco Use    Smoking status: Current Every Day Smoker     Packs/day: 1.00     Years: 40.00     Pack years: 40.00     Types: Cigarettes    Smokeless tobacco: Never Used    Tobacco comment: Restarted 1 .5 yrs. ago   Substance and Sexual Activity    Alcohol use: No     Alcohol/week: 0.0 standard drinks    Drug use: No    Sexual activity: Yes     Partners: Male       Objective:     Vital Signs (Most Recent):  Temp: 98.1 °F (36.7 °C) (03/21/22 1912)  Pulse: 67 (03/21/22 1912)  Resp: 16 (03/21/22 1912)  BP: 117/66 (03/21/22 1912)  SpO2: 96 % (03/21/22 1912) Vital Signs (24h Range):  Temp:  [97.2 °F (36.2 °C)-98.3 °F (36.8 °C)] 98.1 °F (36.7 °C)  Pulse:  [59-89] 67  Resp:  [16-20] 16  SpO2:  [93 %-99 %] 96 %  BP: (103-127)/(58-69) 117/66     Weight: 120.2 kg (265 lb)  Body mass index is 48.47 kg/m².    SpO2: 96 %  O2 Device (Oxygen Therapy): room air      Intake/Output Summary (Last 24 hours) at 3/21/2022 1924  Last data filed at 3/20/2022 2000  Gross per 24 hour   Intake 120 ml   Output --   Net 120 ml       Lines/Drains/Airways       Peripheral Intravenous Line  Duration                  Peripheral IV - Single Lumen 03/18/22 22 G Left Forearm 3 days                    Physical Exam  Constitutional: No distress, obese, conversant  HEENT: Sclera anicteric, PERRLA, EOMI  Neck: No JVD, no masses, good movement  CV: RRR, S1 and S2 normal, no additional heart sounds or murmurs. Pulses 2+ and equal bilaterally in radial arteries, Barron's normal on right. Distal pulses are 2+ and equal in the femoral, DP and PT areas bilaterally  Pulm: Clear to auscultation bilaterally with symmetrical  expansion. Chest wall palpated for reproduction of pain symptoms, and no pain was able to be produced on palpation or resistance exercises  GI: Abdomen soft, non-tender, good bowel sounds  Extremities: Both extremities intact and grossly normal, skin is warm, no edema noted  Skin: No ecchymosis, erythema, or ulcers  Psych: AOx3, appropriate affect  Neuro: CNII-XII intact, no focal deficits    Significant Labs: All pertinent lab results from the last 24 hours have been reviewed.    Significant Imaging: Echocardiogram: 2D echo with color flow doppler:   Results for orders placed or performed during the hospital encounter of 07/20/16   2D Echo w/ Color Flow Doppler   Result Value Ref Range    EF + QEF 60 55 - 65    Diastolic Dysfunction No     Mitral Valve Mobility NORMAL     Narrative    Date of Procedure: 07/20/2016        TEST DESCRIPTION   Technical Quality: This is a technically adequate study.     Aorta: The aortic root is normal in size, measuring 2.3 cm at sinotubular junction.     Left Atrium: The left atrial volume index is normal, measuring 24.21 cc/m2.     Left Ventricle: The left ventricle is normal in size, with an end-diastolic diameter of 4.5 cm, and an end-systolic diameter of 3.2 cm. LV wall thickness is normal, with the septum measuring 1.2 cm and the posterior wall measuring 1.0 cm across. Relative   wall thickness was increased at 0.44, and the LV mass index was 96.0 g/m2 consistent with concentric remodeling. Global left ventricular systolic function appears normal. Visually estimated ejection fraction is 60-65%. The LV Doppler derived stroke   volume equals 60.0 ccs.   The E/e'(lat) is 8, consistent with normal diastolic function.     Right Atrium: The right atrium is normal in size, measuring 3.7 cm in length in the apical view.     Right Ventricle: The right ventricle is normal in size measuring 2.7 cm at the base in the apical right ventricle-focused view. Global right ventricular systolic  function appears normal.     Aortic Valve:  The aortic valve is normal in structure with normal leaflet mobility. The aortic valve is tri-leaflet in structure.     Mitral Valve:  The mitral valve is normal in structure with normal leaflet mobility.     Tricuspid Valve:  The tricuspid valve is normal in structure with normal leaflet mobility.     Pulmonary Valve:  The pulmonic valve is not well seen.     IVC: IVC is normal in size and collapses > 50% with a sniff, suggesting normal right atrial pressure of 3 mmHg.     Intracavitary: There is no evidence of pericardial effusion, intracavity mass, thrombi, or vegetation.         CONCLUSIONS     1 - Normal left ventricular systolic function (EF 60-65%).     2 - Normal left ventricular diastolic function.     3 - Normal right ventricular systolic function .     4 - Concentric remodeling.             This document has been electronically    SIGNED BY: Doris Coleman MD On: 07/20/2016 09:42    and Transthoracic echo (TTE) complete (Cupid Only): No results found for this or any previous visit.    Assessment and Plan:     CAD (coronary artery disease)    IMPRESSION/ PLAN:   -begin losartan 12.5mg daily  - increase atorv 40 mg qhs  - cardiac rehab I and II  - plan for C today    Anti platelet: ASA 81, load with ticagrellor 180mg X1 then 90mg BID  Allergies: Plavix (anaphylaxis)   Access: right radial   Catheter: Shar     -The risks, benefits & alternatives of the procedure were explained to the patient.    -The risks of coronary angiography include but are not limited to:  Bleeding, infection, heart rhythm abnormalities, allergic reactions, kidney injury, stroke and death.    -Should stenting be indicated, the patient has agreed to dual anti-platelet therapy for 1-consecutive year with a drug-eluting stent and a minimum of 1-month with the use of a bare metal stent.    -The risks of moderate sedation include hypotension, respiratory depression, arrhythmias, bronchospasm, &  death.    -Informed consent was obtained & the patient is agreeable to proceed with the procedure.  -This patient was discussed with the attending interventional cardiologist who agrees with the above assessment & plan.                VTE Risk Mitigation (From admission, onward)         Ordered     enoxaparin injection 40 mg  Every 12 hours         03/18/22 1553     IP VTE HIGH RISK PATIENT  Once         03/18/22 1553     Place sequential compression device  Until discontinued         03/18/22 1553                Thank you for your consult. I will follow-up with patient. Please contact us if you have any additional questions.    Renetta Willams MD  Interventional Cardiology   [facility] - Tele[facility] Stepdown ([place])

## 2022-03-22 NOTE — ASSESSMENT & PLAN NOTE
HEART 5. Concerning history given prior MI and ongoing risk factors. Trop/EKG negative x 2.  - Admit, monitor on tele  - Spect/Lexiscan positive for mild reversible anterior wall defect comprising approximately 15-20% of the LV myocardium consistent with ischemia  - Intervention cards consulted. Went for LHC on 3/22 which showed multivessel disease with LM involvement.  - Cards to d/w CTS for CABG

## 2022-03-22 NOTE — SUBJECTIVE & OBJECTIVE
Interval History:   NAEON. Feels well today, no complaints. Denies ongoing CP or dyspnea. LHC revealed extensive disease, now awaiting CT surg eval.    Review of Systems   Constitutional:  Negative for activity change, appetite change and fatigue.   HENT:  Negative for congestion.    Respiratory:  Negative for cough, chest tightness, shortness of breath and wheezing.    Cardiovascular:  Negative for chest pain, palpitations and leg swelling.   Gastrointestinal:  Negative for abdominal pain, diarrhea and nausea.   Genitourinary:  Negative for difficulty urinating, dysuria and urgency.   Musculoskeletal:  Negative for arthralgias and back pain.   Skin:  Negative for rash.   Neurological:  Negative for dizziness, weakness and light-headedness.   Objective:     Vital Signs (Most Recent):  Temp: 97.5 °F (36.4 °C) (03/22/22 1252)  Pulse: 60 (03/22/22 1500)  Resp: (!) 22 (03/22/22 1500)  BP: (!) 146/78 (03/22/22 1430)  SpO2: 97 % (03/22/22 1500)   Vital Signs (24h Range):  Temp:  [97.5 °F (36.4 °C)-98.6 °F (37 °C)] 97.5 °F (36.4 °C)  Pulse:  [54-88] 60  Resp:  [16-32] 22  SpO2:  [93 %-99 %] 97 %  BP: (112-146)/(57-80) 146/78     Weight: 120.2 kg (265 lb)  Body mass index is 48.47 kg/m².  No intake or output data in the 24 hours ending 03/22/22 1547   Physical Exam  Constitutional:       General: She is not in acute distress.     Appearance: She is well-developed. She is not ill-appearing, toxic-appearing or diaphoretic.   HENT:      Head: Atraumatic. No abrasion, contusion or laceration.      Nose: Nose normal.      Mouth/Throat:      Pharynx: No oropharyngeal exudate.   Eyes:      General: No scleral icterus.        Right eye: No discharge.         Left eye: No discharge.      Conjunctiva/sclera: Conjunctivae normal.      Pupils: Pupils are equal, round, and reactive to light.   Neck:      Vascular: No JVD.   Cardiovascular:      Rate and Rhythm: Normal rate and regular rhythm.      Heart sounds: Normal heart sounds. No  murmur heard.    No friction rub. No gallop.   Pulmonary:      Effort: Pulmonary effort is normal. No accessory muscle usage or respiratory distress.      Breath sounds: Normal breath sounds. No wheezing.      Comments: Able to lie flat with no dyspnea  Abdominal:      General: Bowel sounds are normal. There is no distension.      Palpations: Abdomen is soft. There is no mass.      Tenderness: There is no abdominal tenderness. There is no guarding or rebound.   Musculoskeletal:         General: No tenderness or deformity. Normal range of motion.      Cervical back: Normal range of motion and neck supple. No rigidity.   Lymphadenopathy:      Cervical: No cervical adenopathy.   Skin:     General: Skin is warm and dry.      Capillary Refill: Capillary refill takes less than 2 seconds.      Findings: No bruising, ecchymosis, erythema, petechiae or rash.      Nails: There is no clubbing.   Neurological:      Mental Status: She is alert and oriented to person, place, and time.      GCS: GCS eye subscore is 4. GCS verbal subscore is 5. GCS motor subscore is 6.      Cranial Nerves: No cranial nerve deficit.      Sensory: No sensory deficit.      Motor: No abnormal muscle tone.      Coordination: Coordination normal.      Deep Tendon Reflexes: Reflexes normal.   Psychiatric:         Speech: Speech normal.         Behavior: Behavior normal.         Thought Content: Thought content normal.         Judgment: Judgment normal.       Significant Labs: All pertinent labs within the past 24 hours have been reviewed.    Significant Imaging: I have reviewed all pertinent imaging results/findings within the past 24 hours.

## 2022-03-22 NOTE — BRIEF OP NOTE
Brief Operative Note:    : Giuseppe Merchant MD     Referring Physician: Self,Aaareferral     All Operators: Surgeon(s):  MD Giuseppe Barton MD     Preoperative Diagnosis: Chest pain [R07.9]     Postop Diagnosis: Chest pain [R07.9]    Treatments/Procedures: Procedure(s) (LRB):  Angiogram, Coronary, with Left Heart Cath (N/A)    Access: Right radial artery    Findings:Severe coronary artery disease is present.   Multi vessel disease with LM involvement    See catheterization report for full details.    Intervention: none    See catheterization report for full details.    Closure device: Vasc Band       Plan:  - Post cath protocol   - IVF @ 150 cc/hr x 4 hours  - Continue aspirin 81 mg daily indefinitely  - Plan to refer to CTS for bypass surgery  - Recommend therapeutic anticoagulation (heparin or Lovenox) until patient has coronary revascularization   - Continue high intensity statin therapy (LDL goal < 70)  - Risk factor reduction (BP <130/80 mmHg, glycemic control, etc)    Estimated Blood loss: 20 cc    Specimens removed: No    Daniela Byrnes MD  Interventional Cardiology PGY-7  03/22/2022

## 2022-03-22 NOTE — NURSING
Transferred to Room 7095 via bed with EPI Geiger and Lavern on bedside monitor. Report called to Arelis.  Vasc band intact to rt radial.  No bleed or hematoma.

## 2022-03-22 NOTE — SUBJECTIVE & OBJECTIVE
Past Medical History:   Diagnosis Date    Anticoagulant long-term use     CAD (coronary artery disease)     s/p LAD stent    CHF (congestive heart failure)     Gastroduodenitis     Helicobacter pylori (H. pylori) infection     Hyperlipidemia     Hypertension     MI (myocardial infarction)     x2    Obesity     Tobacco use     Urinary tract infection     Vaginal infection        Past Surgical History:   Procedure Laterality Date    COLONOSCOPY N/A 7/14/2017    Procedure: COLONOSCOPY;  Surgeon: Cathy Collier MD;  Location: [facility];  Service: Endoscopy;  Laterality: N/A;    NO PAST SURGERIES         Review of patient's allergies indicates:   Allergen Reactions    Amoxicillin Diarrhea    Plavix [clopidogrel]        PTA Medications   Medication Sig    aspirin (ECOTRIN) 81 MG EC tablet Take 1 tablet (81 mg total) by mouth once daily.    atorvastatin (LIPITOR) 20 MG tablet TAKE 1 TABLET(20 MG) BY MOUTH EVERY DAY    buPROPion (WELLBUTRIN SR) 150 MG TBSR 12 hr tablet Take 1 tablet (150 mg total) by mouth 2 (two) times daily. (Patient not taking: No sig reported)    cyclobenzaprine (FLEXERIL) 5 MG tablet TAKE 1 TABLET(5 MG) BY MOUTH THREE TIMES DAILY AS NEEDED FOR MUSCLE SPASMS    EScitalopram oxalate (LEXAPRO) 10 MG tablet Take 1 tablet (10 mg total) by mouth once daily.    fluticasone (FLONASE) 50 mcg/actuation nasal spray SPRAY ONCE IN EACH NOSTRIL EVERY DAY    hydrOXYzine HCl (ATARAX) 25 MG tablet Take 1 tablet (25 mg total) by mouth 3 (three) times daily as needed for Itching or Anxiety.    losartan-hydrochlorothiazide 50-12.5 mg (HYZAAR) 50-12.5 mg per tablet Take 1 tablet by mouth once daily.    meclizine (ANTIVERT) 12.5 mg tablet Take 1 tablet (12.5 mg total) by mouth 3 (three) times daily as needed for Nausea.    metoprolol succinate (TOPROL-XL) 25 MG 24 hr tablet Take 1 tablet (25 mg total) by mouth once daily.    multivitamin (THERAGRAN) per tablet Take 1 tablet by mouth once daily.    nicotine (NICODERM  CQ) 21 mg/24 hr APPLY 1 PATCH EXTERNALLY TO THE SKIN EVERY DAY FOR 6 WEEKS    nitroGLYCERIN (NITROSTAT) 0.4 MG SL tablet PLACE 1 TABLET UNDER THE TONGUE EVERY 5 MINUTES AS NEEDED    solifenacin (VESICARE) 10 MG tablet Take 1 tablet (10 mg total) by mouth once daily. For bladder urgency    [DISCONTINUED] diclofenac sodium (VOLTAREN) 1 % Gel Apply 2 g topically 3 (three) times daily as needed. (Patient not taking: No sig reported)    [DISCONTINUED] nicotine (NICODERM CQ) 21 mg/24 hr Place 2 patches onto the skin once daily. Dose appropriate. Please fill with [employer] Script ID [id number]   Group [id number] Bin [id number]    [DISCONTINUED] nicotine polacrilex 2 MG Lozg Take 1 lozenge (2 mg total) by mouth as needed (as needed). Dispense Fruit flavor please, DO NOT CHEW UP. Can take 1 per hour as needed in place of a cigarette. ID in note.    [DISCONTINUED] nicotine, polacrilex, (NICORETTE) 2 mg Gum Take 1 each (2 mg total) by mouth as needed (as needed). Dispence FRUIT flavor please. Can take 1-2 per hour in place of a cigarette.     Family History       Problem Relation (Age of Onset)    Breast cancer Sister    Cancer Father, Brother    Coronary artery disease Cousin    Diabetes Other    Hypertension Other    Stomach cancer Mother    Throat cancer Brother          Tobacco Use    Smoking status: Current Every Day Smoker     Packs/day: 1.00     Years: 40.00     Pack years: 40.00     Types: Cigarettes    Smokeless tobacco: Never Used    Tobacco comment: Restarted 1 .5 yrs. ago   Substance and Sexual Activity    Alcohol use: No     Alcohol/week: 0.0 standard drinks    Drug use: No    Sexual activity: Yes     Partners: Male       Objective:     Vital Signs (Most Recent):  Temp: 98.1 °F (36.7 °C) (03/21/22 1912)  Pulse: 67 (03/21/22 1912)  Resp: 16 (03/21/22 1912)  BP: 117/66 (03/21/22 1912)  SpO2: 96 % (03/21/22 1912) Vital Signs (24h Range):  Temp:  [97.2 °F (36.2 °C)-98.3 °F (36.8 °C)] 98.1 °F (36.7 °C)  Pulse:  [59-89] 67  Resp:   [16-20] 16  SpO2:  [93 %-99 %] 96 %  BP: (103-127)/(58-69) 117/66     Weight: 120.2 kg (265 lb)  Body mass index is 48.47 kg/m².    SpO2: 96 %  O2 Device (Oxygen Therapy): room air      Intake/Output Summary (Last 24 hours) at 3/21/2022 1924  Last data filed at 3/20/2022 2000  Gross per 24 hour   Intake 120 ml   Output --   Net 120 ml       Lines/Drains/Airways       Peripheral Intravenous Line  Duration                  Peripheral IV - Single Lumen 03/18/22 22 G Left Forearm 3 days                    Physical Exam  Constitutional: No distress, obese, conversant  HEENT: Sclera anicteric, PERRLA, EOMI  Neck: No JVD, no masses, good movement  CV: RRR, S1 and S2 normal, no additional heart sounds or murmurs. Pulses 2+ and equal bilaterally in radial arteries, Barron's normal on right. Distal pulses are 2+ and equal in the femoral, DP and PT areas bilaterally  Pulm: Clear to auscultation bilaterally with symmetrical expansion. Chest wall palpated for reproduction of pain symptoms, and no pain was able to be produced on palpation or resistance exercises  GI: Abdomen soft, non-tender, good bowel sounds  Extremities: Both extremities intact and grossly normal, skin is warm, no edema noted  Skin: No ecchymosis, erythema, or ulcers  Psych: AOx3, appropriate affect  Neuro: CNII-XII intact, no focal deficits    Significant Labs: All pertinent lab results from the last 24 hours have been reviewed.    Significant Imaging: Echocardiogram: 2D echo with color flow doppler:   Results for orders placed or performed during the hospital encounter of 07/20/16   2D Echo w/ Color Flow Doppler   Result Value Ref Range    EF + QEF 60 55 - 65    Diastolic Dysfunction No     Mitral Valve Mobility NORMAL     Narrative    Date of Procedure: 07/20/2016        TEST DESCRIPTION   Technical Quality: This is a technically adequate study.     Aorta: The aortic root is normal in size, measuring 2.3 cm at sinotubular junction.     Left Atrium: The left  atrial volume index is normal, measuring 24.21 cc/m2.     Left Ventricle: The left ventricle is normal in size, with an end-diastolic diameter of 4.5 cm, and an end-systolic diameter of 3.2 cm. LV wall thickness is normal, with the septum measuring 1.2 cm and the posterior wall measuring 1.0 cm across. Relative   wall thickness was increased at 0.44, and the LV mass index was 96.0 g/m2 consistent with concentric remodeling. Global left ventricular systolic function appears normal. Visually estimated ejection fraction is 60-65%. The LV Doppler derived stroke   volume equals 60.0 ccs.   The E/e'(lat) is 8, consistent with normal diastolic function.     Right Atrium: The right atrium is normal in size, measuring 3.7 cm in length in the apical view.     Right Ventricle: The right ventricle is normal in size measuring 2.7 cm at the base in the apical right ventricle-focused view. Global right ventricular systolic function appears normal.     Aortic Valve:  The aortic valve is normal in structure with normal leaflet mobility. The aortic valve is tri-leaflet in structure.     Mitral Valve:  The mitral valve is normal in structure with normal leaflet mobility.     Tricuspid Valve:  The tricuspid valve is normal in structure with normal leaflet mobility.     Pulmonary Valve:  The pulmonic valve is not well seen.     IVC: IVC is normal in size and collapses > 50% with a sniff, suggesting normal right atrial pressure of 3 mmHg.     Intracavitary: There is no evidence of pericardial effusion, intracavity mass, thrombi, or vegetation.         CONCLUSIONS     1 - Normal left ventricular systolic function (EF 60-65%).     2 - Normal left ventricular diastolic function.     3 - Normal right ventricular systolic function .     4 - Concentric remodeling.             This document has been electronically    SIGNED BY: Doris Coleman MD On: 07/20/2016 09:42    and Transthoracic echo (TTE) complete (Cupid Only): No results found for this  or any previous visit.

## 2022-03-22 NOTE — PROGRESS NOTES
"[facility] Tele[facility] ([address] Medicine  Progress Note    Patient Name: Sonya Armendariz  MRN: [id number]  Patient Class: OP- Observation   Admission Date: 3/18/2022  Length of Stay: 0 days  Attending Physician: Eamon Harp MD  Primary Care Provider: Sivakumar Wing MD        Subjective:     Principal Problem:<principal problem not specified>        HPI:  Ms. Armendariz is a 60 yo F with a h/o CAD s/p LAD stent, ?CHF, htn, hld, and vertigo who presents with chest pain. Pt reports chest pain came on acutely this morning while she was at work. Was not exerting herself. Describes pain as an "sore" substernal pressure that feels similar to when she previously had an MI. She took 3 nitros at home which partially relieved the pain. Received , 4th nitro, and nitro paste from EMS which completely resolved the pain. Notes associated diaphoresis and dyspnea. Denies cough or orthopnea. No change in recent exercise tolerance. She does note she had been noticing this same pain in the evenings before going to bed for about the past three weeks. Last stress test was SPECT on 1/31/20 which was negative. Reports adherence to all meds. Smokes 12 cigarettes per day.      Overview/Hospital Course:  Ms. Armendariz was admitted to Medicine on telemetry. Trop and EKG were trended and remained negative/wnl. No tele events were recorded. She had no additional episodes of chest pain. NM perfusion scan revealed a reversible defect. LHC on 3/22 showed extensive disease. Consulting CTS to eval for CABG.      Interval History:   NAEON. Feels well today, no complaints. Denies ongoing CP or dyspnea. LHC revealed extensive disease, now awaiting CT surg eval.    Review of Systems   Constitutional:  Negative for activity change, appetite change and fatigue.   HENT:  Negative for congestion.    Respiratory:  Negative for cough, chest tightness, shortness of breath and wheezing.    Cardiovascular:  Negative for chest pain, " palpitations and leg swelling.   Gastrointestinal:  Negative for abdominal pain, diarrhea and nausea.   Genitourinary:  Negative for difficulty urinating, dysuria and urgency.   Musculoskeletal:  Negative for arthralgias and back pain.   Skin:  Negative for rash.   Neurological:  Negative for dizziness, weakness and light-headedness.   Objective:     Vital Signs (Most Recent):  Temp: 97.5 °F (36.4 °C) (03/22/22 1252)  Pulse: 60 (03/22/22 1500)  Resp: (!) 22 (03/22/22 1500)  BP: (!) 146/78 (03/22/22 1430)  SpO2: 97 % (03/22/22 1500)   Vital Signs (24h Range):  Temp:  [97.5 °F (36.4 °C)-98.6 °F (37 °C)] 97.5 °F (36.4 °C)  Pulse:  [54-88] 60  Resp:  [16-32] 22  SpO2:  [93 %-99 %] 97 %  BP: (112-146)/(57-80) 146/78     Weight: 120.2 kg (265 lb)  Body mass index is 48.47 kg/m².  No intake or output data in the 24 hours ending 03/22/22 1547   Physical Exam  Constitutional:       General: She is not in acute distress.     Appearance: She is well-developed. She is not ill-appearing, toxic-appearing or diaphoretic.   HENT:      Head: Atraumatic. No abrasion, contusion or laceration.      Nose: Nose normal.      Mouth/Throat:      Pharynx: No oropharyngeal exudate.   Eyes:      General: No scleral icterus.        Right eye: No discharge.         Left eye: No discharge.      Conjunctiva/sclera: Conjunctivae normal.      Pupils: Pupils are equal, round, and reactive to light.   Neck:      Vascular: No JVD.   Cardiovascular:      Rate and Rhythm: Normal rate and regular rhythm.      Heart sounds: Normal heart sounds. No murmur heard.    No friction rub. No gallop.   Pulmonary:      Effort: Pulmonary effort is normal. No accessory muscle usage or respiratory distress.      Breath sounds: Normal breath sounds. No wheezing.      Comments: Able to lie flat with no dyspnea  Abdominal:      General: Bowel sounds are normal. There is no distension.      Palpations: Abdomen is soft. There is no mass.      Tenderness: There is no  abdominal tenderness. There is no guarding or rebound.   Musculoskeletal:         General: No tenderness or deformity. Normal range of motion.      Cervical back: Normal range of motion and neck supple. No rigidity.   Lymphadenopathy:      Cervical: No cervical adenopathy.   Skin:     General: Skin is warm and dry.      Capillary Refill: Capillary refill takes less than 2 seconds.      Findings: No bruising, ecchymosis, erythema, petechiae or rash.      Nails: There is no clubbing.   Neurological:      Mental Status: She is alert and oriented to person, place, and time.      GCS: GCS eye subscore is 4. GCS verbal subscore is 5. GCS motor subscore is 6.      Cranial Nerves: No cranial nerve deficit.      Sensory: No sensory deficit.      Motor: No abnormal muscle tone.      Coordination: Coordination normal.      Deep Tendon Reflexes: Reflexes normal.   Psychiatric:         Speech: Speech normal.         Behavior: Behavior normal.         Thought Content: Thought content normal.         Judgment: Judgment normal.       Significant Labs: All pertinent labs within the past 24 hours have been reviewed.    Significant Imaging: I have reviewed all pertinent imaging results/findings within the past 24 hours.      Assessment/Plan:      Other chest pain  HEART 5. Concerning history given prior MI and ongoing risk factors. Trop/EKG negative x 2.  - Admit, monitor on tele  - Spect/Lexiscan positive for mild reversible anterior wall defect comprising approximately 15-20% of the LV myocardium consistent with ischemia  - Intervention cards consulted. Went for LHC on 3/22 which showed multivessel disease with LM involvement.  - Cards to d/w CTS for CABG      Current moderate episode of major depressive disorder without prior episode  Continue lexapro, WB        JONH (obstructive sleep apnea)  Previously had a CPAP machine  - Refer to sleep medicine on discharge  - Reviewed sleep hygiene techniques      Tobacco use   on  cessation      Hypertension  - Continue losartan/hctz, toprol      CAD (coronary artery disease)  Continue ASA, atorva, toprol        VTE Risk Mitigation (From admission, onward)         Ordered     enoxaparin injection 40 mg  Every 12 hours         03/18/22 1553     IP VTE HIGH RISK PATIENT  Once         03/18/22 1553     Place sequential compression device  Until discontinued         03/18/22 1553                Discharge Planning   BLUE: 3/22/2022     Code Status: Full Code   Is the patient medically ready for discharge?: No    Reason for patient still in hospital (select all that apply): Patient trending condition  Discharge Plan A: Home                  Eamon Harp MD  Department of Hospital Medicine   [facility] ([place]

## 2022-03-22 NOTE — ASSESSMENT & PLAN NOTE
IMPRESSION/ PLAN:   -begin losartan 12.5mg daily  - increase atorv 40 mg qhs  - cardiac rehab I and II  - will further discuss case in AM    Anti platelet: ASA 81, load with ticagrellor 180mg X1 then 90mg BID  Allergies: Plavix (anaphylaxis)   Access: right radial   Catheter: Shar     -The risks, benefits & alternatives of the procedure were explained to the patient.    -The risks of coronary angiography include but are not limited to:  Bleeding, infection, heart rhythm abnormalities, allergic reactions, kidney injury, stroke and death.    -Should stenting be indicated, the patient has agreed to dual anti-platelet therapy for 1-consecutive year with a drug-eluting stent and a minimum of 1-month with the use of a bare metal stent.    -The risks of moderate sedation include hypotension, respiratory depression, arrhythmias, bronchospasm, & death.    -Informed consent was obtained & the patient is agreeable to proceed with the procedure.  -This patient was discussed with the attending interventional cardiologist who agrees with the above assessment & plan.

## 2022-03-22 NOTE — PLAN OF CARE
No distress overnight, slept well. No complaints of pain. NSR on tele monitor. Npo since mn for am procedure.

## 2022-03-22 NOTE — TELEPHONE ENCOUNTER
Staff reached out to pt about virtual appt. Sleep questions listed in my chart, and directions for virtual listed in my chart.

## 2022-03-23 ENCOUNTER — ANESTHESIA EVENT (OUTPATIENT)
Dept: SURGERY | Facility: HOSPITAL | Age: 62
DRG: 236 | End: 2022-03-23
Payer: COMMERCIAL

## 2022-03-23 VITALS
DIASTOLIC BLOOD PRESSURE: 67 MMHG | RESPIRATION RATE: 17 BRPM | SYSTOLIC BLOOD PRESSURE: 146 MMHG | HEIGHT: 62 IN | BODY MASS INDEX: 48.76 KG/M2 | OXYGEN SATURATION: 95 % | TEMPERATURE: 98 F | HEART RATE: 68 BPM | WEIGHT: 265 LBS

## 2022-03-23 LAB
AV INDEX (PROSTH): 0.68
AV MEAN GRADIENT: 7 MMHG
AV PEAK GRADIENT: 15 MMHG
AV VALVE AREA: 2.07 CM2
AV VELOCITY RATIO: 0.55
BSA FOR ECHO PROCEDURE: 2.29 M2
CV ECHO LV RWT: 0.29 CM
DOP CALC AO PEAK VEL: 1.94 M/S
DOP CALC AO VTI: 35.95 CM
DOP CALC LVOT AREA: 3 CM2
DOP CALC LVOT DIAMETER: 1.97 CM
DOP CALC LVOT PEAK VEL: 1.06 M/S
DOP CALC LVOT STROKE VOLUME: 74.55 CM3
DOP CALCLVOT PEAK VEL VTI: 24.47 CM
E WAVE DECELERATION TIME: 237.57 MSEC
E/A RATIO: 0.83
E/E' RATIO: 9.18 M/S
ECHO LV POSTERIOR WALL: 0.67 CM (ref 0.6–1.1)
EJECTION FRACTION: 60 %
FRACTIONAL SHORTENING: 40 % (ref 28–44)
INTERVENTRICULAR SEPTUM: 0.82 CM (ref 0.6–1.1)
LA MAJOR: 5.1 CM
LA MINOR: 4.55 CM
LA WIDTH: 3.36 CM
LEFT ARM DIASTOLIC BLOOD PRESSURE: 65 MMHG
LEFT ARM SYSTOLIC BLOOD PRESSURE: 117 MMHG
LEFT ATRIUM SIZE: 3.51 CM
LEFT ATRIUM VOLUME INDEX MOD: 16.1 ML/M2
LEFT ATRIUM VOLUME INDEX: 22.4 ML/M2
LEFT ATRIUM VOLUME MOD: 34.62 CM3
LEFT ATRIUM VOLUME: 48.21 CM3
LEFT CBA DIAS: 14 CM/S
LEFT CBA SYS: 54 CM/S
LEFT CCA DIST DIAS: 16 CM/S
LEFT CCA DIST SYS: 57 CM/S
LEFT CCA MID DIAS: 15 CM/S
LEFT CCA MID SYS: 65 CM/S
LEFT CCA PROX DIAS: 14 CM/S
LEFT CCA PROX SYS: 78 CM/S
LEFT ECA DIAS: 8 CM/S
LEFT ECA SYS: 89 CM/S
LEFT ICA DIST DIAS: 23 CM/S
LEFT ICA DIST SYS: 73 CM/S
LEFT ICA MID DIAS: 28 CM/S
LEFT ICA MID SYS: 73 CM/S
LEFT ICA PROX DIAS: 22 CM/S
LEFT ICA PROX SYS: 68 CM/S
LEFT INTERNAL DIMENSION IN SYSTOLE: 2.73 CM (ref 2.1–4)
LEFT VENTRICLE DIASTOLIC VOLUME INDEX: 44.11 ML/M2
LEFT VENTRICLE DIASTOLIC VOLUME: 94.84 ML
LEFT VENTRICLE MASS INDEX: 49 G/M2
LEFT VENTRICLE SYSTOLIC VOLUME INDEX: 12.9 ML/M2
LEFT VENTRICLE SYSTOLIC VOLUME: 27.81 ML
LEFT VENTRICULAR INTERNAL DIMENSION IN DIASTOLE: 4.55 CM (ref 3.5–6)
LEFT VENTRICULAR MASS: 105.56 G
LEFT VERTEBRAL DIAS: 14 CM/S
LEFT VERTEBRAL SYS: 52 CM/S
LV LATERAL E/E' RATIO: 7.09 M/S
LV SEPTAL E/E' RATIO: 13 M/S
MV PEAK A VEL: 0.94 M/S
MV PEAK E VEL: 0.78 M/S
MV STENOSIS PRESSURE HALF TIME: 68.89 MS
MV VALVE AREA P 1/2 METHOD: 3.19 CM2
OHS CV CAROTID RIGHT ICA EDV HIGHEST: 20
OHS CV CAROTID ULTRASOUND LEFT ICA/CCA RATIO: 1.28
OHS CV CAROTID ULTRASOUND RIGHT ICA/CCA RATIO: 1.24
OHS CV PV CAROTID LEFT HIGHEST CCA: 78
OHS CV PV CAROTID LEFT HIGHEST ICA: 73
OHS CV PV CAROTID RIGHT HIGHEST CCA: 84
OHS CV PV CAROTID RIGHT HIGHEST ICA: 93
OHS CV US CAROTID LEFT HIGHEST EDV: 28
RA MAJOR: 4.99 CM
RA PRESSURE: 3 MMHG
RA WIDTH: 3.09 CM
RIGHT ARM DIASTOLIC BLOOD PRESSURE: 65 MMHG
RIGHT ARM SYSTOLIC BLOOD PRESSURE: 117 MMHG
RIGHT CBA DIAS: 26 CM/S
RIGHT CBA SYS: 89 CM/S
RIGHT CCA DIST DIAS: 20 CM/S
RIGHT CCA DIST SYS: 75 CM/S
RIGHT CCA MID DIAS: 17 CM/S
RIGHT CCA MID SYS: 75 CM/S
RIGHT CCA PROX DIAS: 14 CM/S
RIGHT CCA PROX SYS: 84 CM/S
RIGHT ECA DIAS: 6 CM/S
RIGHT ECA SYS: 60 CM/S
RIGHT ICA DIST DIAS: 17 CM/S
RIGHT ICA DIST SYS: 93 CM/S
RIGHT ICA MID DIAS: 10 CM/S
RIGHT ICA MID SYS: 53 CM/S
RIGHT ICA PROX DIAS: 20 CM/S
RIGHT ICA PROX SYS: 70 CM/S
RIGHT VERTEBRAL DIAS: 7 CM/S
RIGHT VERTEBRAL SYS: 40 CM/S
RV TISSUE DOPPLER FREE WALL SYSTOLIC VELOCITY 1 (APICAL 4 CHAMBER VIEW): 16.57 CM/S
SINUS: 2.96 CM
TDI LATERAL: 0.11 M/S
TDI SEPTAL: 0.06 M/S
TDI: 0.09 M/S
TRICUSPID ANNULAR PLANE SYSTOLIC EXCURSION: 2.71 CM

## 2022-03-23 PROCEDURE — 99239 HOSP IP/OBS DSCHRG MGMT >30: CPT | Mod: ,,, | Performed by: STUDENT IN AN ORGANIZED HEALTH CARE EDUCATION/TRAINING PROGRAM

## 2022-03-23 PROCEDURE — 99239 PR HOSPITAL DISCHARGE DAY,>30 MIN: ICD-10-PCS | Mod: ,,, | Performed by: STUDENT IN AN ORGANIZED HEALTH CARE EDUCATION/TRAINING PROGRAM

## 2022-03-23 PROCEDURE — 94761 N-INVAS EAR/PLS OXIMETRY MLT: CPT

## 2022-03-23 RX ORDER — ATORVASTATIN CALCIUM 40 MG/1
40 TABLET, FILM COATED ORAL DAILY
Qty: 90 TABLET | Refills: 0 | Status: SHIPPED | OUTPATIENT
Start: 2022-03-23 | End: 2022-05-03 | Stop reason: SDUPTHER

## 2022-03-23 RX ORDER — ASPIRIN 325 MG
325 TABLET ORAL DAILY
Qty: 30 TABLET | Refills: 0 | Status: SHIPPED | OUTPATIENT
Start: 2022-03-23 | End: 2022-05-03 | Stop reason: SDUPTHER

## 2022-03-23 NOTE — PLAN OF CARE
POC reviewed with patient. All questions and concerns reviewed. Safety precautions implemented and maintained. Pt angiogram completed, further consultation with cardio ordered. Please see procedure note. Pt VSS and pt denies any further needs. Pt resting in bed in NAD at this time. Bed locked in lowest position. Call bell within reach.        Problem: Adult Inpatient Plan of Care  Goal: Plan of Care Review  Outcome: Ongoing, Progressing  Goal: Patient-Specific Goal (Individualized)  Outcome: Ongoing, Progressing  Goal: Absence of Hospital-Acquired Illness or Injury  Outcome: Ongoing, Progressing  Goal: Optimal Comfort and Wellbeing  Outcome: Ongoing, Progressing  Goal: Readiness for Transition of Care  Outcome: Ongoing, Progressing     Problem: Bariatric Environmental Safety  Goal: Safety Maintained with Care  Outcome: Ongoing, Progressing     Problem: Fall Injury Risk  Goal: Absence of Fall and Fall-Related Injury  Outcome: Ongoing, Progressing

## 2022-03-23 NOTE — ANESTHESIA PREPROCEDURE EVALUATION
[facility]  Anesthesia Pre-Operative Evaluation         Patient Name/: Sonya Armendariz, 1960  MRN: [id number]    SUBJECTIVE:     Pre-operative evaluation for Procedure(s) (LRB):  CORONARY ARTERY BYPASS GRAFT (CABG) (N/A)     2022    Sonya Armendariz is a 61 y.o. female w/ a significant PMHx of morbid obesity (BMI 50), hypertension, JONH, CAD s/p LAD stent '12 and current tobacco abuse.  LHC on 3/22 showed multivessel disease with LM involvement.  Last stress test was SPECT on 20 which was negative. CTS team plans for 3v CABG.  Patient now presents for the above procedure(s).    Coronary angiogram details: 60% left main disease with a moderate sized mid and distal LAD, 90% ostial LCx (dominant) with a moderate sized left posterolateral ventricular artery, small to moderate sized left PDA, and nondominant RCA.      TTE: 60% ejection fraction with no significant valvulopathy.      Prev airway: None documented.      Patient Active Problem List   Diagnosis    CAD (coronary artery disease)    Hyperlipidemia    Hypertension    Obesity    Tobacco use    Dysphagia    Heartburn    Reflux    Abdominal pain, other specified site    Loose stools    JONH (obstructive sleep apnea)    Abdominal pain, RUQ (right upper quadrant)    Bloating    Otalgia of both ears    Nausea    History of placement of stent in LAD coronary artery    BMI 45.0-49.9, adult    Current moderate episode of major depressive disorder without prior episode    Other chest pain       Review of patient's allergies indicates:   Allergen Reactions    Amoxicillin Diarrhea    Plavix [clopidogrel]        Current Inpatient Medications:    aspirin  325 mg Oral Daily    atorvastatin  40 mg Oral Daily    buPROPion  150 mg Oral Daily    enoxaparin  40 mg Subcutaneous Q12H    EScitalopram oxalate  10 mg Oral Daily    fluticasone propionate  1 spray Each Nostril Daily    losartan-hydrochlorothiazide  50-12.5 mg  1 tablet Oral Daily    metoprolol succinate  25 mg Oral Daily    multivitamin  1 tablet Oral Daily    oxybutynin  5 mg Oral Daily       No current facility-administered medications on file prior to encounter.     Current Outpatient Medications on File Prior to Encounter   Medication Sig Dispense Refill    aspirin (ECOTRIN) 81 MG EC tablet Take 1 tablet (81 mg total) by mouth once daily.      atorvastatin (LIPITOR) 20 MG tablet TAKE 1 TABLET(20 MG) BY MOUTH EVERY DAY 30 tablet 11    buPROPion (WELLBUTRIN SR) 150 MG TBSR 12 hr tablet Take 1 tablet (150 mg total) by mouth 2 (two) times daily. (Patient not taking: No sig reported) 60 tablet 11    cyclobenzaprine (FLEXERIL) 5 MG tablet TAKE 1 TABLET(5 MG) BY MOUTH THREE TIMES DAILY AS NEEDED FOR MUSCLE SPASMS 30 tablet 0    EScitalopram oxalate (LEXAPRO) 10 MG tablet Take 1 tablet (10 mg total) by mouth once daily. 30 tablet 11    fluticasone (FLONASE) 50 mcg/actuation nasal spray SPRAY ONCE IN EACH NOSTRIL EVERY DAY 16 g 3    hydrOXYzine HCl (ATARAX) 25 MG tablet Take 1 tablet (25 mg total) by mouth 3 (three) times daily as needed for Itching or Anxiety. 60 tablet 3    losartan-hydrochlorothiazide 50-12.5 mg (HYZAAR) 50-12.5 mg per tablet Take 1 tablet by mouth once daily. 30 tablet 11    meclizine (ANTIVERT) 12.5 mg tablet Take 1 tablet (12.5 mg total) by mouth 3 (three) times daily as needed for Nausea. 30 tablet 0    metoprolol succinate (TOPROL-XL) 25 MG 24 hr tablet Take 1 tablet (25 mg total) by mouth once daily. 30 tablet 11    multivitamin (THERAGRAN) per tablet Take 1 tablet by mouth once daily.      nicotine (NICODERM CQ) 21 mg/24 hr APPLY 1 PATCH EXTERNALLY TO THE SKIN EVERY DAY FOR 6 WEEKS 42 patch 0    nitroGLYCERIN (NITROSTAT) 0.4 MG SL tablet PLACE 1 TABLET UNDER THE TONGUE EVERY 5 MINUTES AS NEEDED 25 tablet 0    solifenacin (VESICARE) 10 MG tablet Take 1 tablet (10 mg total) by mouth once daily. For bladder urgency 30 tablet  11    [DISCONTINUED] diclofenac sodium (VOLTAREN) 1 % Gel Apply 2 g topically 3 (three) times daily as needed. (Patient not taking: No sig reported) 100 g 3       Past Surgical History:   Procedure Laterality Date    ANGIOGRAM, CORONARY, WITH LEFT HEART CATHETERIZATION N/A 3/22/2022    Procedure: Angiogram, Coronary, with Left Heart Cath;  Surgeon: Giuseppe Merchant MD;  Location: [facility] CATH LAB;  Service: Cardiology;  Laterality: N/A;    COLONOSCOPY N/A 7/14/2017    Procedure: COLONOSCOPY;  Surgeon: Cathy Collier MD;  Location: [facility] ENDO;  Service: Endoscopy;  Laterality: N/A;    NO PAST SURGERIES         Social History:  Tobacco Use: High Risk    Smoking Tobacco Use: Current Every Day Smoker    Smokeless Tobacco Use: Never Used       Alcohol Use: Not on file       OBJECTIVE:     Vital Signs Range:  BMI Readings from Last 1 Encounters:   03/23/22 48.47 kg/m²       Temp:  [36.2 °C (97.2 °F)-36.7 °C (98 °F)]   Pulse:  [60-75]   Resp:  [16-20]   BP: (115-146)/(60-68)   SpO2:  [95 %-96 %]        Significant Labs:        Component Value Date/Time    WBC 5.45 03/22/2022 0913    HGB 14.3 03/22/2022 0913    HCT 44.0 03/22/2022 0913    HCT 43 03/18/2022 1132     03/22/2022 0913     03/22/2022 0913    K 4.2 03/22/2022 0913     03/22/2022 0913    CO2 25 03/22/2022 0913     03/22/2022 0913    BUN 11 03/22/2022 0913    CREATININE 0.8 03/22/2022 0913    MG 1.8 03/19/2022 0515    PHOS 3.9 08/02/2011 0608    CALCIUM 9.5 03/22/2022 0913    ALBUMIN 3.7 03/18/2022 1119    PROT 7.0 03/18/2022 1119    ALKPHOS 85 03/18/2022 1119    BILITOT 0.6 03/18/2022 1119    AST 19 03/18/2022 1119    ALT 15 03/18/2022 1119    INR 1.0 08/02/2011 1042    HGBA1C 6.1 (H) 03/19/2022 0515        Please see Results Review for additional labs.     Diagnostic Studies: No relevant studies.    EKG:   Results for orders placed or performed during the hospital encounter of 03/18/22   EKG 12-lead    Collection Time:  03/18/22  3:13 PM    Narrative    Test Reason : R07.9,    Vent. Rate : 066 BPM     Atrial Rate : 066 BPM     P-R Int : 150 ms          QRS Dur : 070 ms      QT Int : 418 ms       P-R-T Axes : 062 054 060 degrees     QTc Int : 438 ms    Age and gender specific analysis  Normal sinus rhythm  Septal infarct (cited on or before 16-JAN-2020)  Abnormal ECG  When compared with ECG of 18-MAR-2022 10:54,  ST now depressed in Inferior leads  Confirmed by SENA CISNEROS MD ([id number]) on 3/20/2022 12:35:29 AM    Referred By: ELIA   SELF           Confirmed By:SENA CISNEROS MD       ECHO:  Results for orders placed during the hospital encounter of 03/18/22    Echo    Interpretation Summary  · The left ventricle is normal in size with normal systolic function. The estimated ejection fraction is 60%.  · Normal right ventricular size with normal right ventricular systolic function.  · Normal left ventricular diastolic function.  · Normal central venous pressure (3 mmHg).      Spect/Lexiscan positive for mild reversible anterior wall defect comprising approximately 15-20% of the LV myocardium consistent with ischemia    LHC on 3/22 which showed multivessel disease with LM involvement.        ASSESSMENT/PLAN:         Pre-op Assessment     I have reviewed the Nursing Notes.    I have reviewed the Medications.     Review of Systems  Anesthesia Hx:  No problems with previous Anesthesia   Denies Personal Hx of Anesthesia complications.   Social:  Smoker    Cardiovascular:   Exercise tolerance: good Hypertension Past MI (2010) CAD asymptomatic CABG/stent (stents X 2 in 2010)  Angina (stable) CHF (hx, nml EF last echo) hyperlipidemia ECG has been reviewed.    Pulmonary:   Sleep Apnea, CPAP        Physical Exam  General: Well nourished and Cooperative    Airway:  Mallampati: II   Mouth Opening: Normal  TM Distance: Normal  Tongue: Normal  Neck ROM: Normal ROM    Dental:  Intact, Retainer, Caps / Implants    Chest/Lungs:  Clear to  auscultation, Normal Respiratory Rate    Heart:  Rate: Normal  Rhythm: Regular Rhythm        Anesthesia Plan  Type of Anesthesia, risks & benefits discussed:    Anesthesia Type: Gen ETT  Intra-op Monitoring Plan: Standard ASA Monitors, Art Line and Central Line  Post Op Pain Control Plan: multimodal analgesia and IV/PO Opioids PRN  Induction:  IV  Airway Plan: Direct, Post-Induction  Informed Consent: Informed consent signed with the Patient and all parties understand the risks and agree with anesthesia plan.  All questions answered.   ASA Score: 4  Day of Surgery Review of History & Physical: H&P Update referred to the surgeon/provider.    Ready For Surgery From Anesthesia Perspective.     .

## 2022-03-23 NOTE — CONSULTS
"Cardiothoracic Surgery Consult Note      SUBJECTIVE:     History of Present Illness:  Ms. Armendariz is a 60 yo F with a h/o CAD s/p LAD stent, ?CHF, htn, hld, and vertigo who presents with chest pain. Pt reports chest pain came on acutely this morning while she was at work. Was not exerting herself. Describes pain as an "sore" substernal pressure that feels similar to when she previously had an MI. She took 3 nitros at home which partially relieved the pain. Received , 4th nitro, and nitro paste from EMS which completely resolved the pain. Notes associated diaphoresis and dyspnea. Denies cough or orthopnea. No change in recent exercise tolerance. She does note she had been noticing this same pain in the evenings before going to bed for about the past three weeks. Last stress test was SPECT on 1/31/20 which was negative. Reports adherence to all meds. Smokes 12 cigarettes per day.    Past Medical History:   Diagnosis Date    Anticoagulant long-term use     CAD (coronary artery disease)     s/p LAD stent    CHF (congestive heart failure)     Gastroduodenitis     Helicobacter pylori (H. pylori) infection     Hyperlipidemia     Hypertension     MI (myocardial infarction)     x2    Obesity     Tobacco use     Urinary tract infection     Vaginal infection      Past Surgical History:   Procedure Laterality Date    ANGIOGRAM, CORONARY, WITH LEFT HEART CATHETERIZATION N/A 3/22/2022    Procedure: Angiogram, Coronary, with Left Heart Cath;  Surgeon: Giuseppe Merchant MD;  Location: [facility] CATH LAB;  Service: Cardiology;  Laterality: N/A;    COLONOSCOPY N/A 7/14/2017    Procedure: COLONOSCOPY;  Surgeon: Cathy Collier MD;  Location: [facility] ENDO;  Service: Endoscopy;  Laterality: N/A;    NO PAST SURGERIES       Family History   Problem Relation Age of Onset    Hypertension Other         multiple    Diabetes Other         multiple    Stomach cancer Mother     Cancer Father         unknown    Breast " cancer Sister     Throat cancer Brother     Cancer Brother         x 2,. unknown    Coronary artery disease Cousin      Social History     Tobacco Use    Smoking status: Current Every Day Smoker     Packs/day: 1.00     Years: 40.00     Pack years: 40.00     Types: Cigarettes    Smokeless tobacco: Never Used    Tobacco comment: Restarted 1 .5 yrs. ago   Substance Use Topics    Alcohol use: No     Alcohol/week: 0.0 standard drinks    Drug use: No      Review of patient's allergies indicates:   Allergen Reactions    Amoxicillin Diarrhea    Plavix [clopidogrel]      Current medications reviewed    Review of Systems:  Constitutional: Negative for fever, chills, distress  Skin: no acute disorders vs admission. Negative for rash, itching  HEENT: unremarkable.  Negative for sore throat, congestion  Cardiovascular:  Negative for chest pain orthopnea lower extremity edema.  Respiratory:  Negative for cough.  GI:  unremarkable.  Negative for abdominal pain, vomiting  :  Negative for hematuria, flank pain  Musculoskeletal:  Negative for falls, myalgias  Neuro:  Negative for dizziness, LOC  Psych:  Negative for substance abuse, memory loss        OBJECTIVE:     Vital Signs (Most Recent)  Temp: 98 °F (36.7 °C) (03/23/22 1157)  Pulse: 68 (03/23/22 1325)  Resp: 17 (03/23/22 1325)  BP: (!) 146/67 (03/23/22 1157)  SpO2: 95 % (03/23/22 1325)  Vital signs reviewed    Physical Exam:  General Appearance: no acute distress.  Normal for age  Skin: no acute lesions, minor bruises from phlebotomy  HEENT: no masses/hematoma, Jugular veins: not distended  Resp:  excursion/effort normal; clear to auscultation  CV:  Rate:  regular  Rhythm: regular  Murmur:  no significant murmur  GI: Bowel sounds: present; abdo soft, nondistended, nontender, no masses palpable  Extrem: Edema: minimal  Pulses: normal  Groin: no hematoma  Neuro: Alert and oriented; no focal deficit  Psych: no acute delirium noted  : voiding well  MSK: no acute  findings, ranges of motion unchanged vs previous      I have personally reviewed the imaging, electrocardiogram, and pertinent lab findings    ASSESSMENT/PLAN:     I have personally taken the history and examined this patient.     Ms. Armendariz is a pleasant 61 year old female smoker (1.5ppd x 45 years) with heart failure preserved ejection fraction (60% ejection fraction), coronary artery disease s/p LAD stent in 2012, hypertension, and BMI of 48 who presented with unstable angina, given Brilinta, and underwent coronary angiogram showing left main disease.  Coronary angiogram details: 60% left main disease with a moderate sized mid and distal LAD, 90% ostial LCx (dominant) with a moderate sized left posterolateral ventricular artery, small to moderate sized left PDA, and nondominant RCA.  The transthoracic echo shows 60% ejection fraction with no significant valvulopathy.      CT chest noncontrast shows minimal ascending aortic and severe aortic arch calcifications.  Carotid ultrasound showed nonsignificant disease.    Given the severity of disease and the symptoms, I recommend coronary artery bypass surgery x 3 (LIMA-LAD, SVG-LPL, SVG-LPDA).  I had a lengthy discussion with her about the risks vs. benefits of the surgery.  We discussed the risks including the predicted chance of mortality as well as morbidity such as stroke, kidney injury, respiratory failure, limb ischemia, myocardial infarction, sternal wound infection, and bleeding.  The Society of Thoracic Surgery (STS) risk score was also discussed.  With this history, I noted the patient has a higher chance of sternal wound complications and respiratory complications due to her very elevated BMI.  Additionally, we discussed the likely length of stay in the ICU and in the hospital, as well as the overall recovery period.  Ms. Armendariz is in agreement and we will proceed with surgery on Friday, April 1, 2022, with preop clinic appointment on Wednesday, March 30,  2022.      Leandro Howard MD  Cardiothoracic Surgery  [facility]

## 2022-03-23 NOTE — PLAN OF CARE
Sw setup Ly[employer] transport for Pt, nurse updated on vehicle make model and color and drivers contact number and ER front entrance.

## 2022-03-23 NOTE — PLAN OF CARE
Discharge instructions, diagnosis, medications, and follow up discussed with patient. Patient verbalized understanding. All questions and concerns answered. No needs expressed at the time. Pt is awake, alert and oriented with no acute distress noted. Respirations even and unlabored. Lyft ride arranged for pt.  Ambulatory off floor.        Problem: Adult Inpatient Plan of Care  Goal: Plan of Care Review  Outcome: Met  Goal: Patient-Specific Goal (Individualized)  Outcome: Met  Goal: Absence of Hospital-Acquired Illness or Injury  Outcome: Met  Goal: Optimal Comfort and Wellbeing  Outcome: Met  Goal: Readiness for Transition of Care  Outcome: Met     Problem: Bariatric Environmental Safety  Goal: Safety Maintained with Care  Outcome: Met     Problem: Fall Injury Risk  Goal: Absence of Fall and Fall-Related Injury  Outcome: Met

## 2022-03-23 NOTE — DISCHARGE SUMMARY
"[facility] - Telemetry Stepdown ([place] [id number])  [facility] Medicine  Discharge Summary      Patient Name: Sonya Armendariz  MRN: [id number]  Patient Class: IP- Inpatient  Admission Date: 3/18/2022  Hospital Length of Stay: 1 days  Discharge Date and Time:  03/23/2022 2:41 PM  Attending Physician: Eamon Harp MD   Discharging Provider: Eamon Harp MD  Primary Care Provider: Sivakumar Wing MD  [facility] Medicine Team: [facility] B Eamon Harp MD    HPI:   Ms. Armendariz is a 60 yo F with a h/o CAD s/p LAD stent, ?CHF, htn, hld, and vertigo who presents with chest pain. Pt reports chest pain came on acutely this morning while she was at work. Was not exerting herself. Describes pain as an "sore" substernal pressure that feels similar to when she previously had an MI. She took 3 nitros at home which partially relieved the pain. Received , 4th nitro, and nitro paste from EMS which completely resolved the pain. Notes associated diaphoresis and dyspnea. Denies cough or orthopnea. No change in recent exercise tolerance. She does note she had been noticing this same pain in the evenings before going to bed for about the past three weeks. Last stress test was SPECT on 1/31/20 which was negative. Reports adherence to all meds. Smokes 12 cigarettes per day.      Procedure(s) (LRB):  Angiogram, Coronary, with Left Heart Cath (N/A)      Hospital Course:   Ms. Armendariz was admitted to Medicine on telemetry. Trop and EKG were trended and remained negative/wnl. No tele events were recorded. She had no additional episodes of chest pain. NM perfusion scan revealed a reversible defect. LHC on 3/22 showed extensive disease as detailed below. CTS consulted and plans to do outpatient CABG since she received Brilinta prior to the [facility], scheduled for 4/1. She was started on  on discharge. CT chest, TTE, and carotid ultrasound obtained prior to discharge per CTS request.    [facility] 3/22:  · LVEDP=16mmHg  · 60% distal LM stenosis  · 80% ostial " LCx stenosis  · 80% proximal LCx stenosis (distal to OM1) origin)  · The estimated blood loss was <50 mL.         Goals of Care Treatment Preferences:  Code Status: Full Code      Consults:   Consults (From admission, onward)        Status Ordering Provider     Inpatient consult to Cardiothoracic Surgery  Once        Provider:  (Not yet assigned)    Acknowledged LILI LÓPEZ     Inpatient consult to Interventional Cardiology  Once        Provider:  (Not yet assigned)    Completed LISA MARINA          No new Assessment & Plan notes have been filed under this hospital service since the last note was generated.  Service: Hospital Medicine    Final Active Diagnoses:    Diagnosis Date Noted POA    PRINCIPAL PROBLEM:  CAD (coronary artery disease) [I25.10]  Yes    Other chest pain [R07.89] 03/18/2022 Yes    Current moderate episode of major depressive disorder without prior episode [F32.1] 01/28/2022 Yes    JONH (obstructive sleep apnea) [G47.33] 07/24/2015 Yes    Hypertension [I10]  Yes    Tobacco use [Z72.0]  Yes      Problems Resolved During this Admission:       Discharged Condition: fair    Disposition: Home or Self Care    Follow Up:   Follow-up Information     [facility] Follow up.    Why: Nurse will call to schedule appointment; however, if you do not hear from anyone within 48 hours contact the number listed.  Contact information:  [address] [address] [phone]                       Patient Instructions:      Ambulatory referral/consult to Sleep Disorders   Standing Status: Future   Referral Priority: Routine Referral Type: Consultation   Requested Specialty: Sleep Medicine   Number of Visits Requested: 1     Ambulatory referral/consult to [facility] Family Med   Standing Status: Future   Referral Priority: Routine Referral Type: Consultation   Referral Reason: Specialty Services Required   Requested Specialty: Family Medicine   Number of Visits Requested: 1      Ambulatory referral/consult to Cardiology   Standing Status: Future   Referral Priority: Routine Referral Type: Consultation   Referral Reason: Specialty Services Required   Requested Specialty: Cardiology   Number of Visits Requested: 1     Diet Cardiac     Notify your health care provider if you experience any of the following:   Order Comments: Chest pain, difficulty breathing, nausea, sweating     Activity as tolerated       Significant Diagnostic Studies:   [facility] as detailed above    Pending Diagnostic Studies:     None         Medications:  Reconciled Home Medications:      Medication List      START taking these medications    aspirin 325 MG tablet  Take 1 tablet (325 mg total) by mouth once daily.  Replaces: aspirin 81 MG EC tablet        CHANGE how you take these medications    atorvastatin 40 MG tablet  Commonly known as: LIPITOR  Take 1 tablet (40 mg total) by mouth once daily.  What changed:   · medication strength  · how much to take     nicotine 21 mg/24 hr  Commonly known as: NICODERM CQ  APPLY 1 PATCH EXTERNALLY TO THE SKIN EVERY DAY FOR 6 WEEKS  What changed: Another medication with the same name was removed. Continue taking this medication, and follow the directions you see here.        CONTINUE taking these medications    buPROPion 150 MG TBSR 12 hr tablet  Commonly known as: WELLBUTRIN SR  Take 1 tablet (150 mg total) by mouth 2 (two) times daily.     cyclobenzaprine 5 MG tablet  Commonly known as: FLEXERIL  TAKE 1 TABLET(5 MG) BY MOUTH THREE TIMES DAILY AS NEEDED FOR MUSCLE SPASMS     EScitalopram oxalate 10 MG tablet  Commonly known as: LEXAPRO  Take 1 tablet (10 mg total) by mouth once daily.     fluticasone propionate 50 mcg/actuation nasal spray  Commonly known as: FLONASE  SPRAY ONCE IN EACH NOSTRIL EVERY DAY     hydrOXYzine HCL 25 MG tablet  Commonly known as: ATARAX  Take 1 tablet (25 mg total) by mouth 3 (three) times daily as needed for Itching or Anxiety.      losartan-hydrochlorothiazide 50-12.5 mg 50-12.5 mg per tablet  Commonly known as: HYZAAR  Take 1 tablet by mouth once daily.     meclizine 12.5 mg tablet  Commonly known as: ANTIVERT  Take 1 tablet (12.5 mg total) by mouth 3 (three) times daily as needed for Nausea.     metoprolol succinate 25 MG 24 hr tablet  Commonly known as: TOPROL-XL  Take 1 tablet (25 mg total) by mouth once daily.     multivitamin per tablet  Commonly known as: THERAGRAN  Take 1 tablet by mouth once daily.     nitroGLYCERIN 0.4 MG SL tablet  Commonly known as: NITROSTAT  PLACE 1 TABLET UNDER THE TONGUE EVERY 5 MINUTES AS NEEDED     solifenacin 10 MG tablet  Commonly known as: VESICARE  Take 1 tablet (10 mg total) by mouth once daily. For bladder urgency        STOP taking these medications    aspirin 81 MG EC tablet  Commonly known as: ECOTRIN  Replaced by: aspirin 325 MG tablet     diclofenac sodium 1 % Gel  Commonly known as: VOLTAREN     nicotine (polacrilex) 2 mg Gum  Commonly known as: NICORETTE     nicotine polacrilex 2 MG Lozg            Indwelling Lines/Drains at time of discharge:   Lines/Drains/Airways     None                 Time spent on the discharge of patient: >30 minutes         Eamon Harp MD  Department of Hospital Medicine  [facility] - Telemetry Stepdown ([place])

## 2022-03-24 ENCOUNTER — ANESTHESIA (OUTPATIENT)
Dept: SURGERY | Facility: HOSPITAL | Age: 62
DRG: 236 | End: 2022-03-24
Payer: COMMERCIAL

## 2022-03-24 ENCOUNTER — CLINICAL SUPPORT (OUTPATIENT)
Dept: SMOKING CESSATION | Facility: CLINIC | Age: 62
End: 2022-03-24
Payer: COMMERCIAL

## 2022-03-24 DIAGNOSIS — F17.200 NICOTINE DEPENDENCE: Primary | ICD-10-CM

## 2022-03-24 PROCEDURE — 99407 BEHAV CHNG SMOKING > 10 MIN: CPT | Mod: S$GLB,,,

## 2022-03-24 PROCEDURE — 99407 PR TOBACCO USE CESSATION INTENSIVE >10 MINUTES: ICD-10-PCS | Mod: S$GLB,,,

## 2022-03-24 NOTE — PROGRESS NOTES
Spoke with patient today in regard to smoking cessation progress for 12 month phone follow up on quit 3. Patient has been tobacco free for 6 days and has an upcoming cardiac procedure. Patient has scheduled an appointment to return to the program for Quit attempt #5. Informed patient of benefit period, future follow ups, and contact information if any further help or support is needed. Will complete smart form and resolve Quit attempt #3.  Will complete 3 month f/u on Quit 4.

## 2022-03-24 NOTE — PLAN OF CARE
David Garcia - Telemetry Stepdown ([facility])  Discharge Final Note    Primary Care Provider: Sivakumar Wing MD    Expected Discharge Date: 3/23/2022    Patient discharged to home via Lyft transportation.     Patient's bedside nurse and patient notified of the above.      Final Discharge Note (most recent)       Final Note - 03/24/22 0938          Final Note    Assessment Type Final Discharge Note (P)      Anticipated Discharge Disposition Home or Self Care (P)         Post-Acute Status    Post-Acute Authorization Other (P)      Other Status No Post-Acute Service Needs (P)                      Important Message from Medicare             Contact Info       [facility]    [address]  [id number]  [place] [address]   Phone: [phone]       Next Steps: Follow up    Instructions: Nurse will call to schedule appointment; however, if you do not hear from anyone within 48 hours contact the number listed.            Future Appointments   Date Time Provider Department Center   3/30/2022  9:40 AM Leandro Howard MD [facility] CARDVAS David Garcia   4/1/2022  9:00 AM Dereck Guthrie PA-C [facility] LSUFMRE Favian Myersi   4/19/2022  3:00 PM [facility] MAMMO1 [facility] ALESSANDRO Myersi       SW scheduled post-discharge follow-up appointment and information added to AVS.     Doris Jade LMSW  [facility]  Ext. [phone]

## 2022-03-25 NOTE — PHYSICIAN QUERY
PT Name: Sonya Armendariz  MR #: [id number]     DOCUMENTATION CLARIFICATION     CDS/: Maria Dolores Engle RN CDIS              Contact information:[email or web address]    This form is a permanent document in the medical record.     Query Date: March 25, 2022    By submitting this query, we are merely seeking further clarification of documentation.  Please utilize your independent clinical judgment when addressing the question(s) below.    The Medical Record contains the following   Indicators Supporting Clinical Findings Location in Medical Record   x Heart Failure documented heart failure preserved ejection fraction (60% ejection fraction),     ?CHF    CHF (congestive heart failure) CTS consult       HM note 3/22    IC consult Past medical history    x BNP 42   Labs 3/18   x EF/Echo · The left ventricle is normal in size with normal systolic function. The estimated ejection fraction is 60%.  · Normal right ventricular size with normal right ventricular systolic function.  · Normal left ventricular diastolic function.  · Normal central venous pressure (3 mmHg).     TTE 3/23     Radiology findings      Subjective/Objective Respiratory Conditions      Recent/Current MI      Heart Transplant, LVAD      Edema, JVD      Ascites      Diuretics/Meds      Other Treatment      Other       Heart failure is a clinical diagnosis which includes symptomatic fluid retention, elevated intracardiac pressures, and/or the inability of the heart to deliver adequate blood flow.     Heart Failure with reduced Ejection Fraction (HFrEF) or Systolic Heart Failure (loses ability to contract normally, EF is <40%)     Heart Failure with preserved Ejection Fraction (HFpEF) or Diastolic Heart Failure (stiff ventricles, does not relax properly, EF is >50%)      Heart Failure with Combined Systolic and Diastolic Failure (stiff ventricles, does not relax properly and EF is <50%)     Mid-range or mildly reduced ejection fraction (HFmrEF) is  classified as systolic heart failure.   Common clues to acute exacerbation:  Rapidly progressive symptoms (w/in 2 weeks of presentation), using IV diuretics, using supplemental O2, pulmonary edema on Xray, new or worsening pleural effusion, +JVD or other signs of volume overload, MI w/in 4 weeks, and/or BNP >500  The clinical guidelines noted are only system guidelines, and do not replace the providers clinical judgment.    Provider, please specify the diagnosis associated with the above clinical findings.    [   ]  Chronic Diastolic Heart Failure (HFpEF) - preexisting and stable   [   ]  Heart Failure Ruled Out   [   ]  Other (please specify): ___________________________________   [ x ]  Clinically Undetermined         Please document in your progress notes daily for the duration of treatment until resolved and include in your discharge summary.    References:  American Heart Association editorial staff. (2017, May). Ejection Fraction Heart Failure Measurement. American Heart Association. https://www.heart.org/en/health-topics/heart-failure/diagnosing-heart-failure/ejection-fraction-heart-failure-measurement#:~:text=Ejection%20fraction%20(EF)%20is%20a,pushed%20out%20with%20each%20heartbeat  MARY Perez (2020, December 15). Heart failure with preserved ejection fraction: Clinical manifestations and diagnosis. [employer]. https://www.[employer].com/contents/heart-failure-with-preserved-ejection-fraction-clinical-manifestations-and-diagnosis.  ICD-10-CM/PCS Coding Clinic Third Quarter ICD-10, Effective with discharges: September 8, 2020 [facility] § Heart failure with mid-range or mildly reduced ejection fraction (2020).  Form No. 67231

## 2022-03-27 RX ORDER — SOLIFENACIN SUCCINATE 10 MG/1
TABLET, FILM COATED ORAL
Qty: 30 TABLET | Refills: 11 | Status: SHIPPED | OUTPATIENT
Start: 2022-03-27 | End: 2023-04-06

## 2022-03-28 ENCOUNTER — CLINICAL SUPPORT (OUTPATIENT)
Dept: SMOKING CESSATION | Facility: CLINIC | Age: 62
End: 2022-03-28
Payer: COMMERCIAL

## 2022-03-28 DIAGNOSIS — F17.210 CIGARETTE NICOTINE DEPENDENCE, UNCOMPLICATED: Primary | ICD-10-CM

## 2022-03-28 PROCEDURE — 99999 PR PBB SHADOW E&M-EST. PATIENT-LVL III: CPT | Mod: PBBFAC,,,

## 2022-03-28 PROCEDURE — 99404 PR PREVENT COUNSEL,INDIV,60 MIN: ICD-10-PCS | Mod: S$GLB,,,

## 2022-03-28 PROCEDURE — 99999 PR PBB SHADOW E&M-EST. PATIENT-LVL III: ICD-10-PCS | Mod: PBBFAC,,,

## 2022-03-28 PROCEDURE — 99404 PREV MED CNSL INDIV APPRX 60: CPT | Mod: S$GLB,,,

## 2022-03-28 NOTE — PROGRESS NOTES
CESD of 0 is perceived as no mental distress or depression at this time. FTND of 4  Indicates a low to moderate  level  of tobacco/nicotine dependency. Patient is having a heart procedure this coming Friday. No nicotine treatment before precodeure per Dr. Harp.

## 2022-03-30 ENCOUNTER — OFFICE VISIT (OUTPATIENT)
Dept: CARDIOTHORACIC SURGERY | Facility: CLINIC | Age: 62
End: 2022-03-30
Payer: COMMERCIAL

## 2022-03-30 VITALS
OXYGEN SATURATION: 93 % | BODY MASS INDEX: 49.9 KG/M2 | DIASTOLIC BLOOD PRESSURE: 85 MMHG | HEART RATE: 85 BPM | WEIGHT: 271.19 LBS | SYSTOLIC BLOOD PRESSURE: 170 MMHG | HEIGHT: 62 IN

## 2022-03-30 DIAGNOSIS — I25.110 CORONARY ARTERY DISEASE INVOLVING NATIVE CORONARY ARTERY OF NATIVE HEART WITH UNSTABLE ANGINA PECTORIS: Primary | ICD-10-CM

## 2022-03-30 PROCEDURE — 99999 PR PBB SHADOW E&M-EST. PATIENT-LVL III: ICD-10-PCS | Mod: PBBFAC,,, | Performed by: THORACIC SURGERY (CARDIOTHORACIC VASCULAR SURGERY)

## 2022-03-30 PROCEDURE — 3079F DIAST BP 80-89 MM HG: CPT | Mod: CPTII,S$GLB,, | Performed by: THORACIC SURGERY (CARDIOTHORACIC VASCULAR SURGERY)

## 2022-03-30 PROCEDURE — 1111F PR DISCHARGE MEDS RECONCILED W/ CURRENT OUTPATIENT MED LIST: ICD-10-PCS | Mod: CPTII,S$GLB,, | Performed by: THORACIC SURGERY (CARDIOTHORACIC VASCULAR SURGERY)

## 2022-03-30 PROCEDURE — 3079F PR MOST RECENT DIASTOLIC BLOOD PRESSURE 80-89 MM HG: ICD-10-PCS | Mod: CPTII,S$GLB,, | Performed by: THORACIC SURGERY (CARDIOTHORACIC VASCULAR SURGERY)

## 2022-03-30 PROCEDURE — 3008F BODY MASS INDEX DOCD: CPT | Mod: CPTII,S$GLB,, | Performed by: THORACIC SURGERY (CARDIOTHORACIC VASCULAR SURGERY)

## 2022-03-30 PROCEDURE — 3044F HG A1C LEVEL LT 7.0%: CPT | Mod: CPTII,S$GLB,, | Performed by: THORACIC SURGERY (CARDIOTHORACIC VASCULAR SURGERY)

## 2022-03-30 PROCEDURE — 1111F DSCHRG MED/CURRENT MED MERGE: CPT | Mod: CPTII,S$GLB,, | Performed by: THORACIC SURGERY (CARDIOTHORACIC VASCULAR SURGERY)

## 2022-03-30 PROCEDURE — 3008F PR BODY MASS INDEX (BMI) DOCUMENTED: ICD-10-PCS | Mod: CPTII,S$GLB,, | Performed by: THORACIC SURGERY (CARDIOTHORACIC VASCULAR SURGERY)

## 2022-03-30 PROCEDURE — 99999 PR PBB SHADOW E&M-EST. PATIENT-LVL III: CPT | Mod: PBBFAC,,, | Performed by: THORACIC SURGERY (CARDIOTHORACIC VASCULAR SURGERY)

## 2022-03-30 PROCEDURE — 99215 PR OFFICE/OUTPT VISIT, EST, LEVL V, 40-54 MIN: ICD-10-PCS | Mod: S$GLB,,, | Performed by: THORACIC SURGERY (CARDIOTHORACIC VASCULAR SURGERY)

## 2022-03-30 PROCEDURE — 3077F PR MOST RECENT SYSTOLIC BLOOD PRESSURE >= 140 MM HG: ICD-10-PCS | Mod: CPTII,S$GLB,, | Performed by: THORACIC SURGERY (CARDIOTHORACIC VASCULAR SURGERY)

## 2022-03-30 PROCEDURE — 3044F PR MOST RECENT HEMOGLOBIN A1C LEVEL <7.0%: ICD-10-PCS | Mod: CPTII,S$GLB,, | Performed by: THORACIC SURGERY (CARDIOTHORACIC VASCULAR SURGERY)

## 2022-03-30 PROCEDURE — 99215 OFFICE O/P EST HI 40 MIN: CPT | Mod: S$GLB,,, | Performed by: THORACIC SURGERY (CARDIOTHORACIC VASCULAR SURGERY)

## 2022-03-30 PROCEDURE — 3077F SYST BP >= 140 MM HG: CPT | Mod: CPTII,S$GLB,, | Performed by: THORACIC SURGERY (CARDIOTHORACIC VASCULAR SURGERY)

## 2022-03-30 RX ORDER — ASPIRIN 325 MG
325 TABLET ORAL DAILY
Status: CANCELLED | OUTPATIENT
Start: 2022-03-30

## 2022-03-30 RX ORDER — FENTANYL CITRATE 50 UG/ML
25 INJECTION, SOLUTION INTRAMUSCULAR; INTRAVENOUS
Status: CANCELLED | OUTPATIENT
Start: 2022-03-30 | End: 2022-03-31

## 2022-03-30 RX ORDER — METOCLOPRAMIDE HYDROCHLORIDE 5 MG/ML
5 INJECTION INTRAMUSCULAR; INTRAVENOUS EVERY 6 HOURS PRN
Status: CANCELLED | OUTPATIENT
Start: 2022-03-30

## 2022-03-30 RX ORDER — ACETAMINOPHEN 325 MG/1
650 TABLET ORAL EVERY 4 HOURS PRN
Status: CANCELLED | OUTPATIENT
Start: 2022-03-30

## 2022-03-30 RX ORDER — SODIUM CHLORIDE 9 MG/ML
INJECTION, SOLUTION INTRAVENOUS CONTINUOUS
Status: CANCELLED | OUTPATIENT
Start: 2022-03-30

## 2022-03-30 RX ORDER — FAMOTIDINE 20 MG/1
20 TABLET, FILM COATED ORAL 2 TIMES DAILY
Status: CANCELLED | OUTPATIENT
Start: 2022-03-30

## 2022-03-30 RX ORDER — MAGNESIUM SULFATE/D5W 2 G/50 ML
4 INTRAVENOUS SOLUTION, PIGGYBACK (ML) INTRAVENOUS
Status: CANCELLED | OUTPATIENT
Start: 2022-03-30

## 2022-03-30 RX ORDER — FENTANYL CITRATE 50 UG/ML
50 INJECTION, SOLUTION INTRAMUSCULAR; INTRAVENOUS
Status: CANCELLED | OUTPATIENT
Start: 2022-04-01

## 2022-03-30 RX ORDER — MAGNESIUM SULFATE HEPTAHYDRATE 40 MG/ML
2 INJECTION, SOLUTION INTRAVENOUS
Status: CANCELLED | OUTPATIENT
Start: 2022-03-30

## 2022-03-30 RX ORDER — DEXTROSE MONOHYDRATE, SODIUM CHLORIDE, AND POTASSIUM CHLORIDE 50; 1.49; 4.5 G/1000ML; G/1000ML; G/1000ML
INJECTION, SOLUTION INTRAVENOUS CONTINUOUS
Status: CANCELLED | OUTPATIENT
Start: 2022-03-30

## 2022-03-30 RX ORDER — FENTANYL CITRATE 50 UG/ML
25 INJECTION, SOLUTION INTRAMUSCULAR; INTRAVENOUS
Status: CANCELLED | OUTPATIENT
Start: 2022-03-30

## 2022-03-30 RX ORDER — METOPROLOL TARTRATE 25 MG/1
25 TABLET, FILM COATED ORAL
Status: CANCELLED | OUTPATIENT
Start: 2022-03-30

## 2022-03-30 RX ORDER — MUPIROCIN 20 MG/G
1 OINTMENT TOPICAL 2 TIMES DAILY
Status: CANCELLED | OUTPATIENT
Start: 2022-03-30 | End: 2022-04-04

## 2022-03-30 RX ORDER — FAMOTIDINE 10 MG/ML
20 INJECTION INTRAVENOUS 2 TIMES DAILY
Status: CANCELLED | OUTPATIENT
Start: 2022-03-30

## 2022-03-30 RX ORDER — MUPIROCIN 20 MG/G
1 OINTMENT TOPICAL
Status: CANCELLED | OUTPATIENT
Start: 2022-03-30

## 2022-03-30 RX ORDER — LIDOCAINE HYDROCHLORIDE 10 MG/ML
1 INJECTION, SOLUTION EPIDURAL; INFILTRATION; INTRACAUDAL; PERINEURAL
Status: CANCELLED | OUTPATIENT
Start: 2022-03-30

## 2022-03-30 RX ORDER — ASPIRIN 300 MG/1
300 SUPPOSITORY RECTAL ONCE AS NEEDED
Status: CANCELLED | OUTPATIENT
Start: 2022-03-30 | End: 2033-08-26

## 2022-03-30 RX ORDER — OXYCODONE HYDROCHLORIDE 5 MG/1
5 TABLET ORAL EVERY 4 HOURS PRN
Status: CANCELLED | OUTPATIENT
Start: 2022-03-30

## 2022-03-30 RX ORDER — BUPROPION HYDROCHLORIDE 150 MG/1
TABLET, EXTENDED RELEASE ORAL
Qty: 60 TABLET | Refills: 11 | Status: SHIPPED | OUTPATIENT
Start: 2022-03-30 | End: 2022-04-08

## 2022-03-30 RX ORDER — CEFAZOLIN SODIUM/D5W 2 G/100 ML
2 PLASTIC BAG, INJECTION (ML) INTRAVENOUS
Status: CANCELLED | OUTPATIENT
Start: 2022-03-30 | End: 2022-04-01

## 2022-03-30 RX ORDER — POLYETHYLENE GLYCOL 3350 17 G/17G
17 POWDER, FOR SOLUTION ORAL DAILY
Status: CANCELLED | OUTPATIENT
Start: 2022-03-30

## 2022-03-30 RX ORDER — IPRATROPIUM BROMIDE AND ALBUTEROL SULFATE 2.5; .5 MG/3ML; MG/3ML
3 SOLUTION RESPIRATORY (INHALATION) EVERY 4 HOURS
Status: CANCELLED | OUTPATIENT
Start: 2022-03-30 | End: 2022-03-31

## 2022-03-30 RX ORDER — POTASSIUM CHLORIDE 29.8 MG/ML
40 INJECTION INTRAVENOUS
Status: CANCELLED | OUTPATIENT
Start: 2022-03-30

## 2022-03-30 RX ORDER — SODIUM CHLORIDE 0.9 % (FLUSH) 0.9 %
10 SYRINGE (ML) INJECTION
Status: CANCELLED | OUTPATIENT
Start: 2022-03-30

## 2022-03-30 RX ORDER — POTASSIUM CHLORIDE 14.9 MG/ML
60 INJECTION INTRAVENOUS
Status: CANCELLED | OUTPATIENT
Start: 2022-03-30

## 2022-03-30 RX ORDER — DOCUSATE SODIUM 100 MG/1
100 CAPSULE, LIQUID FILLED ORAL 2 TIMES DAILY
Status: CANCELLED | OUTPATIENT
Start: 2022-03-30

## 2022-03-30 RX ORDER — BISACODYL 10 MG
10 SUPPOSITORY, RECTAL RECTAL DAILY PRN
Status: CANCELLED | OUTPATIENT
Start: 2022-03-30

## 2022-03-30 RX ORDER — ALBUMIN HUMAN 50 G/1000ML
25 SOLUTION INTRAVENOUS ONCE AS NEEDED
Status: CANCELLED | OUTPATIENT
Start: 2022-03-30 | End: 2033-08-26

## 2022-03-30 RX ORDER — OXYCODONE HYDROCHLORIDE 10 MG/1
10 TABLET ORAL EVERY 4 HOURS PRN
Status: CANCELLED | OUTPATIENT
Start: 2022-03-30

## 2022-03-30 RX ORDER — PROPOFOL 10 MG/ML
0-50 INJECTION, EMULSION INTRAVENOUS CONTINUOUS
Status: CANCELLED | OUTPATIENT
Start: 2022-03-30

## 2022-03-30 RX ORDER — POTASSIUM CHLORIDE 14.9 MG/ML
20 INJECTION INTRAVENOUS
Status: CANCELLED | OUTPATIENT
Start: 2022-03-30

## 2022-03-30 RX ORDER — ATORVASTATIN CALCIUM 40 MG/1
40 TABLET, FILM COATED ORAL NIGHTLY
Status: CANCELLED | OUTPATIENT
Start: 2022-03-30

## 2022-03-30 RX ORDER — ONDANSETRON 2 MG/ML
4 INJECTION INTRAMUSCULAR; INTRAVENOUS EVERY 12 HOURS PRN
Status: CANCELLED | OUTPATIENT
Start: 2022-03-30

## 2022-03-30 RX ORDER — IPRATROPIUM BROMIDE AND ALBUTEROL SULFATE 2.5; .5 MG/3ML; MG/3ML
3 SOLUTION RESPIRATORY (INHALATION) EVERY 4 HOURS PRN
Status: CANCELLED | OUTPATIENT
Start: 2022-03-30 | End: 2022-03-31

## 2022-03-30 RX ORDER — ASPIRIN 325 MG
325 TABLET ORAL ONCE
Status: CANCELLED | OUTPATIENT
Start: 2022-03-30 | End: 2022-03-30

## 2022-03-30 RX ORDER — ASPIRIN 325 MG
325 TABLET, DELAYED RELEASE (ENTERIC COATED) ORAL DAILY
Status: CANCELLED | OUTPATIENT
Start: 2022-03-30

## 2022-03-30 NOTE — H&P (VIEW-ONLY)
"Subjective:      Patient ID: Sonya Armendariz is a 61 y.o. female.    Chief Complaint: No chief complaint on file.      HPI:  Ms. Armendariz is a 60 yo F with a h/o CAD s/p LAD stent, ?CHF, htn, hld, and vertigo who presents with chest pain. Pt reports chest pain came on acutely this morning while she was at work. Was not exerting herself. Describes pain as an "sore" substernal pressure that feels similar to when she previously had an MI. She took 3 nitros at home which partially relieved the pain. Received , 4th nitro, and nitro paste from EMS which completely resolved the pain. Notes associated diaphoresis and dyspnea. Denies cough or orthopnea. No change in recent exercise tolerance. She does note she had been noticing this same pain in the evenings before going to bed for about the past three weeks. Last stress test was SPECT on 1/31/20 which was negative. Reports adherence to all meds. Smokes 12 cigarettes per day.  She presents today to pre-op for CABG.        Current Outpatient Medications:     aspirin 325 MG tablet, Take 1 tablet (325 mg total) by mouth once daily., Disp: 30 tablet, Rfl: 0    atorvastatin (LIPITOR) 40 MG tablet, Take 1 tablet (40 mg total) by mouth once daily., Disp: 90 tablet, Rfl: 0    EScitalopram oxalate (LEXAPRO) 10 MG tablet, Take 1 tablet (10 mg total) by mouth once daily., Disp: 30 tablet, Rfl: 11    losartan-hydrochlorothiazide 50-12.5 mg (HYZAAR) 50-12.5 mg per tablet, Take 1 tablet by mouth once daily., Disp: 30 tablet, Rfl: 11    metoprolol succinate (TOPROL-XL) 25 MG 24 hr tablet, Take 1 tablet (25 mg total) by mouth once daily., Disp: 30 tablet, Rfl: 11    multivitamin (THERAGRAN) per tablet, Take 1 tablet by mouth once daily., Disp: , Rfl:     nitroGLYCERIN (NITROSTAT) 0.4 MG SL tablet, PLACE 1 TABLET UNDER THE TONGUE EVERY 5 MINUTES AS NEEDED, Disp: 25 tablet, Rfl: 0    solifenacin (VESICARE) 10 MG tablet, TAKE 1 TABLET BY MOUTH ONCE DAILY FOR BLADDER URGENCY, " Disp: 30 tablet, Rfl: 11    buPROPion (WELLBUTRIN SR) 150 MG TBSR 12 hr tablet, Take 1 tablet (150 mg total) by mouth 2 (two) times daily. (Patient not taking: No sig reported), Disp: 60 tablet, Rfl: 11    cyclobenzaprine (FLEXERIL) 5 MG tablet, TAKE 1 TABLET(5 MG) BY MOUTH THREE TIMES DAILY AS NEEDED FOR MUSCLE SPASMS (Patient not taking: Reported on 3/30/2022), Disp: 30 tablet, Rfl: 0    fluticasone (FLONASE) 50 mcg/actuation nasal spray, SPRAY ONCE IN EACH NOSTRIL EVERY DAY (Patient not taking: Reported on 3/30/2022), Disp: 16 g, Rfl: 3    hydrOXYzine HCl (ATARAX) 25 MG tablet, Take 1 tablet (25 mg total) by mouth 3 (three) times daily as needed for Itching or Anxiety. (Patient not taking: Reported on 3/30/2022), Disp: 60 tablet, Rfl: 3    meclizine (ANTIVERT) 12.5 mg tablet, Take 1 tablet (12.5 mg total) by mouth 3 (three) times daily as needed for Nausea. (Patient not taking: Reported on 3/30/2022), Disp: 30 tablet, Rfl: 0    nicotine (NICODERM CQ) 21 mg/24 hr, APPLY 1 PATCH EXTERNALLY TO THE SKIN EVERY DAY FOR 6 WEEKS (Patient not taking: Reported on 3/30/2022), Disp: 42 patch, Rfl: 0  Current medications Reviewed    Review of Systems   Constitutional: Negative for activity change, appetite change, fatigue and fever.   HENT: Negative for nosebleeds.    Respiratory: Negative for cough and shortness of breath.    Cardiovascular: Negative for chest pain, palpitations and leg swelling.   Gastrointestinal: Negative for abdominal distention, abdominal pain and nausea.   Genitourinary: Negative for frequency.   Musculoskeletal: Negative for arthralgias and myalgias.   Skin: Negative for rash.   Neurological: Negative for dizziness and numbness.   Hematological: Does not bruise/bleed easily.     Objective:   Physical Exam  HENT:      Head: Normocephalic and atraumatic.   Eyes:      Extraocular Movements: Extraocular movements intact.   Cardiovascular:      Rate and Rhythm: Normal rate and regular rhythm.    Pulmonary:      Effort: Pulmonary effort is normal.   Abdominal:      General: Abdomen is flat.      Palpations: Abdomen is soft.   Musculoskeletal:         General: Normal range of motion.      Cervical back: Normal range of motion.   Skin:     General: Skin is warm and dry.      Capillary Refill: Capillary refill takes less than 2 seconds.   Neurological:      General: No focal deficit present.       Diagnotic Results: reviewed   LHC:  · LVEDP=16mmHg  · 60% distal LM stenosis  · 80% ostial LCx stenosis  · 80% proximal LCx stenosis (distal to OM1) origin)  · The estimated blood loss was <50 mL.    Chest CT:  Impression:  1. No acute abnormalities identified.  2. Pulmonary micro nodules measuring on the order of 2-3 mm.  For nodules measuring less than 6 mm, recommend CT chest in 12 months to establish stability if the patient is considered high risk.  3. Additional findings as above.    ECHO:  · The left ventricle is normal in size with normal systolic function. The estimated ejection fraction is 60%.  · Normal right ventricular size with normal right ventricular systolic function.  · Normal left ventricular diastolic function.  · Normal central venous pressure (3 mmHg).    Assessment:   1. CAD  Plan:   Pre-op for CABG    I have seen the patient and reviewed the nurse practitioner's note above. I have personally interviewed and examined the patient at bedside and agree with the findings.     Ms. Armendariz is a pleasant 61 year old female smoker (1.5ppd x 45 years) with heart failure preserved ejection fraction (60% ejection fraction), coronary artery disease s/p LAD stent in 2012, hypertension, and BMI of 48 who presented with unstable angina, given Brilinta, and underwent coronary angiogram showing left main disease.  Coronary angiogram details: 60% left main disease with a moderate sized mid and distal LAD, 90% ostial LCx (dominant) with a moderate sized left posterolateral ventricular artery, small to moderate  sized left PDA, and nondominant RCA.  The transthoracic echo shows 60% ejection fraction with no significant valvulopathy.       CT chest noncontrast shows minimal ascending aortic and severe aortic arch calcifications.  Carotid ultrasound showed nonsignificant disease.     Given the severity of disease and the symptoms, I recommend coronary artery bypass surgery x 3 (LIMA-LAD, SVG-LPL, SVG-LPDA).  I had a lengthy discussion with her about the risks vs. benefits of the surgery.  We discussed the risks including the predicted chance of mortality as well as morbidity such as stroke, kidney injury, respiratory failure, limb ischemia, myocardial infarction, sternal wound infection, and bleeding.  The Society of Thoracic Surgery (STS) risk score was also discussed.  With this history, I noted the patient has a higher chance of sternal wound complications and respiratory complications due to her very elevated BMI.  Additionally, we discussed the likely length of stay in the ICU and in the hospital, as well as the overall recovery period.  Ms. Armendariz is in agreement and we will proceed with surgery on Friday, April 1, 2022.      Leandro Howard MD  Cardiothoracic Surgery  [facility]

## 2022-03-30 NOTE — TELEPHONE ENCOUNTER
This Rx Request does not qualify for assessment with the OR   Please review protocol details and the Care Due Message for extra clinical information    Reasons Rx Request may be deferred:  Labs/Vitals abnormal  Patient has been seen in the ED/Hospital since the last PCP visit    Note composed:7:56 AM 03/30/2022

## 2022-03-30 NOTE — TELEPHONE ENCOUNTER
No new care gaps identified.  Powered by rSmart [employer]. Reference number: [id number].   3/30/2022 5:24:51 AM CDT

## 2022-03-31 ENCOUNTER — DOCUMENTATION ONLY (OUTPATIENT)
Dept: CARDIOTHORACIC SURGERY | Facility: CLINIC | Age: 62
End: 2022-03-31
Payer: COMMERCIAL

## 2022-03-31 NOTE — PROGRESS NOTES
"PREPARING FOR SURGERY  Your surgery has been scheduled for:   Day: Friday   Date:  April 1, 2022  Arrival Time: 5A  Start Time: 7A  You should report to the second floor surgery center, located on the [facility] side of the second floor of the [facility]. The phone number is [phone].     PLEASE NOTE  · If you are allergic to any medications, please inform your doctor or the nurse responsible for your care.  · Tell the doctor if you take aspirin, products containing aspirin, herbal medications or blood thinners, such as Coumadin, Pradaxa, or Plavix.  · Notify your doctor if you are diabetic and provide information about the medications you take.  · Arrange for someone to drive you home following surgery. You will not be allowed to leave the surgical facility alone or drive yourself home following sedation and anesthesia.  · If you have not already done so, please bring a list of your medications with you the day of your surgery.     BEFORE SURGERY  Stop taking all herbal medications 14 days prior to surgery  Stop taking aspirin, products containing aspirin 0 days before surgery  Stop taking blood thinners 5 days before surgery  Refrain from drinking alcohol beverages for 24 hours before and after surgery  Stop or limit smoking 0 days before surgery  Other: ______________________________________________     THE NIGHT BEFORE SURGERY  Eat a light supper on the night before your surgery, no greasy or fatty foods.  DO NOT EAT OR DRINK ANYTHING AFTER MIDNIGHT, INCLUDING GUM, HARD CANDY, MINTS, OR CHEWING TOBACCO  Take a complete shower. Wash your body from the neck down with Hibiclens (chlorhexidine gluconate) soap. Hibiclens soap may be purchased over the counter at the pharmacy. Keep the soap away from your eyes, ears, and mouth. After washing with Hibiclens, rinse thoroughly. You may also use any soap labeled "antibacterial". Shampoo your hair with your regular shampoo.     THE DAY OF SURGERY  Take " another shower with Hibiclens or any antibacterial soap, to reduce the change of infection.  Medications to take the morning of surgery:Metoprolol 25mg with a small sip of water. Do not take diuretics or fluid pills.  Diabetic medication instructions will be given by the preop center. They will call you before your surgery.  You may brush your teeth and rinse your mouth, but do not shallow any water.  Do not apply perfume, powder, body lotions or deodorant on the day of surgery.  Do not wear any makeup, including mascara and false eyelashes.  Nail polish should be removed.  Wear comfortable clothes, such as button front shirt and loose-fitting pants.  Leave all jewelry, including body piercings and valuables at home.  Hairpins and clasps must be removed before you enter the operating room.  You may wear glasses, dentures and hearing aids before and after surgery. They may need to be removed before going into the operating room. Contact lenses worn before surgery must be removed before entering the operating room. Please bring a case for your hearing aids, glasses and/or contacts.  Bring any devices you will need after surgery such as crutches or canes.  If you have sleep apnea, please bring your CPAP machine.  If you have an implantable device, such as a pacemaker or AICD, please bring the device information card, if you have one.     If you have any questions or concerns, please don't hesitate to call.

## 2022-03-31 NOTE — PROGRESS NOTES
The patient location is: home  The chief complaint leading to consultation is:  Dizziness  Visit type: Virtual visit with synchronous audio and video  Total time spent with patient:  15 minutes  Each patient to whom he or she provides medical services by telemedicine is:  (1) informed of the relationship between the physician and patient and the respective role of any other health care provider with respect to management of the patient; and (2) notified that he or she may decline to receive medical services by telemedicine and may withdraw from such care at any time.    (Portions of this note were dictated using voice recognition software and may contain dictation related errors in spelling/grammar/syntax not found on text review)         HPI: 61 y.o. female last 2 weeks complaining of dizziness symptoms.  This is worse with head movements, either right or left.  If she moves her head more slowly it is not as bad.  When she gets up and walks sometimes she feels like she is unsteady.  Denies any unilateral weakness or numbness in her extremities although she states that she has left thumb numbness periodically but this has been going on for 2 months now, not acutely worse.  When she gets the dizziness episodes, she does get more nauseated.  Denies any ear pain or ringing.  No cough congestion or fever.  No slurred speech, facial drooping.  She does have CAD, chronic tobacco, hypertension, hyperlipidemia.  She is compliant with her medications.  She does not have way to check her blood pressures.  A few years ago she had similar symptoms, went to ED, CT head negative.    Past Medical History:   Diagnosis Date    Anticoagulant long-term use     CAD (coronary artery disease)     s/p LAD stent    CHF (congestive heart failure)     Gastroduodenitis     Helicobacter pylori (H. pylori) infection     Hyperlipidemia     Hypertension     MI (myocardial infarction)     x2    Obesity     Tobacco use     Urinary  Well positioned. tract infection     Vaginal infection        Past Surgical History:   Procedure Laterality Date    COLONOSCOPY N/A 7/14/2017    Procedure: COLONOSCOPY;  Surgeon: Cathy Collier MD;  Location: [facility];  Service: Endoscopy;  Laterality: N/A;    NO PAST SURGERIES         Family History   Problem Relation Age of Onset    Hypertension Other         multiple    Diabetes Other         multiple    Stomach cancer Mother     Cancer Father         unknown    Breast cancer Sister     Throat cancer Brother     Cancer Brother         x 2,. unknown    Coronary artery disease Cousin        Social History     Tobacco Use    Smoking status: Current Every Day Smoker     Packs/day: 1.00     Years: 40.00     Pack years: 40.00     Types: Cigarettes    Smokeless tobacco: Never Used    Tobacco comment: Restarted 1 .5 yrs. ago   Substance Use Topics    Alcohol use: No     Alcohol/week: 0.0 standard drinks    Drug use: No       Lab Results   Component Value Date    WBC 5.98 03/13/2021    HGB 14.7 03/13/2021    HCT 43.6 03/13/2021    MCV 93 03/13/2021     03/13/2021    CHOL 140 03/13/2021    TRIG 118 03/13/2021    HDL 37 (L) 03/13/2021    ALT 12 03/13/2021    AST 15 03/13/2021    BILITOT 0.5 03/13/2021    ALKPHOS 101 03/13/2021     03/13/2021    K 4.1 03/13/2021     03/13/2021    CREATININE 0.9 03/13/2021    ESTGFRAFRICA >60 03/13/2021    EGFRNONAA >60 03/13/2021    CALCIUM 8.8 03/13/2021    ALBUMIN 3.8 03/13/2021    BUN 7 03/13/2021    CO2 22 (L) 03/13/2021    TSH 0.586 03/13/2021    INR 1.0 08/02/2011    HGBA1C 6.2 (H) 03/13/2021    LDLCALC 79.4 03/13/2021     (H) 03/13/2021                PE (elements able to be captured on virtual encounter)  APPEARANCE: Well nourished, well developed, in no acute distress.    HEAD: Normocephalic, atraumatic.  EYES:  .   Conjunctivae noninjected.  RESPIRATORY:  No increased work of breathing or objective visual signs of dyspnea  PSYCHIATRIC:  Normal mood  and affect, alert and oriented, appropriate answers to questions  NEURO:  No obvious facial drooping noted on video    IMPRESSION  1. Vertigo    2. Hypertension, unspecified type    3. Coronary artery disease involving native coronary artery of native heart without angina pectoris    4. Tobacco dependence    5. IFG (impaired fasting glucose)            PLAN  By history, suspect possible BPPV.  She was given meclizine p.r.n., cautioned about drowsiness.  Given Epley exercises for home.  No presyncopal symptoms.  No other acute lateralizing symptoms.  However, given high risk status of CAD, hypertension, dyslipidemia, tobacco use, morbid obesity, have advised if symptoms do not improve, needs a physical exam, further evaluation and discussion for any potential need for cranial imaging

## 2022-04-01 ENCOUNTER — HOSPITAL ENCOUNTER (INPATIENT)
Facility: HOSPITAL | Age: 62
LOS: 7 days | Discharge: HOME HEALTH | DRG: 236 | End: 2022-04-08
Attending: THORACIC SURGERY (CARDIOTHORACIC VASCULAR SURGERY) | Admitting: THORACIC SURGERY (CARDIOTHORACIC VASCULAR SURGERY)
Payer: COMMERCIAL

## 2022-04-01 DIAGNOSIS — I25.110 CORONARY ARTERY DISEASE INVOLVING NATIVE CORONARY ARTERY OF NATIVE HEART WITH UNSTABLE ANGINA PECTORIS: Primary | ICD-10-CM

## 2022-04-01 DIAGNOSIS — Z95.1 S/P CABG (CORONARY ARTERY BYPASS GRAFT): Primary | ICD-10-CM

## 2022-04-01 DIAGNOSIS — E78.2 MIXED HYPERLIPIDEMIA: ICD-10-CM

## 2022-04-01 DIAGNOSIS — E66.01 CLASS 3 SEVERE OBESITY WITH BODY MASS INDEX (BMI) OF 45.0 TO 49.9 IN ADULT, UNSPECIFIED OBESITY TYPE, UNSPECIFIED WHETHER SERIOUS COMORBIDITY PRESENT: ICD-10-CM

## 2022-04-01 DIAGNOSIS — I49.9 ARRHYTHMIA: ICD-10-CM

## 2022-04-01 DIAGNOSIS — I25.110 CORONARY ARTERY DISEASE INVOLVING NATIVE CORONARY ARTERY OF NATIVE HEART WITH UNSTABLE ANGINA PECTORIS: ICD-10-CM

## 2022-04-01 DIAGNOSIS — R73.9 TRANSIENT HYPERGLYCEMIA POST PROCEDURE: ICD-10-CM

## 2022-04-01 DIAGNOSIS — Z98.890 HISTORY OF HEART SURGERY: ICD-10-CM

## 2022-04-01 DIAGNOSIS — I50.9 CONGESTIVE HEART FAILURE, UNSPECIFIED HF CHRONICITY, UNSPECIFIED HEART FAILURE TYPE: ICD-10-CM

## 2022-04-01 DIAGNOSIS — T14.8XXA SURGICAL WOUND PRESENT: ICD-10-CM

## 2022-04-01 DIAGNOSIS — I47.20 V-TACH: ICD-10-CM

## 2022-04-01 DIAGNOSIS — R73.9 HYPERGLYCEMIA: ICD-10-CM

## 2022-04-01 DIAGNOSIS — I25.10 CORONARY ARTERY DISEASE INVOLVING NATIVE CORONARY ARTERY OF NATIVE HEART, UNSPECIFIED WHETHER ANGINA PRESENT: ICD-10-CM

## 2022-04-01 LAB
ABO + RH BLD: NORMAL
ALBUMIN SERPL BCP-MCNC: 3.1 G/DL (ref 3.5–5.2)
ALLENS TEST: ABNORMAL
ALP SERPL-CCNC: 64 U/L (ref 55–135)
ALT SERPL W/O P-5'-P-CCNC: 16 U/L (ref 10–44)
ANION GAP SERPL CALC-SCNC: 8 MMOL/L (ref 8–16)
APTT BLDCRRT: 28 SEC (ref 21–32)
AST SERPL-CCNC: 27 U/L (ref 10–40)
BASOPHILS # BLD AUTO: 0.04 K/UL (ref 0–0.2)
BASOPHILS NFR BLD: 0.2 % (ref 0–1.9)
BILIRUB SERPL-MCNC: 0.4 MG/DL (ref 0.1–1)
BLD GP AB SCN CELLS X3 SERPL QL: NORMAL
BUN SERPL-MCNC: 10 MG/DL (ref 6–20)
CALCIUM SERPL-MCNC: 8.3 MG/DL (ref 8.7–10.5)
CHLORIDE SERPL-SCNC: 108 MMOL/L (ref 95–110)
CO2 SERPL-SCNC: 22 MMOL/L (ref 23–29)
CREAT SERPL-MCNC: 0.9 MG/DL (ref 0.5–1.4)
DELSYS: ABNORMAL
DIFFERENTIAL METHOD: ABNORMAL
EOSINOPHIL # BLD AUTO: 0.1 K/UL (ref 0–0.5)
EOSINOPHIL NFR BLD: 0.6 % (ref 0–8)
ERYTHROCYTE [DISTWIDTH] IN BLOOD BY AUTOMATED COUNT: 12.7 % (ref 11.5–14.5)
ERYTHROCYTE [SEDIMENTATION RATE] IN BLOOD BY WESTERGREN METHOD: 16 MM/H
ERYTHROCYTE [SEDIMENTATION RATE] IN BLOOD BY WESTERGREN METHOD: 16 MM/H
ERYTHROCYTE [SEDIMENTATION RATE] IN BLOOD BY WESTERGREN METHOD: 20 MM/H
ERYTHROCYTE [SEDIMENTATION RATE] IN BLOOD BY WESTERGREN METHOD: 20 MM/H
ERYTHROCYTE [SEDIMENTATION RATE] IN BLOOD BY WESTERGREN METHOD: 26 MM/H
EST. GFR  (AFRICAN AMERICAN): >60 ML/MIN/1.73 M^2
EST. GFR  (NON AFRICAN AMERICAN): >60 ML/MIN/1.73 M^2
FIO2: 10
FIO2: 100
FIO2: 50
FIO2: 50
FIO2: 60
FIO2: 80
GLUCOSE SERPL-MCNC: 148 MG/DL (ref 70–110)
GLUCOSE SERPL-MCNC: 157 MG/DL (ref 70–110)
GLUCOSE SERPL-MCNC: 197 MG/DL (ref 70–110)
HCO3 UR-SCNC: 20.6 MMOL/L (ref 24–28)
HCO3 UR-SCNC: 20.7 MMOL/L (ref 24–28)
HCO3 UR-SCNC: 21.1 MMOL/L (ref 24–28)
HCO3 UR-SCNC: 21.5 MMOL/L (ref 24–28)
HCO3 UR-SCNC: 21.8 MMOL/L (ref 24–28)
HCO3 UR-SCNC: 22.3 MMOL/L (ref 24–28)
HCO3 UR-SCNC: 22.7 MMOL/L (ref 24–28)
HCO3 UR-SCNC: 22.7 MMOL/L (ref 24–28)
HCO3 UR-SCNC: 22.9 MMOL/L (ref 24–28)
HCO3 UR-SCNC: 24.6 MMOL/L (ref 24–28)
HCO3 UR-SCNC: 25.6 MMOL/L (ref 24–28)
HCT VFR BLD AUTO: 34.5 % (ref 37–48.5)
HCT VFR BLD CALC: 30 %PCV (ref 36–54)
HCT VFR BLD CALC: 31 %PCV (ref 36–54)
HCT VFR BLD CALC: 32 %PCV (ref 36–54)
HCT VFR BLD CALC: 33 %PCV (ref 36–54)
HCT VFR BLD CALC: 34 %PCV (ref 36–54)
HCT VFR BLD CALC: 37 %PCV (ref 36–54)
HGB BLD-MCNC: 11.2 G/DL (ref 12–16)
IMM GRANULOCYTES # BLD AUTO: 0.1 K/UL (ref 0–0.04)
IMM GRANULOCYTES NFR BLD AUTO: 0.6 % (ref 0–0.5)
INR PPP: 1.1 (ref 0.8–1.2)
LDH SERPL L TO P-CCNC: 1.14 MMOL/L (ref 0.36–1.25)
LDH SERPL L TO P-CCNC: 1.6 MMOL/L (ref 0.36–1.25)
LDH SERPL L TO P-CCNC: 2.23 MMOL/L (ref 0.36–1.25)
LDH SERPL L TO P-CCNC: 2.29 MMOL/L (ref 0.36–1.25)
LDH SERPL L TO P-CCNC: 2.32 MMOL/L (ref 0.36–1.25)
LDH SERPL L TO P-CCNC: 2.38 MMOL/L (ref 0.36–1.25)
LDH SERPL L TO P-CCNC: 2.43 MMOL/L (ref 0.36–1.25)
LDH SERPL L TO P-CCNC: 2.56 MMOL/L (ref 0.36–1.25)
LDH SERPL L TO P-CCNC: 2.74 MMOL/L (ref 0.36–1.25)
LDH SERPL L TO P-CCNC: 3.3 MMOL/L (ref 0.36–1.25)
LDH SERPL L TO P-CCNC: 3.4 MMOL/L (ref 0.36–1.25)
LDH SERPL L TO P-CCNC: 3.73 MMOL/L (ref 0.36–1.25)
LYMPHOCYTES # BLD AUTO: 3.3 K/UL (ref 1–4.8)
LYMPHOCYTES NFR BLD: 18.6 % (ref 18–48)
MAGNESIUM SERPL-MCNC: 2.3 MG/DL (ref 1.6–2.6)
MCH RBC QN AUTO: 30.6 PG (ref 27–31)
MCHC RBC AUTO-ENTMCNC: 32.5 G/DL (ref 32–36)
MCV RBC AUTO: 94 FL (ref 82–98)
MIN VOL: 10.8
MIN VOL: 9.91
MODE: ABNORMAL
MONOCYTES # BLD AUTO: 0.4 K/UL (ref 0.3–1)
MONOCYTES NFR BLD: 2.1 % (ref 4–15)
NEUTROPHILS # BLD AUTO: 13.6 K/UL (ref 1.8–7.7)
NEUTROPHILS NFR BLD: 77.9 % (ref 38–73)
NRBC BLD-RTO: 0 /100 WBC
PCO2 BLDA: 36.8 MMHG (ref 35–45)
PCO2 BLDA: 38.5 MMHG (ref 35–45)
PCO2 BLDA: 40.4 MMHG (ref 35–45)
PCO2 BLDA: 40.9 MMHG (ref 35–45)
PCO2 BLDA: 41.4 MMHG (ref 35–45)
PCO2 BLDA: 43.7 MMHG (ref 35–45)
PCO2 BLDA: 45.1 MMHG (ref 35–45)
PCO2 BLDA: 45.7 MMHG (ref 35–45)
PCO2 BLDA: 46.7 MMHG (ref 35–45)
PCO2 BLDA: 47.5 MMHG (ref 35–45)
PCO2 BLDA: 49.7 MMHG (ref 35–45)
PEEP: 10
PEEP: 5
PEEP: 5
PH SMN: 7.25 [PH] (ref 7.35–7.45)
PH SMN: 7.27 [PH] (ref 7.35–7.45)
PH SMN: 7.27 [PH] (ref 7.35–7.45)
PH SMN: 7.28 [PH] (ref 7.35–7.45)
PH SMN: 7.3 [PH] (ref 7.35–7.45)
PH SMN: 7.32 [PH] (ref 7.35–7.45)
PH SMN: 7.32 [PH] (ref 7.35–7.45)
PH SMN: 7.36 [PH] (ref 7.35–7.45)
PH SMN: 7.36 [PH] (ref 7.35–7.45)
PH SMN: 7.4 [PH] (ref 7.35–7.45)
PH SMN: 7.41 [PH] (ref 7.35–7.45)
PHOSPHATE SERPL-MCNC: 3.6 MG/DL (ref 2.7–4.5)
PIP: 30
PIP: 31
PIP: 33
PIP: 33
PLATELET # BLD AUTO: 206 K/UL (ref 150–450)
PMV BLD AUTO: 10.8 FL (ref 9.2–12.9)
PO2 BLDA: 103 MMHG (ref 80–100)
PO2 BLDA: 109 MMHG (ref 80–100)
PO2 BLDA: 110 MMHG (ref 80–100)
PO2 BLDA: 178 MMHG (ref 80–100)
PO2 BLDA: 227 MMHG (ref 80–100)
PO2 BLDA: 75 MMHG (ref 80–100)
PO2 BLDA: 76 MMHG (ref 80–100)
PO2 BLDA: 86 MMHG (ref 80–100)
PO2 BLDA: 88 MMHG (ref 80–100)
PO2 BLDA: 92 MMHG (ref 80–100)
PO2 BLDA: 95 MMHG (ref 80–100)
POC BE: -3 MMOL/L
POC BE: -4 MMOL/L
POC BE: -5 MMOL/L
POC BE: -6 MMOL/L
POC BE: -6 MMOL/L
POC BE: 0 MMOL/L
POC BE: 1 MMOL/L
POC IONIZED CALCIUM: 1.04 MMOL/L (ref 1.06–1.42)
POC IONIZED CALCIUM: 1.08 MMOL/L (ref 1.06–1.42)
POC IONIZED CALCIUM: 1.15 MMOL/L (ref 1.06–1.42)
POC IONIZED CALCIUM: 1.16 MMOL/L (ref 1.06–1.42)
POC IONIZED CALCIUM: 1.17 MMOL/L (ref 1.06–1.42)
POC IONIZED CALCIUM: 1.17 MMOL/L (ref 1.06–1.42)
POC IONIZED CALCIUM: 1.18 MMOL/L (ref 1.06–1.42)
POC IONIZED CALCIUM: 1.19 MMOL/L (ref 1.06–1.42)
POC IONIZED CALCIUM: 1.2 MMOL/L (ref 1.06–1.42)
POC SATURATED O2: 100 % (ref 95–100)
POC SATURATED O2: 100 % (ref 95–100)
POC SATURATED O2: 92 % (ref 95–100)
POC SATURATED O2: 93 % (ref 95–100)
POC SATURATED O2: 95 % (ref 95–100)
POC SATURATED O2: 96 % (ref 95–100)
POC SATURATED O2: 97 % (ref 95–100)
POC SATURATED O2: 98 % (ref 95–100)
POC SATURATED O2: 98 % (ref 95–100)
POC TCO2: 22 MMOL/L (ref 23–27)
POC TCO2: 22 MMOL/L (ref 23–27)
POC TCO2: 23 MMOL/L (ref 23–27)
POC TCO2: 24 MMOL/L (ref 23–27)
POC TCO2: 26 MMOL/L (ref 23–27)
POC TCO2: 27 MMOL/L (ref 23–27)
POCT GLUCOSE: 125 MG/DL (ref 70–110)
POCT GLUCOSE: 135 MG/DL (ref 70–110)
POCT GLUCOSE: 135 MG/DL (ref 70–110)
POCT GLUCOSE: 140 MG/DL (ref 70–110)
POCT GLUCOSE: 144 MG/DL (ref 70–110)
POCT GLUCOSE: 147 MG/DL (ref 70–110)
POCT GLUCOSE: 151 MG/DL (ref 70–110)
POCT GLUCOSE: 154 MG/DL (ref 70–110)
POCT GLUCOSE: 172 MG/DL (ref 70–110)
POCT GLUCOSE: 173 MG/DL (ref 70–110)
POCT GLUCOSE: 186 MG/DL (ref 70–110)
POCT GLUCOSE: 196 MG/DL (ref 70–110)
POTASSIUM BLD-SCNC: 3.9 MMOL/L (ref 3.5–5.1)
POTASSIUM BLD-SCNC: 3.9 MMOL/L (ref 3.5–5.1)
POTASSIUM BLD-SCNC: 4.2 MMOL/L (ref 3.5–5.1)
POTASSIUM BLD-SCNC: 4.3 MMOL/L (ref 3.5–5.1)
POTASSIUM BLD-SCNC: 4.4 MMOL/L (ref 3.5–5.1)
POTASSIUM BLD-SCNC: 4.6 MMOL/L (ref 3.5–5.1)
POTASSIUM BLD-SCNC: 5 MMOL/L (ref 3.5–5.1)
POTASSIUM BLD-SCNC: 5 MMOL/L (ref 3.5–5.1)
POTASSIUM BLD-SCNC: 5.1 MMOL/L (ref 3.5–5.1)
POTASSIUM BLD-SCNC: 5.7 MMOL/L (ref 3.5–5.1)
POTASSIUM BLD-SCNC: 5.8 MMOL/L (ref 3.5–5.1)
POTASSIUM SERPL-SCNC: 4.3 MMOL/L (ref 3.5–5.1)
PROT SERPL-MCNC: 5.1 G/DL (ref 6–8.4)
PROTHROMBIN TIME: 11.3 SEC (ref 9–12.5)
PROVIDER CREDENTIALS: ABNORMAL
PROVIDER NOTIFIED: ABNORMAL
RBC # BLD AUTO: 3.66 M/UL (ref 4–5.4)
SAMPLE: ABNORMAL
SAMPLE: NORMAL
SITE: ABNORMAL
SODIUM BLD-SCNC: 138 MMOL/L (ref 136–145)
SODIUM BLD-SCNC: 138 MMOL/L (ref 136–145)
SODIUM BLD-SCNC: 139 MMOL/L (ref 136–145)
SODIUM BLD-SCNC: 140 MMOL/L (ref 136–145)
SODIUM BLD-SCNC: 141 MMOL/L (ref 136–145)
SODIUM BLD-SCNC: 142 MMOL/L (ref 136–145)
SODIUM BLD-SCNC: 142 MMOL/L (ref 136–145)
SODIUM BLD-SCNC: 143 MMOL/L (ref 136–145)
SODIUM SERPL-SCNC: 138 MMOL/L (ref 136–145)
SP02: 100
SP02: 100
SP02: 98
SP02: 98
TIME NOTIFIED: 1216
TIME NOTIFIED: 1216
TIME NOTIFIED: 1313
TIME NOTIFIED: 1313
TIME NOTIFIED: 1412
TIME NOTIFIED: 1412
TIME NOTIFIED: 1508
TIME NOTIFIED: 1508
TIME NOTIFIED: 1615
TIME NOTIFIED: 1615
TIME NOTIFIED: 1707
TIME NOTIFIED: 1707
TIME NOTIFIED: 1805
TIME NOTIFIED: 1805
VERBAL RESULT READBACK PERFORMED: YES
VT: 375
WBC # BLD AUTO: 17.46 K/UL (ref 3.9–12.7)

## 2022-04-01 PROCEDURE — 27201037 HC PRESSURE MONITORING SET UP

## 2022-04-01 PROCEDURE — 99223 PR INITIAL HOSPITAL CARE,LEVL III: ICD-10-PCS | Mod: ,,, | Performed by: NURSE PRACTITIONER

## 2022-04-01 PROCEDURE — 85730 THROMBOPLASTIN TIME PARTIAL: CPT | Performed by: NURSE PRACTITIONER

## 2022-04-01 PROCEDURE — 99291 CRITICAL CARE FIRST HOUR: CPT | Mod: ,,, | Performed by: ANESTHESIOLOGY

## 2022-04-01 PROCEDURE — 83735 ASSAY OF MAGNESIUM: CPT | Performed by: NURSE PRACTITIONER

## 2022-04-01 PROCEDURE — 27201423 OPTIME MED/SURG SUP & DEVICES STERILE SUPPLY: Performed by: THORACIC SURGERY (CARDIOTHORACIC VASCULAR SURGERY)

## 2022-04-01 PROCEDURE — 20000000 HC ICU ROOM

## 2022-04-01 PROCEDURE — C1729 CATH, DRAINAGE: HCPCS | Performed by: THORACIC SURGERY (CARDIOTHORACIC VASCULAR SURGERY)

## 2022-04-01 PROCEDURE — 27100026 HC SHUNT SENSOR, TERUMO

## 2022-04-01 PROCEDURE — 93010 ELECTROCARDIOGRAM REPORT: CPT | Mod: ,,, | Performed by: INTERNAL MEDICINE

## 2022-04-01 PROCEDURE — P9045 ALBUMIN (HUMAN), 5%, 250 ML: HCPCS | Mod: JG | Performed by: NURSE PRACTITIONER

## 2022-04-01 PROCEDURE — 36000712 HC OR TIME LEV V 1ST 15 MIN: Performed by: THORACIC SURGERY (CARDIOTHORACIC VASCULAR SURGERY)

## 2022-04-01 PROCEDURE — 63600175 PHARM REV CODE 636 W HCPCS: Performed by: STUDENT IN AN ORGANIZED HEALTH CARE EDUCATION/TRAINING PROGRAM

## 2022-04-01 PROCEDURE — 99900035 HC TECH TIME PER 15 MIN (STAT)

## 2022-04-01 PROCEDURE — 36620 INSERTION CATHETER ARTERY: CPT | Mod: 59,,, | Performed by: ANESTHESIOLOGY

## 2022-04-01 PROCEDURE — 33517 CABG ARTERY-VEIN SINGLE: CPT | Mod: ,,, | Performed by: THORACIC SURGERY (CARDIOTHORACIC VASCULAR SURGERY)

## 2022-04-01 PROCEDURE — 25000242 PHARM REV CODE 250 ALT 637 W/ HCPCS: Performed by: NURSE PRACTITIONER

## 2022-04-01 PROCEDURE — 85025 COMPLETE CBC W/AUTO DIFF WBC: CPT | Performed by: NURSE PRACTITIONER

## 2022-04-01 PROCEDURE — 27100088 HC CELL SAVER

## 2022-04-01 PROCEDURE — 37000009 HC ANESTHESIA EA ADD 15 MINS: Performed by: THORACIC SURGERY (CARDIOTHORACIC VASCULAR SURGERY)

## 2022-04-01 PROCEDURE — 82330 ASSAY OF CALCIUM: CPT

## 2022-04-01 PROCEDURE — 63600175 PHARM REV CODE 636 W HCPCS: Performed by: ANESTHESIOLOGY

## 2022-04-01 PROCEDURE — 94002 VENT MGMT INPAT INIT DAY: CPT

## 2022-04-01 PROCEDURE — 63600175 PHARM REV CODE 636 W HCPCS: Performed by: NURSE PRACTITIONER

## 2022-04-01 PROCEDURE — 27800903 OPTIME MED/SURG SUP & DEVICES OTHER IMPLANTS: Performed by: THORACIC SURGERY (CARDIOTHORACIC VASCULAR SURGERY)

## 2022-04-01 PROCEDURE — 36620 PR INSERT CATH,ART,PERCUT,SHORTTERM: ICD-10-PCS | Mod: 59,,, | Performed by: ANESTHESIOLOGY

## 2022-04-01 PROCEDURE — 36556 PR INSERT NON-TUNNEL CV CATH 5+ YRS OLD: ICD-10-PCS | Mod: 59,,, | Performed by: ANESTHESIOLOGY

## 2022-04-01 PROCEDURE — 25000003 PHARM REV CODE 250: Performed by: STUDENT IN AN ORGANIZED HEALTH CARE EDUCATION/TRAINING PROGRAM

## 2022-04-01 PROCEDURE — 84132 ASSAY OF SERUM POTASSIUM: CPT

## 2022-04-01 PROCEDURE — 94003 VENT MGMT INPAT SUBQ DAY: CPT

## 2022-04-01 PROCEDURE — 37799 UNLISTED PX VASCULAR SURGERY: CPT

## 2022-04-01 PROCEDURE — 82800 BLOOD PH: CPT

## 2022-04-01 PROCEDURE — 25000003 PHARM REV CODE 250: Performed by: NURSE PRACTITIONER

## 2022-04-01 PROCEDURE — 27000175 HC ADULT BYPASS PUMP

## 2022-04-01 PROCEDURE — 85610 PROTHROMBIN TIME: CPT | Performed by: NURSE PRACTITIONER

## 2022-04-01 PROCEDURE — 27000191 HC C-V MONITORING

## 2022-04-01 PROCEDURE — D9220A PRA ANESTHESIA: Mod: ,,, | Performed by: ANESTHESIOLOGY

## 2022-04-01 PROCEDURE — 27202608 HC CANNULA, MISC

## 2022-04-01 PROCEDURE — 25000003 PHARM REV CODE 250: Performed by: THORACIC SURGERY (CARDIOTHORACIC VASCULAR SURGERY)

## 2022-04-01 PROCEDURE — 84100 ASSAY OF PHOSPHORUS: CPT | Performed by: NURSE PRACTITIONER

## 2022-04-01 PROCEDURE — 94640 AIRWAY INHALATION TREATMENT: CPT

## 2022-04-01 PROCEDURE — 82803 BLOOD GASES ANY COMBINATION: CPT

## 2022-04-01 PROCEDURE — P9045 ALBUMIN (HUMAN), 5%, 250 ML: HCPCS | Mod: JG | Performed by: STUDENT IN AN ORGANIZED HEALTH CARE EDUCATION/TRAINING PROGRAM

## 2022-04-01 PROCEDURE — 33533 PR CABG, ARTERIAL, SINGLE: ICD-10-PCS | Mod: ,,, | Performed by: THORACIC SURGERY (CARDIOTHORACIC VASCULAR SURGERY)

## 2022-04-01 PROCEDURE — 76937 US GUIDE VASCULAR ACCESS: CPT | Mod: 26,,, | Performed by: ANESTHESIOLOGY

## 2022-04-01 PROCEDURE — 93312 ECHO TRANSESOPHAGEAL: CPT | Mod: 26,59,, | Performed by: ANESTHESIOLOGY

## 2022-04-01 PROCEDURE — 84295 ASSAY OF SERUM SODIUM: CPT

## 2022-04-01 PROCEDURE — 36000713 HC OR TIME LEV V EA ADD 15 MIN: Performed by: THORACIC SURGERY (CARDIOTHORACIC VASCULAR SURGERY)

## 2022-04-01 PROCEDURE — 33508 ENDOSCOPIC VEIN HARVEST: CPT | Mod: 59,,, | Performed by: THORACIC SURGERY (CARDIOTHORACIC VASCULAR SURGERY)

## 2022-04-01 PROCEDURE — 27200953 HC CARDIOPLEGIA SYSTEM

## 2022-04-01 PROCEDURE — 33508 PR ENDOSCOPY W/VIDEO-ASST VEIN HARVEST,CABG: ICD-10-PCS | Mod: 59,,, | Performed by: THORACIC SURGERY (CARDIOTHORACIC VASCULAR SURGERY)

## 2022-04-01 PROCEDURE — 33517 PR CABG, ARTERY-VEIN, SINGLE: ICD-10-PCS | Mod: ,,, | Performed by: THORACIC SURGERY (CARDIOTHORACIC VASCULAR SURGERY)

## 2022-04-01 PROCEDURE — D9220A PRA ANESTHESIA: ICD-10-PCS | Mod: ,,, | Performed by: ANESTHESIOLOGY

## 2022-04-01 PROCEDURE — 93010 EKG 12-LEAD: ICD-10-PCS | Mod: ,,, | Performed by: INTERNAL MEDICINE

## 2022-04-01 PROCEDURE — 99291 PR CRITICAL CARE, E/M 30-74 MINUTES: ICD-10-PCS | Mod: ,,, | Performed by: ANESTHESIOLOGY

## 2022-04-01 PROCEDURE — 25000003 PHARM REV CODE 250: Performed by: ANESTHESIOLOGY

## 2022-04-01 PROCEDURE — 99223 1ST HOSP IP/OBS HIGH 75: CPT | Mod: ,,, | Performed by: NURSE PRACTITIONER

## 2022-04-01 PROCEDURE — 99900026 HC AIRWAY MAINTENANCE (STAT)

## 2022-04-01 PROCEDURE — 85014 HEMATOCRIT: CPT

## 2022-04-01 PROCEDURE — 83605 ASSAY OF LACTIC ACID: CPT

## 2022-04-01 PROCEDURE — 94761 N-INVAS EAR/PLS OXIMETRY MLT: CPT

## 2022-04-01 PROCEDURE — 36556 INSERT NON-TUNNEL CV CATH: CPT | Mod: 59,,, | Performed by: ANESTHESIOLOGY

## 2022-04-01 PROCEDURE — 63600175 PHARM REV CODE 636 W HCPCS: Mod: JG | Performed by: STUDENT IN AN ORGANIZED HEALTH CARE EDUCATION/TRAINING PROGRAM

## 2022-04-01 PROCEDURE — 27000445 HC TEMPORARY PACEMAKER LEADS

## 2022-04-01 PROCEDURE — 86850 RBC ANTIBODY SCREEN: CPT | Performed by: NURSE PRACTITIONER

## 2022-04-01 PROCEDURE — 27000221 HC OXYGEN, UP TO 24 HOURS

## 2022-04-01 PROCEDURE — 33533 CABG ARTERIAL SINGLE: CPT | Mod: ,,, | Performed by: THORACIC SURGERY (CARDIOTHORACIC VASCULAR SURGERY)

## 2022-04-01 PROCEDURE — 37000008 HC ANESTHESIA 1ST 15 MINUTES: Performed by: THORACIC SURGERY (CARDIOTHORACIC VASCULAR SURGERY)

## 2022-04-01 PROCEDURE — 63600175 PHARM REV CODE 636 W HCPCS: Performed by: THORACIC SURGERY (CARDIOTHORACIC VASCULAR SURGERY)

## 2022-04-01 PROCEDURE — 93005 ELECTROCARDIOGRAM TRACING: CPT

## 2022-04-01 PROCEDURE — 76937 PR  US GUIDE, VASCULAR ACCESS: ICD-10-PCS | Mod: 26,,, | Performed by: ANESTHESIOLOGY

## 2022-04-01 PROCEDURE — 80053 COMPREHEN METABOLIC PANEL: CPT | Performed by: STUDENT IN AN ORGANIZED HEALTH CARE EDUCATION/TRAINING PROGRAM

## 2022-04-01 PROCEDURE — 85520 HEPARIN ASSAY: CPT

## 2022-04-01 PROCEDURE — 93312 PR ECHO HEART,TRANSESOPHAGEAL: ICD-10-PCS | Mod: 26,59,, | Performed by: ANESTHESIOLOGY

## 2022-04-01 DEVICE — PUTTY HEMASORB BONE RESORBABLE: Type: IMPLANTABLE DEVICE | Site: CHEST | Status: FUNCTIONAL

## 2022-04-01 RX ORDER — FENTANYL CITRATE 50 UG/ML
INJECTION, SOLUTION INTRAMUSCULAR; INTRAVENOUS
Status: DISCONTINUED | OUTPATIENT
Start: 2022-04-01 | End: 2022-04-01

## 2022-04-01 RX ORDER — IPRATROPIUM BROMIDE AND ALBUTEROL SULFATE 2.5; .5 MG/3ML; MG/3ML
3 SOLUTION RESPIRATORY (INHALATION) EVERY 4 HOURS
Status: COMPLETED | OUTPATIENT
Start: 2022-04-01 | End: 2022-04-02

## 2022-04-01 RX ORDER — OXYCODONE HYDROCHLORIDE 5 MG/1
5 TABLET ORAL EVERY 4 HOURS PRN
Status: DISCONTINUED | OUTPATIENT
Start: 2022-04-01 | End: 2022-04-08 | Stop reason: HOSPADM

## 2022-04-01 RX ORDER — PAPAVERINE HYDROCHLORIDE 30 MG/ML
INJECTION INTRAMUSCULAR; INTRAVENOUS
Status: DISCONTINUED | OUTPATIENT
Start: 2022-04-01 | End: 2022-04-01

## 2022-04-01 RX ORDER — FAMOTIDINE 10 MG/ML
20 INJECTION INTRAVENOUS 2 TIMES DAILY
Status: DISCONTINUED | OUTPATIENT
Start: 2022-04-01 | End: 2022-04-02

## 2022-04-01 RX ORDER — MIDAZOLAM HYDROCHLORIDE 1 MG/ML
INJECTION, SOLUTION INTRAMUSCULAR; INTRAVENOUS
Status: DISCONTINUED | OUTPATIENT
Start: 2022-04-01 | End: 2022-04-01

## 2022-04-01 RX ORDER — MAGNESIUM SULFATE HEPTAHYDRATE 40 MG/ML
2 INJECTION, SOLUTION INTRAVENOUS
Status: DISCONTINUED | OUTPATIENT
Start: 2022-04-01 | End: 2022-04-05

## 2022-04-01 RX ORDER — POTASSIUM CHLORIDE 14.9 MG/ML
60 INJECTION INTRAVENOUS
Status: DISCONTINUED | OUTPATIENT
Start: 2022-04-01 | End: 2022-04-05

## 2022-04-01 RX ORDER — ASPIRIN 300 MG/1
300 SUPPOSITORY RECTAL ONCE AS NEEDED
Status: DISCONTINUED | OUTPATIENT
Start: 2022-04-01 | End: 2022-04-01

## 2022-04-01 RX ORDER — CEFAZOLIN SODIUM/D5W 2 G/100 ML
2 PLASTIC BAG, INJECTION (ML) INTRAVENOUS
Status: COMPLETED | OUTPATIENT
Start: 2022-04-01 | End: 2022-04-03

## 2022-04-01 RX ORDER — MUPIROCIN 20 MG/G
1 OINTMENT TOPICAL
Status: COMPLETED | OUTPATIENT
Start: 2022-04-01 | End: 2022-04-01

## 2022-04-01 RX ORDER — BISACODYL 10 MG
10 SUPPOSITORY, RECTAL RECTAL DAILY PRN
Status: DISCONTINUED | OUTPATIENT
Start: 2022-04-01 | End: 2022-04-08 | Stop reason: HOSPADM

## 2022-04-01 RX ORDER — OXYCODONE HYDROCHLORIDE 10 MG/1
10 TABLET ORAL EVERY 4 HOURS PRN
Status: DISCONTINUED | OUTPATIENT
Start: 2022-04-01 | End: 2022-04-08 | Stop reason: HOSPADM

## 2022-04-01 RX ORDER — CEFAZOLIN SODIUM/WATER 2 G/20 ML
SYRINGE (ML) INTRAVENOUS
Status: DISCONTINUED | OUTPATIENT
Start: 2022-04-01 | End: 2022-04-01

## 2022-04-01 RX ORDER — SODIUM CHLORIDE 9 MG/ML
INJECTION, SOLUTION INTRAVENOUS CONTINUOUS
Status: DISCONTINUED | OUTPATIENT
Start: 2022-04-01 | End: 2022-04-01

## 2022-04-01 RX ORDER — HEPARIN SOD,PORCINE/0.9 % NACL 1000/500ML
INTRAVENOUS SOLUTION INTRAVENOUS
Status: DISCONTINUED | OUTPATIENT
Start: 2022-04-01 | End: 2022-04-01

## 2022-04-01 RX ORDER — DEXMEDETOMIDINE HYDROCHLORIDE 100 UG/ML
INJECTION, SOLUTION INTRAVENOUS
Status: DISCONTINUED | OUTPATIENT
Start: 2022-04-01 | End: 2022-04-01

## 2022-04-01 RX ORDER — ASPIRIN 325 MG
325 TABLET ORAL DAILY
Status: DISCONTINUED | OUTPATIENT
Start: 2022-04-01 | End: 2022-04-01

## 2022-04-01 RX ORDER — PROPOFOL 10 MG/ML
VIAL (ML) INTRAVENOUS
Status: DISCONTINUED | OUTPATIENT
Start: 2022-04-01 | End: 2022-04-01

## 2022-04-01 RX ORDER — IPRATROPIUM BROMIDE AND ALBUTEROL SULFATE 2.5; .5 MG/3ML; MG/3ML
3 SOLUTION RESPIRATORY (INHALATION) EVERY 4 HOURS PRN
Status: ACTIVE | OUTPATIENT
Start: 2022-04-01 | End: 2022-04-02

## 2022-04-01 RX ORDER — DEXTROSE MONOHYDRATE, SODIUM CHLORIDE, AND POTASSIUM CHLORIDE 50; 1.49; 4.5 G/1000ML; G/1000ML; G/1000ML
INJECTION, SOLUTION INTRAVENOUS CONTINUOUS
Status: DISCONTINUED | OUTPATIENT
Start: 2022-04-01 | End: 2022-04-05

## 2022-04-01 RX ORDER — PROTAMINE SULFATE 10 MG/ML
INJECTION, SOLUTION INTRAVENOUS
Status: DISCONTINUED | OUTPATIENT
Start: 2022-04-01 | End: 2022-04-01

## 2022-04-01 RX ORDER — DEXMEDETOMIDINE HYDROCHLORIDE 4 UG/ML
0-1.4 INJECTION, SOLUTION INTRAVENOUS CONTINUOUS
Status: DISCONTINUED | OUTPATIENT
Start: 2022-04-01 | End: 2022-04-02

## 2022-04-01 RX ORDER — FENTANYL CITRATE 50 UG/ML
50 INJECTION, SOLUTION INTRAMUSCULAR; INTRAVENOUS
Status: DISCONTINUED | OUTPATIENT
Start: 2022-04-03 | End: 2022-04-02

## 2022-04-01 RX ORDER — LIDOCAINE HYDROCHLORIDE 10 MG/ML
1 INJECTION, SOLUTION EPIDURAL; INFILTRATION; INTRACAUDAL; PERINEURAL
Status: DISCONTINUED | OUTPATIENT
Start: 2022-04-01 | End: 2022-04-01

## 2022-04-01 RX ORDER — MUPIROCIN 20 MG/G
1 OINTMENT TOPICAL 2 TIMES DAILY
Status: COMPLETED | OUTPATIENT
Start: 2022-04-01 | End: 2022-04-05

## 2022-04-01 RX ORDER — POTASSIUM CHLORIDE 29.8 MG/ML
40 INJECTION INTRAVENOUS
Status: DISCONTINUED | OUTPATIENT
Start: 2022-04-01 | End: 2022-04-05

## 2022-04-01 RX ORDER — METOPROLOL TARTRATE 25 MG/1
25 TABLET, FILM COATED ORAL
Status: COMPLETED | OUTPATIENT
Start: 2022-04-01 | End: 2022-04-01

## 2022-04-01 RX ORDER — ASPIRIN 325 MG
325 TABLET ORAL ONCE
Status: DISCONTINUED | OUTPATIENT
Start: 2022-04-01 | End: 2022-04-01

## 2022-04-01 RX ORDER — POLYETHYLENE GLYCOL 3350 17 G/17G
17 POWDER, FOR SOLUTION ORAL DAILY
Status: DISCONTINUED | OUTPATIENT
Start: 2022-04-01 | End: 2022-04-08 | Stop reason: HOSPADM

## 2022-04-01 RX ORDER — ASPIRIN 325 MG
325 TABLET ORAL DAILY
Status: DISCONTINUED | OUTPATIENT
Start: 2022-04-01 | End: 2022-04-08 | Stop reason: HOSPADM

## 2022-04-01 RX ORDER — ROCURONIUM BROMIDE 10 MG/ML
INJECTION, SOLUTION INTRAVENOUS
Status: DISCONTINUED | OUTPATIENT
Start: 2022-04-01 | End: 2022-04-01

## 2022-04-01 RX ORDER — METOCLOPRAMIDE HYDROCHLORIDE 5 MG/ML
5 INJECTION INTRAMUSCULAR; INTRAVENOUS EVERY 6 HOURS PRN
Status: DISCONTINUED | OUTPATIENT
Start: 2022-04-01 | End: 2022-04-08 | Stop reason: HOSPADM

## 2022-04-01 RX ORDER — ACETAMINOPHEN 325 MG/1
650 TABLET ORAL EVERY 4 HOURS PRN
Status: DISCONTINUED | OUTPATIENT
Start: 2022-04-01 | End: 2022-04-04

## 2022-04-01 RX ORDER — PROPOFOL 10 MG/ML
0-50 INJECTION, EMULSION INTRAVENOUS CONTINUOUS
Status: DISCONTINUED | OUTPATIENT
Start: 2022-04-01 | End: 2022-04-02

## 2022-04-01 RX ORDER — FENTANYL CITRATE 50 UG/ML
25 INJECTION, SOLUTION INTRAMUSCULAR; INTRAVENOUS
Status: DISCONTINUED | OUTPATIENT
Start: 2022-04-01 | End: 2022-04-02

## 2022-04-01 RX ORDER — ALBUMIN HUMAN 50 G/1000ML
25 SOLUTION INTRAVENOUS ONCE AS NEEDED
Status: COMPLETED | OUTPATIENT
Start: 2022-04-01 | End: 2022-04-01

## 2022-04-01 RX ORDER — ATORVASTATIN CALCIUM 20 MG/1
40 TABLET, FILM COATED ORAL DAILY
Status: DISCONTINUED | OUTPATIENT
Start: 2022-04-01 | End: 2022-04-08 | Stop reason: HOSPADM

## 2022-04-01 RX ORDER — ONDANSETRON 2 MG/ML
INJECTION INTRAMUSCULAR; INTRAVENOUS
Status: DISCONTINUED | OUTPATIENT
Start: 2022-04-01 | End: 2022-04-01

## 2022-04-01 RX ORDER — POTASSIUM CHLORIDE 14.9 MG/ML
20 INJECTION INTRAVENOUS
Status: DISCONTINUED | OUTPATIENT
Start: 2022-04-01 | End: 2022-04-05

## 2022-04-01 RX ORDER — DOCUSATE SODIUM 100 MG/1
100 CAPSULE, LIQUID FILLED ORAL 2 TIMES DAILY
Status: DISCONTINUED | OUTPATIENT
Start: 2022-04-01 | End: 2022-04-04

## 2022-04-01 RX ORDER — HEPARIN SODIUM 1000 [USP'U]/ML
INJECTION, SOLUTION INTRAVENOUS; SUBCUTANEOUS
Status: DISCONTINUED | OUTPATIENT
Start: 2022-04-01 | End: 2022-04-01

## 2022-04-01 RX ORDER — SODIUM CHLORIDE 0.9 % (FLUSH) 0.9 %
10 SYRINGE (ML) INJECTION
Status: DISCONTINUED | OUTPATIENT
Start: 2022-04-01 | End: 2022-04-08 | Stop reason: HOSPADM

## 2022-04-01 RX ORDER — KETAMINE HCL IN 0.9 % NACL 50 MG/5 ML
SYRINGE (ML) INTRAVENOUS
Status: DISCONTINUED | OUTPATIENT
Start: 2022-04-01 | End: 2022-04-01

## 2022-04-01 RX ORDER — DEXAMETHASONE SODIUM PHOSPHATE 4 MG/ML
INJECTION, SOLUTION INTRA-ARTICULAR; INTRALESIONAL; INTRAMUSCULAR; INTRAVENOUS; SOFT TISSUE
Status: DISCONTINUED | OUTPATIENT
Start: 2022-04-01 | End: 2022-04-01

## 2022-04-01 RX ORDER — LIDOCAINE HYDROCHLORIDE 20 MG/ML
INJECTION, SOLUTION EPIDURAL; INFILTRATION; INTRACAUDAL; PERINEURAL
Status: DISCONTINUED | OUTPATIENT
Start: 2022-04-01 | End: 2022-04-01

## 2022-04-01 RX ORDER — ONDANSETRON 2 MG/ML
4 INJECTION INTRAMUSCULAR; INTRAVENOUS EVERY 12 HOURS PRN
Status: DISCONTINUED | OUTPATIENT
Start: 2022-04-01 | End: 2022-04-08 | Stop reason: HOSPADM

## 2022-04-01 RX ORDER — ESMOLOL HYDROCHLORIDE 10 MG/ML
INJECTION INTRAVENOUS
Status: DISCONTINUED | OUTPATIENT
Start: 2022-04-01 | End: 2022-04-01

## 2022-04-01 RX ORDER — ALBUMIN HUMAN 50 G/1000ML
12.5 SOLUTION INTRAVENOUS ONCE
Status: COMPLETED | OUTPATIENT
Start: 2022-04-01 | End: 2022-04-01

## 2022-04-01 RX ORDER — MAGNESIUM SULFATE HEPTAHYDRATE 40 MG/ML
4 INJECTION, SOLUTION INTRAVENOUS
Status: DISCONTINUED | OUTPATIENT
Start: 2022-04-01 | End: 2022-04-05

## 2022-04-01 RX ORDER — PROPOFOL 10 MG/ML
VIAL (ML) INTRAVENOUS CONTINUOUS PRN
Status: DISCONTINUED | OUTPATIENT
Start: 2022-04-01 | End: 2022-04-01

## 2022-04-01 RX ORDER — ACETAMINOPHEN 10 MG/ML
INJECTION, SOLUTION INTRAVENOUS
Status: DISCONTINUED | OUTPATIENT
Start: 2022-04-01 | End: 2022-04-01

## 2022-04-01 RX ADMIN — ACETAMINOPHEN 1000 MG: 10 INJECTION, SOLUTION INTRAVENOUS at 09:04

## 2022-04-01 RX ADMIN — ESMOLOL HYDROCHLORIDE 50 MG: 100 INJECTION, SOLUTION INTRAVENOUS at 07:04

## 2022-04-01 RX ADMIN — PROPOFOL 50 MCG/KG/MIN: 10 INJECTION, EMULSION INTRAVENOUS at 04:04

## 2022-04-01 RX ADMIN — HEPARIN SODIUM 31000 UNITS: 1000 INJECTION, SOLUTION INTRAVENOUS; SUBCUTANEOUS at 09:04

## 2022-04-01 RX ADMIN — DEXMEDETOMIDINE HYDROCHLORIDE 12 MCG: 100 INJECTION, SOLUTION INTRAVENOUS at 08:04

## 2022-04-01 RX ADMIN — ALBUMIN (HUMAN) 25 G: 12.5 SOLUTION INTRAVENOUS at 01:04

## 2022-04-01 RX ADMIN — Medication 10 MG: at 09:04

## 2022-04-01 RX ADMIN — IPRATROPIUM BROMIDE AND ALBUTEROL SULFATE 3 ML: 2.5; .5 SOLUTION RESPIRATORY (INHALATION) at 11:04

## 2022-04-01 RX ADMIN — SODIUM CHLORIDE: 0.9 INJECTION, SOLUTION INTRAVENOUS at 06:04

## 2022-04-01 RX ADMIN — ALBUMIN (HUMAN) 12.5 G: 12.5 SOLUTION INTRAVENOUS at 08:04

## 2022-04-01 RX ADMIN — FENTANYL CITRATE 300 MCG: 50 INJECTION INTRAMUSCULAR; INTRAVENOUS at 07:04

## 2022-04-01 RX ADMIN — ATORVASTATIN CALCIUM 40 MG: 20 TABLET, FILM COATED ORAL at 01:04

## 2022-04-01 RX ADMIN — MUPIROCIN 1 G: 20 OINTMENT TOPICAL at 09:04

## 2022-04-01 RX ADMIN — PROPOFOL 50 MCG/KG/MIN: 10 INJECTION, EMULSION INTRAVENOUS at 12:04

## 2022-04-01 RX ADMIN — EPINEPHRINE 0.05 MCG/KG/MIN: 1 INJECTION INTRAMUSCULAR; INTRAVENOUS; SUBCUTANEOUS at 10:04

## 2022-04-01 RX ADMIN — ASPIRIN 325 MG ORAL TABLET 325 MG: 325 PILL ORAL at 04:04

## 2022-04-01 RX ADMIN — MUPIROCIN 1 G: 20 OINTMENT TOPICAL at 01:04

## 2022-04-01 RX ADMIN — PROTAMINE SULFATE 300 MG: 10 INJECTION, SOLUTION INTRAVENOUS at 10:04

## 2022-04-01 RX ADMIN — IPRATROPIUM BROMIDE AND ALBUTEROL SULFATE 3 ML: 2.5; .5 SOLUTION RESPIRATORY (INHALATION) at 04:04

## 2022-04-01 RX ADMIN — Medication 3 G: at 07:04

## 2022-04-01 RX ADMIN — METOPROLOL TARTRATE 25 MG: 25 TABLET, FILM COATED ORAL at 06:04

## 2022-04-01 RX ADMIN — FENTANYL CITRATE 200 MCG: 50 INJECTION INTRAMUSCULAR; INTRAVENOUS at 09:04

## 2022-04-01 RX ADMIN — NOREPINEPHRINE BITARTRATE 0.03 MCG/KG/MIN: 1 INJECTION, SOLUTION, CONCENTRATE INTRAVENOUS at 07:04

## 2022-04-01 RX ADMIN — LIDOCAINE HYDROCHLORIDE 100 MG: 20 INJECTION, SOLUTION EPIDURAL; INFILTRATION; INTRACAUDAL at 10:04

## 2022-04-01 RX ADMIN — PROPOFOL 100 MG: 10 INJECTION, EMULSION INTRAVENOUS at 07:04

## 2022-04-01 RX ADMIN — FENTANYL CITRATE 100 MCG: 50 INJECTION INTRAMUSCULAR; INTRAVENOUS at 08:04

## 2022-04-01 RX ADMIN — DEXMEDETOMIDINE HYDROCHLORIDE 12 MCG: 100 INJECTION, SOLUTION INTRAVENOUS at 09:04

## 2022-04-01 RX ADMIN — IPRATROPIUM BROMIDE AND ALBUTEROL SULFATE 3 ML: 2.5; .5 SOLUTION RESPIRATORY (INHALATION) at 12:04

## 2022-04-01 RX ADMIN — Medication 25 MG: at 08:04

## 2022-04-01 RX ADMIN — SODIUM CHLORIDE, SODIUM GLUCONATE, SODIUM ACETATE, POTASSIUM CHLORIDE, MAGNESIUM CHLORIDE, SODIUM PHOSPHATE, DIBASIC, AND POTASSIUM PHOSPHATE: .53; .5; .37; .037; .03; .012; .00082 INJECTION, SOLUTION INTRAVENOUS at 06:04

## 2022-04-01 RX ADMIN — DEXAMETHASONE SODIUM PHOSPHATE 8 MG: 4 INJECTION, SOLUTION INTRAMUSCULAR; INTRAVENOUS at 08:04

## 2022-04-01 RX ADMIN — PROPOFOL 50 MG: 10 INJECTION, EMULSION INTRAVENOUS at 08:04

## 2022-04-01 RX ADMIN — MIDAZOLAM HYDROCHLORIDE 2 MG: 1 INJECTION, SOLUTION INTRAMUSCULAR; INTRAVENOUS at 07:04

## 2022-04-01 RX ADMIN — FENTANYL CITRATE 25 MCG: 50 INJECTION INTRAMUSCULAR; INTRAVENOUS at 12:04

## 2022-04-01 RX ADMIN — DEXTROSE 2 G: 50 INJECTION, SOLUTION INTRAVENOUS at 05:04

## 2022-04-01 RX ADMIN — PROPOFOL 50 MCG/KG/MIN: 10 INJECTION, EMULSION INTRAVENOUS at 09:04

## 2022-04-01 RX ADMIN — PROPOFOL 30 MG: 10 INJECTION, EMULSION INTRAVENOUS at 08:04

## 2022-04-01 RX ADMIN — IPRATROPIUM BROMIDE AND ALBUTEROL SULFATE 3 ML: 2.5; .5 SOLUTION RESPIRATORY (INHALATION) at 07:04

## 2022-04-01 RX ADMIN — Medication 15 MG: at 08:04

## 2022-04-01 RX ADMIN — FAMOTIDINE 20 MG: 10 INJECTION INTRAVENOUS at 09:04

## 2022-04-01 RX ADMIN — Medication 50 MCG/KG/MIN: at 11:04

## 2022-04-01 RX ADMIN — MUPIROCIN 1 G: 20 OINTMENT TOPICAL at 06:04

## 2022-04-01 RX ADMIN — DEXMEDETOMIDINE HYDROCHLORIDE 20 MCG: 100 INJECTION, SOLUTION INTRAVENOUS at 11:04

## 2022-04-01 RX ADMIN — ROCURONIUM BROMIDE 100 MG: 10 INJECTION, SOLUTION INTRAVENOUS at 07:04

## 2022-04-01 RX ADMIN — INSULIN HUMAN 1 UNITS/HR: 1 INJECTION, SOLUTION INTRAVENOUS at 01:04

## 2022-04-01 RX ADMIN — PROPOFOL 40 MG: 10 INJECTION, EMULSION INTRAVENOUS at 11:04

## 2022-04-01 RX ADMIN — DEXTROSE, SODIUM CHLORIDE, AND POTASSIUM CHLORIDE: 5; .45; .15 INJECTION INTRAVENOUS at 01:04

## 2022-04-01 RX ADMIN — LIDOCAINE HYDROCHLORIDE 100 MG: 20 INJECTION, SOLUTION EPIDURAL; INFILTRATION; INTRACAUDAL at 07:04

## 2022-04-01 RX ADMIN — POLYETHYLENE GLYCOL 3350 17 G: 17 POWDER, FOR SOLUTION ORAL at 01:04

## 2022-04-01 RX ADMIN — ONDANSETRON 1 G: 2 INJECTION INTRAMUSCULAR; INTRAVENOUS at 10:04

## 2022-04-01 NOTE — PLAN OF CARE
Shift events: s/p CABGx2. Epi gtt weaned off. 500 mL albumin.    Vitals: Afebrile. NSR 70s. MAP > 65, SBP < 140. CVP 13.     Neuro: Follows commands and moves all extremities on sedation vacation. Pupils equal, round, and reactive.     Cardiac: HR NSR 70s. MAP > 65, SBP < 140. V wires isolated and secured. Pacer remains at bedside.     Respiratory: AC/VC, rate 26, , SpO2 60%, 10 PEEP. Q1h ABGs with lytes/lactic    Neurovascular: Palpable pulses in all 4 extremities.     GI: NPO. OGT to LIWS, marginal output    : UOP per Levy 50 mL/hr    Gtts: TXA, Propofol, MIVF, Insulin    Labs: q1h accucheck    Drains: Mediastinal and pleural chest tube output, 10-20 mL/hr, sanguineous.     Skin: Midsternal incision with telfa island dressing, CDI. Chest tube dressing CDI. L leg graft site with compression wrap, CDI. No skin breakdown noted to back of elbows, sacrum, or heels. Foam dressing in place on sacrum. Heel boots in place. Pt repositioned q2h as tolerated. SCDs in use and waffle mattress inflated.

## 2022-04-01 NOTE — INTERVAL H&P NOTE
The patient has been examined and the H&P has been reviewed:    I concur with the findings and no changes have occurred since H&P was written.    Surgery risks, benefits and alternative options discussed and understood by patient/family.          There are no hospital problems to display for this patient.    Nevaeh Singh MD, PGY-6  Cardiothoracic Surgery   [phone]

## 2022-04-01 NOTE — ANESTHESIA PROCEDURE NOTES
Arterial    Diagnosis: CAD    Patient location during procedure: done in OR  Procedure start time: 4/1/2022 7:31 AM  Timeout: 4/1/2022 7:30 AM  Procedure end time: 4/1/2022 7:32 AM    Staffing  Authorizing Provider: Eugene Celis MD  Performing Provider: Bro Kenyon MD    Anesthesiologist was present at the time of the procedure.    Preanesthetic Checklist  Completed: patient identified, IV checked, site marked, risks and benefits discussed, surgical consent, monitors and equipment checked, pre-op evaluation, timeout performed and anesthesia consent givenArterial  Skin Prep: chlorhexidine gluconate  Local Infiltration: lidocaine  Orientation: left  Location: radial    Catheter placement by Ultrasound guidance. Heme positive aspiration all ports.   Vessel Caliber: patent  Needle advanced into vessel with real time Ultrasound guidance.Insertion Attempts: 1  Assessment  Dressing: secured with tape and tegaderm  Patient: Tolerated well

## 2022-04-01 NOTE — HPI
Reason for Consult: Management of Pre-DM, Hyperglycemia     Surgical Procedure and Date:  4/1/22  CORONARY ARTERY BYPASS GRAFT (CABG) (N/A)  SURGICAL PROCUREMENT, VEIN, ENDOSCOPIC (Left)       HPI:   Patient is a 61 y.o. female with a diagnosis of morbid obesity, HTN, JONH, CAD s/p LAD stent in 2012, and current tobacco abuse.   LHC on 3/22 showed multivessel disease with LM involvement.  Last stress test was SPECT on 1/31/20 which was negative. CTS team plans for 3v CABG.  Patient now presents for the above procedure(s). Endocrinology consulted for management of hyperglycemia.       Lab Results   Component Value Date    HGBA1C 6.1 (H) 03/19/2022

## 2022-04-01 NOTE — SUBJECTIVE & OBJECTIVE
Interval HPI:   Overnight events: Admitted to SICU s/p CABG. Remains intubated. BG slightly above goal ranges with IV intensive insulin protocol initiated. Diet NPO Except for: Sips with Medication  Eating:   NPO  Nausea: No  Hypoglycemia and intervention: No  Fever: No  TPN and/or TF: No  If yes, type of TF/TPN and rate: n/a    PMH, PSH, FH, SH reviewed     ROS:  Unable to obtain due to: Sedation,Intubation,Altered mental status,Critical illness,Reviewed ROS from note dated 3/30/22 by  Nevaeh Singh MD    Review of Systems    Current Medications and/or Treatments Impacting Glycemic Control  Immunotherapy:    Immunosuppressants       None          Steroids:   Hormones (From admission, onward)                None          Pressors:    Autonomic Drugs (From admission, onward)                Start     Stop Route Frequency Ordered    04/01/22 1200  EPINEPHrine (ADRENALIN) 5 mg in dextrose 5 % 250 mL infusion        Question Answer Comment   Begin at (in mcg/kg/min): 0.02    Titrate by: (in mcg/kg/min) 0.02    Titrate interval: (in minutes) 5    Titrate to maintain: (SBP or MAP or Cardiac Index) MAP    Greater than: (in mmHg) 65    Cardiac index greater than: (in L/min) 2.2    Maximum dose: (in mcg/kg/min) 2        -- IV Continuous 04/01/22 1145          Hyperglycemia/Diabetes Medications:   Antihyperglycemics (From admission, onward)                Start     Stop Route Frequency Ordered    04/01/22 1200  insulin regular in 0.9 % NaCl 100 unit/100 mL (1 unit/mL) infusion        Question Answer Comment   Insulin rate changes (DO NOT MODIFY ANSWER) [email or web address]\[email or web address]\Images\Pharmacy\InsulinInfusions\CTS INSULIN [id number].pdf    Enter initial dose (Units/hr): 1        -- IV Continuous 04/01/22 1145             PHYSICAL EXAMINATION:  Vitals:    04/01/22 1315   BP:    Pulse: 73   Resp: (!) 21   Temp:      Body mass index is 49.38 kg/m².    Physical Exam  Constitutional: Well developed, obese, NAD.  ENT: External ears no  masses with nose patent  Neck: Supple; trachea midline  Cardiovascular: Normal heart sounds, no LE edema. DP +2 bilaterally.  Lungs: Normal effort; lungs anterior bilaterally clear to auscultation.  Intubated on a ventilator.  Abdomen: Soft, no masses, no hernias.  Hypoactive BS noted.  MS: No clubbing or cyanosis of nails noted; unable to assess gait.  Skin: No rashes, lesions, or ulcers; no nodules.    Mid-sternal incision with telfa island dressing, CDI.  CT x 2.  LLE wrapped with ACE wrap.  Psychiatric: ASH  Neurological: ASH  Foot: Nails in good condition, no amputations noted

## 2022-04-01 NOTE — TRANSFER OF CARE
"Anesthesia Transfer of Care Note    Patient: Sonya Armendariz    Procedure(s) Performed: Procedure(s) (LRB):  CORONARY ARTERY BYPASS GRAFT (CABG) (N/A)  SURGICAL PROCUREMENT, VEIN, ENDOSCOPIC (Left)    Patient location: ICU    Anesthesia Type: general    Transport from OR: Transported from OR on 6-10 L/min O2 by face mask with adequate spontaneous ventilation    Post pain: adequate analgesia    Post assessment: no apparent anesthetic complications    Post vital signs: stable    Level of consciousness: sedated    Nausea/Vomiting: no nausea/vomiting    Complications: none    Transfer of care protocol was followed      Last vitals:   Visit Vitals  BP (!) 141/74 (BP Location: Right arm, Patient Position: Lying)   Pulse 72   Temp 36.9 °C (98.4 °F) (Oral)   Resp 20   Ht 5' 2" (1.575 m)   Wt 122.5 kg (270 lb)   LMP 06/30/2015   SpO2 (!) 94%   Breastfeeding No   BMI 49.38 kg/m²     "

## 2022-04-01 NOTE — SUBJECTIVE & OBJECTIVE
Follow-up For: Procedure(s) (LRB):  CORONARY ARTERY BYPASS GRAFT (CABG) (N/A)  SURGICAL PROCUREMENT, VEIN, ENDOSCOPIC (Left)    Post-Operative Day: Day of Surgery     Past Medical History:   Diagnosis Date    Anticoagulant long-term use     CAD (coronary artery disease)     s/p LAD stent    CHF (congestive heart failure)     Gastroduodenitis     Helicobacter pylori (H. pylori) infection     Hyperlipidemia     Hypertension     MI (myocardial infarction)     x2    Obesity     Tobacco use     Urinary tract infection     Vaginal infection        Past Surgical History:   Procedure Laterality Date    ANGIOGRAM, CORONARY, WITH LEFT HEART CATHETERIZATION N/A 3/22/2022    Procedure: Angiogram, Coronary, with Left Heart Cath;  Surgeon: Giuseppe Merchant MD;  Location: [facility] CATH LAB;  Service: Cardiology;  Laterality: N/A;    COLONOSCOPY N/A 7/14/2017    Procedure: COLONOSCOPY;  Surgeon: Cathy Collier MD;  Location: [facility] ENDO;  Service: Endoscopy;  Laterality: N/A;    NO PAST SURGERIES         Review of patient's allergies indicates:   Allergen Reactions    Amoxicillin Diarrhea    Plavix [clopidogrel]        Family History       Problem Relation (Age of Onset)    Breast cancer Sister    Cancer Father, Brother    Coronary artery disease Cousin    Diabetes Other    Hypertension Other    Stomach cancer Mother    Throat cancer Brother          Tobacco Use    Smoking status: Current Every Day Smoker     Packs/day: 1.00     Years: 40.00     Pack years: 40.00     Types: Cigarettes    Smokeless tobacco: Never Used    Tobacco comment: Restarted 1 .5 yrs. ago   Substance and Sexual Activity    Alcohol use: No     Alcohol/week: 0.0 standard drinks    Drug use: No    Sexual activity: Yes     Partners: Male      Review of Systems   Unable to perform ROS: Acuity of condition   Objective:     Vital Signs (Most Recent):  Temp: 98.4 °F (36.9 °C) (04/01/22 0606)  Pulse: 69 (04/01/22 0606)  Resp: 20 (04/01/22 0606)  BP: (!) 141/74  (04/01/22 0606)  SpO2: 99 % (04/01/22 0606)   Vital Signs (24h Range):  Temp:  [98.4 °F (36.9 °C)] 98.4 °F (36.9 °C)  Pulse:  [69] 69  Resp:  [20] 20  SpO2:  [99 %] 99 %  BP: (141)/(74) 141/74     Weight: 122.5 kg (270 lb)  Body mass index is 49.38 kg/m².      Intake/Output Summary (Last 24 hours) at 4/1/2022 1155  Last data filed at 4/1/2022 1151  Gross per 24 hour   Intake 2335 ml   Output 300 ml   Net 2035 ml       Physical Exam  Constitutional:       Comments: sedated   HENT:      Head: Normocephalic and atraumatic.   Neck:      Comments: R IJ CVC  Cardiovascular:      Rate and Rhythm: Normal rate and regular rhythm.      Pulses: Normal pulses.      Comments: Midline sternotomy incision c/d/I  Chest tubes in place with serosanguinous output  V wires in place, not connected  Pulmonary:      Breath sounds: Normal breath sounds.      Comments: intubated  Abdominal:      General: Abdomen is flat. There is no distension.      Palpations: Abdomen is soft.      Tenderness: There is no abdominal tenderness.   Genitourinary:     Comments: Levy in place with clear urine  Musculoskeletal:      Comments: R radial art line in place   Skin:     General: Skin is warm.      Capillary Refill: Capillary refill takes less than 2 seconds.       Vents:       Lines/Drains/Airways       Drain  Duration                  Closed/Suction Drain 04/01/22 1104 Inferior;Left Pleural Other (Comment) 19 Fr. <1 day         Closed/Suction Drain 04/01/22 1104 Inferior;Medial;Right Chest Other (Comment) 19 Fr. <1 day         Closed/Suction Drain 04/01/22 1104 Inferior;Right Pleural Other (Comment) 19 Fr. <1 day         Closed/Suction Drain 04/01/22 1104 Medial;Left;Inferior Other (Comment) 19 Fr. <1 day         Urethral Catheter 04/01/22 0730 Straight-tip;Temperature probe;Non-latex 14 Fr. <1 day              Line  Duration                  Pacer Wires 04/01/22 1046 <1 day              Peripheral Intravenous Line  Duration                   Peripheral IV - Single Lumen 22 G Posterior;Right Hand -- days         Peripheral IV - Single Lumen 04/01/22 0610 20 G Anterior;Right Hand <1 day                    Significant Labs:    CBC/Anemia Profile:  Recent Labs   Lab 04/01/22  1011 04/01/22  1037   HCT 30* 31*        Chemistries:  No results for input(s): NA, K, CL, CO2, BUN, CREATININE, CALCIUM, ALBUMIN, PROT, BILITOT, ALKPHOS, ALT, AST, GLUCOSE, MG, PHOS in the last 48 hours.    ABGs:   Recent Labs   Lab 04/01/22  1037   PH 7.404   PCO2 40.9   HCO3 25.6   POCSATURATED 100   BE 1     CMP: No results for input(s): NA, K, CL, CO2, GLU, BUN, CREATININE, CALCIUM, PROT, ALBUMIN, BILITOT, ALKPHOS, AST, ALT, ANIONGAP, EGFRNONAA in the last 48 hours.    Invalid input(s): ESTGFAFRICA    Significant Imaging: I have reviewed all pertinent imaging results/findings within the past 24 hours.

## 2022-04-01 NOTE — ANESTHESIA PROCEDURE NOTES
ALISA    Diagnosis: CAD  Patient location during procedure: OR  Exam type: Baseline    Staffing  Performed: anesthesiologist and fellow     Anesthesiologist: Eugene Celis MD        Anesthesiologist Present  Yes      Setup & Induction  Probe Insertion: easy  Exam: completeDoppler Echo: 2D, 3D, color flow mapping, pulse wave Doppler and continuous wave Doppler.  Exam     Left Heart  Left Atruim: normal    Left Ventricle: cm, normal (men 4.2-5.9; women 3.8-5.2)    LVAD  Estimated Ejection Fraction: 45-54% mild            Right Heart  Right Ventricle: normal  Right Ventricle Function: normal    Intra Atrial Septum          Right Ventricle  Size: normal    Aortic Valve:  Stenosis: none.  Morphology: trileaflet      Mitral Valve:   Morphology:normal  Jet Description: none        Pulmonic Valve:  Morphology:normal  Regurgitation(color flow): none    Great Vessels  Ascending Aorta Atherosclerosis: 1=nl-min dz  Aortic Arch Atherosclerosis: 1=nl-min dz  Descending Aorta Atherosclerosis: 1=nl-min dz      Effusions    SummaryFindings discussed with surgeon.    Other Findings   Low normal LV systolic function, EF 50%  Normal RV systolic function  Normal valves

## 2022-04-01 NOTE — CONSULTS
[facility] - Surgical Intensive Care  Endocrinology  Diabetes Consult Note    Consult Requested by: Leandro Howard MD   Reason for admit: CAD (coronary artery disease)    HISTORY OF PRESENT ILLNESS:  Reason for Consult: Management of Pre-DM, Hyperglycemia     Surgical Procedure and Date:  4/1/22  CORONARY ARTERY BYPASS GRAFT (CABG) (N/A)  SURGICAL PROCUREMENT, VEIN, ENDOSCOPIC (Left)       HPI:   Patient is a 61 y.o. female with a diagnosis of morbid obesity, HTN, JONH, CAD s/p LAD stent in 2012, and current tobacco abuse.   LHC on 3/22 showed multivessel disease with LM involvement.  Last stress test was SPECT on 1/31/20 which was negative. CTS team plans for 3v CABG.  Patient now presents for the above procedure(s). Endocrinology consulted for management of hyperglycemia.       Lab Results   Component Value Date    HGBA1C 6.1 (H) 03/19/2022           Interval HPI:   Overnight events: Admitted to SICU s/p CABG. Remains intubated. BG slightly above goal ranges with IV intensive insulin protocol initiated. Diet NPO Except for: Sips with Medication  Eating:   NPO  Nausea: No  Hypoglycemia and intervention: No  Fever: No  TPN and/or TF: No  If yes, type of TF/TPN and rate: n/a    PMH, PSH, FH, SH reviewed     ROS:  Unable to obtain due to: Sedation,Intubation,Altered mental status,Critical illness,Reviewed ROS from note dated 3/30/22 by  Nevaeh Singh MD    Review of Systems    Current Medications and/or Treatments Impacting Glycemic Control  Immunotherapy:    Immunosuppressants       None          Steroids:   Hormones (From admission, onward)                None          Pressors:    Autonomic Drugs (From admission, onward)                Start     Stop Route Frequency Ordered    04/01/22 1200  EPINEPHrine (ADRENALIN) 5 mg in dextrose 5 % 250 mL infusion        Question Answer Comment   Begin at (in mcg/kg/min): 0.02    Titrate by: (in mcg/kg/min) 0.02    Titrate interval: (in minutes) 5    Titrate to  maintain: (SBP or MAP or Cardiac Index) MAP    Greater than: (in mmHg) 65    Cardiac index greater than: (in L/min) 2.2    Maximum dose: (in mcg/kg/min) 2        -- IV Continuous 04/01/22 1145          Hyperglycemia/Diabetes Medications:   Antihyperglycemics (From admission, onward)                Start     Stop Route Frequency Ordered    04/01/22 1200  insulin regular in 0.9 % NaCl 100 unit/100 mL (1 unit/mL) infusion        Question Answer Comment   Insulin rate changes (DO NOT MODIFY ANSWER) \\[email or web address]\epic\Images\Pharmacy\InsulinInfusions\CTS INSULIN [id number].pdf    Enter initial dose (Units/hr): 1        -- IV Continuous 04/01/22 1145             PHYSICAL EXAMINATION:  Vitals:    04/01/22 1315   BP:    Pulse: 73   Resp: (!) 21   Temp:      Body mass index is 49.38 kg/m².    Physical Exam  Constitutional: Well developed, obese, NAD.  ENT: External ears no masses with nose patent  Neck: Supple; trachea midline  Cardiovascular: Normal heart sounds, no LE edema. DP +2 bilaterally.  Lungs: Normal effort; lungs anterior bilaterally clear to auscultation.  Intubated on a ventilator.  Abdomen: Soft, no masses, no hernias.  Hypoactive BS noted.  MS: No clubbing or cyanosis of nails noted; unable to assess gait.  Skin: No rashes, lesions, or ulcers; no nodules.    Mid-sternal incision with telfa island dressing, CDI.  CT x 2.  LLE wrapped with ACE wrap.  Psychiatric: ASH  Neurological: ASH  Foot: Nails in good condition, no amputations noted        Labs Reviewed and Include   Recent Labs   Lab 04/01/22  1217   *   CALCIUM 8.3*   ALBUMIN 3.1*   PROT 5.1*      K 4.3   CO2 22*      BUN 10   CREATININE 0.9   ALKPHOS 64   ALT 16   AST 27   BILITOT 0.4     Lab Results   Component Value Date    WBC 17.46 (H) 04/01/2022    HGB 11.2 (L) 04/01/2022    HCT 33 (L) 04/01/2022    MCV 94 04/01/2022     04/01/2022     No results for input(s): TSH, FREET4 in the last 168 hours.  Lab Results   Component Value  Date    HGBA1C 6.1 (H) 03/19/2022       Nutritional status:   Body mass index is 49.38 kg/m².  Lab Results   Component Value Date    ALBUMIN 3.1 (L) 04/01/2022    ALBUMIN 3.7 03/18/2022    ALBUMIN 3.8 03/13/2021     No results found for: PREALBUMIN    Estimated Creatinine Clearance: 83 mL/min (based on SCr of 0.9 mg/dL).    Accu-Checks  Recent Labs     04/01/22  1218 04/01/22  1309   POCTGLUCOSE 196* 186*        ASSESSMENT and PLAN    * CAD (coronary artery disease)  Managed per primary team  Optimize BG control        Hyperglycemia  BG goal 110-140 (CTS protocol)     Continue IV insulin infusion protocol  Requires intensive BG monitoring while on protocol     ** Please call Endocrine for any BG related issues **  ** Please notify Endocrine for any change and/or advance in diet**    Discharge planning: TBD        S/P CABG (coronary artery bypass graft)  Managed per primary team  Optimize BG control      Obesity  Body mass index is 49.38 kg/m².   May increase insulin resistance.         Hyperlipidemia  May increase insulin resistance.             Plan discussed with RN at bedside.     Ann Coates NP  Endocrinology  [facility] - Surgical Intensive Care

## 2022-04-01 NOTE — PROGRESS NOTES
Pt admitted to SICU [id number] from OR. ETT connected to ICU ventilator and telemetry connected to ICU cardiac monitoring. All VSS at this time. TXA, Epi, and Propofol infusing at this time. Anesthesia and CTS team at bedside. All orders received and implemented. Will carry out and continue to monitor closely.     Skin note: no apparent skin breakdown noted to back of head, elbows, sacrum or heels. Midsternal incision with telfa island dressing, CDI. Chest tubes covered with gauze, CDI. L leg with compression wrap. SCDs placed and heel boots placed. Waffle mattress inflated.

## 2022-04-01 NOTE — OP NOTE
[facility]  Cardiothoracic Surgery Operative Report    Patient Name:  Sonya Armendariz; [id number]    Preoperative Diagnosis: Coronary artery disease    Postoperative Diagnosis:  Same    Date of Operation:  04/01/2022     Operation:  Double vessel coronary artery bypass grafting  · Left internal mammary artery to the distal left anterior descending artery  · Saphenous vein graft to the left posterolateral ventricular artery  Endoscopic saphenous vein harvest from the left lower extremity    Surgeon:  Leandro Howard MD    Assistant Surgeon:  none    Fellow:  Nevaeh Singh MD    Anesthesiologist:  Eugene Celis MD    ---------------------------------------------------------------------------------------------------------------------      Indications for surgery: Ms. Armendariz is a pleasant 61 year old female smoker (1.5ppd x 45 years) with heart failure preserved ejection fraction (60% ejection fraction), coronary artery disease s/p LAD stent in 2012, hypertension, and BMI of 50 who presented with unstable angina, given Brilinta, and underwent coronary angiogram showing left main disease.  Coronary angiogram details: 60% left main disease with a moderate sized mid and distal LAD, 90% ostial LCx (dominant) with a moderate sized left posterolateral ventricular artery, small to moderate sized left PDA, and nondominant RCA.  The transthoracic echo shows 60% ejection fraction with no significant valvulopathy.       CT chest noncontrast shows minimal ascending aortic and severe aortic arch calcifications.  Carotid ultrasound showed nonsignificant disease.     Given the severity of disease and the symptoms, I recommend coronary artery bypass surgery x 3 (LIMA-LAD, SVG-LPL, SVG-LPDA).  The risks and benefits were explained and informed consent was obtained.     Gross findings: No pericardial adhesions.  All targets much smaller than appeared on angiogram.  Small to moderate LAD target, small to moderate LPL  target, small and nonbypassable PDA target.  Good biventricular function pre and post bypass.      Procedure:  The patient was brought to the holding area and the indications for surgery were reviewed.  Afterwards, the patient was placed supine on the operating room table and prepped and draped in the usual sterile fashion. A surgical time out was performed.     A midline incision was followed with division of the sternum. The left internal mammary artery was harvested. Simultaneously, two pieces of saphenous vein were harvested endoscopically from the leg. ACT guided heparinization was administered.  The ascending aorta and right atrium were cannulated.  Cardiopulmonary bypass was commenced.  The ascending aorta was cannulated for administration of cardioplegia.  A cross-clamp was applied and 1500cc of antegrade cold blood cardioplegia was administered. Subsequent doses of 500cc of antegrade cardioplegia were administered every 20 minutes.    Attention was turned to the left posterolateral ventricular artery. The heart was positioned and the vessel was exposed. The artery was identified and arteriotomy performed with an 11 blade scalpel and elongated with scissors.  A segment of vein was prepared for use.  An end to side anastomosis was constructed with continuous 7-0 prolene. The vein was infused with 100cc of cardioplegia to confirm patency and hemostasis.  Afterwards, an aortotomy was made and the proximal anastomosis was constructed with continuous 6-0 prolene suture.    Attention was next turned to the distal left anterior descending artery. The heart was repositioned and the LAD was identified. The vessel was opened and the arteriotomy elongated with scissors.  The left internal mammary artery was brought to the field and prepared for use.  The left internal mammary artery was sewn end to side to the LAD with continuous 7-0 prolene. The vessel was briefly unclamped to assess for hemostasis.     A dose of warm  ""hot shot" cardioplegia was given antegrade.  Air was evacuated and the cross-clamp was removed. All anastomoses were checked for hemostasis and the patient was weaned from bypass on a minimal amount of inotropic support.  The total cardiopulmonary bypass time was 57 minutes and cross clamp time was 40 minutes.  The cannulae were removed and heparin was reversed.  Temporary ventricular pacing wires were placed on the heart.  Drainage tubes were placed behind the sternum and in the pleural spaces. The chest was closed with steel wires and the soft tissue was closed with absorbable suture.  A sterile dressing was applied and the patient was brought to the ICU in stable condition.      I was present in the operating room for the entire operation and immediately available thereafter.    "

## 2022-04-01 NOTE — H&P
[facility] - Surgical Intensive Care  Critical Care - Surgery  History & Physical    Patient Name: Sonya Armendariz  MRN: [id number]  Admission Date: 4/1/2022  Code Status: Full Code  Attending Physician: Leandro Howard MD   Primary Care Provider: Sivakumar Wing MD   Principal Problem: CAD (coronary artery disease)    Subjective:     HPI:  Sonya Armendariz is our 61 y.o. female with heart failure preserved ejection fraction (60% ejection fraction), coronary artery disease s/p LAD stent in 2012, hypertension, BMI of 48, and a 65 pack year smoking history who presents for a CABG. She underwent CABG x2 on 4/1/22. She tolerated the procedure well. Her preoperative echo demonstrated good function without significant valvular disease. Intraoperatively, she received 2L crystalloid and 250 cell saver. She made 300 cc of urine throughout the case. She was able to be weaned off of bypass without difficulty. Postoperative echo demonstrated good function without significant valvular disease. She was brought to the SICU postoperatively for further care and management. She arrived intubated and on 0.04 of epi. Initial ABG demonstrated mild mixed acidosis with poor oxygenation. iStat 3.3.         Hospital/ICU Course:  No notes on file    Follow-up For: Procedure(s) (LRB):  CORONARY ARTERY BYPASS GRAFT (CABG) (N/A)  SURGICAL PROCUREMENT, VEIN, ENDOSCOPIC (Left)    Post-Operative Day: Day of Surgery     Past Medical History:   Diagnosis Date    Anticoagulant long-term use     CAD (coronary artery disease)     s/p LAD stent    CHF (congestive heart failure)     Gastroduodenitis     Helicobacter pylori (H. pylori) infection     Hyperlipidemia     Hypertension     MI (myocardial infarction)     x2    Obesity     Tobacco use     Urinary tract infection     Vaginal infection        Past Surgical History:   Procedure Laterality Date    ANGIOGRAM, CORONARY, WITH LEFT HEART CATHETERIZATION N/A 3/22/2022     Procedure: Angiogram, Coronary, with Left Heart Cath;  Surgeon: Giuseppe Merchant MD;  Location: [facility] CATH LAB;  Service: Cardiology;  Laterality: N/A;    COLONOSCOPY N/A 7/14/2017    Procedure: COLONOSCOPY;  Surgeon: Cathy Collier MD;  Location: [facility] ENDO;  Service: Endoscopy;  Laterality: N/A;    NO PAST SURGERIES         Review of patient's allergies indicates:   Allergen Reactions    Amoxicillin Diarrhea    Plavix [clopidogrel]        Family History       Problem Relation (Age of Onset)    Breast cancer Sister    Cancer Father, Brother    Coronary artery disease Cousin    Diabetes Other    Hypertension Other    Stomach cancer Mother    Throat cancer Brother          Tobacco Use    Smoking status: Current Every Day Smoker     Packs/day: 1.00     Years: 40.00     Pack years: 40.00     Types: Cigarettes    Smokeless tobacco: Never Used    Tobacco comment: Restarted 1 .5 yrs. ago   Substance and Sexual Activity    Alcohol use: No     Alcohol/week: 0.0 standard drinks    Drug use: No    Sexual activity: Yes     Partners: Male      Review of Systems   Unable to perform ROS: Acuity of condition   Objective:     Vital Signs (Most Recent):  Temp: 98.4 °F (36.9 °C) (04/01/22 0606)  Pulse: 69 (04/01/22 0606)  Resp: 20 (04/01/22 0606)  BP: (!) 141/74 (04/01/22 0606)  SpO2: 99 % (04/01/22 0606)   Vital Signs (24h Range):  Temp:  [98.4 °F (36.9 °C)] 98.4 °F (36.9 °C)  Pulse:  [69] 69  Resp:  [20] 20  SpO2:  [99 %] 99 %  BP: (141)/(74) 141/74     Weight: 122.5 kg (270 lb)  Body mass index is 49.38 kg/m².      Intake/Output Summary (Last 24 hours) at 4/1/2022 1155  Last data filed at 4/1/2022 1151  Gross per 24 hour   Intake 2335 ml   Output 300 ml   Net 2035 ml       Physical Exam  Constitutional:       Comments: sedated   HENT:      Head: Normocephalic and atraumatic.   Neck:      Comments: R IJ CVC  Cardiovascular:      Rate and Rhythm: Normal rate and regular rhythm.      Pulses: Normal pulses.       Comments: Midline sternotomy incision c/d/I  Chest tubes in place with serosanguinous output  V wires in place, not connected  Pulmonary:      Breath sounds: Normal breath sounds.      Comments: intubated  Abdominal:      General: Abdomen is flat. There is no distension.      Palpations: Abdomen is soft.      Tenderness: There is no abdominal tenderness.   Genitourinary:     Comments: Levy in place with clear urine  Musculoskeletal:      Comments: R radial art line in place   Skin:     General: Skin is warm.      Capillary Refill: Capillary refill takes less than 2 seconds.       Vents:       Lines/Drains/Airways       Drain  Duration                  Closed/Suction Drain 04/01/22 1104 Inferior;Left Pleural Other (Comment) 19 Fr. <1 day         Closed/Suction Drain 04/01/22 1104 Inferior;Medial;Right Chest Other (Comment) 19 Fr. <1 day         Closed/Suction Drain 04/01/22 1104 Inferior;Right Pleural Other (Comment) 19 Fr. <1 day         Closed/Suction Drain 04/01/22 1104 Medial;Left;Inferior Other (Comment) 19 Fr. <1 day         Urethral Catheter 04/01/22 0730 Straight-tip;Temperature probe;Non-latex 14 Fr. <1 day              Line  Duration                  Pacer Wires 04/01/22 1046 <1 day              Peripheral Intravenous Line  Duration                  Peripheral IV - Single Lumen 22 G Posterior;Right Hand -- days         Peripheral IV - Single Lumen 04/01/22 0610 20 G Anterior;Right Hand <1 day                    Significant Labs:    CBC/Anemia Profile:  Recent Labs   Lab 04/01/22  1011 04/01/22  1037   HCT 30* 31*        Chemistries:  No results for input(s): NA, K, CL, CO2, BUN, CREATININE, CALCIUM, ALBUMIN, PROT, BILITOT, ALKPHOS, ALT, AST, GLUCOSE, MG, PHOS in the last 48 hours.    ABGs:   Recent Labs   Lab 04/01/22  1037   PH 7.404   PCO2 40.9   HCO3 25.6   POCSATURATED 100   BE 1     CMP: No results for input(s): NA, K, CL, CO2, GLU, BUN, CREATININE, CALCIUM, PROT, ALBUMIN, BILITOT, ALKPHOS, AST, ALT,  ANIONGAP, EGFRNONAA in the last 48 hours.    Invalid input(s): ESTGFAFRICA    Significant Imaging: I have reviewed all pertinent imaging results/findings within the past 24 hours.    Assessment/Plan:     * CAD (coronary artery disease)  Sonya Armendariz is a 61 y.o. lady who presents to the SICU s/p CABGx2 on 4/1/22.      Neuro/Psych:   -- Sedation: propofol  -- Pain: IV narcotics until extubated. Will add orals when able             Cards:   -- Pressors: epi 0.04   -- Wean epi as tolerated  -- Goal MAP: >60; sys<140  -- ASA if CT output ok       Pulm:   -- Goal O2 sat > 90%  -- Wean as able towards extubation  -- Daily CXR while intubated  -- ABG q1hr for 6 hours, then space out  -- Chest tubes include: 2 mediastinal, 1 left pleural to wall suction.        Renal:  -- Keep suárez for strict I/O  -- BUN/Cr pending  -- Diuretics: none       FEN / GI:   -- Replace lytes as needed  -- Nutrition: NPO  -- 1.5L albumin over first 12 hours  -- famotidine 20 BID      ID:   -- WBC pending  -- Antibiotics: periop ancef      Heme/Onc:   -- H/H pending  -- Daily CBC  -- Transfused products: none  -- Anticoagulation: ASA      Endo:   -- Gluc goal 140-180  -- Insulin drip per endocrinology      PPx:   Feeding: NPO  Analgesia/Sedation: IV narcotics / propofol  Thromboembolic prevention: ASA  HOB >30: yes  Stress Ulcer ppx: famotidine 20 BID  Glucose control: Critical care goal 140-180 g/dl, ISS    Lines/Drains/Airway: RIJ, Art line, suárez, ETT      Dispo/Code Status/Palliative:   -- SICU / Full Code               Critical care was time spent personally by me on the following activities: development of treatment plan with patient or surrogate and bedside caregivers, discussions with consultants, evaluation of patient's response to treatment, examination of patient, ordering and performing treatments and interventions, ordering and review of laboratory studies, ordering and review of radiographic studies, pulse  oximetry, re-evaluation of patient's condition.  This critical care time did not overlap with that of any other provider or involve time for any procedures.     Leonardo Bryson MD  Critical Care - Surgery  [facility] - Surgical Intensive Care

## 2022-04-01 NOTE — HPI
Sonya Armendariz is our 61 y.o. female with heart failure preserved ejection fraction (60% ejection fraction), coronary artery disease s/p LAD stent in 2012, hypertension, BMI of 48, and a 65 pack year smoking history who presents for a CABG. She underwent CABG x2 on 4/1/22. She tolerated the procedure well. Her preoperative echo demonstrated good function without significant valvular disease. Intraoperatively, she received 2L crystalloid and 250 cell saver. She made 300 cc of urine throughout the case. She was able to be weaned off of bypass without difficulty. Postoperative echo demonstrated good function without significant valvular disease. She was brought to the SICU postoperatively for further care and management. She arrived intubated and on 0.04 of epi. Initial ABG demonstrated mild mixed acidosis with poor oxygenation. iStat 3.3.

## 2022-04-01 NOTE — ASSESSMENT & PLAN NOTE
BG goal 110-140 (CTS protocol)     Continue IV insulin infusion protocol  Requires intensive BG monitoring while on protocol     ** Please call Endocrine for any BG related issues **  ** Please notify Endocrine for any change and/or advance in diet**    Discharge planning: [email or web address]

## 2022-04-01 NOTE — ASSESSMENT & PLAN NOTE
Sonya Armendariz is a 61 y.o. lady who presents to the SICU s/p CABGx2 on 4/1/22.      Neuro/Psych:   -- Sedation: propofol  -- Pain: IV narcotics until extubated. Will add orals when able             Cards:   -- Pressors: epi 0.04   -- Wean epi as tolerated  -- Goal MAP: >60; sys<140  -- ASA if CT output ok       Pulm:   -- Goal O2 sat > 90%  -- Wean as able towards extubation  -- Daily CXR while intubated  -- ABG q1hr for 6 hours, then space out  -- Chest tubes include: 2 mediastinal, 1 left pleural to wall suction.        Renal:  -- Keep suárez for strict I/O  -- BUN/Cr pending  -- Diuretics: none       FEN / GI:   -- Replace lytes as needed  -- Nutrition: NPO  -- 1.5L albumin over first 12 hours  -- famotidine 20 BID      ID:   -- WBC pending  -- Antibiotics: periop ancef      Heme/Onc:   -- H/H pending  -- Daily CBC  -- Transfused products: none  -- Anticoagulation: ASA      Endo:   -- Gluc goal 140-180  -- Insulin drip per endocrinology      PPx:   Feeding: NPO  Analgesia/Sedation: IV narcotics / propofol  Thromboembolic prevention: ASA  HOB >30: yes  Stress Ulcer ppx: famotidine 20 BID  Glucose control: Critical care goal 140-180 g/dl, ISS    Lines/Drains/Airway: RIJ, Art line, suárez, ETT      Dispo/Code Status/Palliative:   -- SICU / Full Code

## 2022-04-01 NOTE — ANESTHESIA PROCEDURE NOTES
Central Line    Diagnosis: cad  Patient location during procedure: done in OR  Timeout: 4/1/2022 7:35 AM  Procedure end time: 4/1/2022 7:40 AM    Staffing  Authorizing Provider: Eugene Celis MD  Performing Provider: Bro Kenyon MD    Anesthesiologist was present at the time of the procedure.  Preanesthetic Checklist  Completed: patient identified, IV checked, site marked, risks and benefits discussed, surgical consent, monitors and equipment checked, pre-op evaluation, timeout performed and anesthesia consent given  Indication   Indication: hemodynamic monitoring, vascular access, med administration     Anesthesia   general anesthesia    Central Line   Skin Prep: skin prepped with Betadine, skin prep agent completely dried prior to procedure  Sterile Barriers Followed: Yes    All five maximal barriers used- gloves, gown, cap, mask, and large sterile sheet    hand hygiene performed prior to central venous catheter insertion  Location: right internal jugular.   Catheter type: quad lumen  Catheter Size: 9 Fr  Ultrasound: vascular probe with ultrasound   Vessel Caliber: patent  Needle advanced into vessel with real time Ultrasound guidance.  Guidewire confirmed in vessel.  Image recorded and saved.  sterile gel and probe cover used in ultrasound-guided central venous catheter insertion   Manometry: Venous cannualation confirmed by visual estimation of blood vessel pressure using manometry.  Insertion Attempts: 1   Securement:line sutured, chlorhexidine patch, sterile dressing applied and blood return through all ports    Post-Procedure    Adverse Events:none      Guidewire Guidewire removed intact. Guidewire removed intact, verified with nurse.

## 2022-04-01 NOTE — PROGRESS NOTES
Autotransfusion/Rapid Infusion Record:      04/01/2022  Autotransfusionist:  Jhony Morrell    Surgeon(s) and Role:     * Leandro Howard MD - Primary     * Nevaeh Singh MD - Fellow  Anesthesiologist:  Eugene Celis MD    Past Medical History:   Diagnosis Date    Anticoagulant long-term use     CAD (coronary artery disease)     s/p LAD stent    CHF (congestive heart failure)     Gastroduodenitis     Helicobacter pylori (H. pylori) infection     Hyperlipidemia     Hypertension     MI (myocardial infarction)     x2    Obesity     Tobacco use     Urinary tract infection     Vaginal infection        Procedure(s) (LRB):  CORONARY ARTERY BYPASS GRAFT (CABG) (N/A)  SURGICAL PROCUREMENT, VEIN, ENDOSCOPIC (Left)     11:57 AM    Equipment:    Cell Saver     R.I.S.  : [employer] Model: CATSmart or CATSplus : [employer]   Model: [id number]     Serial number: [id number]   Serial number:   Disposable lot #: [id number]   Disposable lot #:     Were extra cardiotomies used for cell saver?  no  if yes, #:     Solutions:  Anticoagulant: ACD-A   Expiration date: 8/23 Volume used: 400   Wash solution: 0.9% NaCl   Expiration date: 2/25 Volume used: 1907     Cell saver checklist  Time completed:           [x]   Circuit assembled correctly     [x]   Cell saver powered and operational     [x]   Vacuum connected, functional, adjust to max -150mmHg     [x]   Anticoagulant drip rate adjusted     [x]   Transfer bag properly labeled with patient name, expiration time, volume,       anticoagulant, OR number, and initials     [x]   Cell saver disinfected after use (completed at end of case)       Cell Saver volumes:    Total volume processed:     1884 mL     Total volume pRBCs recovered     236 mL     Volume pRBCs infused     236 mL         RIS checklist   Time completed:  []   RIS circuit assembled correctly     []   RIS power and operational     []   RIS disinfected after use (completed at end of  case)       RIS volumes:    Total volume infused:    (see anesthesia record for blood       product information)    mL       Additional comments:

## 2022-04-02 PROBLEM — T14.8XXA SURGICAL WOUND PRESENT: Status: ACTIVE | Noted: 2022-04-02

## 2022-04-02 LAB
ALBUMIN SERPL BCP-MCNC: 3.8 G/DL (ref 3.5–5.2)
ALLENS TEST: ABNORMAL
ALP SERPL-CCNC: 54 U/L (ref 55–135)
ALT SERPL W/O P-5'-P-CCNC: 12 U/L (ref 10–44)
ANION GAP SERPL CALC-SCNC: 10 MMOL/L (ref 8–16)
AST SERPL-CCNC: 36 U/L (ref 10–40)
BASOPHILS # BLD AUTO: 0.01 K/UL (ref 0–0.2)
BASOPHILS NFR BLD: 0.1 % (ref 0–1.9)
BILIRUB SERPL-MCNC: 0.5 MG/DL (ref 0.1–1)
BUN SERPL-MCNC: 9 MG/DL (ref 6–20)
CALCIUM SERPL-MCNC: 8.3 MG/DL (ref 8.7–10.5)
CHLORIDE SERPL-SCNC: 108 MMOL/L (ref 95–110)
CO2 SERPL-SCNC: 21 MMOL/L (ref 23–29)
CREAT SERPL-MCNC: 0.8 MG/DL (ref 0.5–1.4)
DELSYS: ABNORMAL
DIFFERENTIAL METHOD: ABNORMAL
EOSINOPHIL # BLD AUTO: 0 K/UL (ref 0–0.5)
EOSINOPHIL NFR BLD: 0 % (ref 0–8)
ERYTHROCYTE [DISTWIDTH] IN BLOOD BY AUTOMATED COUNT: 12.7 % (ref 11.5–14.5)
ERYTHROCYTE [SEDIMENTATION RATE] IN BLOOD BY WESTERGREN METHOD: 16 MM/H
EST. GFR  (AFRICAN AMERICAN): >60 ML/MIN/1.73 M^2
EST. GFR  (NON AFRICAN AMERICAN): >60 ML/MIN/1.73 M^2
FIO2: 40
FIO2: 40
FIO2: 50
FIO2: 60
GLUCOSE SERPL-MCNC: 147 MG/DL (ref 70–110)
HCO3 UR-SCNC: 20 MMOL/L (ref 24–28)
HCO3 UR-SCNC: 20.9 MMOL/L (ref 24–28)
HCO3 UR-SCNC: 21.9 MMOL/L (ref 24–28)
HCT VFR BLD AUTO: 30.2 % (ref 37–48.5)
HCT VFR BLD CALC: 29 %PCV (ref 36–54)
HGB BLD-MCNC: 9.8 G/DL (ref 12–16)
IMM GRANULOCYTES # BLD AUTO: 0.04 K/UL (ref 0–0.04)
IMM GRANULOCYTES NFR BLD AUTO: 0.4 % (ref 0–0.5)
INR PPP: 1.1 (ref 0.8–1.2)
LACTATE SERPL-SCNC: 2.5 MMOL/L (ref 0.5–2.2)
LDH SERPL L TO P-CCNC: 2.79 MMOL/L (ref 0.36–1.25)
LYMPHOCYTES # BLD AUTO: 1 K/UL (ref 1–4.8)
LYMPHOCYTES NFR BLD: 10.4 % (ref 18–48)
MAGNESIUM SERPL-MCNC: 1.9 MG/DL (ref 1.6–2.6)
MAGNESIUM SERPL-MCNC: 1.9 MG/DL (ref 1.6–2.6)
MAGNESIUM SERPL-MCNC: 2.1 MG/DL (ref 1.6–2.6)
MAP: 12
MAP: 12
MCH RBC QN AUTO: 30.5 PG (ref 27–31)
MCHC RBC AUTO-ENTMCNC: 32.5 G/DL (ref 32–36)
MCV RBC AUTO: 94 FL (ref 82–98)
MIN VOL: 13.4
MIN VOL: 13.9
MODE: ABNORMAL
MONOCYTES # BLD AUTO: 0.7 K/UL (ref 0.3–1)
MONOCYTES NFR BLD: 6.8 % (ref 4–15)
NEUTROPHILS # BLD AUTO: 7.9 K/UL (ref 1.8–7.7)
NEUTROPHILS NFR BLD: 82.3 % (ref 38–73)
NRBC BLD-RTO: 0 /100 WBC
PCO2 BLDA: 31 MMHG (ref 35–45)
PCO2 BLDA: 32.1 MMHG (ref 35–45)
PCO2 BLDA: 33.7 MMHG (ref 35–45)
PEEP: 10
PEEP: 5
PEEP: 8
PEEP: 8
PH SMN: 7.42 [PH] (ref 7.35–7.45)
PHOSPHATE SERPL-MCNC: 3.2 MG/DL (ref 2.7–4.5)
PLATELET # BLD AUTO: 141 K/UL (ref 150–450)
PMV BLD AUTO: 11.1 FL (ref 9.2–12.9)
PO2 BLDA: 52 MMHG (ref 80–100)
PO2 BLDA: 55 MMHG (ref 80–100)
PO2 BLDA: 70 MMHG (ref 80–100)
POC BE: -2 MMOL/L
POC BE: -4 MMOL/L
POC BE: -5 MMOL/L
POC IONIZED CALCIUM: 1.14 MMOL/L (ref 1.06–1.42)
POC SATURATED O2: 88 % (ref 95–100)
POC SATURATED O2: 89 % (ref 95–100)
POC SATURATED O2: 94 % (ref 95–100)
POC TCO2: 21 MMOL/L (ref 23–27)
POC TCO2: 22 MMOL/L (ref 23–27)
POC TCO2: 23 MMOL/L (ref 23–27)
POCT GLUCOSE: 121 MG/DL (ref 70–110)
POCT GLUCOSE: 122 MG/DL (ref 70–110)
POCT GLUCOSE: 123 MG/DL (ref 70–110)
POCT GLUCOSE: 125 MG/DL (ref 70–110)
POCT GLUCOSE: 127 MG/DL (ref 70–110)
POCT GLUCOSE: 128 MG/DL (ref 70–110)
POCT GLUCOSE: 132 MG/DL (ref 70–110)
POCT GLUCOSE: 133 MG/DL (ref 70–110)
POCT GLUCOSE: 133 MG/DL (ref 70–110)
POCT GLUCOSE: 134 MG/DL (ref 70–110)
POCT GLUCOSE: 134 MG/DL (ref 70–110)
POCT GLUCOSE: 136 MG/DL (ref 70–110)
POCT GLUCOSE: 138 MG/DL (ref 70–110)
POCT GLUCOSE: 138 MG/DL (ref 70–110)
POCT GLUCOSE: 142 MG/DL (ref 70–110)
POCT GLUCOSE: 147 MG/DL (ref 70–110)
POCT GLUCOSE: 154 MG/DL (ref 70–110)
POCT GLUCOSE: 173 MG/DL (ref 70–110)
POTASSIUM BLD-SCNC: 4.3 MMOL/L (ref 3.5–5.1)
POTASSIUM SERPL-SCNC: 3.8 MMOL/L (ref 3.5–5.1)
POTASSIUM SERPL-SCNC: 4.3 MMOL/L (ref 3.5–5.1)
POTASSIUM SERPL-SCNC: 4.5 MMOL/L (ref 3.5–5.1)
PROT SERPL-MCNC: 5.6 G/DL (ref 6–8.4)
PROTHROMBIN TIME: 11 SEC (ref 9–12.5)
PS: 12
PS: 12
PS: 8
RBC # BLD AUTO: 3.21 M/UL (ref 4–5.4)
SAMPLE: ABNORMAL
SITE: ABNORMAL
SODIUM BLD-SCNC: 140 MMOL/L (ref 136–145)
SODIUM SERPL-SCNC: 139 MMOL/L (ref 136–145)
SP02: 89
SP02: 89
SPONT RATE: 21
SPONT RATE: 22
SPONT RATE: 22
VERBAL RESULT READBACK PERFORMED: YES
VERBAL RESULT READBACK PERFORMED: YES
VOL: 509
VOL: 590
VT: 350
WBC # BLD AUTO: 9.55 K/UL (ref 3.9–12.7)

## 2022-04-02 PROCEDURE — 25000003 PHARM REV CODE 250: Performed by: STUDENT IN AN ORGANIZED HEALTH CARE EDUCATION/TRAINING PROGRAM

## 2022-04-02 PROCEDURE — 94799 UNLISTED PULMONARY SVC/PX: CPT

## 2022-04-02 PROCEDURE — 84100 ASSAY OF PHOSPHORUS: CPT | Performed by: NURSE PRACTITIONER

## 2022-04-02 PROCEDURE — 37799 UNLISTED PX VASCULAR SURGERY: CPT

## 2022-04-02 PROCEDURE — 99900026 HC AIRWAY MAINTENANCE (STAT)

## 2022-04-02 PROCEDURE — 83735 ASSAY OF MAGNESIUM: CPT | Performed by: NURSE PRACTITIONER

## 2022-04-02 PROCEDURE — 82800 BLOOD PH: CPT

## 2022-04-02 PROCEDURE — 94640 AIRWAY INHALATION TREATMENT: CPT

## 2022-04-02 PROCEDURE — 25000242 PHARM REV CODE 250 ALT 637 W/ HCPCS: Performed by: NURSE PRACTITIONER

## 2022-04-02 PROCEDURE — 83605 ASSAY OF LACTIC ACID: CPT | Performed by: STUDENT IN AN ORGANIZED HEALTH CARE EDUCATION/TRAINING PROGRAM

## 2022-04-02 PROCEDURE — 85025 COMPLETE CBC W/AUTO DIFF WBC: CPT | Performed by: NURSE PRACTITIONER

## 2022-04-02 PROCEDURE — 63600175 PHARM REV CODE 636 W HCPCS: Performed by: NURSE PRACTITIONER

## 2022-04-02 PROCEDURE — P9045 ALBUMIN (HUMAN), 5%, 250 ML: HCPCS | Mod: JG | Performed by: STUDENT IN AN ORGANIZED HEALTH CARE EDUCATION/TRAINING PROGRAM

## 2022-04-02 PROCEDURE — 83735 ASSAY OF MAGNESIUM: CPT | Mod: 91 | Performed by: STUDENT IN AN ORGANIZED HEALTH CARE EDUCATION/TRAINING PROGRAM

## 2022-04-02 PROCEDURE — 99233 SBSQ HOSP IP/OBS HIGH 50: CPT | Mod: ,,, | Performed by: NURSE PRACTITIONER

## 2022-04-02 PROCEDURE — 85014 HEMATOCRIT: CPT

## 2022-04-02 PROCEDURE — 83605 ASSAY OF LACTIC ACID: CPT

## 2022-04-02 PROCEDURE — 63600175 PHARM REV CODE 636 W HCPCS: Performed by: ANESTHESIOLOGY

## 2022-04-02 PROCEDURE — 99291 PR CRITICAL CARE, E/M 30-74 MINUTES: ICD-10-PCS | Mod: ,,, | Performed by: ANESTHESIOLOGY

## 2022-04-02 PROCEDURE — 99233 PR SUBSEQUENT HOSPITAL CARE,LEVL III: ICD-10-PCS | Mod: ,,, | Performed by: NURSE PRACTITIONER

## 2022-04-02 PROCEDURE — 63600175 PHARM REV CODE 636 W HCPCS: Mod: JG | Performed by: STUDENT IN AN ORGANIZED HEALTH CARE EDUCATION/TRAINING PROGRAM

## 2022-04-02 PROCEDURE — 80053 COMPREHEN METABOLIC PANEL: CPT | Performed by: STUDENT IN AN ORGANIZED HEALTH CARE EDUCATION/TRAINING PROGRAM

## 2022-04-02 PROCEDURE — 99291 CRITICAL CARE FIRST HOUR: CPT | Mod: ,,, | Performed by: ANESTHESIOLOGY

## 2022-04-02 PROCEDURE — P9045 ALBUMIN (HUMAN), 5%, 250 ML: HCPCS | Mod: JG

## 2022-04-02 PROCEDURE — 85610 PROTHROMBIN TIME: CPT | Performed by: NURSE PRACTITIONER

## 2022-04-02 PROCEDURE — 27000190 HC CPAP FULL FACE MASK W/VALVE

## 2022-04-02 PROCEDURE — 27000221 HC OXYGEN, UP TO 24 HOURS

## 2022-04-02 PROCEDURE — 20000000 HC ICU ROOM

## 2022-04-02 PROCEDURE — 63600175 PHARM REV CODE 636 W HCPCS: Performed by: STUDENT IN AN ORGANIZED HEALTH CARE EDUCATION/TRAINING PROGRAM

## 2022-04-02 PROCEDURE — 84132 ASSAY OF SERUM POTASSIUM: CPT

## 2022-04-02 PROCEDURE — 25000003 PHARM REV CODE 250: Performed by: ANESTHESIOLOGY

## 2022-04-02 PROCEDURE — C9248 INJ, CLEVIDIPINE BUTYRATE: HCPCS | Mod: JG | Performed by: STUDENT IN AN ORGANIZED HEALTH CARE EDUCATION/TRAINING PROGRAM

## 2022-04-02 PROCEDURE — 99900035 HC TECH TIME PER 15 MIN (STAT)

## 2022-04-02 PROCEDURE — 25000003 PHARM REV CODE 250: Performed by: NURSE PRACTITIONER

## 2022-04-02 PROCEDURE — 63600175 PHARM REV CODE 636 W HCPCS: Mod: JG

## 2022-04-02 PROCEDURE — 94660 CPAP INITIATION&MGMT: CPT

## 2022-04-02 PROCEDURE — 82803 BLOOD GASES ANY COMBINATION: CPT

## 2022-04-02 PROCEDURE — 84132 ASSAY OF SERUM POTASSIUM: CPT | Performed by: STUDENT IN AN ORGANIZED HEALTH CARE EDUCATION/TRAINING PROGRAM

## 2022-04-02 PROCEDURE — 94003 VENT MGMT INPAT SUBQ DAY: CPT

## 2022-04-02 PROCEDURE — 82330 ASSAY OF CALCIUM: CPT

## 2022-04-02 PROCEDURE — 94761 N-INVAS EAR/PLS OXIMETRY MLT: CPT

## 2022-04-02 RX ORDER — METOPROLOL SUCCINATE 25 MG/1
25 TABLET, EXTENDED RELEASE ORAL DAILY
Status: DISCONTINUED | OUTPATIENT
Start: 2022-04-02 | End: 2022-04-03

## 2022-04-02 RX ORDER — LIDOCAINE 50 MG/G
1 PATCH TOPICAL ONCE
Status: COMPLETED | OUTPATIENT
Start: 2022-04-02 | End: 2022-04-03

## 2022-04-02 RX ORDER — IBUPROFEN 200 MG
16 TABLET ORAL
Status: DISCONTINUED | OUTPATIENT
Start: 2022-04-02 | End: 2022-04-05

## 2022-04-02 RX ORDER — ACETAMINOPHEN 325 MG/1
650 TABLET ORAL EVERY 8 HOURS
Status: DISCONTINUED | OUTPATIENT
Start: 2022-04-02 | End: 2022-04-08 | Stop reason: HOSPADM

## 2022-04-02 RX ORDER — LIDOCAINE 50 MG/G
1 PATCH TOPICAL
Status: DISCONTINUED | OUTPATIENT
Start: 2022-04-02 | End: 2022-04-08 | Stop reason: HOSPADM

## 2022-04-02 RX ORDER — INSULIN ASPART 100 [IU]/ML
1-10 INJECTION, SOLUTION INTRAVENOUS; SUBCUTANEOUS
Status: DISCONTINUED | OUTPATIENT
Start: 2022-04-02 | End: 2022-04-04

## 2022-04-02 RX ORDER — FUROSEMIDE 10 MG/ML
10 INJECTION INTRAMUSCULAR; INTRAVENOUS ONCE
Status: COMPLETED | OUTPATIENT
Start: 2022-04-02 | End: 2022-04-02

## 2022-04-02 RX ORDER — ALBUMIN HUMAN 50 G/1000ML
12.5 SOLUTION INTRAVENOUS ONCE
Status: COMPLETED | OUTPATIENT
Start: 2022-04-02 | End: 2022-04-02

## 2022-04-02 RX ORDER — GLUCAGON 1 MG
1 KIT INJECTION
Status: DISCONTINUED | OUTPATIENT
Start: 2022-04-02 | End: 2022-04-05

## 2022-04-02 RX ORDER — ENOXAPARIN SODIUM 100 MG/ML
40 INJECTION SUBCUTANEOUS EVERY 24 HOURS
Status: DISCONTINUED | OUTPATIENT
Start: 2022-04-02 | End: 2022-04-08 | Stop reason: HOSPADM

## 2022-04-02 RX ORDER — ALBUMIN HUMAN 50 G/1000ML
SOLUTION INTRAVENOUS
Status: COMPLETED
Start: 2022-04-02 | End: 2022-04-02

## 2022-04-02 RX ORDER — HYDROMORPHONE HYDROCHLORIDE 1 MG/ML
0.5 INJECTION, SOLUTION INTRAMUSCULAR; INTRAVENOUS; SUBCUTANEOUS EVERY 4 HOURS PRN
Status: DISCONTINUED | OUTPATIENT
Start: 2022-04-02 | End: 2022-04-04

## 2022-04-02 RX ORDER — HYDROMORPHONE HYDROCHLORIDE 1 MG/ML
1 INJECTION, SOLUTION INTRAMUSCULAR; INTRAVENOUS; SUBCUTANEOUS EVERY 4 HOURS PRN
Status: DISCONTINUED | OUTPATIENT
Start: 2022-04-02 | End: 2022-04-04

## 2022-04-02 RX ORDER — IBUPROFEN 200 MG
24 TABLET ORAL
Status: DISCONTINUED | OUTPATIENT
Start: 2022-04-02 | End: 2022-04-05

## 2022-04-02 RX ADMIN — FUROSEMIDE 10 MG: 10 INJECTION, SOLUTION INTRAMUSCULAR; INTRAVENOUS at 08:04

## 2022-04-02 RX ADMIN — ALBUMIN HUMAN 12.5 G: 50 SOLUTION INTRAVENOUS at 12:04

## 2022-04-02 RX ADMIN — METOPROLOL SUCCINATE 25 MG: 25 TABLET, EXTENDED RELEASE ORAL at 02:04

## 2022-04-02 RX ADMIN — ASPIRIN 325 MG ORAL TABLET 325 MG: 325 PILL ORAL at 08:04

## 2022-04-02 RX ADMIN — POLYETHYLENE GLYCOL 3350 17 G: 17 POWDER, FOR SOLUTION ORAL at 08:04

## 2022-04-02 RX ADMIN — IPRATROPIUM BROMIDE AND ALBUTEROL SULFATE 3 ML: 2.5; .5 SOLUTION RESPIRATORY (INHALATION) at 07:04

## 2022-04-02 RX ADMIN — ALBUMIN (HUMAN) 12.5 G: 12.5 SOLUTION INTRAVENOUS at 05:04

## 2022-04-02 RX ADMIN — DEXTROSE 2 G: 50 INJECTION, SOLUTION INTRAVENOUS at 11:04

## 2022-04-02 RX ADMIN — DEXMEDETOMIDINE HYDROCHLORIDE 0.2 MCG/KG/HR: 4 INJECTION, SOLUTION INTRAVENOUS at 12:04

## 2022-04-02 RX ADMIN — FENTANYL CITRATE 25 MCG: 50 INJECTION INTRAMUSCULAR; INTRAVENOUS at 04:04

## 2022-04-02 RX ADMIN — PROPOFOL 45 MCG/KG/MIN: 10 INJECTION, EMULSION INTRAVENOUS at 12:04

## 2022-04-02 RX ADMIN — MUPIROCIN 1 G: 20 OINTMENT TOPICAL at 08:04

## 2022-04-02 RX ADMIN — ENOXAPARIN SODIUM 40 MG: 100 INJECTION SUBCUTANEOUS at 04:04

## 2022-04-02 RX ADMIN — DEXTROSE 2 G: 50 INJECTION, SOLUTION INTRAVENOUS at 06:04

## 2022-04-02 RX ADMIN — OXYCODONE HYDROCHLORIDE 10 MG: 10 TABLET ORAL at 12:04

## 2022-04-02 RX ADMIN — LIDOCAINE 1 PATCH: 50 PATCH CUTANEOUS at 09:04

## 2022-04-02 RX ADMIN — POTASSIUM CHLORIDE 20 MEQ: 14.9 INJECTION, SOLUTION INTRAVENOUS at 03:04

## 2022-04-02 RX ADMIN — ATORVASTATIN CALCIUM 40 MG: 20 TABLET, FILM COATED ORAL at 08:04

## 2022-04-02 RX ADMIN — ALBUMIN (HUMAN) 12.5 G: 12.5 SOLUTION INTRAVENOUS at 12:04

## 2022-04-02 RX ADMIN — DEXTROSE 2 G: 50 INJECTION, SOLUTION INTRAVENOUS at 05:04

## 2022-04-02 RX ADMIN — FUROSEMIDE 5 MG/HR: 10 INJECTION, SOLUTION INTRAMUSCULAR; INTRAVENOUS at 08:04

## 2022-04-02 RX ADMIN — CLEVIPIDINE 1 MG/HR: 0.5 EMULSION INTRAVENOUS at 12:04

## 2022-04-02 RX ADMIN — LIDOCAINE 1 PATCH: 50 PATCH CUTANEOUS at 02:04

## 2022-04-02 RX ADMIN — FAMOTIDINE 20 MG: 10 INJECTION INTRAVENOUS at 08:04

## 2022-04-02 RX ADMIN — ACETAMINOPHEN 650 MG: 325 TABLET ORAL at 02:04

## 2022-04-02 RX ADMIN — ALBUMIN (HUMAN) 12.5 G: 12.5 SOLUTION INTRAVENOUS at 07:04

## 2022-04-02 RX ADMIN — HYDROMORPHONE HYDROCHLORIDE 1 MG: 1 INJECTION, SOLUTION INTRAMUSCULAR; INTRAVENOUS; SUBCUTANEOUS at 02:04

## 2022-04-02 RX ADMIN — EPINEPHRINE 0.02 MCG/KG/MIN: 1 INJECTION INTRAMUSCULAR; INTRAVENOUS; SUBCUTANEOUS at 05:04

## 2022-04-02 RX ADMIN — DEXMEDETOMIDINE HYDROCHLORIDE 0.5 MCG/KG/HR: 4 INJECTION, SOLUTION INTRAVENOUS at 05:04

## 2022-04-02 RX ADMIN — DEXTROSE 2 G: 50 INJECTION, SOLUTION INTRAVENOUS at 12:04

## 2022-04-02 RX ADMIN — IPRATROPIUM BROMIDE AND ALBUTEROL SULFATE 3 ML: 2.5; .5 SOLUTION RESPIRATORY (INHALATION) at 04:04

## 2022-04-02 RX ADMIN — MAGNESIUM SULFATE 2 G: 2 INJECTION INTRAVENOUS at 04:04

## 2022-04-02 RX ADMIN — MUPIROCIN 1 G: 20 OINTMENT TOPICAL at 09:04

## 2022-04-02 NOTE — ASSESSMENT & PLAN NOTE
Sonya Armendariz is a 61 y.o. lady who presents to the SICU s/p CABGx2 on 4/1/22.      Neuro/Psych:   -- Sedation: propofol as tolerated for SBT  -- Pain: IV narcotics until extubated. Will add orals when able             Cards:   -- Pressors: epi off, wean as tolerated  -- Goal MAP: >65; sys<140  -- ASA if CT output ok  -- statin  -- possible b blocker later today     Pulm:   -- Goal O2 sat > 90%  -- Wean as able towards extubation  -- Daily CXR while intubated  -- ABG PRN  -- Chest tubes include: 2 mediastinal, 1 left pleural to wall suction.        Renal:  -- Keep suárez for strict I/O  -- BUN/Cr 9/0.8  -- Diuretics: none       FEN / GI:   -- Replace lytes as needed  -- Nutrition: NPO, ok for swallow after extubation  -- s/p 1.75L   -- famotidine 20 BID      ID:   -- WBC 9.6  -- Antibiotics: periop ancef      Heme/Onc:   -- H/H 9.8/30.2  -- Daily CBC  -- Transfused products: none  -- Anticoagulation: ASA      Endo:   -- Gluc goal 140-180  -- Insulin drip per endocrinology      PPx:   Feeding: NPO  Analgesia/Sedation: IV narcotics / propofol  Thromboembolic prevention: ASA  HOB >30: yes  Stress Ulcer ppx: famotidine 20 BID  Glucose control: Critical care goal 140-180 g/dl, ISS    Lines/Drains/Airway: RIJ, Art line, suárez, ETT      Dispo/Code Status/Palliative:   -- SICU / Full Code

## 2022-04-02 NOTE — PROGRESS NOTES
Cardiac Surgery Progress Note    VANNESSA overnight  Extubated this am    I/O  5810/2085 net: +3725  UOP 1755  Chest tubes 330    Gtts  Lasix 5    A/P  61F POD1 from CABG    Progressing    Advance diet   Aggressive pulm hygiene  ASA/Statin  Start A. Fib ppx today  DVT ppx: Lovenox  Increase activity   Keep drains and PW  Cont ICU

## 2022-04-02 NOTE — CARE UPDATE
Pt extubated per order to 15L HFNC. Pt tolerated well, no stridor post extubation.   RT and RN at bedside.  Will continue to monitor.

## 2022-04-02 NOTE — PLAN OF CARE
"      SICU PLAN OF CARE NOTE    Dx: CAD (coronary artery disease)    Shift Events: Extubated to HFNC around 1020 AM. Patient tolerated well. NG tube placed. Lasix 10 mg IVP and Lasix gtt started.     Goals of Care: MAP > 65, SBP < 140, O2> 88%    Neuro: AAO x4, Follows Commands, and Moves All Extremities    Vital Signs: BP (!) 113/57   Pulse 80   Temp 99.32 °F (37.4 °C)   Resp 20   Ht 5' 2" (1.575 m)   Wt 122.5 kg (270 lb)   LMP 06/30/2015   SpO2 (!) 90%   Breastfeeding No   BMI 49.38 kg/m²     Respiratory: HFNC 9 L     Diet: NPO    Gtts: Lasix, MIVF, and Abx    Urine Output: Urinary Catheter 2,520 cc/shift    Drains: Chest Tube, total output 340 cc /  shift     Labs: Q6 Mag/Potassium. All other daily labs trending.  Accuchecks: ACHS     Skin: No signs of skin breakdown noted. Chest incision with island/border CDI and chest tube dsg, CDI. Compression wrap on left leg. Patient turned Q2.       "

## 2022-04-02 NOTE — NURSING
"      SICU PLAN OF CARE NOTE    Dx: CAD (coronary artery disease)    Shift Events: Pt remained intubated throughout shift. Planned for SBTs in early AM and during SBTs, patient began to desat to upper 80s while on 40%/ 8 PEEP on spontaneous mode repeat ABG showed paO2 decreased to 50s from 90s. Sedation swapped from propofol to precedex before SBTs. During SBTs while on precedex, MAPs decreased to 50s and epi was briefly restarted. Pt placed back on a rate 50%/ 10 PEEP around 3am w/ frequent breath stacking due to high RR. 1L albumin given on shift. Pt follows commands and moves all extremities. BP improved after restarting epi and after weaning precedex off. HR 70-90s NSR. MD Cavazos @ bedside for all events regarding BP and oxygenation issues. Pain management w/ fentanyl. Levy catheter remains in place. Family @ bedside in beginning of shift, Any and all questions and concerns answered.     Goals of Care: Wean vent to extubate, wean pressors and sedation. MAPs>65, SBP<140, O2 sat >90%    Neuro: Arouses to Voice, Follows Commands and Moves All Extremities    Vital Signs: BP (!) 90/55   Pulse 81   Temp 99.86 °F (37.7 °C)   Resp (!) 35   Ht 5' 2" (1.575 m)   Wt 122.5 kg (270 lb)   LMP 06/30/2015   SpO2 (!) 94%   Breastfeeding No   BMI 49.38 kg/m²     Respiratory: Ventilator    Diet: NPO    Gtts: Precedex, Insulin and MIVF    Urine Output: Urinary Catheter 1105 cc/shift    Drains: Chest Tube, total output 200 cc /  shift    Restraints R/L soft wrist restraints. No skin breakdown noted.       Labs/Accuchecks: Daily Labs, Trending ABGs/Lactic. Q1H accucheck    Skin: Midsternal incision w/ OR island border dressing in place. L leg skin graft site w/ compression wrap in place. No skin breakdown on sacrum/elbows/heels.       "

## 2022-04-02 NOTE — PROGRESS NOTES
[facility]  Critical Care - Surgery  Progress Note    Patient Name: Sonya Armendariz  MRN: [id number]  Admission Date: 4/1/2022  Hospital Length of Stay: 1 days  Code Status: Full Code  Attending Provider: Leandro Howard MD  Primary Care Provider: Sivakumar Wing MD   Principal Problem: CAD (coronary artery disease)    Subjective:     Hospital/ICU Course:  No notes on file    Interval History/Significant Events:   Weaned vent requirements, now on 50/10 tolerating SBT with decreased oxygenation  Required some cleviprex overnight  Follows commands, though drowsy    Follow-up For: Procedure(s) (LRB):  CORONARY ARTERY BYPASS GRAFT (CABG) (N/A)  SURGICAL PROCUREMENT, VEIN, ENDOSCOPIC (Left)    Post-Operative Day: 1 Day Post-Op    Objective:     Vital Signs (Most Recent):  Temp: 99.86 °F (37.7 °C) (04/02/22 0533)  Pulse: 85 (04/02/22 0615)  Resp: (!) 35 (04/02/22 0446)  BP: (!) 96/51 (04/02/22 0600)  SpO2: (!) 90 % (04/02/22 0615)   Vital Signs (24h Range):  Temp:  [97.6 °F (36.4 °C)-100.22 °F (37.9 °C)] 99.86 °F (37.7 °C)  Pulse:  [72-93] 85  Resp:  [18-35] 35  SpO2:  [89 %-100 %] 90 %  BP: ()/(51-85) 96/51  Arterial Line BP: ()/(46-79) 103/50     Weight: 122.5 kg (270 lb)  Body mass index is 49.38 kg/m².      Intake/Output Summary (Last 24 hours) at 4/2/2022 0630  Last data filed at 4/2/2022 0600  Gross per 24 hour   Intake 5810.58 ml   Output 2055 ml   Net 3755.58 ml       Physical Exam  Constitutional:       Comments: sedated   HENT:      Head: Normocephalic and atraumatic.   Neck:      Comments: R IJ CVC  Cardiovascular:      Rate and Rhythm: Normal rate and regular rhythm.      Pulses: Normal pulses.      Comments: Midline sternotomy incision c/d/I  Chest tubes in place with serosanguinous output  V wires in place, not connected  Pulmonary:      Breath sounds: Normal breath sounds.      Comments: intubated  Abdominal:      General: Abdomen is flat. There is no distension.       Palpations: Abdomen is soft.      Tenderness: There is no abdominal tenderness.   Genitourinary:     Comments: Levy in place with clear urine  Musculoskeletal:      Comments: R radial art line in place   Skin:     General: Skin is warm.      Capillary Refill: Capillary refill takes less than 2 seconds.       Vents:  Vent Mode: A/C (04/02/22 0533)  Ventilator Initiated: Yes (04/01/22 1210)  Set Rate: 18 BPM (04/02/22 0533)  Vt Set: 350 mL (04/02/22 0533)  Pressure Support: 12 cmH20 (04/02/22 0253)  PEEP/CPAP: 10 cmH20 (04/02/22 0533)  Oxygen Concentration (%): 50 (04/02/22 0615)  Peak Airway Pressure: 14 cmH2O (04/02/22 0533)  Plateau Pressure: 22 cmH20 (04/02/22 0533)  Total Ve: 11.2 mL (04/02/22 0533)  Negative Inspiratory Force (cm H2O): 0 (04/02/22 0533)  F/VT Ratio<105 (RSBI): (!) 46.63 (04/01/22 1927)    Lines/Drains/Airways       Central Venous Catheter Line  Duration                  Percutaneous Central Line Insertion/Assessment - Quad lumen  04/01/22 1226 right internal jugular <1 day    Percutaneous Central Line Insertion/Assessment - Quad Lumen  04/01/22 1226 right internal jugular <1 day              Drain  Duration                  NG/OG Tube 04/01/22 1215 Center mouth <1 day         Urethral Catheter 04/01/22 0730 Straight-tip;Temperature probe;Non-latex 14 Fr. <1 day         Y Chest Tube 1 and 2 04/01/22 1000 Mediastinal <1 day         Y Chest Tube 1 and 2 04/01/22 1000 Pleural <1 day              Airway  Duration                  Airway - Non-Surgical 04/01/22 1000 Endotracheal Tube <1 day              Arterial Line  Duration             Arterial Line 04/01/22 0731 Left Radial <1 day              Line  Duration                  Pacer Wires 04/01/22 1046 <1 day              Peripheral Intravenous Line  Duration                  Peripheral IV - Single Lumen 04/01/22 0610 20 G Anterior;Right Hand 1 day         Peripheral IV - Single Lumen 04/01/22 1247 16 G Anterior;Left Forearm <1 day                     Significant Labs:    CBC/Anemia Profile:  Recent Labs   Lab 04/01/22  1217 04/01/22  1312 04/01/22  2157 04/02/22  0239 04/02/22  0257   WBC 17.46*  --   --   --  9.55   HGB 11.2*  --   --   --  9.8*   HCT 34.5*   < > 32* 29* 30.2*     --   --   --  141*   MCV 94  --   --   --  94   RDW 12.7  --   --   --  12.7    < > = values in this interval not displayed.        Chemistries:  Recent Labs   Lab 04/01/22  1217 04/02/22  0257    139   K 4.3 4.5    108   CO2 22* 21*   BUN 10 9   CREATININE 0.9 0.8   CALCIUM 8.3* 8.3*   ALBUMIN 3.1* 3.8   PROT 5.1* 5.6*   BILITOT 0.4 0.5   ALKPHOS 64 54*   ALT 16 12   AST 27 36   MG 2.3 1.9   PHOS 3.6 3.2       ABGs:   Recent Labs   Lab 04/02/22  0522   PH 7.421   PCO2 32.1*   HCO3 20.9*   POCSATURATED 89*   BE -4     CMP:   Recent Labs   Lab 04/01/22 1217 04/02/22  0257    139   K 4.3 4.5    108   CO2 22* 21*   * 147*   BUN 10 9   CREATININE 0.9 0.8   CALCIUM 8.3* 8.3*   PROT 5.1* 5.6*   ALBUMIN 3.1* 3.8   BILITOT 0.4 0.5   ALKPHOS 64 54*   AST 27 36   ALT 16 12   ANIONGAP 8 10   EGFRNONAA >60.0 >60.0       Significant Imaging:  I have reviewed all pertinent imaging results/findings within the past 24 hours.    Assessment/Plan:     * CAD (coronary artery disease)  Sonya Armendariz is a 61 y.o. lady who presents to the SICU s/p CABGx2 on 4/1/22.      Neuro/Psych:   -- Sedation: propofol as tolerated for SBT  -- Pain: IV narcotics until extubated. Will add orals when able             Cards:   -- Pressors: epi off, wean as tolerated  -- Goal MAP: >65; sys<140  -- ASA if CT output ok  -- statin  -- possible b blocker later today     Pulm:   -- Goal O2 sat > 90%  -- Wean as able towards extubation  -- Daily CXR while intubated  -- ABG PRN  -- Chest tubes include: 2 mediastinal, 1 left pleural to wall suction.        Renal:  -- Keep suárez for strict I/O  -- BUN/Cr 9/0.8  -- Diuretics: none       FEN / GI:   -- Replace lytes as  needed  -- Nutrition: NPO, ok for swallow after extubation  -- s/p 1.75L   -- famotidine 20 BID      ID:   -- WBC 9.6  -- Antibiotics: periop ancef      Heme/Onc:   -- H/H 9.8/30.2  -- Daily CBC  -- Transfused products: none  -- Anticoagulation: ASA      Endo:   -- Gluc goal 140-180  -- Insulin drip per endocrinology      PPx:   Feeding: NPO  Analgesia/Sedation: IV narcotics / propofol  Thromboembolic prevention: ASA  HOB >30: yes  Stress Ulcer ppx: famotidine 20 BID  Glucose control: Critical care goal 140-180 g/dl, ISS    Lines/Drains/Airway: RIJ, Art line, suárez, ETT      Dispo/Code Status/Palliative:   -- SICU / Full Code               Critical care was time spent personally by me on the following activities: development of treatment plan with patient or surrogate and bedside caregivers, discussions with consultants, evaluation of patient's response to treatment, examination of patient, ordering and performing treatments and interventions, ordering and review of laboratory studies, ordering and review of radiographic studies, pulse oximetry, re-evaluation of patient's condition.  This critical care time did not overlap with that of any other provider or involve time for any procedures.     Leonardo Bryson MD  Critical Care - Surgery  [facility]

## 2022-04-02 NOTE — SUBJECTIVE & OBJECTIVE
Interval History/Significant Events:   Weaned vent requirements, now on 50/10 tolerating SBT with decreased oxygenation  Required some cleviprex overnight  Follows commands, though drowsy    Follow-up For: Procedure(s) (LRB):  CORONARY ARTERY BYPASS GRAFT (CABG) (N/A)  SURGICAL PROCUREMENT, VEIN, ENDOSCOPIC (Left)    Post-Operative Day: 1 Day Post-Op    Objective:     Vital Signs (Most Recent):  Temp: 99.86 °F (37.7 °C) (04/02/22 0533)  Pulse: 85 (04/02/22 0615)  Resp: (!) 35 (04/02/22 0446)  BP: (!) 96/51 (04/02/22 0600)  SpO2: (!) 90 % (04/02/22 0615)   Vital Signs (24h Range):  Temp:  [97.6 °F (36.4 °C)-100.22 °F (37.9 °C)] 99.86 °F (37.7 °C)  Pulse:  [72-93] 85  Resp:  [18-35] 35  SpO2:  [89 %-100 %] 90 %  BP: ()/(51-85) 96/51  Arterial Line BP: ()/(46-79) 103/50     Weight: 122.5 kg (270 lb)  Body mass index is 49.38 kg/m².      Intake/Output Summary (Last 24 hours) at 4/2/2022 0630  Last data filed at 4/2/2022 0600  Gross per 24 hour   Intake 5810.58 ml   Output 2055 ml   Net 3755.58 ml       Physical Exam  Constitutional:       Comments: sedated   HENT:      Head: Normocephalic and atraumatic.   Neck:      Comments: R IJ CVC  Cardiovascular:      Rate and Rhythm: Normal rate and regular rhythm.      Pulses: Normal pulses.      Comments: Midline sternotomy incision c/d/I  Chest tubes in place with serosanguinous output  V wires in place, not connected  Pulmonary:      Breath sounds: Normal breath sounds.      Comments: intubated  Abdominal:      General: Abdomen is flat. There is no distension.      Palpations: Abdomen is soft.      Tenderness: There is no abdominal tenderness.   Genitourinary:     Comments: Levy in place with clear urine  Musculoskeletal:      Comments: R radial art line in place   Skin:     General: Skin is warm.      Capillary Refill: Capillary refill takes less than 2 seconds.       Vents:  Vent Mode: A/C (04/02/22 0533)  Ventilator Initiated: Yes (04/01/22 1210)  Set Rate: 18  BPM (04/02/22 0533)  Vt Set: 350 mL (04/02/22 0533)  Pressure Support: 12 cmH20 (04/02/22 0253)  PEEP/CPAP: 10 cmH20 (04/02/22 0533)  Oxygen Concentration (%): 50 (04/02/22 0615)  Peak Airway Pressure: 14 cmH2O (04/02/22 0533)  Plateau Pressure: 22 cmH20 (04/02/22 0533)  Total Ve: 11.2 mL (04/02/22 0533)  Negative Inspiratory Force (cm H2O): 0 (04/02/22 0533)  F/VT Ratio<105 (RSBI): (!) 46.63 (04/01/22 1927)    Lines/Drains/Airways       Central Venous Catheter Line  Duration                  Percutaneous Central Line Insertion/Assessment - Quad lumen  04/01/22 1226 right internal jugular <1 day    Percutaneous Central Line Insertion/Assessment - Quad Lumen  04/01/22 1226 right internal jugular <1 day              Drain  Duration                  NG/OG Tube 04/01/22 1215 Center mouth <1 day         Urethral Catheter 04/01/22 0730 Straight-tip;Temperature probe;Non-latex 14 Fr. <1 day         Y Chest Tube 1 and 2 04/01/22 1000 Mediastinal <1 day         Y Chest Tube 1 and 2 04/01/22 1000 Pleural <1 day              Airway  Duration                  Airway - Non-Surgical 04/01/22 1000 Endotracheal Tube <1 day              Arterial Line  Duration             Arterial Line 04/01/22 0731 Left Radial <1 day              Line  Duration                  Pacer Wires 04/01/22 1046 <1 day              Peripheral Intravenous Line  Duration                  Peripheral IV - Single Lumen 04/01/22 0610 20 G Anterior;Right Hand 1 day         Peripheral IV - Single Lumen 04/01/22 1247 16 G Anterior;Left Forearm <1 day                    Significant Labs:    CBC/Anemia Profile:  Recent Labs   Lab 04/01/22  1217 04/01/22  1312 04/01/22  2157 04/02/22  0239 04/02/22  0257   WBC 17.46*  --   --   --  9.55   HGB 11.2*  --   --   --  9.8*   HCT 34.5*   < > 32* 29* 30.2*     --   --   --  141*   MCV 94  --   --   --  94   RDW 12.7  --   --   --  12.7    < > = values in this interval not displayed.        Chemistries:  Recent Labs    Lab 04/01/22 1217 04/02/22  0257    139   K 4.3 4.5    108   CO2 22* 21*   BUN 10 9   CREATININE 0.9 0.8   CALCIUM 8.3* 8.3*   ALBUMIN 3.1* 3.8   PROT 5.1* 5.6*   BILITOT 0.4 0.5   ALKPHOS 64 54*   ALT 16 12   AST 27 36   MG 2.3 1.9   PHOS 3.6 3.2       ABGs:   Recent Labs   Lab 04/02/22  0522   PH 7.421   PCO2 32.1*   HCO3 20.9*   POCSATURATED 89*   BE -4     CMP:   Recent Labs   Lab 04/01/22 1217 04/02/22  0257    139   K 4.3 4.5    108   CO2 22* 21*   * 147*   BUN 10 9   CREATININE 0.9 0.8   CALCIUM 8.3* 8.3*   PROT 5.1* 5.6*   ALBUMIN 3.1* 3.8   BILITOT 0.4 0.5   ALKPHOS 64 54*   AST 27 36   ALT 16 12   ANIONGAP 8 10   EGFRNONAA >60.0 >60.0       Significant Imaging:  I have reviewed all pertinent imaging results/findings within the past 24 hours.

## 2022-04-02 NOTE — PROGRESS NOTES
"[facility]  Endocrinology  Progress Note    Admit Date: 4/1/2022     Reason for Consult: Management of Pre-DM, Hyperglycemia     Surgical Procedure and Date:  4/1/22  CORONARY ARTERY BYPASS GRAFT (CABG) (N/A)  SURGICAL PROCUREMENT, VEIN, ENDOSCOPIC (Left)       HPI:   Patient is a 61 y.o. female with a diagnosis of morbid obesity, HTN, JONH, CAD s/p LAD stent in 2012, and current tobacco abuse.   LHC on 3/22 showed multivessel disease with LM involvement.  Last stress test was SPECT on 1/31/20 which was negative. CTS team plans for 3v CABG.  Patient now presents for the above procedure(s). Endocrinology consulted for management of hyperglycemia.       Lab Results   Component Value Date    HGBA1C 6.1 (H) 03/19/2022           Interval HPI:   Overnight events:   BG stable and within goal while on intensive IV insulin infusion protocol overnight. Patient extubated this AM. Endo will continue to follow, and manage glycemic control while inpatient.   Diet NPO Except for: Sips with Medication  1 Day Post-Op    Eating:   NPO  Nausea: No  Hypoglycemia and intervention: No  Fever: No  TPN and/or TF: No  If yes, type of TF/TPN and rate: None    BP (!) 107/59   Pulse 79   Temp 99.68 °F (37.6 °C)   Resp (!) 24   Ht 5' 2" (1.575 m)   Wt 122.5 kg (270 lb)   LMP 06/30/2015   SpO2 95%   Breastfeeding No   BMI 49.38 kg/m²     Labs Reviewed and Include    Recent Labs   Lab 04/02/22  0257   *   CALCIUM 8.3*   ALBUMIN 3.8   PROT 5.6*      K 4.5   CO2 21*      BUN 9   CREATININE 0.8   ALKPHOS 54*   ALT 12   AST 36   BILITOT 0.5     Lab Results   Component Value Date    WBC 9.55 04/02/2022    HGB 9.8 (L) 04/02/2022    HCT 30.2 (L) 04/02/2022    MCV 94 04/02/2022     (L) 04/02/2022     No results for input(s): TSH, FREET4 in the last 168 hours.  Lab Results   Component Value Date    HGBA1C 6.1 (H) 03/19/2022       Nutritional status:   Body mass index is 49.38 kg/m².  Lab Results "   Component Value Date    ALBUMIN 3.8 04/02/2022    ALBUMIN 3.1 (L) 04/01/2022    ALBUMIN 3.7 03/18/2022     No results found for: PREALBUMIN    Estimated Creatinine Clearance: 93.4 mL/min (based on SCr of 0.8 mg/dL).    Accu-Checks  Recent Labs     04/02/22  0106 04/02/22  0200 04/02/22  0255 04/02/22  0404 04/02/22  0459 04/02/22  0725 04/02/22  0805 04/02/22  0859 04/02/22  1003 04/02/22  1104   POCTGLUCOSE 136* 142* 173* 134* 147* 154* 125* 138* 133* 132*       Current Medications and/or Treatments Impacting Glycemic Control  Immunotherapy:    Immunosuppressants       None          Steroids:   Hormones (From admission, onward)                None          Pressors:    Autonomic Drugs (From admission, onward)                Start     Stop Route Frequency Ordered    04/01/22 1200  EPINEPHrine (ADRENALIN) 5 mg in dextrose 5 % 250 mL infusion        Question Answer Comment   Begin at (in mcg/kg/min): 0.02    Titrate by: (in mcg/kg/min) 0.02    Titrate interval: (in minutes) 5    Titrate to maintain: (SBP or MAP or Cardiac Index) MAP    Greater than: (in mmHg) 65    Cardiac index greater than: (in L/min) 2.2    Maximum dose: (in mcg/kg/min) 2        -- IV Continuous 04/01/22 1145          Hyperglycemia/Diabetes Medications:   Antihyperglycemics (From admission, onward)                Start     Stop Route Frequency Ordered    04/01/22 1200  insulin regular in 0.9 % NaCl 100 unit/100 mL (1 unit/mL) infusion        Question Answer Comment   Insulin rate changes (DO NOT MODIFY ANSWER) [email or web address]\epic\Images\Pharmacy\InsulinInfusions\CTS INSULIN [id number]    Enter initial dose (Units/hr): 1        -- IV Continuous 04/01/22 1145            ASSESSMENT and PLAN    * CAD (coronary artery disease)  Managed per primary team  Optimize BG control        Transient hyperglycemia post procedure  BG goal 110-140 (CTS protocol)     Continue IV insulin infusion protocol  Requires intensive BG monitoring while on protocol     5:22  PM  Patient has fallen off of gtt, and is able to maintain stable BG.   - Discontinue intensive IV insulin infusion protocol.   - Start Moderate Dose SQ Insulin Correction Scale.  - BG Monitoring AC/HS     ** Please call Endocrine for any BG related issues **  ** Please notify Endocrine for any change and/or advance in diet**    Discharge planning: TBD        Surgical wound present  Optimize BG control to improve wound healing        Obesity  Body mass index is 49.38 kg/m².   May increase insulin resistance.         Hyperlipidemia  May increase insulin resistance.               Jose Nash, NP  Endocrinology  [facility] - Surgical Intensive Care

## 2022-04-02 NOTE — SUBJECTIVE & OBJECTIVE
"Interval HPI:   Overnight events:   BG stable and within goal while on intensive IV insulin infusion protocol overnight. Patient extubated this AM. Endo will continue to follow, and manage glycemic control while inpatient.   Diet NPO Except for: Sips with Medication  1 Day Post-Op    Eating:   NPO  Nausea: No  Hypoglycemia and intervention: No  Fever: No  TPN and/or TF: No  If yes, type of TF/TPN and rate: None    BP (!) 107/59   Pulse 79   Temp 99.68 °F (37.6 °C)   Resp (!) 24   Ht 5' 2" (1.575 m)   Wt 122.5 kg (270 lb)   LMP 06/30/2015   SpO2 95%   Breastfeeding No   BMI 49.38 kg/m²     Labs Reviewed and Include    Recent Labs   Lab 04/02/22  0257   *   CALCIUM 8.3*   ALBUMIN 3.8   PROT 5.6*      K 4.5   CO2 21*      BUN 9   CREATININE 0.8   ALKPHOS 54*   ALT 12   AST 36   BILITOT 0.5     Lab Results   Component Value Date    WBC 9.55 04/02/2022    HGB 9.8 (L) 04/02/2022    HCT 30.2 (L) 04/02/2022    MCV 94 04/02/2022     (L) 04/02/2022     No results for input(s): TSH, FREET4 in the last 168 hours.  Lab Results   Component Value Date    HGBA1C 6.1 (H) 03/19/2022       Nutritional status:   Body mass index is 49.38 kg/m².  Lab Results   Component Value Date    ALBUMIN 3.8 04/02/2022    ALBUMIN 3.1 (L) 04/01/2022    ALBUMIN 3.7 03/18/2022     No results found for: PREALBUMIN    Estimated Creatinine Clearance: 93.4 mL/min (based on SCr of 0.8 mg/dL).    Accu-Checks  Recent Labs     04/02/22  0106 04/02/22  0200 04/02/22  0255 04/02/22  0404 04/02/22  0459 04/02/22  0725 04/02/22  0805 04/02/22  0859 04/02/22  1003 04/02/22  1104   POCTGLUCOSE 136* 142* 173* 134* 147* 154* 125* 138* 133* 132*       Current Medications and/or Treatments Impacting Glycemic Control  Immunotherapy:    Immunosuppressants       None          Steroids:   Hormones (From admission, onward)                None          Pressors:    Autonomic Drugs (From admission, onward)                Start     Stop Route " Frequency Ordered    04/01/22 1200  EPINEPHrine (ADRENALIN) 5 mg in dextrose 5 % 250 mL infusion        Question Answer Comment   Begin at (in mcg/kg/min): 0.02    Titrate by: (in mcg/kg/min) 0.02    Titrate interval: (in minutes) 5    Titrate to maintain: (SBP or MAP or Cardiac Index) MAP    Greater than: (in mmHg) 65    Cardiac index greater than: (in L/min) 2.2    Maximum dose: (in mcg/kg/min) 2        -- IV Continuous 04/01/22 1146          Hyperglycemia/Diabetes Medications:   Antihyperglycemics (From admission, onward)                Start     Stop Route Frequency Ordered    04/01/22 1200  insulin regular in 0.9 % NaCl 100 unit/100 mL (1 unit/mL) infusion        Question Answer Comment   Insulin rate changes (DO NOT MODIFY ANSWER) \\[email or web address]\epic\Images\Pharmacy\InsulinInfusions\CTS INSULIN [id number].pdf    Enter initial dose (Units/hr): 1        -- IV Continuous 04/01/22 1144

## 2022-04-03 LAB
ALBUMIN SERPL BCP-MCNC: 3.8 G/DL (ref 3.5–5.2)
ALLENS TEST: ABNORMAL
ALLENS TEST: NORMAL
ALP SERPL-CCNC: 50 U/L (ref 55–135)
ALT SERPL W/O P-5'-P-CCNC: 10 U/L (ref 10–44)
ANION GAP SERPL CALC-SCNC: 9 MMOL/L (ref 8–16)
AST SERPL-CCNC: 31 U/L (ref 10–40)
BASOPHILS # BLD AUTO: 0.01 K/UL (ref 0–0.2)
BASOPHILS NFR BLD: 0.1 % (ref 0–1.9)
BILIRUB SERPL-MCNC: 0.8 MG/DL (ref 0.1–1)
BUN SERPL-MCNC: 10 MG/DL (ref 6–20)
CALCIUM SERPL-MCNC: 8.2 MG/DL (ref 8.7–10.5)
CHLORIDE SERPL-SCNC: 104 MMOL/L (ref 95–110)
CO2 SERPL-SCNC: 25 MMOL/L (ref 23–29)
CREAT SERPL-MCNC: 0.9 MG/DL (ref 0.5–1.4)
DIFFERENTIAL METHOD: ABNORMAL
EOSINOPHIL # BLD AUTO: 0 K/UL (ref 0–0.5)
EOSINOPHIL NFR BLD: 0.4 % (ref 0–8)
ERYTHROCYTE [DISTWIDTH] IN BLOOD BY AUTOMATED COUNT: 13 % (ref 11.5–14.5)
EST. GFR  (AFRICAN AMERICAN): >60 ML/MIN/1.73 M^2
EST. GFR  (NON AFRICAN AMERICAN): >60 ML/MIN/1.73 M^2
GLUCOSE SERPL-MCNC: 135 MG/DL (ref 70–110)
HCO3 UR-SCNC: 27.6 MMOL/L (ref 24–28)
HCT VFR BLD AUTO: 29.5 % (ref 37–48.5)
HGB BLD-MCNC: 9.7 G/DL (ref 12–16)
IMM GRANULOCYTES # BLD AUTO: 0.04 K/UL (ref 0–0.04)
IMM GRANULOCYTES NFR BLD AUTO: 0.4 % (ref 0–0.5)
LDH SERPL L TO P-CCNC: 0.65 MMOL/L (ref 0.36–1.25)
LYMPHOCYTES # BLD AUTO: 1.6 K/UL (ref 1–4.8)
LYMPHOCYTES NFR BLD: 15.7 % (ref 18–48)
MAGNESIUM SERPL-MCNC: 1.9 MG/DL (ref 1.6–2.6)
MAGNESIUM SERPL-MCNC: 1.9 MG/DL (ref 1.6–2.6)
MAGNESIUM SERPL-MCNC: 2 MG/DL (ref 1.6–2.6)
MAGNESIUM SERPL-MCNC: 2.3 MG/DL (ref 1.6–2.6)
MCH RBC QN AUTO: 31.5 PG (ref 27–31)
MCHC RBC AUTO-ENTMCNC: 32.9 G/DL (ref 32–36)
MCV RBC AUTO: 96 FL (ref 82–98)
MONOCYTES # BLD AUTO: 0.7 K/UL (ref 0.3–1)
MONOCYTES NFR BLD: 6.9 % (ref 4–15)
NEUTROPHILS # BLD AUTO: 7.8 K/UL (ref 1.8–7.7)
NEUTROPHILS NFR BLD: 76.5 % (ref 38–73)
NRBC BLD-RTO: 0 /100 WBC
PCO2 BLDA: 44.5 MMHG (ref 35–45)
PH SMN: 7.4 [PH] (ref 7.35–7.45)
PHOSPHATE SERPL-MCNC: 2.4 MG/DL (ref 2.7–4.5)
PLATELET # BLD AUTO: 141 K/UL (ref 150–450)
PMV BLD AUTO: 11.5 FL (ref 9.2–12.9)
PO2 BLDA: 61 MMHG (ref 80–100)
POC BE: 3 MMOL/L
POC SATURATED O2: 91 % (ref 95–100)
POC TCO2: 29 MMOL/L (ref 23–27)
POCT GLUCOSE: 126 MG/DL (ref 70–110)
POCT GLUCOSE: 130 MG/DL (ref 70–110)
POCT GLUCOSE: 138 MG/DL (ref 70–110)
POCT GLUCOSE: 143 MG/DL (ref 70–110)
POTASSIUM SERPL-SCNC: 3.8 MMOL/L (ref 3.5–5.1)
POTASSIUM SERPL-SCNC: 3.9 MMOL/L (ref 3.5–5.1)
POTASSIUM SERPL-SCNC: 4 MMOL/L (ref 3.5–5.1)
PROT SERPL-MCNC: 5.8 G/DL (ref 6–8.4)
RBC # BLD AUTO: 3.08 M/UL (ref 4–5.4)
SAMPLE: ABNORMAL
SAMPLE: NORMAL
SITE: ABNORMAL
SITE: NORMAL
SODIUM SERPL-SCNC: 138 MMOL/L (ref 136–145)
WBC # BLD AUTO: 10.15 K/UL (ref 3.9–12.7)

## 2022-04-03 PROCEDURE — 97530 THERAPEUTIC ACTIVITIES: CPT

## 2022-04-03 PROCEDURE — 97162 PT EVAL MOD COMPLEX 30 MIN: CPT

## 2022-04-03 PROCEDURE — 27100171 HC OXYGEN HIGH FLOW UP TO 24 HOURS

## 2022-04-03 PROCEDURE — 99291 CRITICAL CARE FIRST HOUR: CPT | Mod: ,,, | Performed by: ANESTHESIOLOGY

## 2022-04-03 PROCEDURE — 84100 ASSAY OF PHOSPHORUS: CPT | Performed by: NURSE PRACTITIONER

## 2022-04-03 PROCEDURE — 63600175 PHARM REV CODE 636 W HCPCS: Performed by: NURSE PRACTITIONER

## 2022-04-03 PROCEDURE — 25000003 PHARM REV CODE 250: Performed by: THORACIC SURGERY (CARDIOTHORACIC VASCULAR SURGERY)

## 2022-04-03 PROCEDURE — 94761 N-INVAS EAR/PLS OXIMETRY MLT: CPT

## 2022-04-03 PROCEDURE — 83735 ASSAY OF MAGNESIUM: CPT | Performed by: STUDENT IN AN ORGANIZED HEALTH CARE EDUCATION/TRAINING PROGRAM

## 2022-04-03 PROCEDURE — 97535 SELF CARE MNGMENT TRAINING: CPT

## 2022-04-03 PROCEDURE — 84132 ASSAY OF SERUM POTASSIUM: CPT | Performed by: STUDENT IN AN ORGANIZED HEALTH CARE EDUCATION/TRAINING PROGRAM

## 2022-04-03 PROCEDURE — 99900035 HC TECH TIME PER 15 MIN (STAT)

## 2022-04-03 PROCEDURE — 99291 PR CRITICAL CARE, E/M 30-74 MINUTES: ICD-10-PCS | Mod: ,,, | Performed by: ANESTHESIOLOGY

## 2022-04-03 PROCEDURE — 63600175 PHARM REV CODE 636 W HCPCS: Performed by: ANESTHESIOLOGY

## 2022-04-03 PROCEDURE — 63600175 PHARM REV CODE 636 W HCPCS: Performed by: STUDENT IN AN ORGANIZED HEALTH CARE EDUCATION/TRAINING PROGRAM

## 2022-04-03 PROCEDURE — 85025 COMPLETE CBC W/AUTO DIFF WBC: CPT | Performed by: NURSE PRACTITIONER

## 2022-04-03 PROCEDURE — 97116 GAIT TRAINING THERAPY: CPT

## 2022-04-03 PROCEDURE — 20000000 HC ICU ROOM

## 2022-04-03 PROCEDURE — 84132 ASSAY OF SERUM POTASSIUM: CPT | Mod: 91 | Performed by: STUDENT IN AN ORGANIZED HEALTH CARE EDUCATION/TRAINING PROGRAM

## 2022-04-03 PROCEDURE — 25000003 PHARM REV CODE 250: Performed by: NURSE PRACTITIONER

## 2022-04-03 PROCEDURE — 97165 OT EVAL LOW COMPLEX 30 MIN: CPT

## 2022-04-03 PROCEDURE — 80053 COMPREHEN METABOLIC PANEL: CPT | Performed by: STUDENT IN AN ORGANIZED HEALTH CARE EDUCATION/TRAINING PROGRAM

## 2022-04-03 PROCEDURE — 94799 UNLISTED PULMONARY SVC/PX: CPT

## 2022-04-03 PROCEDURE — 37799 UNLISTED PX VASCULAR SURGERY: CPT

## 2022-04-03 PROCEDURE — 25000003 PHARM REV CODE 250: Performed by: STUDENT IN AN ORGANIZED HEALTH CARE EDUCATION/TRAINING PROGRAM

## 2022-04-03 PROCEDURE — 83735 ASSAY OF MAGNESIUM: CPT | Mod: 91 | Performed by: STUDENT IN AN ORGANIZED HEALTH CARE EDUCATION/TRAINING PROGRAM

## 2022-04-03 PROCEDURE — 83605 ASSAY OF LACTIC ACID: CPT

## 2022-04-03 PROCEDURE — 82803 BLOOD GASES ANY COMBINATION: CPT

## 2022-04-03 PROCEDURE — 94660 CPAP INITIATION&MGMT: CPT

## 2022-04-03 RX ORDER — FUROSEMIDE 10 MG/ML
20 INJECTION INTRAMUSCULAR; INTRAVENOUS 2 TIMES DAILY
Status: DISCONTINUED | OUTPATIENT
Start: 2022-04-03 | End: 2022-04-03

## 2022-04-03 RX ORDER — METOPROLOL SUCCINATE 50 MG/1
50 TABLET, EXTENDED RELEASE ORAL DAILY
Status: DISCONTINUED | OUTPATIENT
Start: 2022-04-03 | End: 2022-04-04

## 2022-04-03 RX ORDER — FUROSEMIDE 10 MG/ML
20 INJECTION INTRAMUSCULAR; INTRAVENOUS 2 TIMES DAILY
Status: DISCONTINUED | OUTPATIENT
Start: 2022-04-03 | End: 2022-04-04

## 2022-04-03 RX ORDER — POTASSIUM CHLORIDE 20 MEQ/1
20 TABLET, EXTENDED RELEASE ORAL 2 TIMES DAILY
Status: DISCONTINUED | OUTPATIENT
Start: 2022-04-03 | End: 2022-04-03

## 2022-04-03 RX ADMIN — ONDANSETRON 4 MG: 2 INJECTION INTRAMUSCULAR; INTRAVENOUS at 05:04

## 2022-04-03 RX ADMIN — MUPIROCIN 1 G: 20 OINTMENT TOPICAL at 08:04

## 2022-04-03 RX ADMIN — FUROSEMIDE 20 MG: 10 INJECTION, SOLUTION INTRAMUSCULAR; INTRAVENOUS at 12:04

## 2022-04-03 RX ADMIN — METOPROLOL SUCCINATE 50 MG: 50 TABLET, EXTENDED RELEASE ORAL at 08:04

## 2022-04-03 RX ADMIN — ATORVASTATIN CALCIUM 40 MG: 20 TABLET, FILM COATED ORAL at 08:04

## 2022-04-03 RX ADMIN — HYDROMORPHONE HYDROCHLORIDE 1 MG: 1 INJECTION, SOLUTION INTRAMUSCULAR; INTRAVENOUS; SUBCUTANEOUS at 12:04

## 2022-04-03 RX ADMIN — ACETAMINOPHEN 650 MG: 325 TABLET ORAL at 02:04

## 2022-04-03 RX ADMIN — DEXTROSE 2 G: 50 INJECTION, SOLUTION INTRAVENOUS at 07:04

## 2022-04-03 RX ADMIN — POTASSIUM CHLORIDE 20 MEQ: 14.9 INJECTION, SOLUTION INTRAVENOUS at 06:04

## 2022-04-03 RX ADMIN — SODIUM PHOSPHATE, MONOBASIC, MONOHYDRATE 15 MMOL: 276; 142 INJECTION, SOLUTION INTRAVENOUS at 08:04

## 2022-04-03 RX ADMIN — DOCUSATE SODIUM 100 MG: 100 CAPSULE ORAL at 08:04

## 2022-04-03 RX ADMIN — ACETAMINOPHEN 650 MG: 325 TABLET ORAL at 09:04

## 2022-04-03 RX ADMIN — POTASSIUM CHLORIDE 20 MEQ: 1500 TABLET, EXTENDED RELEASE ORAL at 08:04

## 2022-04-03 RX ADMIN — FUROSEMIDE 20 MG: 10 INJECTION, SOLUTION INTRAMUSCULAR; INTRAVENOUS at 08:04

## 2022-04-03 RX ADMIN — DEXTROSE 2 G: 50 INJECTION, SOLUTION INTRAVENOUS at 12:04

## 2022-04-03 RX ADMIN — LIDOCAINE 1 PATCH: 50 PATCH CUTANEOUS at 02:04

## 2022-04-03 RX ADMIN — OXYCODONE HYDROCHLORIDE 10 MG: 10 TABLET ORAL at 06:04

## 2022-04-03 RX ADMIN — ASPIRIN 325 MG ORAL TABLET 325 MG: 325 PILL ORAL at 08:04

## 2022-04-03 RX ADMIN — ENOXAPARIN SODIUM 40 MG: 100 INJECTION SUBCUTANEOUS at 05:04

## 2022-04-03 RX ADMIN — MAGNESIUM SULFATE 2 G: 2 INJECTION INTRAVENOUS at 04:04

## 2022-04-03 RX ADMIN — POLYETHYLENE GLYCOL 3350 17 G: 17 POWDER, FOR SOLUTION ORAL at 08:04

## 2022-04-03 RX ADMIN — ACETAMINOPHEN 650 MG: 325 TABLET ORAL at 06:04

## 2022-04-03 RX ADMIN — OXYCODONE HYDROCHLORIDE 10 MG: 10 TABLET ORAL at 04:04

## 2022-04-03 RX ADMIN — POTASSIUM BICARBONATE 20 MEQ: 391 TABLET, EFFERVESCENT ORAL at 09:04

## 2022-04-03 NOTE — ANESTHESIA POSTPROCEDURE EVALUATION
Anesthesia Post Evaluation    Patient: Sonya Armendariz    Procedure(s) Performed: Procedure(s) (LRB):  CORONARY ARTERY BYPASS GRAFT (CABG) (N/A)  SURGICAL PROCUREMENT, VEIN, ENDOSCOPIC (Left)    Final Anesthesia Type: general      Patient location during evaluation: ICU  Patient participation: Yes- Able to Participate  Level of consciousness: awake  Post-procedure vital signs: reviewed and stable  Pain management: adequate  Airway patency: patent    PONV status at discharge: No PONV  Anesthetic complications: no      Cardiovascular status: hemodynamically stable  Respiratory status: unassisted  Follow-up not needed.          Vitals Value Taken Time   /71 04/03/22 0003   Temp 37.5 °C (99.5 °F) 04/03/22 0200   Pulse 76 04/03/22 0200   Resp 32 04/03/22 0058   SpO2 94 % 04/03/22 0200   Vitals shown include unvalidated device data.      No case tracking events are documented in the log.      Pain/Keely Score: Pain Rating Prior to Med Admin: 9 (4/3/2022 12:58 AM)  Pain Rating Post Med Admin: 3 (4/3/2022  1:28 AM)

## 2022-04-03 NOTE — CARE UPDATE
BG goal 140 -180   Diet clear liquid Fluid - 1500mL  2 Days Post-Op    BG remains stable and within goal without need for PRN SQ insulin correction since IV insulin infusion was discontinued yesterday. Endow ill continue to follow, and manage glycemic control while inpatient.     - Continue Moderate Dose SQ Insulin Correction Scale.  - BG Monitoring AC/HS     ** Please call Endocrine for any BG related issues **  ** Please notify Endocrine for any change and/or advance in diet**  Lab Results   Component Value Date    HGBA1C 6.1 (H) 03/19/2022       Discharge Planning:   TBD. Please notify endocrinology prior to discharge.

## 2022-04-03 NOTE — PT/OT/SLP EVAL
Physical Therapy Evaluation    Patient Name:  Sonya Armendariz   MRN:  [id number]  Admit Date: 4/1/2022  Admitting Diagnosis:  CAD (coronary artery disease)  Length of Stay: 2 days  Recent Surgery: Procedure(s) (LRB):  CORONARY ARTERY BYPASS GRAFT (CABG) (N/A)  SURGICAL PROCUREMENT, VEIN, ENDOSCOPIC (Left) 2 Days Post-Op    Recommendations:     Discharge Recommendations:  home health PT   Discharge Equipment Recommendations: none   Barriers to discharge: None    Assessment:     Sonya Armendariz is a 61 y.o. female admitted with a medical diagnosis of CAD (coronary artery disease).      Problem List: weakness, impaired endurance, impaired functional mobilty, gait instability, pain, decreased upper extremity function, impaired cardiopulmonary response to activity  Rehab Prognosis: Good; patient would benefit from acute skilled PT services to address these deficits and reach maximum level of function.      Plan:     During this hospitalization, patient to be seen 5 x/week to address the identified rehab impairments via gait training, therapeutic activities, therapeutic exercises, neuromuscular re-education and progress towards the established goals.    · Plan of Care Expires:  05/01/22    Subjective   Communicated with RN prior to session.  Patient found up in chair upon PT entry to room, agreeable to evaluation. Sonya Armendariz's alone during session.    Chief Complaint: No chief complaint on file.    Patient/Family Comments/goals: to get better and return home   Pain/Comfort:  · Pain Rating 1: 4/10  · Location 1:  (sternum)  · Pain Addressed 1: Reposition, Distraction, Pre-medicate for activity  · Pain Rating Post-Intervention 1: 4/10    Living Environment:  Pt lives with brother in a [facility] with 4 MERCEDEZ in the front with no HR and 4 MERCEDEZ in the back with B HR. Brother recently had a BKA and is proficient with using a prosthetic. Patient uses DME as follows: none. DME owned (not currently used):  "none.    Patient reports they will have limited assistance from brother upon discharge.    Objective:   Patient found with: telemetry, pulse ox (continuous), blood pressure cuff, central line, peripheral IV, suárez catheter, chest tube, NG tube, arterial line, oxygen     General Precautions: Standard, Cardiac fall, sternal   Orthopedic Precautions:N/A   Braces: N/A   Oxygen Device: High Flow Nasal Cannula  Vitals: /71   Pulse 82   Temp 98.1 °F (36.7 °C) (Oral)   Resp (!) 21   Ht 5' 2" (1.575 m)   Wt 122.5 kg (270 lb)   LMP 06/30/2015   SpO2 (!) 93%   Breastfeeding No   BMI 49.38 kg/m²     Exams:  · Cognition:   · Alert and Cooperative  · Ox4  · Command following: Follows multistep  commands  · Fluency: clear/fluent    · RLE ROM: WFL  · RLE Strength: WFL  · LLE ROM: WFL  · LLE Strength: WFL    Outcome Measures:  AM-PAC 6 CLICK MOBILITY  Turning over in bed (including adjusting bedclothes, sheets and blankets)?: 2  Sitting down on and standing up from a chair with arms (e.g., wheelchair, bedside commode, etc.): 3  Moving from lying on back to sitting on the side of the bed?: 2  Moving to and from a bed to a chair (including a wheelchair)?: 3  Need to walk in hospital room?: 3  Climbing 3-5 steps with a railing?: 3  Basic Mobility Total Score: 16     Functional Mobility:  Additional staff present: OT  Bed Mobility:  Not performed 2nd to pt found in chair     Transfers:   · Sit <> Stand Transfer: contact guard assistance with no assistive device from EOB      Gait:   · Patient ambulated: 10ft + 20ft (Stading ADLs at sink between trials)   · Patient required: contact guard  · Patient used: no assistive device  · Gait Pattern observed: reciprocal gait  · Gait Deviation(s): occasional unsteady gait, decreased step length, narrow base of support and decreased conchita  · Impairments due to: impaired balance, pain and decreased endurance  · Comments:   · Pt reported increased back pain during ambulation " trial  · Verbal cuing to increase step length, pacing, and deep breathing        Therapeutic Activities, Exercises, & Education:   Educated pt on PT role/POC  Educated pt on importance of OOB activity and daily ambulation   Educated pt on sternal precautions  Pt verbalized understanding     Pt performed standing ADLs at the sink with OT    Patient left up in chair with all lines intact, call button in reach and RN notified.    GOALS:   Multidisciplinary Problems     Physical Therapy Goals        Problem: Physical Therapy    Goal Priority Disciplines Outcome Goal Variances Interventions   Physical Therapy Goal     PT, PT/OT Ongoing, Progressing     Description: Goals to be met by: 2022    Patient will increase functional independence with mobility by performin. Supine to sit with CGA  2. Sit to stand transfer with Supervision  3. Gait  x 250 feet with Supervision   4. Ascend/descend 4 stair with Contact Guard Assistance                      History:     Past Medical History:   Diagnosis Date    Anticoagulant long-term use     CAD (coronary artery disease)     s/p LAD stent    CHF (congestive heart failure)     Gastroduodenitis     Helicobacter pylori (H. pylori) infection     Hyperlipidemia     Hypertension     MI (myocardial infarction)     x2    Obesity     Tobacco use     Urinary tract infection     Vaginal infection        Past Surgical History:   Procedure Laterality Date    ANGIOGRAM, CORONARY, WITH LEFT HEART CATHETERIZATION N/A 3/22/2022    Procedure: Angiogram, Coronary, with Left Heart Cath;  Surgeon: Giuseppe Merchant MD;  Location: [facility] CATH LAB;  Service: Cardiology;  Laterality: N/A;    COLONOSCOPY N/A 2017    Procedure: COLONOSCOPY;  Surgeon: Cathy Collier MD;  Location: [facility] ENDO;  Service: Endoscopy;  Laterality: N/A;    NO PAST SURGERIES         Time Tracking:     PT Received On: 22  PT Start Time: 0800     PT Stop Time: 0830  PT Total Time (min): 30  min     Billable Minutes: Evaluation 5, Gait Training 8 and Therapeutic Activity 15

## 2022-04-03 NOTE — SUBJECTIVE & OBJECTIVE
Interval History/Significant Events:   Extubated yesterday, decreasing oxygen requirements on 8L this AM  Up in chair this AM  Tolerated clears    Follow-up For: Procedure(s) (LRB):  CORONARY ARTERY BYPASS GRAFT (CABG) (N/A)  SURGICAL PROCUREMENT, VEIN, ENDOSCOPIC (Left)    Post-Operative Day: 1 Day Post-Op    Objective:     Vital Signs (Most Recent):  Temp: 98.1 °F (36.7 °C) (04/03/22 0700)  Pulse: 79 (04/03/22 0930)  Resp: (!) 23 (04/03/22 0930)  BP: (!) 141/73 (04/03/22 0915)  SpO2: 95 % (04/03/22 0930)   Vital Signs (24h Range):  Temp:  [98.1 °F (36.7 °C)-100.22 °F (37.9 °C)] 98.1 °F (36.7 °C)  Pulse:  [74-97] 79  Resp:  [12-39] 23  SpO2:  [89 %-97 %] 95 %  BP: (102-141)/(55-73) 141/73  Arterial Line BP: (105-152)/(47-67) 127/57     Weight: 122.5 kg (270 lb)  Body mass index is 49.38 kg/m².      Intake/Output Summary (Last 24 hours) at 4/3/2022 0943  Last data filed at 4/3/2022 0900  Gross per 24 hour   Intake 808.99 ml   Output 4555 ml   Net -3746.01 ml         Physical Exam  HENT:      Head: Normocephalic and atraumatic.   Neck:      Comments: R IJ CVC  Cardiovascular:      Rate and Rhythm: Normal rate and regular rhythm.      Pulses: Normal pulses.      Comments: Midline sternotomy incision c/d/I  Chest tubes in place with serosanguinous output  V wires in place, not connected  Pulmonary:      Breath sounds: Normal breath sounds.      Comments: Nasal cannula  Abdominal:      General: Abdomen is flat. There is no distension.      Palpations: Abdomen is soft.      Tenderness: There is no abdominal tenderness.   Genitourinary:     Comments: Levy in place with clear urine  Musculoskeletal:      Comments: R radial art line in place   Skin:     General: Skin is warm.      Capillary Refill: Capillary refill takes less than 2 seconds.       Vents:  Vent Mode: Spont (04/02/22 1020)  Ventilator Initiated: Yes (04/01/22 1210)  Set Rate: 18 BPM (04/02/22 0533)  Vt Set: 350 mL (04/02/22 0533)  Pressure Support: 8 cmH20  (04/02/22 1020)  PEEP/CPAP: 5 cmH20 (04/02/22 1020)  Oxygen Concentration (%): 50 (04/03/22 0434)  Peak Airway Pressure: 13 cmH2O (04/02/22 1020)  Plateau Pressure: 22 cmH20 (04/02/22 1020)  Total Ve: 8.83 mL (04/02/22 1020)  Negative Inspiratory Force (cm H2O): -45 (04/02/22 1020)  F/VT Ratio<105 (RSBI): (!) 44.04 (04/02/22 0753)    Lines/Drains/Airways       Central Venous Catheter Line  Duration                  Percutaneous Central Line Insertion/Assessment - Quad lumen  04/01/22 1226 right internal jugular 1 day    Percutaneous Central Line Insertion/Assessment - Quad Lumen  04/01/22 1226 right internal jugular 1 day              Drain  Duration                  Urethral Catheter 04/01/22 0730 Straight-tip;Temperature probe;Non-latex 14 Fr. 2 days         NG/OG Tube 04/02/22 0900 [place] sump Right nostril 1 day         Y Chest Tube 1 and 2 04/01/22 1000 Mediastinal 1 day         Y Chest Tube 1 and 2 04/01/22 1000 Pleural 1 day              Arterial Line  Duration             Arterial Line 04/01/22 0731 Left Radial 2 days              Line  Duration                  Pacer Wires 04/01/22 1046 1 day              Peripheral Intravenous Line  Duration                  Peripheral IV - Single Lumen 04/01/22 0610 20 G Anterior;Right Hand 2 days         Peripheral IV - Single Lumen 04/01/22 1247 16 G Anterior;Left Forearm 1 day                    Significant Labs:    CBC/Anemia Profile:  Recent Labs   Lab 04/01/22  1217 04/01/22  1312 04/02/22  0239 04/02/22  0257 04/03/22  0304   WBC 17.46*  --   --  9.55 10.15   HGB 11.2*  --   --  9.8* 9.7*   HCT 34.5*   < > 29* 30.2* 29.5*     --   --  141* 141*   MCV 94  --   --  94 96   RDW 12.7  --   --  12.7 13.0    < > = values in this interval not displayed.          Chemistries:  Recent Labs   Lab 04/01/22  1217 04/02/22  0257 04/02/22  1408 04/02/22  1749 04/03/22  0008 04/03/22  0304    139  --   --   --  138   K 4.3 4.5   < > 4.3 3.8 3.9    108  --   --    --  104   CO2 22* 21*  --   --   --  25   BUN 10 9  --   --   --  10   CREATININE 0.9 0.8  --   --   --  0.9   CALCIUM 8.3* 8.3*  --   --   --  8.2*   ALBUMIN 3.1* 3.8  --   --   --  3.8   PROT 5.1* 5.6*  --   --   --  5.8*   BILITOT 0.4 0.5  --   --   --  0.8   ALKPHOS 64 54*  --   --   --  50*   ALT 16 12  --   --   --  10   AST 27 36  --   --   --  31   MG 2.3 1.9   < > 1.9 1.9 1.9   PHOS 3.6 3.2  --   --   --  2.4*    < > = values in this interval not displayed.         ABGs:   Recent Labs   Lab 04/02/22  1006   PH 7.422   PCO2 33.7*   HCO3 21.9*   POCSATURATED 94*   BE -2       CMP:   Recent Labs   Lab 04/01/22  1217 04/02/22  0257 04/02/22  1408 04/02/22  1749 04/03/22  0008 04/03/22  0304    139  --   --   --  138   K 4.3 4.5   < > 4.3 3.8 3.9    108  --   --   --  104   CO2 22* 21*  --   --   --  25   * 147*  --   --   --  135*   BUN 10 9  --   --   --  10   CREATININE 0.9 0.8  --   --   --  0.9   CALCIUM 8.3* 8.3*  --   --   --  8.2*   PROT 5.1* 5.6*  --   --   --  5.8*   ALBUMIN 3.1* 3.8  --   --   --  3.8   BILITOT 0.4 0.5  --   --   --  0.8   ALKPHOS 64 54*  --   --   --  50*   AST 27 36  --   --   --  31   ALT 16 12  --   --   --  10   ANIONGAP 8 10  --   --   --  9   EGFRNONAA >60.0 >60.0  --   --   --  >60.0    < > = values in this interval not displayed.         Significant Imaging:  I have reviewed all pertinent imaging results/findings within the past 24 hours.

## 2022-04-03 NOTE — PROGRESS NOTES
Cardiac Surgery Progress Note     VANNESSA overnight    I/O  5810/2085 net: +3725  UOP 4000  Chest tubes 510     Gtts  Lasix 2.5     A/P  61F POD2 from CABG     Progressing     Advance diet   Aggressive pulm hygiene  ASA/Statin  Transition off lasix gtt  A. Fib ppx: Toprol 50  DVT ppx: Lovenox  Increase activity   Keep drains and PW  Cont ICU

## 2022-04-03 NOTE — PROGRESS NOTES
[facility] - [facility] Intensive Care  Critical Care - Surgery  Progress Note    Patient Name: Sonya Armendariz  MRN: [id number]  Admission Date: 4/1/2022  Hospital Length of Stay: 2 days  Code Status: Full Code  Attending Provider: Leandro Howard MD  Primary Care Provider: Sivakumar Wing MD   Principal Problem: CAD (coronary artery disease)    Subjective:     Hospital/ICU Course:  No notes on file    Interval History/Significant Events:   Extubated yesterday, decreasing oxygen requirements on 8L this AM  Up in chair this AM  Tolerated clears    Follow-up For: Procedure(s) (LRB):  CORONARY ARTERY BYPASS GRAFT (CABG) (N/A)  SURGICAL PROCUREMENT, VEIN, ENDOSCOPIC (Left)    Post-Operative Day: 1 Day Post-Op    Objective:     Vital Signs (Most Recent):  Temp: 98.1 °F (36.7 °C) (04/03/22 0700)  Pulse: 79 (04/03/22 0930)  Resp: (!) 23 (04/03/22 0930)  BP: (!) 141/73 (04/03/22 0915)  SpO2: 95 % (04/03/22 0930)   Vital Signs (24h Range):  Temp:  [98.1 °F (36.7 °C)-100.22 °F (37.9 °C)] 98.1 °F (36.7 °C)  Pulse:  [74-97] 79  Resp:  [12-39] 23  SpO2:  [89 %-97 %] 95 %  BP: (102-141)/(55-73) 141/73  Arterial Line BP: (105-152)/(47-67) 127/57     Weight: 122.5 kg (270 lb)  Body mass index is 49.38 kg/m².      Intake/Output Summary (Last 24 hours) at 4/3/2022 0943  Last data filed at 4/3/2022 0900  Gross per 24 hour   Intake 808.99 ml   Output 4555 ml   Net -3746.01 ml         Physical Exam  HENT:      Head: Normocephalic and atraumatic.   Neck:      Comments: R IJ CVC  Cardiovascular:      Rate and Rhythm: Normal rate and regular rhythm.      Pulses: Normal pulses.      Comments: Midline sternotomy incision c/d/I  Chest tubes in place with serosanguinous output  V wires in place, not connected  Pulmonary:      Breath sounds: Normal breath sounds.      Comments: Nasal cannula  Abdominal:      General: Abdomen is flat. There is no distension.      Palpations: Abdomen is soft.      Tenderness: There is no abdominal tenderness.    Genitourinary:     Comments: Levy in place with clear urine  Musculoskeletal:      Comments: R radial art line in place   Skin:     General: Skin is warm.      Capillary Refill: Capillary refill takes less than 2 seconds.       Vents:  Vent Mode: Spont (04/02/22 1020)  Ventilator Initiated: Yes (04/01/22 1210)  Set Rate: 18 BPM (04/02/22 0533)  Vt Set: 350 mL (04/02/22 0533)  Pressure Support: 8 cmH20 (04/02/22 1020)  PEEP/CPAP: 5 cmH20 (04/02/22 1020)  Oxygen Concentration (%): 50 (04/03/22 0434)  Peak Airway Pressure: 13 cmH2O (04/02/22 1020)  Plateau Pressure: 22 cmH20 (04/02/22 1020)  Total Ve: 8.83 mL (04/02/22 1020)  Negative Inspiratory Force (cm H2O): -45 (04/02/22 1020)  F/VT Ratio<105 (RSBI): (!) 44.04 (04/02/22 0753)    Lines/Drains/Airways       Central Venous Catheter Line  Duration                  Percutaneous Central Line Insertion/Assessment - Quad lumen  04/01/22 1226 right internal jugular 1 day    Percutaneous Central Line Insertion/Assessment - Quad Lumen  04/01/22 1226 right internal jugular 1 day              Drain  Duration                  Urethral Catheter 04/01/22 0730 Straight-tip;Temperature probe;Non-latex 14 Fr. 2 days         NG/OG Tube 04/02/22 0900 [place] sump Right nostril 1 day         Y Chest Tube 1 and 2 04/01/22 1000 Mediastinal 1 day         Y Chest Tube 1 and 2 04/01/22 1000 Pleural 1 day              Arterial Line  Duration             Arterial Line 04/01/22 0731 Left Radial 2 days              Line  Duration                  Pacer Wires 04/01/22 1046 1 day              Peripheral Intravenous Line  Duration                  Peripheral IV - Single Lumen 04/01/22 0610 20 G Anterior;Right Hand 2 days         Peripheral IV - Single Lumen 04/01/22 1247 16 G Anterior;Left Forearm 1 day                    Significant Labs:    CBC/Anemia Profile:  Recent Labs   Lab 04/01/22  1217 04/01/22  1312 04/02/22  0239 04/02/22  0257 04/03/22  0304   WBC 17.46*  --   --  9.55 10.15   HGB  11.2*  --   --  9.8* 9.7*   HCT 34.5*   < > 29* 30.2* 29.5*     --   --  141* 141*   MCV 94  --   --  94 96   RDW 12.7  --   --  12.7 13.0    < > = values in this interval not displayed.          Chemistries:  Recent Labs   Lab 04/01/22  1217 04/02/22  0257 04/02/22 1408 04/02/22 1749 04/03/22  0008 04/03/22  0304    139  --   --   --  138   K 4.3 4.5   < > 4.3 3.8 3.9    108  --   --   --  104   CO2 22* 21*  --   --   --  25   BUN 10 9  --   --   --  10   CREATININE 0.9 0.8  --   --   --  0.9   CALCIUM 8.3* 8.3*  --   --   --  8.2*   ALBUMIN 3.1* 3.8  --   --   --  3.8   PROT 5.1* 5.6*  --   --   --  5.8*   BILITOT 0.4 0.5  --   --   --  0.8   ALKPHOS 64 54*  --   --   --  50*   ALT 16 12  --   --   --  10   AST 27 36  --   --   --  31   MG 2.3 1.9   < > 1.9 1.9 1.9   PHOS 3.6 3.2  --   --   --  2.4*    < > = values in this interval not displayed.         ABGs:   Recent Labs   Lab 04/02/22  1006   PH 7.422   PCO2 33.7*   HCO3 21.9*   POCSATURATED 94*   BE -2       CMP:   Recent Labs   Lab 04/01/22  1217 04/02/22  0257 04/02/22 1408 04/02/22 1749 04/03/22  0008 04/03/22  0304    139  --   --   --  138   K 4.3 4.5   < > 4.3 3.8 3.9    108  --   --   --  104   CO2 22* 21*  --   --   --  25   * 147*  --   --   --  135*   BUN 10 9  --   --   --  10   CREATININE 0.9 0.8  --   --   --  0.9   CALCIUM 8.3* 8.3*  --   --   --  8.2*   PROT 5.1* 5.6*  --   --   --  5.8*   ALBUMIN 3.1* 3.8  --   --   --  3.8   BILITOT 0.4 0.5  --   --   --  0.8   ALKPHOS 64 54*  --   --   --  50*   AST 27 36  --   --   --  31   ALT 16 12  --   --   --  10   ANIONGAP 8 10  --   --   --  9   EGFRNONAA >60.0 >60.0  --   --   --  >60.0    < > = values in this interval not displayed.         Significant Imaging:  I have reviewed all pertinent imaging results/findings within the past 24 hours.    Assessment/Plan:     * CAD (coronary artery disease)  Sonya Armendariz is a 61 y.o. lady who presents to the  SICU s/p CABGx2 on 4/1/22.      Neuro/Psych:   -- Sedation: none  -- Pain: multimodals             Cards:   -- Pressors: epi off  -- Goal MAP: >65; sys<140  -- ASA if CT output ok  -- statin  -- b blocker     Pulm:   -- Goal O2 sat > 90%  -- wean oxygen requirements  -- respiratory toilet  -- ABG PRN  -- Chest tubes include: 2 mediastinal, 1 left pleural to wall suction.        Renal:  -- Keep suárez for strict I/O  -- BUN/Cr 10/0.9  -- Diuretics: switch lasix drip to lasix pushes BID  -- 3.7 L UOP yesterday, net negative 3L      FEN / GI:   -- Replace lytes as needed  -- Nutrition: CLD, ok to advance to cardiac diet with 1500 fluid restriction if tolerates diet      ID:   -- WBC 10  -- Antibiotics: periop ancef      Heme/Onc:   -- H/H 9.7/30  -- Daily CBC  -- Transfused products: none  -- Anticoagulation: ASA  \-- DVT ppx lovenox      Endo:   -- Gluc goal 140-180  -- Insulin drip per endocrinology      PPx:   Feeding: CLD, ADAT  Analgesia/Sedation: multimodals  Thromboembolic prevention: ASA, lovenox  HOB >30: yes  Stress Ulcer ppx: none indicated  Glucose control: Critical care goal 140-180 g/dl, ISS    Lines/Drains/Airway: RIJ, Art line, suárez      Dispo/Code Status/Palliative:   -- SICU / Full Code                   Critical care was time spent personally by me on the following activities: development of treatment plan with patient or surrogate and bedside caregivers, discussions with consultants, evaluation of patient's response to treatment, examination of patient, ordering and performing treatments and interventions, ordering and review of laboratory studies, ordering and review of radiographic studies, pulse oximetry, re-evaluation of patient's condition.  This critical care time did not overlap with that of any other provider or involve time for any procedures.     Leonardo Bryson MD  Critical Care - Surgery  [facility] - [facility]

## 2022-04-03 NOTE — PLAN OF CARE
"      SICU PLAN OF CARE NOTE    Dx: CAD (coronary artery disease)    Shift Events: VSS throughout shift. NG d/c. Lasix gtt d/c and IVP 20 mg lasix BID started.     Goals of Care: MAP > 65, SBP <140, O2 > 88%     Neuro: AAO x4, Follows Commands, and Moves All Extremities    Vital Signs: /65   Pulse 77   Temp 98 °F (36.7 °C) (Oral)   Resp 20   Ht 5' 2" (1.575 m)   Wt 122.5 kg (270 lb)   LMP 06/30/2015   SpO2 (!) 94%   Breastfeeding No   BMI 49.38 kg/m²     Respiratory: Nasal Cannula 5L    Diet: Clear Liquid    Gtts: MIVF    Urine Output: Urinary Catheter 815 cc/shift    Drains: Chest Tube, total output 140 cc /  shift   2 mediastinals and 2 pleural.  Serous/serosanguineous drainage.    Labs: Potassium/ Mag Q6 hr. All other daily labs.    Accuchecks: ACHS    Skin: No new signs of skin breakdown. Patient OOBTC throughout the day. Waffle mattress in use. Patient provided with CHG wipes. Heel foams and SCDs on and intact.        "

## 2022-04-03 NOTE — PLAN OF CARE
Problem: Physical Therapy  Goal: Physical Therapy Goal  Description: Goals to be met by: 2022    Patient will increase functional independence with mobility by performin. Supine to sit with CGA  2. Sit to stand transfer with Supervision  3. Gait  x 250 feet with Supervision   4. Ascend/descend 4 stair with Contact Guard Assistance     Outcome: Ongoing, Progressing     Eval completed. Goals appropriate

## 2022-04-03 NOTE — PLAN OF CARE
Problem: Occupational Therapy  Goal: Occupational Therapy Goal  Description: Goals to be met by: 4/17/2022    Patient will increase functional independence with ADLs by performing:    UE Dressing with Bowman.  LE Dressing with Bowman.  Grooming while standing at sink with Bowman.  Toileting from toilet with Bowman for hygiene and clothing management.   Toilet transfer to toilet with Bowman.  Pt will engage in functional mobility to simulate household distances in order to maximize functional activity tolerance required for engagement in occupations of choice with supervision     Outcome: Met

## 2022-04-03 NOTE — ASSESSMENT & PLAN NOTE
Sonya Armendariz is a 61 y.o. lady who presents to the SICU s/p CABGx2 on 4/1/22.      Neuro/Psych:   -- Sedation: none  -- Pain: multimodals             Cards:   -- Pressors: epi off  -- Goal MAP: >65; sys<140  -- ASA if CT output ok  -- statin  -- b blocker     Pulm:   -- Goal O2 sat > 90%  -- wean oxygen requirements  -- respiratory toilet  -- ABG PRN  -- Chest tubes include: 2 mediastinal, 1 left pleural to wall suction.        Renal:  -- Keep suárez for strict I/O  -- BUN/Cr 10/0.9  -- Diuretics: switch lasix drip to lasix pushes BID  -- 3.7 L UOP yesterday, net negative 3L      FEN / GI:   -- Replace lytes as needed  -- Nutrition: CLD, ok to advance to cardiac diet with 1500 fluid restriction if tolerates diet      ID:   -- WBC 10  -- Antibiotics: periop ancef      Heme/Onc:   -- H/H 9.7/30  -- Daily CBC  -- Transfused products: none  -- Anticoagulation: ASA  \-- DVT ppx lovenox      Endo:   -- Gluc goal 140-180  -- Insulin drip per endocrinology      PPx:   Feeding: CLD, ADAT  Analgesia/Sedation: multimodals  Thromboembolic prevention: ASA, lovenox  HOB >30: yes  Stress Ulcer ppx: none indicated  Glucose control: Critical care goal 140-180 g/dl, ISS    Lines/Drains/Airway: RIJ, Art line, jose armando      Dispo/Code Status/Palliative:   -- SICU / Full Code

## 2022-04-03 NOTE — NURSING
"      SICU PLAN OF CARE NOTE    Dx: CAD (coronary artery disease)    Shift Events: No significant events throughout shift. Pt on CPAP @ night. HR 70-90 NSR w/ rare PVCs. MAPs> 65 and SBP < 140. Pt OOBTC in early AM and tolerated well. Remained on Lasix gtt. Pain management w/ dilaudid/ oxy.     Goals of Care: MAP > 65, SBP < 140, accucheck ACHS, advance diet in AM. Remain in chair throughout AM shift.     Neuro: AAO x4, Arouses to Voice, Follows Commands and Moves All Extremities    Vital Signs: /71   Pulse 79   Temp 98.96 °F (37.2 °C)   Resp (!) 26   Ht 5' 2" (1.575 m)   Wt 122.5 kg (270 lb)   LMP 06/30/2015   SpO2 96%   Breastfeeding No   BMI 49.38 kg/m²     Respiratory: BiPAP/CPAP @ night and HFNC when not on CPAP    Diet: NPO    Gtts: Lasix and MIVF    Urine Output: Urinary Catheter 1480 cc/shift    Drains: Chest Tube, total output 170 cc /  shift       Labs/Accuchecks: daily labs, q6h potassium & mag. ACHS accucheck.    Skin: No skin breakdown noted. Full bath given. OOBTC. Pt remained free from falls throughout shift.        "

## 2022-04-03 NOTE — PT/OT/SLP EVAL
Occupational Therapy   Evaluation and Treatment with PT    Name: Sonya Armendariz  MRN: [id number]  Admitting Diagnosis:  CAD (coronary artery disease)  Recent Surgery: Procedure(s) (LRB):  CORONARY ARTERY BYPASS GRAFT (CABG) (N/A)  SURGICAL PROCUREMENT, VEIN, ENDOSCOPIC (Left) 2 Days Post-Op    Recommendations:     Discharge Recommendations: home health PT  Discharge Equipment Recommendations:  none  Barriers to discharge:  None    Assessment:     Sonya Armendariz is a 61 y.o. female with a medical diagnosis of CAD (coronary artery disease).  She presents with performance deficits affecting function: weakness, impaired endurance, impaired self care skills, impaired functional mobilty, gait instability, impaired balance, impaired cardiopulmonary response to activity, pain.  Pt would benefit from continued skilled acute OT services in order to maximize independence and safety with ADLs and functional mobility to ensure safe return to PLOF in the least restrictive environment. OT recommending HH PT once pt is medically appropriate for d/c. s/p CABGx2 on 4/1/22    Rehab Prognosis: Good; patient would benefit from acute skilled OT services to address these deficits and reach maximum level of function.       Plan:     Patient to be seen 5 x/week to address the above listed problems via self-care/home management, therapeutic activities, therapeutic exercises  · Plan of Care Expires: 05/02/22  · Plan of Care Reviewed with: patient    Subjective     Chief Complaint: pain  Patient/Family Comments/goals:  To get better and return home     Occupational Profile:  Living Environment: Pt lives with brother in a [facility] with 4 MERCEDEZ in the front with no HR and 4 MERCEDEZ in the back with B HR. Brother recently had a BKA and uses prosthetic. Pt has both a walk-in shower and tub/shower combo with no DME present. Pt was driving and works as a cafeteria lady at a school.   Previous level of function: PTA, pt was (I) with ADLs and  functional mobility   Roles and Routines: caretaker to self  Equipment Used at Home:  none  Assistance upon Discharge: Pt will have assistance from brother but limited.     Pain/Comfort:  · Pain Rating 1: 4/10  · Location 1:  (sternum)  · Pain Addressed 1: Reposition, Distraction  · Pain Rating Post-Intervention 1: 4/10    Patients cultural, spiritual, Orthodoxy conflicts given the current situation: no    Objective:     Communicated with: RN prior to session.  Patient found up in chair with blood pressure cuff, pulse ox (continuous), oxygen, telemetry, arterial line, central line, NG tube, suárez catheter, chest tube upon OT entry to room. Pt agreeable to therapy session.     General Precautions: Standard, fall, sternal   Orthopedic Precautions:N/A   Braces: N/A  Respiratory Status: High flow    Occupational Performance:    Bed Mobility:    · Not performed, pt found sitting UIC     Functional Mobility/Transfers:  · Patient completed Sit <> Stand Transfer with contact guard assistance  with  no assistive device   · Functional Mobility: Pt engaging in functional mobility to simulate household/community distances approx 10ft + 20 ft  with CGA and utilizing no AD in order to maximize functional activity tolerance and standing balance required for engagement in occupations of choice.   · Pt with no LOB   · Increased back pain during mobility     Activities of Daily Living:  · Grooming: stand by assistance Pt completed oral care and washed face - pt tolerated standing at sink with SBA for ~8 mins   · Upper Body Dressing: maximal assistance donning gown like robe while sitting UIC with increased assistance due to line management     Cognitive/Visual Perceptual:  Cognitive/Psychosocial Skills:     -       Oriented to: Person, Place, Time and Situation   -       Follows Commands/attention:Follows multistep  commands  -       Communication: clear/fluent  -       Memory: No Deficits noted  -       Safety awareness/insight to  disability: intact   -       Mood/Affect/Coping skills/emotional control: Appropriate to situation  Visual/Perceptual:      -Intact      Physical Exam:  Balance:     Static sit: (S)   Dynamic sit: (S)   Static standing: SBA <> CGA    Dynamic Standing: CGA    Postural examination/scapula alignment:    -       No postural abnormalities identified  Skin integrity: Visible skin intact  Edema:  None noted  Sensation:    -       Intact  Dominant hand:    -       left  Upper Extremity Range of Motion:     -       Right Upper Extremity: WFL  -       Left Upper Extremity: WFL  Upper Extremity Strength:    -       Right Upper Extremity: not formally assessed due to sternal precautions but observed to be WFL during functional tasks   -       Left Upper Extremity: not formally assessed due to sternal precautions but observed to be WFL during functional tasks     AMPAC 6 Click ADL:  AMPAC Total Score: 16    Treatment & Education:   Pt educated on role of OT, POC, and goals for therapy.     POC was dicussed with patient/caregiver, who was included in its development and is in agreement with the identified goals and treatment plan.   Post-op sternal precautions, including no lifting > 5 lbs, pulling or pushing with BUEs; Modifying daily activities and functional mobility tasks to maintain sternal precautions appropriately; Importance of participation in therapy and engaging in increased OOB mobility with assistance to improve endurance   Patient and family aware of patient's deficits and therapy progression.    Time provided for therapeutic counseling and discussion of health disposition.    Educated on importance of EOB/OOB mobility, maintaining routine, sitting up in chair, and maximizing independence with ADLs during admission    Pt completed ADLs and functional mobility for treatment session as noted above    Pt/caregiver verbalized understanding and expressed no further concerns/questions.   Updated communication  board with level of assist required (CGA x 1 person assistance) & educated RN/patient that pt is appropriate for transfers with RN/PCT.     Co-evaluation/treatment performed due to patient's multiple deficits requiring two skilled therapists to appropriately and safely assess patient's strength and endurance while facilitating functional tasks in addition to accommodating for patient's activity tolerance.   Education:    Patient left up in chair with all lines intact, call button in reach and RN  notified    GOALS:   Multidisciplinary Problems     Occupational Therapy Goals     Not on file          Multidisciplinary Problems (Resolved)        Problem: Occupational Therapy    Goal Priority Disciplines Outcome Interventions   Occupational Therapy Goal   (Resolved)     OT, PT/OT Met    Description: Goals to be met by: 4/17/2022    Patient will increase functional independence with ADLs by performing:    UE Dressing with Chaves.  LE Dressing with Chaves.  Grooming while standing at sink with Chaves.  Toileting from toilet with Chaves for hygiene and clothing management.   Toilet transfer to toilet with Chaves.  Pt will engage in functional mobility to simulate household distances in order to maximize functional activity tolerance required for engagement in occupations of choice with supervision                      History:     Past Medical History:   Diagnosis Date    Anticoagulant long-term use     CAD (coronary artery disease)     s/p LAD stent    CHF (congestive heart failure)     Gastroduodenitis     Helicobacter pylori (H. pylori) infection     Hyperlipidemia     Hypertension     MI (myocardial infarction)     x2    Obesity     Tobacco use     Urinary tract infection     Vaginal infection        Past Surgical History:   Procedure Laterality Date    ANGIOGRAM, CORONARY, WITH LEFT HEART CATHETERIZATION N/A 3/22/2022    Procedure: Angiogram, Coronary, with Left Heart Cath;   Surgeon: Giuseppe Merchant MD;  Location: [facility] CATH LAB;  Service: Cardiology;  Laterality: N/A;    COLONOSCOPY N/A 7/14/2017    Procedure: COLONOSCOPY;  Surgeon: Cathy Collier MD;  Location: [facility] ENDO;  Service: Endoscopy;  Laterality: N/A;    NO PAST SURGERIES         Time Tracking:     OT Date of Treatment: 04/03/22  OT Start Time: 0800  OT Stop Time: 0828  OT Total Time (min): 28 min    Billable Minutes:Evaluation 15  Self Care/Home Management 10    4/3/2022

## 2022-04-04 LAB
ALBUMIN SERPL BCP-MCNC: 3.3 G/DL (ref 3.5–5.2)
ALP SERPL-CCNC: 56 U/L (ref 55–135)
ALT SERPL W/O P-5'-P-CCNC: 10 U/L (ref 10–44)
ANION GAP SERPL CALC-SCNC: 9 MMOL/L (ref 8–16)
AST SERPL-CCNC: 22 U/L (ref 10–40)
BASOPHILS # BLD AUTO: 0.03 K/UL (ref 0–0.2)
BASOPHILS NFR BLD: 0.3 % (ref 0–1.9)
BILIRUB SERPL-MCNC: 1 MG/DL (ref 0.1–1)
BUN SERPL-MCNC: 11 MG/DL (ref 6–20)
CA-I BLDV-SCNC: 1.18 MMOL/L (ref 1.06–1.42)
CALCIUM SERPL-MCNC: 8.6 MG/DL (ref 8.7–10.5)
CHLORIDE SERPL-SCNC: 99 MMOL/L (ref 95–110)
CO2 SERPL-SCNC: 27 MMOL/L (ref 23–29)
CREAT SERPL-MCNC: 0.8 MG/DL (ref 0.5–1.4)
DIFFERENTIAL METHOD: ABNORMAL
EOSINOPHIL # BLD AUTO: 0.2 K/UL (ref 0–0.5)
EOSINOPHIL NFR BLD: 2.3 % (ref 0–8)
ERYTHROCYTE [DISTWIDTH] IN BLOOD BY AUTOMATED COUNT: 12.8 % (ref 11.5–14.5)
EST. GFR  (AFRICAN AMERICAN): >60 ML/MIN/1.73 M^2
EST. GFR  (NON AFRICAN AMERICAN): >60 ML/MIN/1.73 M^2
GLUCOSE SERPL-MCNC: 118 MG/DL (ref 70–110)
GLUCOSE SERPL-MCNC: 128 MG/DL (ref 70–110)
GLUCOSE SERPL-MCNC: 165 MG/DL (ref 70–110)
HCO3 UR-SCNC: 22.8 MMOL/L (ref 24–28)
HCO3 UR-SCNC: 27.5 MMOL/L (ref 24–28)
HCT VFR BLD AUTO: 29.3 % (ref 37–48.5)
HCT VFR BLD CALC: 31 %PCV (ref 36–54)
HCT VFR BLD CALC: 36 %PCV (ref 36–54)
HGB BLD-MCNC: 9.6 G/DL (ref 12–16)
IMM GRANULOCYTES # BLD AUTO: 0.04 K/UL (ref 0–0.04)
IMM GRANULOCYTES NFR BLD AUTO: 0.4 % (ref 0–0.5)
LYMPHOCYTES # BLD AUTO: 1.8 K/UL (ref 1–4.8)
LYMPHOCYTES NFR BLD: 18.1 % (ref 18–48)
MAGNESIUM SERPL-MCNC: 1.9 MG/DL (ref 1.6–2.6)
MAGNESIUM SERPL-MCNC: 2.2 MG/DL (ref 1.6–2.6)
MAGNESIUM SERPL-MCNC: 2.5 MG/DL (ref 1.6–2.6)
MCH RBC QN AUTO: 30.6 PG (ref 27–31)
MCHC RBC AUTO-ENTMCNC: 32.8 G/DL (ref 32–36)
MCV RBC AUTO: 93 FL (ref 82–98)
MONOCYTES # BLD AUTO: 0.7 K/UL (ref 0.3–1)
MONOCYTES NFR BLD: 6.7 % (ref 4–15)
NEUTROPHILS # BLD AUTO: 7.2 K/UL (ref 1.8–7.7)
NEUTROPHILS NFR BLD: 72.2 % (ref 38–73)
NRBC BLD-RTO: 0 /100 WBC
PCO2 BLDA: 45.3 MMHG (ref 35–45)
PCO2 BLDA: 49 MMHG (ref 35–45)
PH SMN: 7.31 [PH] (ref 7.35–7.45)
PH SMN: 7.36 [PH] (ref 7.35–7.45)
PHOSPHATE SERPL-MCNC: 2.3 MG/DL (ref 2.7–4.5)
PHOSPHATE SERPL-MCNC: 2.5 MG/DL (ref 2.7–4.5)
PLATELET # BLD AUTO: 147 K/UL (ref 150–450)
PMV BLD AUTO: 11.2 FL (ref 9.2–12.9)
PO2 BLDA: 410 MMHG (ref 80–100)
PO2 BLDA: 67 MMHG (ref 80–100)
POC BE: -3 MMOL/L
POC BE: 2 MMOL/L
POC IONIZED CALCIUM: 1.18 MMOL/L (ref 1.06–1.42)
POC IONIZED CALCIUM: 1.33 MMOL/L (ref 1.06–1.42)
POC SATURATED O2: 100 % (ref 95–100)
POC SATURATED O2: 91 % (ref 95–100)
POC TCO2: 24 MMOL/L (ref 23–27)
POC TCO2: 29 MMOL/L (ref 23–27)
POCT GLUCOSE: 123 MG/DL (ref 70–110)
POCT GLUCOSE: 147 MG/DL (ref 70–110)
POTASSIUM BLD-SCNC: 4.2 MMOL/L (ref 3.5–5.1)
POTASSIUM BLD-SCNC: 5 MMOL/L (ref 3.5–5.1)
POTASSIUM SERPL-SCNC: 3.8 MMOL/L (ref 3.5–5.1)
POTASSIUM SERPL-SCNC: 3.9 MMOL/L (ref 3.5–5.1)
POTASSIUM SERPL-SCNC: 4.1 MMOL/L (ref 3.5–5.1)
POTASSIUM SERPL-SCNC: 4.1 MMOL/L (ref 3.5–5.1)
PROT SERPL-MCNC: 6 G/DL (ref 6–8.4)
RBC # BLD AUTO: 3.14 M/UL (ref 4–5.4)
SAMPLE: ABNORMAL
SAMPLE: ABNORMAL
SODIUM BLD-SCNC: 139 MMOL/L (ref 136–145)
SODIUM BLD-SCNC: 139 MMOL/L (ref 136–145)
SODIUM SERPL-SCNC: 135 MMOL/L (ref 136–145)
WBC # BLD AUTO: 9.99 K/UL (ref 3.9–12.7)

## 2022-04-04 PROCEDURE — 20600001 HC STEP DOWN PRIVATE ROOM

## 2022-04-04 PROCEDURE — 85025 COMPLETE CBC W/AUTO DIFF WBC: CPT | Performed by: NURSE PRACTITIONER

## 2022-04-04 PROCEDURE — 25000003 PHARM REV CODE 250: Performed by: STUDENT IN AN ORGANIZED HEALTH CARE EDUCATION/TRAINING PROGRAM

## 2022-04-04 PROCEDURE — 99291 PR CRITICAL CARE, E/M 30-74 MINUTES: ICD-10-PCS | Mod: ,,, | Performed by: ANESTHESIOLOGY

## 2022-04-04 PROCEDURE — 97530 THERAPEUTIC ACTIVITIES: CPT

## 2022-04-04 PROCEDURE — 94761 N-INVAS EAR/PLS OXIMETRY MLT: CPT

## 2022-04-04 PROCEDURE — 99291 CRITICAL CARE FIRST HOUR: CPT | Mod: ,,, | Performed by: ANESTHESIOLOGY

## 2022-04-04 PROCEDURE — 83735 ASSAY OF MAGNESIUM: CPT | Performed by: STUDENT IN AN ORGANIZED HEALTH CARE EDUCATION/TRAINING PROGRAM

## 2022-04-04 PROCEDURE — 83735 ASSAY OF MAGNESIUM: CPT | Mod: 91 | Performed by: THORACIC SURGERY (CARDIOTHORACIC VASCULAR SURGERY)

## 2022-04-04 PROCEDURE — 63600175 PHARM REV CODE 636 W HCPCS: Performed by: THORACIC SURGERY (CARDIOTHORACIC VASCULAR SURGERY)

## 2022-04-04 PROCEDURE — 99900035 HC TECH TIME PER 15 MIN (STAT)

## 2022-04-04 PROCEDURE — 84100 ASSAY OF PHOSPHORUS: CPT | Mod: 91 | Performed by: THORACIC SURGERY (CARDIOTHORACIC VASCULAR SURGERY)

## 2022-04-04 PROCEDURE — 82330 ASSAY OF CALCIUM: CPT | Performed by: THORACIC SURGERY (CARDIOTHORACIC VASCULAR SURGERY)

## 2022-04-04 PROCEDURE — 63600175 PHARM REV CODE 636 W HCPCS: Performed by: ANESTHESIOLOGY

## 2022-04-04 PROCEDURE — 84100 ASSAY OF PHOSPHORUS: CPT | Performed by: NURSE PRACTITIONER

## 2022-04-04 PROCEDURE — 25000003 PHARM REV CODE 250: Performed by: NURSE PRACTITIONER

## 2022-04-04 PROCEDURE — 27000221 HC OXYGEN, UP TO 24 HOURS

## 2022-04-04 PROCEDURE — 80053 COMPREHEN METABOLIC PANEL: CPT | Performed by: STUDENT IN AN ORGANIZED HEALTH CARE EDUCATION/TRAINING PROGRAM

## 2022-04-04 PROCEDURE — 97535 SELF CARE MNGMENT TRAINING: CPT

## 2022-04-04 PROCEDURE — 63600175 PHARM REV CODE 636 W HCPCS: Performed by: NURSE PRACTITIONER

## 2022-04-04 PROCEDURE — 25000003 PHARM REV CODE 250: Performed by: THORACIC SURGERY (CARDIOTHORACIC VASCULAR SURGERY)

## 2022-04-04 PROCEDURE — 84132 ASSAY OF SERUM POTASSIUM: CPT | Performed by: THORACIC SURGERY (CARDIOTHORACIC VASCULAR SURGERY)

## 2022-04-04 RX ORDER — DOCUSATE SODIUM 50 MG/5ML
100 LIQUID ORAL 2 TIMES DAILY
Status: DISCONTINUED | OUTPATIENT
Start: 2022-04-04 | End: 2022-04-08 | Stop reason: HOSPADM

## 2022-04-04 RX ORDER — FUROSEMIDE 10 MG/ML
40 INJECTION INTRAMUSCULAR; INTRAVENOUS 2 TIMES DAILY
Status: DISCONTINUED | OUTPATIENT
Start: 2022-04-04 | End: 2022-04-06

## 2022-04-04 RX ORDER — HYDROMORPHONE HYDROCHLORIDE 1 MG/ML
0.5 INJECTION, SOLUTION INTRAMUSCULAR; INTRAVENOUS; SUBCUTANEOUS EVERY 4 HOURS PRN
Status: DISCONTINUED | OUTPATIENT
Start: 2022-04-04 | End: 2022-04-08 | Stop reason: HOSPADM

## 2022-04-04 RX ORDER — ESCITALOPRAM OXALATE 10 MG/1
10 TABLET ORAL DAILY
Status: DISCONTINUED | OUTPATIENT
Start: 2022-04-04 | End: 2022-04-08 | Stop reason: HOSPADM

## 2022-04-04 RX ORDER — INSULIN ASPART 100 [IU]/ML
0-5 INJECTION, SOLUTION INTRAVENOUS; SUBCUTANEOUS
Status: DISCONTINUED | OUTPATIENT
Start: 2022-04-04 | End: 2022-04-05

## 2022-04-04 RX ORDER — MAGNESIUM SULFATE HEPTAHYDRATE 40 MG/ML
INJECTION, SOLUTION INTRAVENOUS
Status: COMPLETED
Start: 2022-04-04 | End: 2022-04-04

## 2022-04-04 RX ORDER — SODIUM,POTASSIUM PHOSPHATES 280-250MG
2 POWDER IN PACKET (EA) ORAL ONCE
Status: COMPLETED | OUTPATIENT
Start: 2022-04-04 | End: 2022-04-04

## 2022-04-04 RX ORDER — METOPROLOL SUCCINATE 100 MG/1
100 TABLET, EXTENDED RELEASE ORAL DAILY
Status: DISCONTINUED | OUTPATIENT
Start: 2022-04-04 | End: 2022-04-08 | Stop reason: HOSPADM

## 2022-04-04 RX ADMIN — ACETAMINOPHEN 650 MG: 325 TABLET ORAL at 03:04

## 2022-04-04 RX ADMIN — LIDOCAINE 1 PATCH: 50 PATCH CUTANEOUS at 03:04

## 2022-04-04 RX ADMIN — POTASSIUM BICARBONATE 20 MEQ: 391 TABLET, EFFERVESCENT ORAL at 09:04

## 2022-04-04 RX ADMIN — MAGNESIUM SULFATE 2 G: 2 INJECTION INTRAVENOUS at 03:04

## 2022-04-04 RX ADMIN — POTASSIUM BICARBONATE 20 MEQ: 391 TABLET, EFFERVESCENT ORAL at 10:04

## 2022-04-04 RX ADMIN — DOCUSATE SODIUM 100 MG: 50 LIQUID ORAL at 01:04

## 2022-04-04 RX ADMIN — MUPIROCIN 1 G: 20 OINTMENT TOPICAL at 10:04

## 2022-04-04 RX ADMIN — ENOXAPARIN SODIUM 40 MG: 100 INJECTION SUBCUTANEOUS at 05:04

## 2022-04-04 RX ADMIN — POLYETHYLENE GLYCOL 3350 17 G: 17 POWDER, FOR SOLUTION ORAL at 09:04

## 2022-04-04 RX ADMIN — ATORVASTATIN CALCIUM 40 MG: 20 TABLET, FILM COATED ORAL at 09:04

## 2022-04-04 RX ADMIN — ASPIRIN 325 MG ORAL TABLET 325 MG: 325 PILL ORAL at 09:04

## 2022-04-04 RX ADMIN — ACETAMINOPHEN 650 MG: 325 TABLET ORAL at 05:04

## 2022-04-04 RX ADMIN — METOPROLOL SUCCINATE 100 MG: 100 TABLET, EXTENDED RELEASE ORAL at 09:04

## 2022-04-04 RX ADMIN — OXYCODONE 5 MG: 5 TABLET ORAL at 09:04

## 2022-04-04 RX ADMIN — ACETAMINOPHEN 650 MG: 325 TABLET ORAL at 09:04

## 2022-04-04 RX ADMIN — POTASSIUM CHLORIDE 20 MEQ: 14.9 INJECTION, SOLUTION INTRAVENOUS at 03:04

## 2022-04-04 RX ADMIN — ESCITALOPRAM OXALATE 10 MG: 5 TABLET, FILM COATED ORAL at 09:04

## 2022-04-04 RX ADMIN — OXYCODONE HYDROCHLORIDE 10 MG: 10 TABLET ORAL at 02:04

## 2022-04-04 RX ADMIN — MUPIROCIN 1 G: 20 OINTMENT TOPICAL at 09:04

## 2022-04-04 RX ADMIN — POTASSIUM BICARBONATE 20 MEQ: 391 TABLET, EFFERVESCENT ORAL at 07:04

## 2022-04-04 RX ADMIN — POTASSIUM & SODIUM PHOSPHATES POWDER PACK 280-160-250 MG 2 PACKET: 280-160-250 PACK at 07:04

## 2022-04-04 RX ADMIN — FUROSEMIDE 40 MG: 10 INJECTION, SOLUTION INTRAMUSCULAR; INTRAVENOUS at 06:04

## 2022-04-04 RX ADMIN — SODIUM PHOSPHATE, MONOBASIC, MONOHYDRATE 15 MMOL: 276; 142 INJECTION, SOLUTION INTRAVENOUS at 09:04

## 2022-04-04 NOTE — CARE UPDATE
BG goal 140 -180     Diet Cardiac [facility]; Fluid - 1500mL; Thin  3 Days Post-Op       BG remains stable and within goal without need for PRN SQ insulin correction. Endo will continue to follow, and manage glycemic control while inpatient.     - Decrease to Low Dose SQ Insulin Correction Scale.   PRN hyperglycemia.  - BG Monitoring AC/HS.     ** Please call Endocrine for any BG related issues **  ** Please notify Endocrine for any change and/or advance in diet**    Lab Results   Component Value Date    HGBA1C 6.1 (H) 03/19/2022       Discharge Planning:   TBD. Please notify endocrinology prior to discharge.

## 2022-04-04 NOTE — PROGRESS NOTES
[facility] - [facility] Intensive [facility]  Critical Care - Surgery  Progress Note    Patient Name: Sonya Armendariz  MRN: [id number]  Admission Date: 4/1/2022  Hospital Length of Stay: 3 days  Code Status: Full Code  Attending Provider: Leandro Howard MD  Primary Care Provider: Sivakumar Wing MD   Principal Problem: CAD (coronary artery disease)    Subjective:     Hospital/ICU Course:  No notes on file    Interval History/Significant Events:   NAEON  Decreasing oxygen requirements to 4L NC  Tolerating clears but burping and hiccupping  Ambulated in room yesterday  Awaiting stepdown bed    Follow-up For: Procedure(s) (LRB):  CORONARY ARTERY BYPASS GRAFT (CABG) (N/A)  SURGICAL PROCUREMENT, VEIN, ENDOSCOPIC (Left)    Post-Operative Day: 1 Day Post-Op    Objective:     Vital Signs (Most Recent):  Temp: 97.9 °F (36.6 °C) (04/03/22 2330)  Pulse: 78 (04/04/22 0215)  Resp: (!) 32 (04/04/22 0215)  BP: (!) 122/57 (04/04/22 0000)  SpO2: (!) 90 % (04/04/22 0215)   Vital Signs (24h Range):  Temp:  [97.8 °F (36.6 °C)-98.96 °F (37.2 °C)] 97.9 °F (36.6 °C)  Pulse:  [72-88] 78  Resp:  [14-39] 32  SpO2:  [89 %-99 %] 90 %  BP: (105-141)/(57-73) 122/57  Arterial Line BP: (101-152)/(49-67) 124/58     Weight: 122.5 kg (270 lb)  Body mass index is 49.38 kg/m².      Intake/Output Summary (Last 24 hours) at 4/4/2022 0327  Last data filed at 4/4/2022 0200  Gross per 24 hour   Intake 605.13 ml   Output 2325 ml   Net -1719.87 ml         Physical Exam  HENT:      Head: Normocephalic and atraumatic.   Neck:      Comments: R IJ CVC  Cardiovascular:      Rate and Rhythm: Normal rate and regular rhythm.      Pulses: Normal pulses.      Comments: Midline sternotomy incision c/d/I  Chest tubes in place with serosanguinous output  V wires in place, not connected  Pulmonary:      Breath sounds: Normal breath sounds.      Comments: Nasal cannula  Abdominal:      General: Abdomen is flat. There is no distension.      Palpations: Abdomen is soft.       Tenderness: There is no abdominal tenderness.   Genitourinary:     Comments: Levy in place with clear urine  Musculoskeletal:      Comments: R radial art line in place   Skin:     General: Skin is warm.      Capillary Refill: Capillary refill takes less than 2 seconds.       Vents:  Vent Mode: Spont (04/02/22 1020)  Ventilator Initiated: Yes (04/01/22 1210)  Set Rate: 18 BPM (04/02/22 0533)  Vt Set: 350 mL (04/02/22 0533)  Pressure Support: 8 cmH20 (04/02/22 1020)  PEEP/CPAP: 5 cmH20 (04/02/22 1020)  Oxygen Concentration (%): 50 (04/03/22 2359)  Peak Airway Pressure: 13 cmH2O (04/02/22 1020)  Plateau Pressure: 22 cmH20 (04/02/22 1020)  Total Ve: 8.83 mL (04/02/22 1020)  Negative Inspiratory Force (cm H2O): -45 (04/02/22 1020)  F/VT Ratio<105 (RSBI): (!) 44.04 (04/02/22 0753)    Lines/Drains/Airways       Central Venous Catheter Line  Duration                  Percutaneous Central Line Insertion/Assessment - Quad lumen  04/01/22 1226 right internal jugular 2 days    Percutaneous Central Line Insertion/Assessment - Quad Lumen  04/01/22 1226 right internal jugular 2 days              Drain  Duration                  Urethral Catheter 04/01/22 0730 Straight-tip;Temperature probe;Non-latex 14 Fr. 2 days         Y Chest Tube 1 and 2 04/01/22 1000 Mediastinal 2 days         Y Chest Tube 1 and 2 04/01/22 1000 Pleural 2 days              Arterial Line  Duration             Arterial Line 04/01/22 0731 Left Radial 2 days              Line  Duration                  Pacer Wires 04/01/22 1046 2 days              Peripheral Intravenous Line  Duration                  Peripheral IV - Single Lumen 04/01/22 0610 20 G Anterior;Right Hand 2 days         Peripheral IV - Single Lumen 04/01/22 1247 16 G Anterior;Left Forearm 2 days                    Significant Labs:    CBC/Anemia Profile:  Recent Labs   Lab 04/03/22  0304   WBC 10.15   HGB 9.7*   HCT 29.5*   *   MCV 96   RDW 13.0          Chemistries:  Recent Labs   Lab  04/03/22 0304 04/03/22 1152 04/03/22 1727 04/03/22  2344     --   --   --    K 3.9 4.0  4.0 4.0 3.9     --   --   --    CO2 25  --   --   --    BUN 10  --   --   --    CREATININE 0.9  --   --   --    CALCIUM 8.2*  --   --   --    ALBUMIN 3.8  --   --   --    PROT 5.8*  --   --   --    BILITOT 0.8  --   --   --    ALKPHOS 50*  --   --   --    ALT 10  --   --   --    AST 31  --   --   --    MG 1.9 2.3 2.0 1.9   PHOS 2.4*  --   --   --          ABGs:   Recent Labs   Lab 04/03/22 0714   PH 7.400   PCO2 44.5   HCO3 27.6   POCSATURATED 91*   BE 3       CMP:   Recent Labs   Lab 04/03/22 0304 04/03/22 1152 04/03/22 1727 04/03/22  2344     --   --   --    K 3.9 4.0  4.0 4.0 3.9     --   --   --    CO2 25  --   --   --    *  --   --   --    BUN 10  --   --   --    CREATININE 0.9  --   --   --    CALCIUM 8.2*  --   --   --    PROT 5.8*  --   --   --    ALBUMIN 3.8  --   --   --    BILITOT 0.8  --   --   --    ALKPHOS 50*  --   --   --    AST 31  --   --   --    ALT 10  --   --   --    ANIONGAP 9  --   --   --    EGFRNONAA >60.0  --   --   --          Significant Imaging:  I have reviewed all pertinent imaging results/findings within the past 24 hours.    Assessment/Plan:     * CAD (coronary artery disease)  Sonya Armendariz is a 61 y.o. lady who presents to the SICU s/p CABGx2 on 4/1/22.      Neuro/Psych:   -- Sedation: none  -- Pain: multimodals             Cards:   -- Pressors: epi off  -- Goal MAP: >65; sys<140  -- ASA  -- statin  -- b blocker  -- dc art line, dc central line     Pulm:   -- Goal O2 sat > 90%  -- wean oxygen requirements, on 4L NC  -- respiratory toilet  -- ABG PRN  -- Chest tubes include: 2 mediastinal, 1 left pleural to wall suction.  -- dc chest tubes today      Renal:  -- dc suárez today  -- BUN/Cr 10/0.9  -- Diuretics: lasix 20 BID  -- 1.5 L UOP yesterday, net negative 1L      FEN / GI:   -- Replace lytes as needed  -- Nutrition: CLD, ok to advance to  cardiac diet with 1500 fluid restriction if tolerates diet      ID:   -- WBC 10  -- Antibiotics: periop ancef      Heme/Onc:   -- H/H 9.6/29  -- Daily CBC  -- Transfused products: none  -- Anticoagulation: ASA  -- DVT ppx lovenox      Endo:   -- Gluc goal 140-180  -- Insulin drip per endocrinology      PPx:   Feeding: CLD, ADAT  Analgesia/Sedation: multimodals  Thromboembolic prevention: ASA, lovenox  HOB >30: yes  Stress Ulcer ppx: none indicated  Glucose control: Critical care goal 140-180 g/dl, ISS    Lines/Drains/Airway: RIJ, Art line, suárez      Dispo/Code Status/Palliative:   -- Stepdown                 Critical care was time spent personally by me on the following activities: development of treatment plan with patient or surrogate and bedside caregivers, discussions with consultants, evaluation of patient's response to treatment, examination of patient, ordering and performing treatments and interventions, ordering and review of laboratory studies, ordering and review of radiographic studies, pulse oximetry, re-evaluation of patient's condition.  This critical care time did not overlap with that of any other provider or involve time for any procedures.     Leonardo Bryson MD  Critical Care - Surgery  [facility] - [facility]

## 2022-04-04 NOTE — PT/OT/SLP PROGRESS
Physical Therapy      Patient Name:  Sonya Armendariz   MRN:  [id number]    Patient not seen today secondary to  (pt refused treatment due to just getting back in bed. PT was not able to make 2nd attempt for treatment.). Will follow-up at a later date.    4/4/2022  .

## 2022-04-04 NOTE — ASSESSMENT & PLAN NOTE
Sonya Armendariz is a 61 y.o. lady who presents to the SICU s/p CABGx2 on 4/1/22.      Neuro/Psych:   -- Sedation: none  -- Pain: multimodals             Cards:   -- Pressors: epi off  -- Goal MAP: >65; sys<140  -- ASA  -- statin  -- b blocker  -- dc art line, dc central line     Pulm:   -- Goal O2 sat > 90%  -- wean oxygen requirements, on 4L NC  -- respiratory toilet  -- ABG PRN  -- Chest tubes include: 2 mediastinal, 1 left pleural to wall suction.  -- dc chest tubes today      Renal:  -- dc suárez today  -- BUN/Cr 10/0.9  -- Diuretics: lasix 20 BID  -- 1.5 L UOP yesterday, net negative 1L      FEN / GI:   -- Replace lytes as needed  -- Nutrition: CLD, ok to advance to cardiac diet with 1500 fluid restriction if tolerates diet      ID:   -- WBC 10  -- Antibiotics: periop ancef      Heme/Onc:   -- H/H 9.6/29  -- Daily CBC  -- Transfused products: none  -- Anticoagulation: ASA  -- DVT ppx lovenox      Endo:   -- Gluc goal 140-180  -- Insulin drip per endocrinology      PPx:   Feeding: CLD, ADAT  Analgesia/Sedation: multimodals  Thromboembolic prevention: ASA, lovenox  HOB >30: yes  Stress Ulcer ppx: none indicated  Glucose control: Critical care goal 140-180 g/dl, ISS    Lines/Drains/Airway: RIJ, Art line, suárez      Dispo/Code Status/Palliative:   -- Stepdown

## 2022-04-04 NOTE — SUBJECTIVE & OBJECTIVE
Interval History/Significant Events:   NAEON  Decreasing oxygen requirements to 4L NC  Tolerating clears but burping and hiccupping  Ambulated in room yesterday  Awaiting stepdown bed    Follow-up For: Procedure(s) (LRB):  CORONARY ARTERY BYPASS GRAFT (CABG) (N/A)  SURGICAL PROCUREMENT, VEIN, ENDOSCOPIC (Left)    Post-Operative Day: 1 Day Post-Op    Objective:     Vital Signs (Most Recent):  Temp: 97.9 °F (36.6 °C) (04/03/22 2330)  Pulse: 78 (04/04/22 0215)  Resp: (!) 32 (04/04/22 0215)  BP: (!) 122/57 (04/04/22 0000)  SpO2: (!) 90 % (04/04/22 0215)   Vital Signs (24h Range):  Temp:  [97.8 °F (36.6 °C)-98.96 °F (37.2 °C)] 97.9 °F (36.6 °C)  Pulse:  [72-88] 78  Resp:  [14-39] 32  SpO2:  [89 %-99 %] 90 %  BP: (105-141)/(57-73) 122/57  Arterial Line BP: (101-152)/(49-67) 124/58     Weight: 122.5 kg (270 lb)  Body mass index is 49.38 kg/m².      Intake/Output Summary (Last 24 hours) at 4/4/2022 0327  Last data filed at 4/4/2022 0200  Gross per 24 hour   Intake 605.13 ml   Output 2325 ml   Net -1719.87 ml         Physical Exam  HENT:      Head: Normocephalic and atraumatic.   Neck:      Comments: R IJ CVC  Cardiovascular:      Rate and Rhythm: Normal rate and regular rhythm.      Pulses: Normal pulses.      Comments: Midline sternotomy incision c/d/I  Chest tubes in place with serosanguinous output  V wires in place, not connected  Pulmonary:      Breath sounds: Normal breath sounds.      Comments: Nasal cannula  Abdominal:      General: Abdomen is flat. There is no distension.      Palpations: Abdomen is soft.      Tenderness: There is no abdominal tenderness.   Genitourinary:     Comments: Levy in place with clear urine  Musculoskeletal:      Comments: R radial art line in place   Skin:     General: Skin is warm.      Capillary Refill: Capillary refill takes less than 2 seconds.       Vents:  Vent Mode: Spont (04/02/22 1020)  Ventilator Initiated: Yes (04/01/22 1210)  Set Rate: 18 BPM (04/02/22 0533)  Vt Set: 350 mL  (04/02/22 0533)  Pressure Support: 8 cmH20 (04/02/22 1020)  PEEP/CPAP: 5 cmH20 (04/02/22 1020)  Oxygen Concentration (%): 50 (04/03/22 2359)  Peak Airway Pressure: 13 cmH2O (04/02/22 1020)  Plateau Pressure: 22 cmH20 (04/02/22 1020)  Total Ve: 8.83 mL (04/02/22 1020)  Negative Inspiratory Force (cm H2O): -45 (04/02/22 1020)  F/VT Ratio<105 (RSBI): (!) 44.04 (04/02/22 0753)    Lines/Drains/Airways       Central Venous Catheter Line  Duration                  Percutaneous Central Line Insertion/Assessment - Quad lumen  04/01/22 1226 right internal jugular 2 days    Percutaneous Central Line Insertion/Assessment - Quad Lumen  04/01/22 1226 right internal jugular 2 days              Drain  Duration                  Urethral Catheter 04/01/22 0730 Straight-tip;Temperature probe;Non-latex 14 Fr. 2 days         Y Chest Tube 1 and 2 04/01/22 1000 Mediastinal 2 days         Y Chest Tube 1 and 2 04/01/22 1000 Pleural 2 days              Arterial Line  Duration             Arterial Line 04/01/22 0731 Left Radial 2 days              Line  Duration                  Pacer Wires 04/01/22 1046 2 days              Peripheral Intravenous Line  Duration                  Peripheral IV - Single Lumen 04/01/22 0610 20 G Anterior;Right Hand 2 days         Peripheral IV - Single Lumen 04/01/22 1247 16 G Anterior;Left Forearm 2 days                    Significant Labs:    CBC/Anemia Profile:  Recent Labs   Lab 04/03/22  0304   WBC 10.15   HGB 9.7*   HCT 29.5*   *   MCV 96   RDW 13.0          Chemistries:  Recent Labs   Lab 04/03/22  0304 04/03/22  1152 04/03/22  1727 04/03/22  2344     --   --   --    K 3.9 4.0  4.0 4.0 3.9     --   --   --    CO2 25  --   --   --    BUN 10  --   --   --    CREATININE 0.9  --   --   --    CALCIUM 8.2*  --   --   --    ALBUMIN 3.8  --   --   --    PROT 5.8*  --   --   --    BILITOT 0.8  --   --   --    ALKPHOS 50*  --   --   --    ALT 10  --   --   --    AST 31  --   --   --    MG 1.9 2.3  2.0 1.9   PHOS 2.4*  --   --   --          ABGs:   Recent Labs   Lab 04/03/22  0714   PH 7.400   PCO2 44.5   HCO3 27.6   POCSATURATED 91*   BE 3       CMP:   Recent Labs   Lab 04/03/22  0304 04/03/22  1152 04/03/22  1727 04/03/22  2344     --   --   --    K 3.9 4.0  4.0 4.0 3.9     --   --   --    CO2 25  --   --   --    *  --   --   --    BUN 10  --   --   --    CREATININE 0.9  --   --   --    CALCIUM 8.2*  --   --   --    PROT 5.8*  --   --   --    ALBUMIN 3.8  --   --   --    BILITOT 0.8  --   --   --    ALKPHOS 50*  --   --   --    AST 31  --   --   --    ALT 10  --   --   --    ANIONGAP 9  --   --   --    EGFRNONAA >60.0  --   --   --          Significant Imaging:  I have reviewed all pertinent imaging results/findings within the past 24 hours.

## 2022-04-04 NOTE — PLAN OF CARE
"      SICU PLAN OF CARE NOTE    SHIFT EVENTS:  VSS. Managed pain. Replaced Mg and K. OOBTC. Orders in to transfer. POC reviewed with patient and family; questions and concerns addressed. See flow sheets for full assessment details. Will continue to monitor patient closely.      Dx: CAD (coronary artery disease)    Vital Signs: /66   Pulse 74   Temp 98.7 °F (37.1 °C) (Oral)   Resp 19   Ht 5' 2" (1.575 m)   Wt 122.5 kg (270 lb)   LMP 06/30/2015   SpO2 96%   Breastfeeding No   BMI 49.38 kg/m²     Neuro: AAO x4    Respiratory: Nasal Cannula 4L     Cardiac: NSR; HR 70-80s    Diet: Clear Liquid    Gtts: MIVF     Urine Output: Urinary Catheter 770 cc/shift    Drains:     Chest Tube, total output 50 cc /  shift     Labs: Daily; Mg & K Q6    Accuchecks: ACHS    SKIN NOTE:  Skin: L leg graft site, dsg in place, CDI; Midsternal incision ALEJANDRA, CDI. No skin breakdown or skin tears at this time.    Skin precautions maintained including:  Sacrum and heels with foam dressing in place for pressure protection. Frequent weight shift assistance provided Q2 hr to prevent breakdown. Bed plugged in and mattress inflated; Adhesive use limited. Heels elevated off bed. Pressure points protected and positioning supports utilized.  Skin-to-device areas padded. Skin-to-skin areas padded    "

## 2022-04-04 NOTE — PT/OT/SLP PROGRESS
Occupational Therapy   Treatment    Name: Sonya Armendariz  MRN: [id number]  Admitting Diagnosis:  CAD (coronary artery disease)  3 Days Post-Op    Recommendations:     Discharge Recommendations: home health PT, home health OT  Discharge Equipment Recommendations:  none  Barriers to discharge:  None    Assessment:     Sonya Armendariz is a 61 y.o. female with a medical diagnosis of CAD (coronary artery disease).  She presents with  weakness, impaired endurance, impaired self care skills, impaired functional mobilty, impaired balance, pain, impaired cardiopulmonary response to activity.     Rehab Prognosis:  Good; patient would benefit from acute skilled OT services to address these deficits and reach maximum level of function.       Plan:     Patient to be seen 5 x/week to address the above listed problems via self-care/home management, therapeutic activities, therapeutic exercises  · Plan of Care Expires: 05/02/22  · Plan of Care Reviewed with: patient    Subjective     Pain/Comfort:  · Pain Rating 1: 6/10  · Location 1:  (between shoulder blades)  · Pain Addressed 1: Nurse notified    Objective:     Communicated with: RN prior to session.  Patient found up in chair with blood pressure cuff, telemetry, pulse ox (continuous), arterial line, oxygen upon OT entry to room.    General Precautions: Standard, fall, sternal   Orthopedic Precautions:N/A   Braces: N/A  Respiratory Status: Nasal cannula, flow 4L  L/min     Occupational Performance:     Bed Mobility:    · Sit > supine: CGA with assist for lines      Functional Mobility/Transfers:  · Sit <> stand: CGA no AD  · Functional Mobility: Pt required CGA and no AD for functional ambulation to/from the bathroom. Pt tolerated standing at the sink for approx 10 min to complete ADL activity     Activities of Daily Living:  · Grooming: Supervision in standing at the sink to complete oral care and face washing. HR up to 97bpm during standing ADL activity. Pt denied  fatigue, dizziness or SOB      [facility] 6 Click ADL: 16    Treatment & Education:  OT role, plan of care, progression of goals, importance of continued OOB activity, fall prevention, ADL retraining, transfer retraining, promotion of independence with ADLs and increased participation with ADLs      Patient left right sidelying with all lines intact, call button in reach and RN notifiedEducation:   and at bedside    GOALS:   Multidisciplinary Problems     Occupational Therapy Goals        Problem: Occupational Therapy    Goal Priority Disciplines Outcome Interventions   Occupational Therapy Goal     OT, PT/OT     Description: Goals to be met by: 4/17/2022    Patient will increase functional independence with ADLs by performing:    UE Dressing with [place].  LE Dressing with [place].  Grooming while standing at sink with [place].  Toileting from toilet with [place] for hygiene and clothing management.   Toilet transfer to toilet with [place].  Pt will engage in functional mobility to simulate household distances in order to maximize functional activity tolerance required for engagement in occupations of choice with supervision                      Time Tracking:     OT Date of Treatment: 04/04/22  OT Start Time: 1012  OT Stop Time: 1050  OT Total Time (min): 38 min    Billable Minutes:Self Care/Home Management 23 minutes  Therapeutic Activity 15 minutes    OT/ALEJANDRA: OT          4/4/2022

## 2022-04-05 LAB
ALBUMIN SERPL BCP-MCNC: 3.3 G/DL (ref 3.5–5.2)
ALP SERPL-CCNC: 70 U/L (ref 55–135)
ALT SERPL W/O P-5'-P-CCNC: 18 U/L (ref 10–44)
ANION GAP SERPL CALC-SCNC: 12 MMOL/L (ref 8–16)
AST SERPL-CCNC: 30 U/L (ref 10–40)
BASOPHILS # BLD AUTO: 0.02 K/UL (ref 0–0.2)
BASOPHILS NFR BLD: 0.2 % (ref 0–1.9)
BILIRUB SERPL-MCNC: 1.3 MG/DL (ref 0.1–1)
BUN SERPL-MCNC: 16 MG/DL (ref 6–20)
CALCIUM SERPL-MCNC: 8.9 MG/DL (ref 8.7–10.5)
CHLORIDE SERPL-SCNC: 98 MMOL/L (ref 95–110)
CO2 SERPL-SCNC: 28 MMOL/L (ref 23–29)
CREAT SERPL-MCNC: 0.8 MG/DL (ref 0.5–1.4)
DIFFERENTIAL METHOD: ABNORMAL
EOSINOPHIL # BLD AUTO: 0.3 K/UL (ref 0–0.5)
EOSINOPHIL NFR BLD: 2.9 % (ref 0–8)
ERYTHROCYTE [DISTWIDTH] IN BLOOD BY AUTOMATED COUNT: 12.9 % (ref 11.5–14.5)
EST. GFR  (AFRICAN AMERICAN): >60 ML/MIN/1.73 M^2
EST. GFR  (NON AFRICAN AMERICAN): >60 ML/MIN/1.73 M^2
GLUCOSE SERPL-MCNC: 112 MG/DL (ref 70–110)
HCT VFR BLD AUTO: 30.5 % (ref 37–48.5)
HGB BLD-MCNC: 9.9 G/DL (ref 12–16)
IMM GRANULOCYTES # BLD AUTO: 0.04 K/UL (ref 0–0.04)
IMM GRANULOCYTES NFR BLD AUTO: 0.4 % (ref 0–0.5)
LYMPHOCYTES # BLD AUTO: 2.1 K/UL (ref 1–4.8)
LYMPHOCYTES NFR BLD: 21 % (ref 18–48)
MAGNESIUM SERPL-MCNC: 2 MG/DL (ref 1.6–2.6)
MCH RBC QN AUTO: 30.8 PG (ref 27–31)
MCHC RBC AUTO-ENTMCNC: 32.5 G/DL (ref 32–36)
MCV RBC AUTO: 95 FL (ref 82–98)
MONOCYTES # BLD AUTO: 0.8 K/UL (ref 0.3–1)
MONOCYTES NFR BLD: 7.7 % (ref 4–15)
NEUTROPHILS # BLD AUTO: 6.6 K/UL (ref 1.8–7.7)
NEUTROPHILS NFR BLD: 67.8 % (ref 38–73)
NRBC BLD-RTO: 0 /100 WBC
PHOSPHATE SERPL-MCNC: 3 MG/DL (ref 2.7–4.5)
PLATELET # BLD AUTO: 179 K/UL (ref 150–450)
PMV BLD AUTO: 11.6 FL (ref 9.2–12.9)
POCT GLUCOSE: 123 MG/DL (ref 70–110)
POCT GLUCOSE: 134 MG/DL (ref 70–110)
POCT GLUCOSE: 166 MG/DL (ref 70–110)
POCT GLUCOSE: 181 MG/DL (ref 70–110)
POTASSIUM SERPL-SCNC: 3.9 MMOL/L (ref 3.5–5.1)
PROT SERPL-MCNC: 6.3 G/DL (ref 6–8.4)
RBC # BLD AUTO: 3.21 M/UL (ref 4–5.4)
SODIUM SERPL-SCNC: 138 MMOL/L (ref 136–145)
WBC # BLD AUTO: 9.76 K/UL (ref 3.9–12.7)

## 2022-04-05 PROCEDURE — 27000221 HC OXYGEN, UP TO 24 HOURS

## 2022-04-05 PROCEDURE — 99900035 HC TECH TIME PER 15 MIN (STAT)

## 2022-04-05 PROCEDURE — 83735 ASSAY OF MAGNESIUM: CPT | Performed by: THORACIC SURGERY (CARDIOTHORACIC VASCULAR SURGERY)

## 2022-04-05 PROCEDURE — 97116 GAIT TRAINING THERAPY: CPT

## 2022-04-05 PROCEDURE — 63600175 PHARM REV CODE 636 W HCPCS: Performed by: THORACIC SURGERY (CARDIOTHORACIC VASCULAR SURGERY)

## 2022-04-05 PROCEDURE — 25000003 PHARM REV CODE 250: Performed by: STUDENT IN AN ORGANIZED HEALTH CARE EDUCATION/TRAINING PROGRAM

## 2022-04-05 PROCEDURE — 97535 SELF CARE MNGMENT TRAINING: CPT

## 2022-04-05 PROCEDURE — 20600001 HC STEP DOWN PRIVATE ROOM

## 2022-04-05 PROCEDURE — 63600175 PHARM REV CODE 636 W HCPCS: Performed by: ANESTHESIOLOGY

## 2022-04-05 PROCEDURE — 94660 CPAP INITIATION&MGMT: CPT

## 2022-04-05 PROCEDURE — 85025 COMPLETE CBC W/AUTO DIFF WBC: CPT | Performed by: NURSE PRACTITIONER

## 2022-04-05 PROCEDURE — 94761 N-INVAS EAR/PLS OXIMETRY MLT: CPT

## 2022-04-05 PROCEDURE — 84100 ASSAY OF PHOSPHORUS: CPT | Performed by: NURSE PRACTITIONER

## 2022-04-05 PROCEDURE — 25000003 PHARM REV CODE 250: Performed by: THORACIC SURGERY (CARDIOTHORACIC VASCULAR SURGERY)

## 2022-04-05 PROCEDURE — 36415 COLL VENOUS BLD VENIPUNCTURE: CPT | Performed by: THORACIC SURGERY (CARDIOTHORACIC VASCULAR SURGERY)

## 2022-04-05 PROCEDURE — 97530 THERAPEUTIC ACTIVITIES: CPT

## 2022-04-05 PROCEDURE — 25000003 PHARM REV CODE 250: Performed by: NURSE PRACTITIONER

## 2022-04-05 PROCEDURE — 80053 COMPREHEN METABOLIC PANEL: CPT | Performed by: STUDENT IN AN ORGANIZED HEALTH CARE EDUCATION/TRAINING PROGRAM

## 2022-04-05 RX ADMIN — OXYCODONE HYDROCHLORIDE 10 MG: 10 TABLET ORAL at 10:04

## 2022-04-05 RX ADMIN — MUPIROCIN 1 G: 20 OINTMENT TOPICAL at 09:04

## 2022-04-05 RX ADMIN — DOCUSATE SODIUM 100 MG: 50 LIQUID ORAL at 09:04

## 2022-04-05 RX ADMIN — FUROSEMIDE 40 MG: 10 INJECTION, SOLUTION INTRAMUSCULAR; INTRAVENOUS at 05:04

## 2022-04-05 RX ADMIN — LIDOCAINE 1 PATCH: 50 PATCH CUTANEOUS at 03:04

## 2022-04-05 RX ADMIN — ASPIRIN 325 MG ORAL TABLET 325 MG: 325 PILL ORAL at 08:04

## 2022-04-05 RX ADMIN — ESCITALOPRAM OXALATE 10 MG: 5 TABLET, FILM COATED ORAL at 08:04

## 2022-04-05 RX ADMIN — METOPROLOL SUCCINATE 100 MG: 100 TABLET, EXTENDED RELEASE ORAL at 08:04

## 2022-04-05 RX ADMIN — ENOXAPARIN SODIUM 40 MG: 100 INJECTION SUBCUTANEOUS at 05:04

## 2022-04-05 RX ADMIN — POTASSIUM BICARBONATE 20 MEQ: 391 TABLET, EFFERVESCENT ORAL at 08:04

## 2022-04-05 RX ADMIN — FUROSEMIDE 40 MG: 10 INJECTION, SOLUTION INTRAMUSCULAR; INTRAVENOUS at 08:04

## 2022-04-05 RX ADMIN — ATORVASTATIN CALCIUM 40 MG: 20 TABLET, FILM COATED ORAL at 08:04

## 2022-04-05 RX ADMIN — DOCUSATE SODIUM 100 MG: 50 LIQUID ORAL at 08:04

## 2022-04-05 RX ADMIN — ACETAMINOPHEN 650 MG: 325 TABLET ORAL at 09:04

## 2022-04-05 RX ADMIN — ACETAMINOPHEN 650 MG: 325 TABLET ORAL at 03:04

## 2022-04-05 RX ADMIN — OXYCODONE 5 MG: 5 TABLET ORAL at 09:04

## 2022-04-05 RX ADMIN — POTASSIUM BICARBONATE 20 MEQ: 391 TABLET, EFFERVESCENT ORAL at 09:04

## 2022-04-05 NOTE — PLAN OF CARE
Received awake, alert,following commands, vss. C/O pain in lateral side: medicated with oxycodone as prescribed: desired effect achieved. Resting comfortably at this time.   Problem: Adult Inpatient Plan of Care  Goal: Plan of Care Review  Outcome: Ongoing, Progressing  Goal: Patient-Specific Goal (Individualized)  Outcome: Ongoing, Progressing  Goal: Absence of Hospital-Acquired Illness or Injury  Outcome: Ongoing, Progressing  Goal: Optimal Comfort and Wellbeing  Outcome: Ongoing, Progressing  Goal: Readiness for Transition of Care  Outcome: Ongoing, Progressing     Problem: Bariatric Environmental Safety  Goal: Safety Maintained with Care  Outcome: Ongoing, Progressing     Problem: Activity Intolerance (Cardiovascular Surgery)  Goal: Improved Activity Tolerance  Outcome: Ongoing, Progressing     Problem: Adjustment to Surgery (Cardiovascular Surgery)  Goal: Optimal Coping with Heart Surgery  Outcome: Ongoing, Progressing     Problem: Bleeding (Cardiovascular Surgery)  Goal: Bleeding (Cardiovascular Surgery)  Outcome: Ongoing, Progressing     Problem: Bowel Motility Impaired (Cardiovascular Surgery)  Goal: Effective Bowel Elimination (Cardiovascular Surgery)  Outcome: Ongoing, Progressing     Problem: Cardiac Function Impaired (Cardiovascular Surgery)  Goal: Effective Cardiac Function  Outcome: Ongoing, Progressing     Problem: Cerebral Tissue Perfusion (Cardiovascular Surgery)  Goal: Optimal Cerebral Tissue Perfusion (Cardiovascular Surgery)  Outcome: Ongoing, Progressing     Problem: Fluid and Electrolyte Imbalance (Cardiovascular Surgery)  Goal: Fluid and Electrolyte Balance (Cardiovascular Surgery)  Outcome: Ongoing, Progressing     Problem: Glycemic Control Impaired (Cardiovascular Surgery)  Goal: Blood Glucose Level Within Targeted Range (Cardiovascular Surgery)  Outcome: Ongoing, Progressing     Problem: Infection (Cardiovascular Surgery)  Goal: Absence of Infection Signs and Symptoms  Outcome: Ongoing,  Progressing     Problem: Ongoing Anesthesia Effects (Cardiovascular Surgery)  Goal: Anesthesia/Sedation Recovery  Outcome: Ongoing, Progressing     Problem: Pain (Cardiovascular Surgery)  Goal: Acceptable Pain Control  Outcome: Ongoing, Progressing     Problem: Respiratory Compromise (Cardiovascular Surgery)  Goal: Effective Oxygenation and Ventilation (Cardiovascular Surgery)  Outcome: Ongoing, Progressing     Problem: Fall Injury Risk  Goal: Absence of Fall and Fall-Related Injury  Outcome: Ongoing, Progressing     Problem: Skin Injury Risk Increased  Goal: Skin Health and Integrity  Outcome: Ongoing, Progressing

## 2022-04-05 NOTE — SUBJECTIVE & OBJECTIVE
Interval History: Stepdown from ICU uneventful, patient to work on strength and conditioning while on CSU to prepare for discharge    Review of Systems   Constitutional: Negative for malaise/fatigue.   Cardiovascular:  Negative for chest pain, claudication, dyspnea on exertion, irregular heartbeat, leg swelling and palpitations.   Respiratory:  Negative for cough and shortness of breath.    Hematologic/Lymphatic: Negative for bleeding problem.   Gastrointestinal:  Negative for abdominal pain.   Genitourinary:  Negative for dysuria.   Neurological:  Negative for headaches and weakness.   Medications:  Continuous Infusions:  Scheduled Meds:   acetaminophen  650 mg Oral Q8H    aspirin  325 mg Oral Daily    atorvastatin  40 mg Oral Daily    docusate  100 mg Oral BID    enoxaparin  40 mg Subcutaneous Daily    EScitalopram oxalate  10 mg Oral Daily    furosemide (LASIX) injection  40 mg Intravenous BID    LIDOcaine  1 patch Transdermal Q24H    metoprolol succinate  100 mg Oral Daily    mupirocin  1 g Nasal BID    polyethylene glycol  17 g Oral Daily    potassium bicarbonate  20 mEq Oral BID     PRN Meds:bisacodyL, dextrose 10%, dextrose 10%, glucagon (human recombinant), glucose, glucose, HYDROmorphone, insulin aspart U-100, metoclopramide HCl, ondansetron, oxyCODONE, oxyCODONE, sodium chloride 0.9%     Objective:     Vital Signs (Most Recent):  Temp: 98.1 °F (36.7 °C) (04/05/22 0733)  Pulse: 73 (04/05/22 0808)  Resp: 18 (04/05/22 0733)  BP: 110/61 (04/05/22 0733)  SpO2: (!) 92 % (04/05/22 0733)   Vital Signs (24h Range):  Temp:  [96.7 °F (35.9 °C)-99 °F (37.2 °C)] 98.1 °F (36.7 °C)  Pulse:  [73-99] 73  Resp:  [13-63] 18  SpO2:  [84 %-96 %] 92 %  BP: (107-129)/(57-78) 110/61  Arterial Line BP: ()/(49-59) 89/59     Weight: 125 kg (275 lb 9.2 oz[facility]  Body mass index is 50.4 kg/m².    SpO2: (!) 92 %  O2 Device (Oxygen Therapy): CPAP    Intake/Output - Last 3 Shifts         04/03 0700  04/04 0659 04/04  0700  04/05 0659 04/05 0700  04/06 0659    P.O. 250 1182     I.V. (mL/kg) 102.5 (0.8) 69.9 (0.6)     NG/GT       IV Piggyback 437 348.6     Total Intake(mL/kg) 789.5 (6.1) 1600.6 (12.8)     Urine (mL/kg/hr) 1585 (0.5) 1750 (0.6)     Drains       Stool  0     Chest Tube 170 10     Total Output 1755 1760     Net -965.6 -159.4            Urine Occurrence  3 x     Stool Occurrence  3 x             Lines/Drains/Airways       Line  Duration                  Pacer Wires 04/01/22 1046 3 days              Peripheral Intravenous Line  Duration                  Peripheral IV - Single Lumen 04/01/22 0610 20 G Anterior;Right Hand 4 days         Peripheral IV - Single Lumen 04/04/22 1920 20 G;1 3/4 in Left Forearm <1 day                    Physical Exam  Constitutional:       Appearance: Normal appearance.   HENT:      Head: Normocephalic.   Eyes:      Pupils: Pupils are equal, round, and reactive to light.   Cardiovascular:      Rate and Rhythm: Normal rate and regular rhythm.      Pulses: Normal pulses.   Pulmonary:      Effort: Pulmonary effort is normal.   Abdominal:      General: Abdomen is flat. Bowel sounds are normal.      Palpations: Abdomen is soft.   Musculoskeletal:         General: Normal range of motion.   Skin:     General: Skin is warm and dry.      Comments: MSI CDI   Neurological:      General: No focal deficit present.      Mental Status: She is alert.       Significant Labs:  BMP:   Recent Labs   Lab 04/05/22 0219   *      K 3.9   CL 98   CO2 28   BUN 16   CREATININE 0.8   CALCIUM 8.9   MG 2.0     CBC:   Recent Labs   Lab 04/05/22 0219   WBC 9.76   RBC 3.21*   HGB 9.9*   HCT 30.5*      MCV 95   MCH 30.8   MCHC 32.5     CMP:   Recent Labs   Lab 04/05/22 0219   *   CALCIUM 8.9   ALBUMIN 3.3*   PROT 6.3      K 3.9   CO2 28   CL 98   BUN 16   CREATININE 0.8   ALKPHOS 70   ALT 18   AST 30   BILITOT 1.3*     Coagulation: No results for input(s): PT, INR, APTT in the last 48  hours.    Significant Diagnostics:  I have reviewed and interpreted all pertinent imaging results/findings within the past 24 hours.

## 2022-04-05 NOTE — NURSING
ROUNDING COMPLETED.CPAP INTACT.PT ASSISTED TO BSC AND BACK TO BED.CHEST INCISION SITE C/D/I.NO COMPLAINTS OF ANY PAIN OR SOB.

## 2022-04-05 NOTE — PT/OT/SLP PROGRESS
Occupational Therapy   Treatment    Name: Sonya Armendariz  MRN: [id number]  Admitting Diagnosis:  CAD (coronary artery disease)  4 Days Post-Op    Recommendations:     Discharge Recommendations: home health OT  Discharge Equipment Recommendations:  none  Barriers to discharge:  None    Assessment:     oSnya Armendariz is a 61 y.o. female with a medical diagnosis of CAD (coronary artery disease).  She was able to perform sit/stand T/F and walk to bathroom c CGA.  She was able to perform grooming c set-up while standing at sink.  Pt was fatigued at the end of tx session.  Was able to maintain sternal precautions c cues.  She is progressing well. Performance deficits affecting function are weakness, impaired endurance, impaired self care skills, impaired functional mobilty, impaired balance, decreased upper extremity function.     Rehab Prognosis:  Good; patient would benefit from acute skilled OT services to address these deficits and reach maximum level of function.       Plan:     Patient to be seen 5 x/week to address the above listed problems via self-care/home management, therapeutic activities, therapeutic exercises  · Plan of Care Expires: 05/02/22  · Plan of Care Reviewed with: patient    Subjective     Pain/Comfort:  · Pain Rating 1: 7/10    Objective:     Communicated with: RN prior to session.  Patient found up in chair with oxygen, telemetry upon OT entry to room.    General Precautions: Standard, fall, sternal   Orthopedic Precautions:N/A   Braces: N/A  Respiratory Status: Nasal cannula, flow 5 L/min     Occupational Performance:     Functional Mobility/Transfers:  · Patient completed Sit <> Stand Transfer with contact guard assistance  with  no assistive device   · Functional Mobility: Pt was able to walk to bathroom c CGA.    Activities of Daily Living:  · Grooming: contact guard assistance to brush teeth and was off face while standing at sink.    · Upper Body Dressing: maximal assistance to  [facility].      [facility] 6 Click ADL: 18    Patient left up in chair with all lines intact, call button in reach and RN notifiedEducation:      GOALS:   Multidisciplinary Problems     Occupational Therapy Goals        Problem: Occupational Therapy    Goal Priority Disciplines Outcome Interventions   Occupational Therapy Goal     OT, PT/OT     Description: Goals to be met by: 4/17/2022    Patient will increase functional independence with ADLs by performing:    UE Dressing with [place].  LE Dressing with [place].  Grooming while standing at sink with [place].  Toileting from toilet with [place] for hygiene and clothing management.   Toilet transfer to toilet with [place].  Pt will engage in functional mobility to simulate household distances in order to maximize functional activity tolerance required for engagement in occupations of choice with supervision                      Time Tracking:     OT Date of Treatment: 04/05/22  OT Start Time: 1008  OT Stop Time: 1034  OT Total Time (min): 26 min    Billable Minutes:Self Care/Home Management 13  Therapeutic Activity 13               4/5/2022

## 2022-04-05 NOTE — PLAN OF CARE
PT IN AND OUT OF SLEEP.PT DYSPNEIC ON EXERTION.PT ON  BIPAP.NO CURRENT COMPLAINTS OF SOB.CHEST INCISION SITE C/D/I.SMALL DRESSING NOTED TO UPPER ABDOMEN C/D/I.NO DRAINAGE NOTED.NO VENTRICLE WIRES NOTED.DONOR SITES TO LEFT LEG WITH DERMABOND C/D/I.PT INSTRUCTED TO CALL FOR BATHROOM ASSIST.

## 2022-04-05 NOTE — PLAN OF CARE
[facility] - Cardiology Stepdown  Initial Discharge Assessment       Primary Care Provider: Sivakumar Wing MD    Admission Diagnosis: Coronary artery disease involving native coronary artery of native heart with unstable angina pectoris [I25.110]    Admission Date: 4/1/2022  Expected Discharge Date: 4/7/2022    Discharge Barriers Identified: None    Payor: BLUE CROSS BLUE SHIELD / Plan: [place] / Product Type: Commercial /     Extended Emergency Contact Information  Primary Emergency Contact: nishant stanley  Address: [address] [place] [address] [place]  Mobile Phone: [phone]  Relation: Relative    Discharge Plan A: Home with family, Home Health  Discharge Plan B: Home with family      [id number] - [facility] [address] [facility] [address]  [facility] [address]  Phone: [phone] Fax: [phone]      Initial Assessment (most recent)     Adult Discharge Assessment - 04/05/22 1447        Discharge Assessment    Assessment Type Discharge Planning Assessment     Confirmed/corrected address, phone number and insurance Yes     Confirmed Demographics Correct on Facesheet     Source of Information patient     Communicated BLUE with patient/caregiver Yes     Lives With sibling(s)     Do you expect to return to your current living situation? Yes     Do you have help at home or someone to help you manage your care at home? Yes     Who are your caregiver(s) and their phone number(s)? Nishant [phone]     Prior to hospitilization cognitive status: Alert/Oriented     Current cognitive status: Alert/Oriented     Walking or Climbing Stairs Difficulty none     Dressing/Bathing Difficulty none     Equipment Currently Used at Home none     Readmission within 30 days? No     Patient currently being followed by outpatient case management? No     Do you currently have service(s) that help you manage your  care at home? No     Do you have prescription coverage? Yes     Coverage BCBS     Do you have any problems affording any of your prescribed medications? TBD     Who is going to help you get home at discharge? Family     How do you get to doctors appointments? family or friend will provide     Are you on dialysis? No     Do you take coumadin? No     Discharge Plan A Home with family;Home Health     Discharge Plan B Home with family     DME Needed Upon Discharge  none     Discharge Plan discussed with: Patient     Discharge Barriers Identified None                    Pt admitted 4/1/2022  5:04 AM    For Coronary artery disease involving native coronary artery of native heart with unstable angina pectoris [I25.110]       CM to bedside for assessment. Information obtained from patient.  Pt lives with brother in house.  Family will bring patient home at discharge. Will have support from family at d/c. Anticipate d/c to home with home health needs.      PCP is Sivakumar Wing MD  [phone] (p)  [phone] (f)      Payor: BLUE CROSS BLUE SHIELD / Plan: BCBS OF [facility] / Product Type: Commercial /       [id number] [address] AT [facility] [address] [facility] [address]  [address] [facility] [address]  Phone: [phone] Fax: [phone]      Extended Emergency Contact Information  Primary Emergency Contact: nishant stanley  Address: [address] [place] [address] [place]  Mobile Phone: [phone]  Relation: Relative    Future Appointments   Date Time Provider Department Center   4/19/2022  3:00 PM [facility]   5/11/2022  8:30 AM EKG, APPT [facility]   5/11/2022  9:10 AM Leandro Howard MD [facility] Julie Haase RN  Case Management [phone]

## 2022-04-05 NOTE — PROGRESS NOTES
[facility] - Cardiology Stepdown  Cardiothoracic Surgery  Progress Note    Patient Name: Sonya Armendariz  MRN: [id number]  Admission Date: 4/1/2022  Hospital Length of Stay: 4 days  Code Status: Full Code   Attending Physician: Leandro Howard MD   Referring Provider: Leandro Howard MD  Principal Problem:CAD (coronary artery disease)    Subjective:     Post-Op Info:  Procedure(s) (LRB):  CORONARY ARTERY BYPASS GRAFT (CABG) (N/A)  SURGICAL PROCUREMENT, VEIN, ENDOSCOPIC (Left)   4 Days Post-Op     Interval History: Stepdown from ICU uneventful, patient to work on strength and conditioning while on CSU to prepare for discharge    Review of Systems   Constitutional: Negative for malaise/fatigue.   Cardiovascular:  Negative for chest pain, claudication, dyspnea on exertion, irregular heartbeat, leg swelling and palpitations.   Respiratory:  Negative for cough and shortness of breath.    Hematologic/Lymphatic: Negative for bleeding problem.   Gastrointestinal:  Negative for abdominal pain.   Genitourinary:  Negative for dysuria.   Neurological:  Negative for headaches and weakness.   Medications:  Continuous Infusions:  Scheduled Meds:   acetaminophen  650 mg Oral Q8H    aspirin  325 mg Oral Daily    atorvastatin  40 mg Oral Daily    docusate  100 mg Oral BID    enoxaparin  40 mg Subcutaneous Daily    EScitalopram oxalate  10 mg Oral Daily    furosemide (LASIX) injection  40 mg Intravenous BID    LIDOcaine  1 patch Transdermal Q24H    metoprolol succinate  100 mg Oral Daily    mupirocin  1 g Nasal BID    polyethylene glycol  17 g Oral Daily    potassium bicarbonate  20 mEq Oral BID     PRN Meds:bisacodyL, dextrose 10%, dextrose 10%, glucagon (human recombinant), glucose, glucose, HYDROmorphone, insulin aspart U-100, metoclopramide HCl, ondansetron, oxyCODONE, oxyCODONE, sodium chloride 0.9%     Objective:     Vital Signs (Most Recent):  Temp: 98.1 °F (36.7 °C) (04/05/22 0733)  Pulse: 73 (04/05/22  0808)  Resp: 18 (04/05/22 0733)  BP: 110/61 (04/05/22 0733)  SpO2: (!) 92 % (04/05/22 0733)   Vital Signs (24h Range):  Temp:  [96.7 °F (35.9 °C)-99 °F (37.2 °C)] 98.1 °F (36.7 °C)  Pulse:  [73-99] 73  Resp:  [13-63] 18  SpO2:  [84 %-96 %] 92 %  BP: (107-129)/(57-78) 110/61  Arterial Line BP: ()/(49-59) 89/59     Weight: 125 kg (275 lb 9.2 oz) ([facility])  Body mass index is 50.4 kg/m².    SpO2: (!) 92 %  O2 Device (Oxygen Therapy): CPAP    Intake/Output - Last 3 Shifts         04/03 0700  04/04 0659 04/04 0700 04/05 0659 04/05 0700 04/06 0659    P.O. 250 1182     I.V. (mL/kg) 102.5 (0.8) 69.9 (0.6)     NG/GT       IV Piggyback 437 348.6     Total Intake(mL/kg) 789.5 (6.1) 1600.6 (12.8)     Urine (mL/kg/hr) 1585 (0.5) 1750 (0.6)     Drains       Stool  0     Chest Tube 170 10     Total Output 1755 1760     Net -965.6 -159.4            Urine Occurrence  3 x     Stool Occurrence  3 x             Lines/Drains/Airways       Line  Duration                  Pacer Wires 04/01/22 1046 3 days              Peripheral Intravenous Line  Duration                  Peripheral IV - Single Lumen 04/01/22 0610 20 G Anterior;Right Hand 4 days         Peripheral IV - Single Lumen 04/04/22 1920 20 G;1 3/4 in Left Forearm <1 day                    Physical Exam  Constitutional:       Appearance: Normal appearance.   HENT:      Head: Normocephalic.   Eyes:      Pupils: Pupils are equal, round, and reactive to light.   Cardiovascular:      Rate and Rhythm: Normal rate and regular rhythm.      Pulses: Normal pulses.   Pulmonary:      Effort: Pulmonary effort is normal.   Abdominal:      General: Abdomen is flat. Bowel sounds are normal.      Palpations: Abdomen is soft.   Musculoskeletal:         General: Normal range of motion.   Skin:     General: Skin is warm and dry.      Comments: MSI CDI   Neurological:      General: No focal deficit present.      Mental Status: She is alert.       Significant Labs:  BMP:   Recent Labs   Lab  04/05/22 0219   *      K 3.9   CL 98   CO2 28   BUN 16   CREATININE 0.8   CALCIUM 8.9   MG 2.0     CBC:   Recent Labs   Lab 04/05/22 0219   WBC 9.76   RBC 3.21*   HGB 9.9*   HCT 30.5*      MCV 95   MCH 30.8   MCHC 32.5     CMP:   Recent Labs   Lab 04/05/22 0219   *   CALCIUM 8.9   ALBUMIN 3.3*   PROT 6.3      K 3.9   CO2 28   CL 98   BUN 16   CREATININE 0.8   ALKPHOS 70   ALT 18   AST 30   BILITOT 1.3*     Coagulation: No results for input(s): PT, INR, APTT in the last 48 hours.    Significant Diagnostics:  I have reviewed and interpreted all pertinent imaging results/findings within the past 24 hours.    Assessment/Plan:     S/P CABG (coronary artery bypass graft)  - Maintain sternal precautions   - ASA  - BB  - Statin  - Lasix 40 mg BID with scheduled potassium ordered   - Encourage ambulation  - PT/OT  - Cardiac diet with 1500 cc fluid restriction   - Bowel regimen in place   - Lovenox 40 mg daily  - Monitor electrolytes to keep Mag above 2 and Potassium above 4  - OOBTO   - Encourage IS use  - Multimodal pain control    Obesity  - PT/OT  - Cardiac Diet    Hyperlipidemia  - Statin        Natasha Marques NP  Cardiothoracic Surgery  [facility] - Cardiology Stepdown

## 2022-04-05 NOTE — NURSING TRANSFER
Nursing Transfer Note      4/4/2022     Reason patient is being transferred: no longer requiring ICU level care    TRANSFER TO [facility] FROM:[id number]    Transfer via bed    Transfer with O2 tank, portable monitor    Transported by STELLA Harley, STELLA Kearns and EPI Ferris    Medicines sent: none    Any special needs or follow-up needed: none    Chart send with patient:yes    Notified: Unit [place]    Patient reassessed at: 2100 4/4/22    Upon arrival to floor: paperwork given to unit secretary, pt disconnected from portable monitor, connected to flowmeter oxygen @ 5L

## 2022-04-05 NOTE — NURSING
PLAN OF CARE DISCUSSED DURING TRANSFER TO FLOOR.FULL BODY ASSESSMENT COMPLETED.NO VENTRICULAR WIRES NOTED UPON ASSESSMENT.CHEST INCISION SITE C/D/I WITH DERMABOND.DONOR SITES TO LEFT INNER LEG C/D/I.NO DRAINAGE OR BLEEDING NOTED.PT DID NOTE TO BE SOB WHEN FIRST ARRIVED TO FLOOR DURING TRANSPORT BUT QUICKLY RESOLVED,PT ON NC 6L..VITALS OBTAINED WNL.TRIPLE LUMEN CENTRAL LINE STILL INTACT TO RIGHT NECK .ICU NURSE MITZI CALLED FOR CLARIFICATION OF ORDERS TO DISCONTINUE CENTRAL LINE AND CLARIFICATION OF MAGNESIUM LISTED ON MARS.ICU NURSE MITZI THEN DISCONTINUED CENTRAL LINE AND STATED MAGNESIUM ORDER WAS AN ERROR BY PHYSICIAN

## 2022-04-05 NOTE — CARE UPDATE
SIGN OFF    Discontinue BG monitoring. Will sign off. Please re-consult Endo as needed.    Patient has no Dx of DM, and is tolerating PO intake without BG excursions and/or complications.     Thank you for the consult.     ** Please call Endocrine for any BG related issues **      Lab Results   Component Value Date    HGBA1C 6.1 (H) 03/19/2022

## 2022-04-05 NOTE — ASSESSMENT & PLAN NOTE
- Maintain sternal precautions   - ASA  - BB  - Statin  - Lasix 40 mg BID with scheduled potassium ordered   - Encourage ambulation  - PT/OT  - Cardiac diet with 1500 cc fluid restriction   - Bowel regimen in place   - Lovenox 40 mg daily  - Monitor electrolytes to keep Mag above 2 and Potassium above 4  - OOBTO   - Encourage IS use  - Multimodal pain control

## 2022-04-05 NOTE — PLAN OF CARE
VSS. 5L O2 NC. CT D/C, Levy D/C. Voiding per bedside commode. BM x 3. PT/OT, pain controlled. Lytes replaced. Pending transfer to CSU. Will take out TLC prior to transfer. POC reviewed with pt.

## 2022-04-05 NOTE — PT/OT/SLP PROGRESS
Physical Therapy Treatment    Patient Name:  Sonya Armendariz   MRN:  [id number]    Recommendations:     Discharge Recommendations:  home health PT   Discharge Equipment Recommendations: none   Barriers to discharge: None    Assessment:     Sonya Armendariz is a 61 y.o. female admitted with a medical diagnosis of CAD (coronary artery disease).  She presents with the following impairments/functional limitations:  impaired endurance, impaired functional mobilty, gait instability, impaired balance, decreased safety awareness pt tolerated better being able to gait train farther. Pt will benefit from cont skilled PT 5x/wk to progress physically. Pt will be able to discharge home with HHPT when medically stable. Pt is s/p CABG 4/1/22.    Rehab Prognosis: Good; patient would benefit from acute skilled PT services to address these deficits and reach maximum level of function.    Recent Surgery: Procedure(s) (LRB):  CORONARY ARTERY BYPASS GRAFT (CABG) (N/A)  SURGICAL PROCUREMENT, VEIN, ENDOSCOPIC (Left) 4 Days Post-Op    Plan:     During this hospitalization, patient to be seen 5 x/week to address the identified rehab impairments via gait training, therapeutic activities, therapeutic exercises and progress toward the following goals:    · Plan of Care Expires:  05/01/22    Subjective     Chief Complaint: pt c/o pain and SOB with treatment.   Patient/Family Comments/goals:  To be able to care for herself.   Pain/Comfort:  · Pain Rating 1: 7/10 (chest)  · Pain Addressed 1: Distraction  · Pain Rating Post-Intervention 1: 7/10 (chest)      Objective:     Communicated with nurse prior to session.  Patient found supine with telemetry, oxygen (hep lock IV) upon PT entry to room.     General Precautions: Standard, fall, sternal   Orthopedic Precautions:N/A   Braces:    Respiratory Status: Nasal cannula, flow 5 L/min     Functional Mobility:  · Bed Mobility:   Pt needed verbal cues for functional mobility with sternal  precautions.   · Rolling Right: minimum assistance  · Supine to Sit: minimum assistance  ·   · Transfers:     · Sit to Stand:  contact guard assistance with hand-held assist. Pt stood x 2 reps.   ·   · Gait: pt received gait training ~ 148 ft with 1 standing rest period and 1 sitting rest period due to SOB. pt had O2 intact and mask on.       AM-PAC 6 CLICK MOBILITY  Turning over in bed (including adjusting bedclothes, sheets and blankets)?: 3  Sitting down on and standing up from a chair with arms (e.g., wheelchair, bedside commode, etc.): 3  Moving from lying on back to sitting on the side of the bed?: 3  Moving to and from a bed to a chair (including a wheelchair)?: 3  Need to walk in hospital room?: 3  Climbing 3-5 steps with a railing?: 2  Basic Mobility Total Score: 17       Therapeutic Activities and Exercises:   pt received verbal instructions in PT POC and verbally expressed understanding of such.     Patient left up in chair with all lines intact and call button in reach..    GOALS:   Multidisciplinary Problems     Physical Therapy Goals        Problem: Physical Therapy    Goal Priority Disciplines Outcome Goal Variances Interventions   Physical Therapy Goal     PT, PT/OT Ongoing, Progressing     Description: Goals to be met by: 2022    Patient will increase functional independence with mobility by performin. Supine to sit with CGA -not met  2. Sit to stand transfer with Supervision -not met  3. Gait  x 250 feet with Supervision -not met  4. Ascend/descend 4 stair with Contact Guard Assistance -not met                     Time Tracking:     PT Received On: 22  PT Start Time: 845     PT Stop Time: 908  PT Total Time (min): 23 min     Billable Minutes: Gait Training 23 min    Treatment Type: Treatment  PT/PTA: PT     PTA Visit Number: 0     2022

## 2022-04-05 NOTE — PLAN OF CARE
Problem: Physical Therapy  Goal: Physical Therapy Goal  Description: Goals to be met by: 2022    Patient will increase functional independence with mobility by performin. Supine to sit with CGA -not met  2. Sit to stand transfer with Supervision -not met  3. Gait  x 250 feet with Supervision -not met  4. Ascend/descend 4 stair with Contact Guard Assistance -not met    Outcome: Ongoing, Progressing   Goals remain appropriate. 2022

## 2022-04-06 LAB
ALBUMIN SERPL BCP-MCNC: 3.3 G/DL (ref 3.5–5.2)
ALP SERPL-CCNC: 74 U/L (ref 55–135)
ALT SERPL W/O P-5'-P-CCNC: 19 U/L (ref 10–44)
ANION GAP SERPL CALC-SCNC: 12 MMOL/L (ref 8–16)
AST SERPL-CCNC: 28 U/L (ref 10–40)
BASOPHILS # BLD AUTO: 0.03 K/UL (ref 0–0.2)
BASOPHILS NFR BLD: 0.3 % (ref 0–1.9)
BILIRUB SERPL-MCNC: 1.3 MG/DL (ref 0.1–1)
BUN SERPL-MCNC: 19 MG/DL (ref 6–20)
CALCIUM SERPL-MCNC: 9.2 MG/DL (ref 8.7–10.5)
CHLORIDE SERPL-SCNC: 99 MMOL/L (ref 95–110)
CO2 SERPL-SCNC: 30 MMOL/L (ref 23–29)
CREAT SERPL-MCNC: 0.8 MG/DL (ref 0.5–1.4)
DIFFERENTIAL METHOD: ABNORMAL
EOSINOPHIL # BLD AUTO: 0.4 K/UL (ref 0–0.5)
EOSINOPHIL NFR BLD: 4.2 % (ref 0–8)
ERYTHROCYTE [DISTWIDTH] IN BLOOD BY AUTOMATED COUNT: 13.2 % (ref 11.5–14.5)
EST. GFR  (AFRICAN AMERICAN): >60 ML/MIN/1.73 M^2
EST. GFR  (NON AFRICAN AMERICAN): >60 ML/MIN/1.73 M^2
GLUCOSE SERPL-MCNC: 123 MG/DL (ref 70–110)
HCT VFR BLD AUTO: 31 % (ref 37–48.5)
HGB BLD-MCNC: 9.9 G/DL (ref 12–16)
IMM GRANULOCYTES # BLD AUTO: 0.05 K/UL (ref 0–0.04)
IMM GRANULOCYTES NFR BLD AUTO: 0.5 % (ref 0–0.5)
LYMPHOCYTES # BLD AUTO: 2.1 K/UL (ref 1–4.8)
LYMPHOCYTES NFR BLD: 23.5 % (ref 18–48)
MAGNESIUM SERPL-MCNC: 2.2 MG/DL (ref 1.6–2.6)
MCH RBC QN AUTO: 31.4 PG (ref 27–31)
MCHC RBC AUTO-ENTMCNC: 31.9 G/DL (ref 32–36)
MCV RBC AUTO: 98 FL (ref 82–98)
MONOCYTES # BLD AUTO: 0.9 K/UL (ref 0.3–1)
MONOCYTES NFR BLD: 10.1 % (ref 4–15)
NEUTROPHILS # BLD AUTO: 5.6 K/UL (ref 1.8–7.7)
NEUTROPHILS NFR BLD: 61.4 % (ref 38–73)
NRBC BLD-RTO: 0 /100 WBC
PHOSPHATE SERPL-MCNC: 3.3 MG/DL (ref 2.7–4.5)
PLATELET # BLD AUTO: 208 K/UL (ref 150–450)
PMV BLD AUTO: 11.3 FL (ref 9.2–12.9)
POTASSIUM SERPL-SCNC: 3.9 MMOL/L (ref 3.5–5.1)
PROT SERPL-MCNC: 6.3 G/DL (ref 6–8.4)
RBC # BLD AUTO: 3.15 M/UL (ref 4–5.4)
SODIUM SERPL-SCNC: 141 MMOL/L (ref 136–145)
WBC # BLD AUTO: 9.12 K/UL (ref 3.9–12.7)

## 2022-04-06 PROCEDURE — 63600175 PHARM REV CODE 636 W HCPCS: Performed by: NURSE PRACTITIONER

## 2022-04-06 PROCEDURE — 94660 CPAP INITIATION&MGMT: CPT

## 2022-04-06 PROCEDURE — 80053 COMPREHEN METABOLIC PANEL: CPT | Performed by: STUDENT IN AN ORGANIZED HEALTH CARE EDUCATION/TRAINING PROGRAM

## 2022-04-06 PROCEDURE — 97116 GAIT TRAINING THERAPY: CPT | Mod: CQ

## 2022-04-06 PROCEDURE — 83735 ASSAY OF MAGNESIUM: CPT | Performed by: THORACIC SURGERY (CARDIOTHORACIC VASCULAR SURGERY)

## 2022-04-06 PROCEDURE — 63600175 PHARM REV CODE 636 W HCPCS: Performed by: THORACIC SURGERY (CARDIOTHORACIC VASCULAR SURGERY)

## 2022-04-06 PROCEDURE — 99900035 HC TECH TIME PER 15 MIN (STAT)

## 2022-04-06 PROCEDURE — 63600175 PHARM REV CODE 636 W HCPCS: Performed by: ANESTHESIOLOGY

## 2022-04-06 PROCEDURE — 25000003 PHARM REV CODE 250: Performed by: STUDENT IN AN ORGANIZED HEALTH CARE EDUCATION/TRAINING PROGRAM

## 2022-04-06 PROCEDURE — 97110 THERAPEUTIC EXERCISES: CPT | Mod: CQ

## 2022-04-06 PROCEDURE — 97535 SELF CARE MNGMENT TRAINING: CPT

## 2022-04-06 PROCEDURE — 20600001 HC STEP DOWN PRIVATE ROOM

## 2022-04-06 PROCEDURE — 25000003 PHARM REV CODE 250: Performed by: NURSE PRACTITIONER

## 2022-04-06 PROCEDURE — 84100 ASSAY OF PHOSPHORUS: CPT | Performed by: NURSE PRACTITIONER

## 2022-04-06 PROCEDURE — 85025 COMPLETE CBC W/AUTO DIFF WBC: CPT | Performed by: NURSE PRACTITIONER

## 2022-04-06 PROCEDURE — 94761 N-INVAS EAR/PLS OXIMETRY MLT: CPT

## 2022-04-06 PROCEDURE — 25000003 PHARM REV CODE 250: Performed by: THORACIC SURGERY (CARDIOTHORACIC VASCULAR SURGERY)

## 2022-04-06 PROCEDURE — 27000221 HC OXYGEN, UP TO 24 HOURS

## 2022-04-06 PROCEDURE — 97530 THERAPEUTIC ACTIVITIES: CPT

## 2022-04-06 RX ORDER — FUROSEMIDE 10 MG/ML
80 INJECTION INTRAMUSCULAR; INTRAVENOUS 2 TIMES DAILY
Status: DISCONTINUED | OUTPATIENT
Start: 2022-04-06 | End: 2022-04-08 | Stop reason: HOSPADM

## 2022-04-06 RX ADMIN — ACETAMINOPHEN 650 MG: 325 TABLET ORAL at 06:04

## 2022-04-06 RX ADMIN — DOCUSATE SODIUM 100 MG: 50 LIQUID ORAL at 09:04

## 2022-04-06 RX ADMIN — POTASSIUM BICARBONATE 25 MEQ: 978 TABLET, EFFERVESCENT ORAL at 08:04

## 2022-04-06 RX ADMIN — OXYCODONE 5 MG: 5 TABLET ORAL at 10:04

## 2022-04-06 RX ADMIN — ONDANSETRON 4 MG: 2 INJECTION INTRAMUSCULAR; INTRAVENOUS at 10:04

## 2022-04-06 RX ADMIN — DOCUSATE SODIUM 100 MG: 50 LIQUID ORAL at 10:04

## 2022-04-06 RX ADMIN — ACETAMINOPHEN 650 MG: 325 TABLET ORAL at 02:04

## 2022-04-06 RX ADMIN — METOPROLOL SUCCINATE 100 MG: 100 TABLET, EXTENDED RELEASE ORAL at 08:04

## 2022-04-06 RX ADMIN — OXYCODONE 5 MG: 5 TABLET ORAL at 09:04

## 2022-04-06 RX ADMIN — LIDOCAINE 1 PATCH: 50 PATCH CUTANEOUS at 03:04

## 2022-04-06 RX ADMIN — POTASSIUM BICARBONATE 25 MEQ: 978 TABLET, EFFERVESCENT ORAL at 10:04

## 2022-04-06 RX ADMIN — POLYETHYLENE GLYCOL 3350 17 G: 17 POWDER, FOR SOLUTION ORAL at 09:04

## 2022-04-06 RX ADMIN — ATORVASTATIN CALCIUM 40 MG: 20 TABLET, FILM COATED ORAL at 08:04

## 2022-04-06 RX ADMIN — FUROSEMIDE 80 MG: 10 INJECTION, SOLUTION INTRAMUSCULAR; INTRAVENOUS at 06:04

## 2022-04-06 RX ADMIN — FUROSEMIDE 80 MG: 10 INJECTION, SOLUTION INTRAMUSCULAR; INTRAVENOUS at 08:04

## 2022-04-06 RX ADMIN — ENOXAPARIN SODIUM 40 MG: 100 INJECTION SUBCUTANEOUS at 06:04

## 2022-04-06 RX ADMIN — ACETAMINOPHEN 650 MG: 325 TABLET ORAL at 10:04

## 2022-04-06 RX ADMIN — ASPIRIN 325 MG ORAL TABLET 325 MG: 325 PILL ORAL at 08:04

## 2022-04-06 RX ADMIN — ESCITALOPRAM OXALATE 10 MG: 5 TABLET, FILM COATED ORAL at 08:04

## 2022-04-06 NOTE — PT/OT/SLP PROGRESS
Occupational Therapy   Treatment    Name: Sonya Armendariz  MRN: [id number]  Admitting Diagnosis:  CAD (coronary artery disease)  5 Days Post-Op    Recommendations:     Discharge Recommendations: home health OT  Discharge Equipment Recommendations:  none  Barriers to discharge:  None    Assessment:     Sonya Armendariz is a 61 y.o. female with a medical diagnosis of CAD (coronary artery disease).  She was able to perform BSC T/F and sit/stand, and shower chair T/F c CGA.  Was able to walk to bathroom c CGA.  Pt was able to perform toilet hygiene, bathing, UB dressing, and LB dressing c S.  Pt vomited x1 while in shower and was unable to walk back to chair in room.  Chair brought closer to bathroom and pt was able to T/F c CGA.  Pt is progressing well. Performance deficits affecting function are weakness, impaired endurance, impaired self care skills, impaired functional mobilty.     Rehab Prognosis:  Good; patient would benefit from acute skilled OT services to address these deficits and reach maximum level of function.       Plan:     Patient to be seen 5 x/week to address the above listed problems via self-care/home management, therapeutic activities, therapeutic exercises  · Plan of Care Expires: 05/02/22  · Plan of Care Reviewed with: patient    Subjective     Pain/Comfort:  · Pain Rating 1: 0/10    Objective:     Communicated with: RN prior to session.  Patient found on BSC with oxygen, telemetry upon OT entry to room.    General Precautions: Standard, fall, sternal   Orthopedic Precautions:N/A   Braces: N/A  Respiratory Status: Room air     Occupational Performance:     Functional Mobility/Transfers:  · Patient completed Sit <> Stand Transfer with contact guard assistance  with  no assistive device   · Patient completed Bed <> Chair Transfer using Stand Pivot technique with contact guard assistance with no assistive device  · Patient completed Toilet Transfer Stand Pivot technique with contact guard  assistance with  no AD  · Patient completed  Shower Transfer Stand Pivot technique with contact guard assistance with no AD  · Functional Mobility: Pt was able to walk to bathroom c CGA.  Unable to walk back in room d/t n/v.  RN notified and chair brought closer to bathroom.    Activities of Daily Living:  · Bathing: supervision to take shower while seated on shower chair and using hand held shower head.  Educated pt on precautions regarding showering after cardiac sx.  · Upper Body Dressing: supervision to don/doff hospital gown.  · Lower Body Dressing: supervision to don/do socks c feet propped up on bed.  · Toileting: supervision for toilet hygiene.      [facility] 6 Click ADL: 18    Patient left up in chair with all lines intact, call button in reach and RN notifiedEducation:      GOALS:   Multidisciplinary Problems     Occupational Therapy Goals        Problem: Occupational Therapy    Goal Priority Disciplines Outcome Interventions   Occupational Therapy Goal     OT, PT/OT     Description: Goals to be met by: 4/17/2022    Patient will increase functional independence with ADLs by performing:    UE Dressing with [place].  LE Dressing with [place].  Grooming while standing at sink with [place].  Toileting from toilet with [place] for hygiene and clothing management.   Toilet transfer to toilet with [place].  Pt will engage in functional mobility to simulate household distances in order to maximize functional activity tolerance required for engagement in occupations of choice with supervision                      Time Tracking:     OT Date of Treatment: 04/06/22  OT Start Time: 0920  OT Stop Time: 1008  OT Total Time (min): 48 min    Billable Minutes:Self Care/Home Management 30  Therapeutic Activity 18               4/6/2022

## 2022-04-06 NOTE — SUBJECTIVE & OBJECTIVE
Interval History: Patient remains on oxygen, continuing to work on strength and conditioning    Review of Systems   Constitutional: Negative for malaise/fatigue.   Cardiovascular:  Negative for chest pain, claudication, dyspnea on exertion, irregular heartbeat, leg swelling and palpitations.   Respiratory:  Negative for cough and shortness of breath.    Hematologic/Lymphatic: Negative for bleeding problem.   Musculoskeletal:  Positive for stiffness.   Gastrointestinal:  Negative for abdominal pain.   Genitourinary:  Negative for dysuria.   Neurological:  Negative for headaches and weakness.   Medications:  Continuous Infusions:  Scheduled Meds:   acetaminophen  650 mg Oral Q8H    aspirin  325 mg Oral Daily    atorvastatin  40 mg Oral Daily    docusate  100 mg Oral BID    enoxaparin  40 mg Subcutaneous Daily    EScitalopram oxalate  10 mg Oral Daily    furosemide (LASIX) injection  80 mg Intravenous BID    LIDOcaine  1 patch Transdermal Q24H    metoprolol succinate  100 mg Oral Daily    polyethylene glycol  17 g Oral Daily    potassium bicarbonate  25 mEq Oral BID     PRN Meds:bisacodyL, dextrose 10%, dextrose 10%, HYDROmorphone, metoclopramide HCl, ondansetron, oxyCODONE, oxyCODONE, sodium chloride 0.9%     Objective:     Vital Signs (Most Recent):  Temp: 98.6 °F (37 °C) (04/06/22 0716)  Pulse: 78 (04/06/22 0835)  Resp: 18 (04/06/22 0906)  BP: (!) 112/54 (04/06/22 0716)  SpO2: (!) 92 % (04/06/22 0835)   Vital Signs (24h Range):  Temp:  [97.2 °F (36.2 °C)-98.6 °F (37 °C)] 98.6 °F (37 °C)  Pulse:  [69-91] 78  Resp:  [16-19] 18  SpO2:  [90 %-96 %] 92 %  BP: ()/(51-82) 112/54     Weight: 126.1 kg (278 lb)  Body mass index is 50.85 kg/m².    SpO2: (!) 92 %  O2 Device (Oxygen Therapy): nasal cannula w/ humidification    Intake/Output - Last 3 Shifts         04/04 0700  04/05 0659 04/05 0700  04/06 0659 04/06 0700  04/07 0659    P.O. 1182 480     I.V. (mL/kg) 69.9 (0.6)      IV Piggyback 348.6      Total  Intake(mL/kg) 1600.6 (12.8) 480 (3.8)     Urine (mL/kg/hr) 1750 (0.6) 1400 (0.5)     Stool 0      Chest Tube 10      Total Output 1760 1400     Net -159.4 -920            Urine Occurrence 3 x      Stool Occurrence 3 x              Lines/Drains/Airways       Line  Duration                  Pacer Wires 04/01/22 1046 4 days              Peripheral Intravenous Line  Duration                  Peripheral IV - Single Lumen 04/01/22 0610 20 G Anterior;Right Hand 5 days         Peripheral IV - Single Lumen 04/04/22 1920 20 G;1 3/4 in Left Forearm 1 day                    Physical Exam  Constitutional:       Appearance: Normal appearance.   HENT:      Head: Normocephalic.   Eyes:      Pupils: Pupils are equal, round, and reactive to light.   Cardiovascular:      Rate and Rhythm: Normal rate and regular rhythm.      Pulses: Normal pulses.   Pulmonary:      Effort: Pulmonary effort is normal.   Abdominal:      General: Abdomen is flat. Bowel sounds are normal.      Palpations: Abdomen is soft.   Musculoskeletal:         General: Normal range of motion.   Skin:     General: Skin is warm and dry.      Comments: MSI CDI   Neurological:      General: No focal deficit present.      Mental Status: She is alert.       Significant Labs:  BMP:   Recent Labs   Lab 04/06/22  0328   *      K 3.9   CL 99   CO2 30*   BUN 19   CREATININE 0.8   CALCIUM 9.2   MG 2.2     CBC:   Recent Labs   Lab 04/06/22  0328   WBC 9.12   RBC 3.15*   HGB 9.9*   HCT 31.0*      MCV 98   MCH 31.4*   MCHC 31.9*     CMP:   Recent Labs   Lab 04/06/22  0328   *   CALCIUM 9.2   ALBUMIN 3.3*   PROT 6.3      K 3.9   CO2 30*   CL 99   BUN 19   CREATININE 0.8   ALKPHOS 74   ALT 19   AST 28   BILITOT 1.3*     Coagulation: No results for input(s): PT, INR, APTT in the last 48 hours.    Significant Diagnostics:  I have reviewed and interpreted all pertinent imaging results/findings within the past 24 hours.

## 2022-04-06 NOTE — ASSESSMENT & PLAN NOTE
- Maintain sternal precautions   - ASA  - BB  - Statin  - Lasix 80 mg BID with scheduled potassium ordered   - Encourage ambulation  - PT/OT  - Cardiac diet with 1500 cc fluid restriction   - Bowel regimen in place   - Lovenox 40 mg daily  - Monitor electrolytes to keep Mag above 2 and Potassium above 4  - OOBTO   - Encourage IS use  - Multimodal pain control

## 2022-04-06 NOTE — PROGRESS NOTES
[facility] - Cardiology Stepdown  Cardiothoracic Surgery  Progress Note    Patient Name: Sonya Armendariz  MRN: [id number]  Admission Date: 4/1/2022  Hospital Length of Stay: 5 days  Code Status: Full Code   Attending Physician: Leandro Howard MD   Referring Provider: Leandro Howard MD  Principal Problem:CAD (coronary artery disease)    Subjective:     Post-Op Info:  Procedure(s) (LRB):  CORONARY ARTERY BYPASS GRAFT (CABG) (N/A)  SURGICAL PROCUREMENT, VEIN, ENDOSCOPIC (Left)   5 Days Post-Op     Interval History: Patient remains on oxygen, continuing to work on strength and conditioning    Review of Systems   Constitutional: Negative for malaise/fatigue.   Cardiovascular:  Negative for chest pain, claudication, dyspnea on exertion, irregular heartbeat, leg swelling and palpitations.   Respiratory:  Negative for cough and shortness of breath.    Hematologic/Lymphatic: Negative for bleeding problem.   Musculoskeletal:  Positive for stiffness.   Gastrointestinal:  Negative for abdominal pain.   Genitourinary:  Negative for dysuria.   Neurological:  Negative for headaches and weakness.   Medications:  Continuous Infusions:  Scheduled Meds:   acetaminophen  650 mg Oral Q8H    aspirin  325 mg Oral Daily    atorvastatin  40 mg Oral Daily    docusate  100 mg Oral BID    enoxaparin  40 mg Subcutaneous Daily    EScitalopram oxalate  10 mg Oral Daily    furosemide (LASIX) injection  80 mg Intravenous BID    LIDOcaine  1 patch Transdermal Q24H    metoprolol succinate  100 mg Oral Daily    polyethylene glycol  17 g Oral Daily    potassium bicarbonate  25 mEq Oral BID     PRN Meds:bisacodyL, dextrose 10%, dextrose 10%, HYDROmorphone, metoclopramide HCl, ondansetron, oxyCODONE, oxyCODONE, sodium chloride 0.9%     Objective:     Vital Signs (Most Recent):  Temp: 98.6 °F (37 °C) (04/06/22 0716)  Pulse: 78 (04/06/22 0835)  Resp: 18 (04/06/22 0906)  BP: (!) 112/54 (04/06/22 0716)  SpO2: (!) 92 % (04/06/22  0835)   Vital Signs (24h Range):  Temp:  [97.2 °F (36.2 °C)-98.6 °F (37 °C)] 98.6 °F (37 °C)  Pulse:  [69-91] 78  Resp:  [16-19] 18  SpO2:  [90 %-96 %] 92 %  BP: ()/(51-82) 112/54     Weight: 126.1 kg (278 lb)  Body mass index is 50.85 kg/m².    SpO2: (!) 92 %  O2 Device (Oxygen Therapy): nasal cannula w/ humidification    Intake/Output - Last 3 Shifts         04/04 0700  04/05 0659 04/05 0700  04/06 0659 04/06 0700  04/07 0659    P.O. 1182 480     I.V. (mL/kg) 69.9 (0.6)      IV Piggyback 348.6      Total Intake(mL/kg) 1600.6 (12.8) 480 (3.8)     Urine (mL/kg/hr) 1750 (0.6) 1400 (0.5)     Stool 0      Chest Tube 10      Total Output 1760 1400     Net -159.4 -920            Urine Occurrence 3 x      Stool Occurrence 3 x              Lines/Drains/Airways       Line  Duration                  Pacer Wires 04/01/22 1046 4 days              Peripheral Intravenous Line  Duration                  Peripheral IV - Single Lumen 04/01/22 0610 20 G Anterior;Right Hand 5 days         Peripheral IV - Single Lumen 04/04/22 1920 20 G;1 3/4 in Left Forearm 1 day                    Physical Exam  Constitutional:       Appearance: Normal appearance.   HENT:      Head: Normocephalic.   Eyes:      Pupils: Pupils are equal, round, and reactive to light.   Cardiovascular:      Rate and Rhythm: Normal rate and regular rhythm.      Pulses: Normal pulses.   Pulmonary:      Effort: Pulmonary effort is normal.   Abdominal:      General: Abdomen is flat. Bowel sounds are normal.      Palpations: Abdomen is soft.   Musculoskeletal:         General: Normal range of motion.   Skin:     General: Skin is warm and dry.      Comments: MSI CDI   Neurological:      General: No focal deficit present.      Mental Status: She is alert.       Significant Labs:  BMP:   Recent Labs   Lab 04/06/22  0328   *      K 3.9   CL 99   CO2 30*   BUN 19   CREATININE 0.8   CALCIUM 9.2   MG 2.2     CBC:   Recent Labs   Lab 04/06/22  0328   WBC 9.12    RBC 3.15*   HGB 9.9*   HCT 31.0*      MCV 98   MCH 31.4*   MCHC 31.9*     CMP:   Recent Labs   Lab 04/06/22  0328   *   CALCIUM 9.2   ALBUMIN 3.3*   PROT 6.3      K 3.9   CO2 30*   CL 99   BUN 19   CREATININE 0.8   ALKPHOS 74   ALT 19   AST 28   BILITOT 1.3*     Coagulation: No results for input(s): PT, INR, APTT in the last 48 hours.    Significant Diagnostics:  I have reviewed and interpreted all pertinent imaging results/findings within the past 24 hours.    Assessment/Plan:     S/P CABG (coronary artery bypass graft)  - Maintain sternal precautions   - ASA  - BB  - Statin  - Lasix 80 mg BID with scheduled potassium ordered   - Encourage ambulation  - PT/OT  - Cardiac diet with 1500 cc fluid restriction   - Bowel regimen in place   - Lovenox 40 mg daily  - Monitor electrolytes to keep Mag above 2 and Potassium above 4  - OOBTO   - Encourage IS use  - Multimodal pain control    Obesity  - PT/OT  - Cardiac Diet    Hyperlipidemia  - Statin        Natasha Marques NP  Cardiothoracic Surgery  [facility] - Cardiology Stepdown

## 2022-04-06 NOTE — PLAN OF CARE
Problem: Adult Inpatient Plan of Care  Goal: Plan of Care Review  Outcome: Ongoing, Progressing  Goal: Patient-Specific Goal (Individualized)  Outcome: Ongoing, Progressing  Goal: Absence of Hospital-Acquired Illness or Injury  Outcome: Ongoing, Progressing  Goal: Optimal Comfort and Wellbeing  Outcome: Ongoing, Progressing  Goal: Readiness for Transition of Care  Outcome: Ongoing, Progressing     Problem: Bariatric Environmental Safety  Goal: Safety Maintained with Care  Outcome: Ongoing, Progressing     Problem: Activity Intolerance (Cardiovascular Surgery)  Goal: Improved Activity Tolerance  Outcome: Ongoing, Progressing     Problem: Adjustment to Surgery (Cardiovascular Surgery)  Goal: Optimal Coping with Heart Surgery  Outcome: Ongoing, Progressing     Problem: Bleeding (Cardiovascular Surgery)  Goal: Bleeding (Cardiovascular Surgery)  Outcome: Ongoing, Progressing     Problem: Bowel Motility Impaired (Cardiovascular Surgery)  Goal: Effective Bowel Elimination (Cardiovascular Surgery)  Outcome: Ongoing, Progressing     Problem: Cardiac Function Impaired (Cardiovascular Surgery)  Goal: Effective Cardiac Function  Outcome: Ongoing, Progressing     Problem: Cerebral Tissue Perfusion (Cardiovascular Surgery)  Goal: Optimal Cerebral Tissue Perfusion (Cardiovascular Surgery)  Outcome: Ongoing, Progressing     Problem: Fluid and Electrolyte Imbalance (Cardiovascular Surgery)  Goal: Fluid and Electrolyte Balance (Cardiovascular Surgery)  Outcome: Ongoing, Progressing     Problem: Glycemic Control Impaired (Cardiovascular Surgery)  Goal: Blood Glucose Level Within Targeted Range (Cardiovascular Surgery)  Outcome: Ongoing, Progressing   Cardiology Step Down. See progress note and flowsheet. Plan: Continue to monitor patient and report any abnormalities in labs, vitals signs, and body system functions to physician as indicated. Patient was given education on their disease process and medications.

## 2022-04-06 NOTE — PT/OT/SLP PROGRESS
Physical Therapy Treatment    Patient Name:  Sonya Armendariz   MRN:  [id number]    Recommendations:     Discharge Recommendations:  home health PT   Discharge Equipment Recommendations: none   Barriers to discharge: None    Assessment:     Sonya Armendariz is a 61 y.o. female admitted with a medical diagnosis of CAD (coronary artery disease).  She presents with the following impairments/functional limitations:  weakness, impaired endurance, impaired self care skills, impaired functional mobilty, gait instability, decreased ROM, impaired skin, impaired cardiopulmonary response to activity, edema . Patient with increased shortness of breath and respiration rate after exertion, but recovers quickly. Patient ambulates with decreased but consistent conchita.    Rehab Prognosis: Good; patient would benefit from acute skilled PT services to address these deficits and reach maximum level of function.    Recent Surgery: Procedure(s) (LRB):  CORONARY ARTERY BYPASS GRAFT (CABG) (N/A)  SURGICAL PROCUREMENT, VEIN, ENDOSCOPIC (Left) 5 Days Post-Op    Plan:     During this hospitalization, patient to be seen 5 x/week to address the identified rehab impairments via gait training, therapeutic activities, therapeutic exercises, neuromuscular re-education and progress toward the following goals:    · Plan of Care Expires:  05/01/22    Subjective     Chief Complaint: a little tired  Patient/Family Comments/goals: to go home soon.  Pain/Comfort:  · Pain Rating 1: 0/10  · Pain Rating Post-Intervention 1: 0/10      Objective:     Communicated with NSG prior to session.  Patient found with bed in chair position with oxygen, telemetry upon PT entry to room.     General Precautions: Standard, fall, sternal   Orthopedic Precautions:N/A   Braces: N/A  Respiratory Status: Nasal cannula, flow 4 L/min     Functional Mobility:  · Bed Mobility:     · Scooting: stand by assistance  · Supine to Sit: stand by assistance  · Sit to Supine:  stand by assistance  · Transfers:     · Sit to Stand:  stand by assistance with no AD  · Gait: ~160 ft with no AD with 4L O2 tank in tow.      AM-PAC 6 CLICK MOBILITY  Turning over in bed (including adjusting bedclothes, sheets and blankets)?: 4  Sitting down on and standing up from a chair with arms (e.g., wheelchair, bedside commode, etc.): 4  Moving from lying on back to sitting on the side of the bed?: 4  Moving to and from a bed to a chair (including a wheelchair)?: 4  Need to walk in hospital room?: 3  Climbing 3-5 steps with a railing?: 2  Basic Mobility Total Score: 21       Therapeutic Activities and Exercises:   Donned/Doffed a gown. Reviewed sternal restrictions. There ex in sitting: LAQ, HIP FLEX AND HEEL/TOE RAISES 2X12 REPS B LE with rest breaks between sets.    Patient left with bed in chair position with all lines intact and call button in reach..    GOALS:   Multidisciplinary Problems     Physical Therapy Goals        Problem: Physical Therapy    Goal Priority Disciplines Outcome Goal Variances Interventions   Physical Therapy Goal     PT, PT/OT Ongoing, Progressing     Description: Goals to be met by: 2022    Patient will increase functional independence with mobility by performin. Supine to sit with CGA -not met  2. Sit to stand transfer with Supervision -not met  3. Gait  x 250 feet with Supervision -not met  4. Ascend/descend 4 stair with Contact Guard Assistance -not met                     Time Tracking:     PT Received On: 22  PT Start Time: 1356     PT Stop Time: 1420  PT Total Time (min): 24 min     Billable Minutes: Gait Training 15 and Therapeutic Exercise 9    Treatment Type: Treatment  PT/PTA: PTA     PTA Visit Number: 1     2022

## 2022-04-07 LAB
ALBUMIN SERPL BCP-MCNC: 3.5 G/DL (ref 3.5–5.2)
ALP SERPL-CCNC: 79 U/L (ref 55–135)
ALT SERPL W/O P-5'-P-CCNC: 21 U/L (ref 10–44)
ANION GAP SERPL CALC-SCNC: 10 MMOL/L (ref 8–16)
AST SERPL-CCNC: 25 U/L (ref 10–40)
BASOPHILS # BLD AUTO: 0.03 K/UL (ref 0–0.2)
BASOPHILS NFR BLD: 0.3 % (ref 0–1.9)
BILIRUB SERPL-MCNC: 1.5 MG/DL (ref 0.1–1)
BUN SERPL-MCNC: 17 MG/DL (ref 6–20)
CALCIUM SERPL-MCNC: 9.2 MG/DL (ref 8.7–10.5)
CHLORIDE SERPL-SCNC: 96 MMOL/L (ref 95–110)
CO2 SERPL-SCNC: 33 MMOL/L (ref 23–29)
CREAT SERPL-MCNC: 0.9 MG/DL (ref 0.5–1.4)
DIFFERENTIAL METHOD: ABNORMAL
EOSINOPHIL # BLD AUTO: 0.4 K/UL (ref 0–0.5)
EOSINOPHIL NFR BLD: 4.9 % (ref 0–8)
ERYTHROCYTE [DISTWIDTH] IN BLOOD BY AUTOMATED COUNT: 13.2 % (ref 11.5–14.5)
EST. GFR  (AFRICAN AMERICAN): >60 ML/MIN/1.73 M^2
EST. GFR  (NON AFRICAN AMERICAN): >60 ML/MIN/1.73 M^2
GLUCOSE SERPL-MCNC: 132 MG/DL (ref 70–110)
HCT VFR BLD AUTO: 31.7 % (ref 37–48.5)
HGB BLD-MCNC: 10 G/DL (ref 12–16)
IMM GRANULOCYTES # BLD AUTO: 0.05 K/UL (ref 0–0.04)
IMM GRANULOCYTES NFR BLD AUTO: 0.6 % (ref 0–0.5)
LYMPHOCYTES # BLD AUTO: 2.3 K/UL (ref 1–4.8)
LYMPHOCYTES NFR BLD: 25.6 % (ref 18–48)
MAGNESIUM SERPL-MCNC: 2 MG/DL (ref 1.6–2.6)
MCH RBC QN AUTO: 30.6 PG (ref 27–31)
MCHC RBC AUTO-ENTMCNC: 31.5 G/DL (ref 32–36)
MCV RBC AUTO: 97 FL (ref 82–98)
MONOCYTES # BLD AUTO: 0.9 K/UL (ref 0.3–1)
MONOCYTES NFR BLD: 9.7 % (ref 4–15)
NEUTROPHILS # BLD AUTO: 5.2 K/UL (ref 1.8–7.7)
NEUTROPHILS NFR BLD: 58.9 % (ref 38–73)
NRBC BLD-RTO: 0 /100 WBC
PHOSPHATE SERPL-MCNC: 3.6 MG/DL (ref 2.7–4.5)
PLATELET # BLD AUTO: 237 K/UL (ref 150–450)
PMV BLD AUTO: 11 FL (ref 9.2–12.9)
POTASSIUM SERPL-SCNC: 3.8 MMOL/L (ref 3.5–5.1)
PROT SERPL-MCNC: 6.3 G/DL (ref 6–8.4)
RBC # BLD AUTO: 3.27 M/UL (ref 4–5.4)
SODIUM SERPL-SCNC: 139 MMOL/L (ref 136–145)
WBC # BLD AUTO: 8.84 K/UL (ref 3.9–12.7)

## 2022-04-07 PROCEDURE — 80053 COMPREHEN METABOLIC PANEL: CPT | Performed by: STUDENT IN AN ORGANIZED HEALTH CARE EDUCATION/TRAINING PROGRAM

## 2022-04-07 PROCEDURE — 94660 CPAP INITIATION&MGMT: CPT

## 2022-04-07 PROCEDURE — 63600175 PHARM REV CODE 636 W HCPCS: Performed by: THORACIC SURGERY (CARDIOTHORACIC VASCULAR SURGERY)

## 2022-04-07 PROCEDURE — 85025 COMPLETE CBC W/AUTO DIFF WBC: CPT | Performed by: NURSE PRACTITIONER

## 2022-04-07 PROCEDURE — 25000003 PHARM REV CODE 250: Performed by: STUDENT IN AN ORGANIZED HEALTH CARE EDUCATION/TRAINING PROGRAM

## 2022-04-07 PROCEDURE — 94760 N-INVAS EAR/PLS OXIMETRY 1: CPT

## 2022-04-07 PROCEDURE — 99900035 HC TECH TIME PER 15 MIN (STAT)

## 2022-04-07 PROCEDURE — 97116 GAIT TRAINING THERAPY: CPT

## 2022-04-07 PROCEDURE — 83735 ASSAY OF MAGNESIUM: CPT | Performed by: THORACIC SURGERY (CARDIOTHORACIC VASCULAR SURGERY)

## 2022-04-07 PROCEDURE — 25000003 PHARM REV CODE 250: Performed by: THORACIC SURGERY (CARDIOTHORACIC VASCULAR SURGERY)

## 2022-04-07 PROCEDURE — 97110 THERAPEUTIC EXERCISES: CPT

## 2022-04-07 PROCEDURE — 25000003 PHARM REV CODE 250: Performed by: NURSE PRACTITIONER

## 2022-04-07 PROCEDURE — 27000221 HC OXYGEN, UP TO 24 HOURS

## 2022-04-07 PROCEDURE — 84100 ASSAY OF PHOSPHORUS: CPT | Performed by: NURSE PRACTITIONER

## 2022-04-07 PROCEDURE — 36415 COLL VENOUS BLD VENIPUNCTURE: CPT | Performed by: THORACIC SURGERY (CARDIOTHORACIC VASCULAR SURGERY)

## 2022-04-07 PROCEDURE — 63600175 PHARM REV CODE 636 W HCPCS: Performed by: ANESTHESIOLOGY

## 2022-04-07 PROCEDURE — 94761 N-INVAS EAR/PLS OXIMETRY MLT: CPT

## 2022-04-07 PROCEDURE — 20600001 HC STEP DOWN PRIVATE ROOM

## 2022-04-07 RX ADMIN — ENOXAPARIN SODIUM 40 MG: 100 INJECTION SUBCUTANEOUS at 04:04

## 2022-04-07 RX ADMIN — FUROSEMIDE 80 MG: 10 INJECTION, SOLUTION INTRAMUSCULAR; INTRAVENOUS at 08:04

## 2022-04-07 RX ADMIN — OXYCODONE 5 MG: 5 TABLET ORAL at 09:04

## 2022-04-07 RX ADMIN — POLYETHYLENE GLYCOL 3350 17 G: 17 POWDER, FOR SOLUTION ORAL at 08:04

## 2022-04-07 RX ADMIN — LIDOCAINE 1 PATCH: 50 PATCH CUTANEOUS at 03:04

## 2022-04-07 RX ADMIN — POTASSIUM BICARBONATE 25 MEQ: 978 TABLET, EFFERVESCENT ORAL at 09:04

## 2022-04-07 RX ADMIN — DOCUSATE SODIUM 100 MG: 50 LIQUID ORAL at 09:04

## 2022-04-07 RX ADMIN — DOCUSATE SODIUM 100 MG: 50 LIQUID ORAL at 08:04

## 2022-04-07 RX ADMIN — ASPIRIN 325 MG ORAL TABLET 325 MG: 325 PILL ORAL at 08:04

## 2022-04-07 RX ADMIN — METOPROLOL SUCCINATE 100 MG: 100 TABLET, EXTENDED RELEASE ORAL at 08:04

## 2022-04-07 RX ADMIN — ESCITALOPRAM OXALATE 10 MG: 5 TABLET, FILM COATED ORAL at 08:04

## 2022-04-07 RX ADMIN — ACETAMINOPHEN 650 MG: 325 TABLET ORAL at 09:04

## 2022-04-07 RX ADMIN — ACETAMINOPHEN 650 MG: 325 TABLET ORAL at 02:04

## 2022-04-07 RX ADMIN — ACETAMINOPHEN 650 MG: 325 TABLET ORAL at 05:04

## 2022-04-07 RX ADMIN — ATORVASTATIN CALCIUM 40 MG: 20 TABLET, FILM COATED ORAL at 08:04

## 2022-04-07 RX ADMIN — POTASSIUM BICARBONATE 25 MEQ: 978 TABLET, EFFERVESCENT ORAL at 08:04

## 2022-04-07 RX ADMIN — FUROSEMIDE 80 MG: 10 INJECTION, SOLUTION INTRAMUSCULAR; INTRAVENOUS at 05:04

## 2022-04-07 NOTE — PT/OT/SLP PROGRESS
Physical Therapy Treatment    Patient Name:  Sonya Armendariz   MRN:  [id number]  Admit Date: 2022  Admitting Diagnosis:  CAD (coronary artery disease)   Length of Stay: 6 days  Recent Surgery: Procedure(s) (LRB):  CORONARY ARTERY BYPASS GRAFT (CABG) (N/A)  SURGICAL PROCUREMENT, VEIN, ENDOSCOPIC (Left) 6 Days Post-Op    Recommendations:     Discharge Recommendations:  home health PT   Discharge Equipment Recommendations: none   Barriers to discharge: None    Plan:     During this hospitalization, patient to be seen 5 x/week to address the listed problems via gait training, therapeutic activities, therapeutic exercises, neuromuscular re-education  · Plan of Care Expires:  22   Plan of Care Reviewed with: patient    Assessment:     Sonya Armendariz is a 61 y.o. female admitted with a medical diagnosis of CAD (coronary artery disease). Pt progressing towards goals, but not at PLOF. Pt tolerated session well but continues with SCOTT. Pt is improving with therapy evidenced by ability to ascend/descend stairs with one person assistance. Pt would benefit from continued PT services to improve overall functional mobility. Recommend d/c to HHPT to maximize functional independence.         Problem List: weakness, impaired endurance, impaired functional mobilty, decreased upper extremity function, impaired cardiopulmonary response to activity.  Rehab Prognosis: Good     GOALS:   Multidisciplinary Problems     Physical Therapy Goals        Problem: Physical Therapy    Goal Priority Disciplines Outcome Goal Variances Interventions   Physical Therapy Goal     PT, PT/OT Ongoing, Progressing     Description: Goals to be met by: 2022    Patient will increase functional independence with mobility by performin. Supine to sit with CGA -not met  2. Sit to stand transfer with Supervision -not met  3. Gait  x 250 feet with Supervision -not met  4. Ascend/descend 4 stair with Contact Guard Assistance -not met    "                  Subjective   Communicated with RN prior to session.  Patient found HOB elevated upon PT entry to room, agreeable to evaluation. Sonya Armendariz's alone during session.    Chief Complaint: wanting to go home   Patient/Family Comments/goals: to get better and return home   Pain/Comfort:  · Pain Rating 1: 0/10  · Pain Rating Post-Intervention 1: 0/10    Objective:   Patient found with: telemetry, peripheral IV   General Precautions: Standard, Cardiac fall, sternal   Orthopedic Precautions:N/A   Braces: N/A   Oxygen Device: Nasal Cannula   Vitals: BP (!) 97/55 (BP Location: Left arm, Patient Position: Lying)   Pulse 71   Temp 98.5 °F (36.9 °C) (Oral)   Resp 14   Ht 5' 2" (1.575 m)   Wt 124.2 kg (273 lb 13 oz)   LMP 06/30/2015   SpO2 (!) 94%   Breastfeeding No   BMI 50.08 kg/m²     Outcome Measures:  AM-PAC 6 CLICK MOBILITY  Turning over in bed (including adjusting bedclothes, sheets and blankets)?: 3  Sitting down on and standing up from a chair with arms (e.g., wheelchair, bedside commode, etc.): 3  Moving from lying on back to sitting on the side of the bed?: 3  Moving to and from a bed to a chair (including a wheelchair)?: 3  Need to walk in hospital room?: 3  Climbing 3-5 steps with a railing?: 3  Basic Mobility Total Score: 18       Functional Mobility:  Additional staff present: N/A  Bed Mobility:    Supine to Sit: stand by assistance; HOB elevated   Scooting anteriorly to EOB to have both feet planted on floor: stand by assistance   Sit to Supine: stand by assistance; HOB elevated    Sitting Balance at Edge of Bed:   Assistance Level Required: Stand-by Assistance    Transfers:    Sit <> Stand Transfer: stand by assistance with no assistive device from chair       Gait:  · Patient ambulated: 70ft + 70ft (stair training in between trials)   · Patient required: SBA  · Patient used: no assistive device  · Gait Pattern observed: reciprocal gait  · Gait Deviation(s): decreased " step length, wide base of support and decreased conchita  · Impairments due to: impaired balance and decreased endurance  · Comments:   · Mask donned  · Verbal cuing for pacing, deep breathing, and to decreased MIHIR    Stairs:  · Ascend/Descend 4 Stairs Using one HR with CGA      Therapeutic Activities, Exercises, and Education:   Educated pt on PT role/POC  Educated pt on importance of OOB activity and daily ambulation   Educated pt on sternal precautions   Pt verbalized understanding     Patient left HOB elevated with all lines intact, call button in reach and RN notified..    Time Tracking:     PT Received On: 04/07/22  PT Start Time: 1012     PT Stop Time: 1026  PT Total Time (min): 14 min       Billable Minutes:   · Gait Training 14    Treatment Type: Treatment  PT/PTA: PT

## 2022-04-07 NOTE — PT/OT/SLP PROGRESS
Occupational Therapy   Treatment    Name: Sonya Armendariz  MRN: [id number]  Admitting Diagnosis:  CAD (coronary artery disease)  6 Days Post-Op    Recommendations:     Discharge Recommendations: home health OT  Discharge Equipment Recommendations:  none  Barriers to discharge:  None    Assessment:     Sonya Armendariz is a 61 y.o. female with a medical diagnosis of CAD (coronary artery disease).  She was able to perform sit/stand T/F c S.  She had just returned from walking c RN in [place] and was fatigued.  Willing to perform B UE AROM exercises and tolerated well.  She reports performing self care this AM but still has difficulty c LB dressing.  She is progressing well. Performance deficits affecting function are weakness, impaired endurance, impaired self care skills, impaired functional mobilty, impaired balance, impaired cardiopulmonary response to activity.     Rehab Prognosis:  Good; patient would benefit from acute skilled OT services to address these deficits and reach maximum level of function.       Plan:     Patient to be seen 5 x/week to address the above listed problems via self-care/home management, therapeutic activities, therapeutic exercises  · Plan of Care Expires: 05/02/22  · Plan of Care Reviewed with: patient    Subjective     Pain/Comfort:  · Pain Rating 1: 0/10    Objective:     Communicated with: RN prior to session.  Patient found up in chair with oxygen, telemetry upon OT entry to room.    General Precautions: Standard, fall, sternal   Orthopedic Precautions:N/A   Braces: N/A  Respiratory Status: Nasal cannula, flow 3 L/min     Occupational Performance:     Functional Mobility/Transfers:  · Patient completed Sit <> Stand Transfer with supervision  with  no assistive device       [facility] 6 Click ADL: 18    Treatment & Education:  She was able to perform B UE AROM exercises 1x15 in all planes and tolerated well while sitting and standing at chair.  O2 SATs between 91-93 during tx  session.      Patient left up in chair with all lines intact, call button in reach and RN notifiedEducation:      GOALS:   Multidisciplinary Problems     Occupational Therapy Goals        Problem: Occupational Therapy    Goal Priority Disciplines Outcome Interventions   Occupational Therapy Goal     OT, PT/OT     Description: Goals to be met by: 4/17/2022    Patient will increase functional independence with ADLs by performing:    UE Dressing with [place].  LE Dressing with [place].  Grooming while standing at sink with [place].  Toileting from toilet with [place] for hygiene and clothing management.   Toilet transfer to toilet with [place].  Pt will engage in functional mobility to simulate household distances in order to maximize functional activity tolerance required for engagement in occupations of choice with supervision                      Time Tracking:     OT Date of Treatment: 04/07/22  OT Start Time: 1434  OT Stop Time: 1451  OT Total Time (min): 17 min    Billable Minutes:Therapeutic Exercise 17               4/7/2022

## 2022-04-07 NOTE — PLAN OF CARE
Problem: Adult Inpatient Plan of Care  Goal: Plan of Care Review  Outcome: Ongoing, Progressing  Goal: Patient-Specific Goal (Individualized)  Outcome: Ongoing, Progressing  Goal: Absence of Hospital-Acquired Illness or Injury  Outcome: Ongoing, Progressing  Goal: Optimal Comfort and Wellbeing  Outcome: Ongoing, Progressing  Goal: Readiness for Transition of Care  Outcome: Ongoing, Progressing     Problem: Bariatric Environmental Safety  Goal: Safety Maintained with Care  Outcome: Ongoing, Progressing     Problem: Activity Intolerance (Cardiovascular Surgery)  Goal: Improved Activity Tolerance  Outcome: Ongoing, Progressing     Problem: Adjustment to Surgery (Cardiovascular Surgery)  Goal: Optimal Coping with Heart Surgery  Outcome: Ongoing, Progressing     Problem: Bleeding (Cardiovascular Surgery)  Goal: Bleeding (Cardiovascular Surgery)  Outcome: Ongoing, Progressing     Problem: Bowel Motility Impaired (Cardiovascular Surgery)  Goal: Effective Bowel Elimination (Cardiovascular Surgery)  Outcome: Ongoing, Progressing     Problem: Cardiac Function Impaired (Cardiovascular Surgery)  Goal: Effective Cardiac Function  Outcome: Ongoing, Progressing     Problem: Cerebral Tissue Perfusion (Cardiovascular Surgery)  Goal: Optimal Cerebral Tissue Perfusion (Cardiovascular Surgery)  Outcome: Ongoing, Progressing     Problem: Fluid and Electrolyte Imbalance (Cardiovascular Surgery)  Goal: Fluid and Electrolyte Balance (Cardiovascular Surgery)  Outcome: Ongoing, Progressing     Problem: Glycemic Control Impaired (Cardiovascular Surgery)  Goal: Blood Glucose Level Within Targeted Range (Cardiovascular Surgery)  Outcome: Ongoing, Progressing     Problem: Infection (Cardiovascular Surgery)  Goal: Absence of Infection Signs and Symptoms  Outcome: Ongoing, Progressing     Problem: Ongoing Anesthesia Effects (Cardiovascular Surgery)  Goal: Anesthesia/Sedation Recovery  Outcome: Ongoing, Progressing     Problem: Pain  (Cardiovascular Surgery)  Goal: Acceptable Pain Control  Outcome: Ongoing, Progressing     Problem: Respiratory Compromise (Cardiovascular Surgery)  Goal: Effective Oxygenation and Ventilation (Cardiovascular Surgery)  Outcome: Ongoing, Progressing   Cardiology Step Down. See progress note and flowsheet. Plan: Continue to monitor patient and report any abnormalities in labs, vitals signs, and body system functions to physician as indicated. Patient was given education on their disease process and medications.

## 2022-04-07 NOTE — PROGRESS NOTES
[facility] - Cardiology Stepdown  Cardiothoracic Surgery  Progress Note    Patient Name: Sonya Armendariz  MRN: [id number]  Admission Date: 4/1/2022  Hospital Length of Stay: 6 days  Code Status: Full Code   Attending Physician: Leandro Howard MD   Referring Provider: Leandro Howard MD  Principal Problem:CAD (coronary artery disease)    Subjective:     Post-Op Info:  Procedure(s) (LRB):  CORONARY ARTERY BYPASS GRAFT (CABG) (N/A)  SURGICAL PROCUREMENT, VEIN, ENDOSCOPIC (Left)   6 Days Post-Op     Interval History: Diuresing well, continue to attempt to wean O2    Review of Systems   Constitutional: Negative for malaise/fatigue.   Cardiovascular:  Negative for chest pain, claudication, dyspnea on exertion, irregular heartbeat, leg swelling and palpitations.   Respiratory:  Negative for cough and shortness of breath.    Hematologic/Lymphatic: Negative for bleeding problem.   Gastrointestinal:  Negative for abdominal pain.   Genitourinary:  Negative for dysuria.   Neurological:  Negative for headaches and weakness.   Medications:  Continuous Infusions:  Scheduled Meds:   acetaminophen  650 mg Oral Q8H    aspirin  325 mg Oral Daily    atorvastatin  40 mg Oral Daily    docusate  100 mg Oral BID    enoxaparin  40 mg Subcutaneous Daily    EScitalopram oxalate  10 mg Oral Daily    furosemide (LASIX) injection  80 mg Intravenous BID    LIDOcaine  1 patch Transdermal Q24H    metoprolol succinate  100 mg Oral Daily    polyethylene glycol  17 g Oral Daily    potassium bicarbonate  25 mEq Oral BID     PRN Meds:bisacodyL, dextrose 10%, dextrose 10%, HYDROmorphone, metoclopramide HCl, ondansetron, oxyCODONE, oxyCODONE, sodium chloride 0.9%     Objective:     Vital Signs (Most Recent):  Temp: 98.5 °F (36.9 °C) (04/07/22 0713)  Pulse: 71 (04/07/22 1121)  Resp: 14 (04/07/22 1121)  BP: (!) 97/55 (04/07/22 1121)  SpO2: (!) 94 % (04/07/22 1121)   Vital Signs (24h Range):  Temp:  [97.4 °F (36.3 °C)-98.5 °F (36.9  °C)] 98.5 °F (36.9 °C)  Pulse:  [68-96] 71  Resp:  [14-24] 14  SpO2:  [90 %-94 %] 94 %  BP: ()/(51-83) 97/55     Weight: 124.2 kg (273 lb 13 oz)  Body mass index is 50.08 kg/m².    SpO2: (!) 94 %  O2 Device (Oxygen Therapy): CPAP    Intake/Output - Last 3 Shifts         04/05 0700 04/06 0659 04/06 0700 04/07 0659 04/07 0700  04/08 0659    P.O. 480 1540     I.V. (mL/kg)       IV Piggyback       Total Intake(mL/kg) 480 (3.8) 1540 (12.2)     Urine (mL/kg/hr) 1400 (0.5) 1700 (0.6)     Stool       Chest Tube       Total Output 1400 1700     Net -920 -160                    Lines/Drains/Airways       Line  Duration                  Pacer Wires 04/01/22 1046 6 days              Peripheral Intravenous Line  Duration                  Peripheral IV - Single Lumen 04/04/22 1920 20 G;1 3/4 in Left Forearm 2 days                    Physical Exam  Constitutional:       Appearance: Normal appearance.   HENT:      Head: Normocephalic.   Eyes:      Pupils: Pupils are equal, round, and reactive to light.   Cardiovascular:      Rate and Rhythm: Normal rate and regular rhythm.      Pulses: Normal pulses.   Pulmonary:      Effort: Pulmonary effort is normal.   Abdominal:      General: Abdomen is flat. Bowel sounds are normal.      Palpations: Abdomen is soft.   Musculoskeletal:         General: Normal range of motion.   Skin:     General: Skin is warm and dry.      Comments: MSI CDI   Neurological:      General: No focal deficit present.      Mental Status: She is alert.       Significant Labs:  BMP:   Recent Labs   Lab 04/07/22 0431 04/07/22 0432   *  --      --    K 3.8  --    CL 96  --    CO2 33*  --    BUN 17  --    CREATININE 0.9  --    CALCIUM 9.2  --    MG  --  2.0     CBC:   Recent Labs   Lab 04/07/22 0431   WBC 8.84   RBC 3.27*   HGB 10.0*   HCT 31.7*      MCV 97   MCH 30.6   MCHC 31.5*     CMP:   Recent Labs   Lab 04/07/22 0431   *   CALCIUM 9.2   ALBUMIN 3.5   PROT 6.3      K 3.8    CO2 33*   CL 96   BUN 17   CREATININE 0.9   ALKPHOS 79   ALT 21   AST 25   BILITOT 1.5*     Coagulation: No results for input(s): PT, INR, APTT in the last 48 hours.    Significant Diagnostics:  I have reviewed and interpreted all pertinent imaging results/findings within the past 24 hours.    Assessment/Plan:     S/P CABG (coronary artery bypass graft)  - Maintain sternal precautions   - ASA  - BB  - Statin  - Lasix 80 mg BID with scheduled potassium ordered   - Encourage ambulation  - PT/OT  - Cardiac diet with 1500 cc fluid restriction   - Bowel regimen in place   - Lovenox 40 mg daily  - Monitor electrolytes to keep Mag above 2 and Potassium above 4  - OOBTO   - Encourage IS use  - Multimodal pain control    Obesity  - PT/OT  - Cardiac Diet    Hyperlipidemia  - Statin        Natasha Marques NP  Cardiothoracic Surgery  [facility] - Cardiology Stepdown

## 2022-04-07 NOTE — NURSING
Home Oxygen Evaluation    Date Performed: 2022    1) Patient's Home O2 Sat on room air, while at rest: 89        If O2 sats on room air at rest are 88% or below, patient qualifies. No additional testing needed. Document N/A in steps 2 and 3. If 89% or above, complete steps 2.      2) Patient's O2 Sat on room air while exercisin        If O2 sats on room air while exercising remain 89% or above patient does not qualify, no further testing needed Document N/A in step 3. If O2 sats on room air while exercising are 88% or below, continue to step 3.      3) Patient's O2 Sat while exercising on O2: 90 at 3 LPM         (Must show improvement from #2 for patients to qualify)    If O2 sats improve on oxygen, patient qualifies for portable oxygen. If not, the patient does not qualify.

## 2022-04-07 NOTE — PLAN OF CARE
Received awake, alert, following commands,vss, no c/o pain. Deminished lung sounds in the lower lobes, right lower lobe  rales: lasix given as ordered: diuresed 700ml: rales improved, work of breathing improved. Worked with Physical therapy: tolerated well. Resting comfortably at this time will continue to monitor this shift.  Problem: Adult Inpatient Plan of Care  Goal: Plan of Care Review  Outcome: Ongoing, Progressing  Goal: Patient-Specific Goal (Individualized)  Outcome: Ongoing, Progressing  Goal: Absence of Hospital-Acquired Illness or Injury  Outcome: Ongoing, Progressing  Goal: Optimal Comfort and Wellbeing  Outcome: Ongoing, Progressing  Goal: Readiness for Transition of Care  Outcome: Ongoing, Progressing     Problem: Bariatric Environmental Safety  Goal: Safety Maintained with Care  Outcome: Ongoing, Progressing     Problem: Activity Intolerance (Cardiovascular Surgery)  Goal: Improved Activity Tolerance  Outcome: Ongoing, Progressing     Problem: Adjustment to Surgery (Cardiovascular Surgery)  Goal: Optimal Coping with Heart Surgery  Outcome: Ongoing, Progressing     Problem: Bleeding (Cardiovascular Surgery)  Goal: Bleeding (Cardiovascular Surgery)  Outcome: Ongoing, Progressing     Problem: Bowel Motility Impaired (Cardiovascular Surgery)  Goal: Effective Bowel Elimination (Cardiovascular Surgery)  Outcome: Ongoing, Progressing     Problem: Cardiac Function Impaired (Cardiovascular Surgery)  Goal: Effective Cardiac Function  Outcome: Ongoing, Progressing     Problem: Cerebral Tissue Perfusion (Cardiovascular Surgery)  Goal: Optimal Cerebral Tissue Perfusion (Cardiovascular Surgery)  Outcome: Ongoing, Progressing     Problem: Fluid and Electrolyte Imbalance (Cardiovascular Surgery)  Goal: Fluid and Electrolyte Balance (Cardiovascular Surgery)  Outcome: Ongoing, Progressing     Problem: Glycemic Control Impaired (Cardiovascular Surgery)  Goal: Blood Glucose Level Within Targeted Range (Cardiovascular  Surgery)  Outcome: Ongoing, Progressing     Problem: Infection (Cardiovascular Surgery)  Goal: Absence of Infection Signs and Symptoms  Outcome: Ongoing, Progressing     Problem: Ongoing Anesthesia Effects (Cardiovascular Surgery)  Goal: Anesthesia/Sedation Recovery  Outcome: Ongoing, Progressing     Problem: Pain (Cardiovascular Surgery)  Goal: Acceptable Pain Control  Outcome: Ongoing, Progressing     Problem: Respiratory Compromise (Cardiovascular Surgery)  Goal: Effective Oxygenation and Ventilation (Cardiovascular Surgery)  Outcome: Ongoing, Progressing     Problem: Fall Injury Risk  Goal: Absence of Fall and Fall-Related Injury  Outcome: Ongoing, Progressing     Problem: Skin Injury Risk Increased  Goal: Skin Health and Integrity  Outcome: Ongoing, Progressing

## 2022-04-08 VITALS
BODY MASS INDEX: 48.64 KG/M2 | SYSTOLIC BLOOD PRESSURE: 118 MMHG | OXYGEN SATURATION: 98 % | WEIGHT: 264.31 LBS | HEIGHT: 62 IN | TEMPERATURE: 98 F | HEART RATE: 74 BPM | RESPIRATION RATE: 18 BRPM | DIASTOLIC BLOOD PRESSURE: 66 MMHG

## 2022-04-08 LAB
ALBUMIN SERPL BCP-MCNC: 3.3 G/DL (ref 3.5–5.2)
ALP SERPL-CCNC: 80 U/L (ref 55–135)
ALT SERPL W/O P-5'-P-CCNC: 21 U/L (ref 10–44)
ANION GAP SERPL CALC-SCNC: 10 MMOL/L (ref 8–16)
AST SERPL-CCNC: 25 U/L (ref 10–40)
BASOPHILS # BLD AUTO: 0.04 K/UL (ref 0–0.2)
BASOPHILS NFR BLD: 0.4 % (ref 0–1.9)
BILIRUB SERPL-MCNC: 1.1 MG/DL (ref 0.1–1)
BUN SERPL-MCNC: 18 MG/DL (ref 6–20)
CALCIUM SERPL-MCNC: 9.4 MG/DL (ref 8.7–10.5)
CHLORIDE SERPL-SCNC: 96 MMOL/L (ref 95–110)
CO2 SERPL-SCNC: 33 MMOL/L (ref 23–29)
CREAT SERPL-MCNC: 0.9 MG/DL (ref 0.5–1.4)
DIFFERENTIAL METHOD: ABNORMAL
EOSINOPHIL # BLD AUTO: 0.4 K/UL (ref 0–0.5)
EOSINOPHIL NFR BLD: 4.3 % (ref 0–8)
ERYTHROCYTE [DISTWIDTH] IN BLOOD BY AUTOMATED COUNT: 13.2 % (ref 11.5–14.5)
EST. GFR  (AFRICAN AMERICAN): >60 ML/MIN/1.73 M^2
EST. GFR  (NON AFRICAN AMERICAN): >60 ML/MIN/1.73 M^2
GLUCOSE SERPL-MCNC: 112 MG/DL (ref 70–110)
HCT VFR BLD AUTO: 32.2 % (ref 37–48.5)
HGB BLD-MCNC: 10.1 G/DL (ref 12–16)
IMM GRANULOCYTES # BLD AUTO: 0.07 K/UL (ref 0–0.04)
IMM GRANULOCYTES NFR BLD AUTO: 0.7 % (ref 0–0.5)
LYMPHOCYTES # BLD AUTO: 2.4 K/UL (ref 1–4.8)
LYMPHOCYTES NFR BLD: 25.2 % (ref 18–48)
MAGNESIUM SERPL-MCNC: 2.1 MG/DL (ref 1.6–2.6)
MCH RBC QN AUTO: 30.5 PG (ref 27–31)
MCHC RBC AUTO-ENTMCNC: 31.4 G/DL (ref 32–36)
MCV RBC AUTO: 97 FL (ref 82–98)
MONOCYTES # BLD AUTO: 0.8 K/UL (ref 0.3–1)
MONOCYTES NFR BLD: 8.8 % (ref 4–15)
NEUTROPHILS # BLD AUTO: 5.7 K/UL (ref 1.8–7.7)
NEUTROPHILS NFR BLD: 60.6 % (ref 38–73)
NRBC BLD-RTO: 0 /100 WBC
PHOSPHATE SERPL-MCNC: 4.1 MG/DL (ref 2.7–4.5)
PLATELET # BLD AUTO: 260 K/UL (ref 150–450)
PMV BLD AUTO: 10.9 FL (ref 9.2–12.9)
POTASSIUM SERPL-SCNC: 3.5 MMOL/L (ref 3.5–5.1)
PROT SERPL-MCNC: 6.6 G/DL (ref 6–8.4)
RBC # BLD AUTO: 3.31 M/UL (ref 4–5.4)
SODIUM SERPL-SCNC: 139 MMOL/L (ref 136–145)
WBC # BLD AUTO: 9.39 K/UL (ref 3.9–12.7)

## 2022-04-08 PROCEDURE — 63600175 PHARM REV CODE 636 W HCPCS: Performed by: THORACIC SURGERY (CARDIOTHORACIC VASCULAR SURGERY)

## 2022-04-08 PROCEDURE — 85025 COMPLETE CBC W/AUTO DIFF WBC: CPT | Performed by: NURSE PRACTITIONER

## 2022-04-08 PROCEDURE — 80053 COMPREHEN METABOLIC PANEL: CPT | Performed by: STUDENT IN AN ORGANIZED HEALTH CARE EDUCATION/TRAINING PROGRAM

## 2022-04-08 PROCEDURE — 27000221 HC OXYGEN, UP TO 24 HOURS

## 2022-04-08 PROCEDURE — 84100 ASSAY OF PHOSPHORUS: CPT | Performed by: NURSE PRACTITIONER

## 2022-04-08 PROCEDURE — 25000003 PHARM REV CODE 250: Performed by: STUDENT IN AN ORGANIZED HEALTH CARE EDUCATION/TRAINING PROGRAM

## 2022-04-08 PROCEDURE — 97530 THERAPEUTIC ACTIVITIES: CPT

## 2022-04-08 PROCEDURE — 25000003 PHARM REV CODE 250: Performed by: NURSE PRACTITIONER

## 2022-04-08 PROCEDURE — 25000003 PHARM REV CODE 250: Performed by: THORACIC SURGERY (CARDIOTHORACIC VASCULAR SURGERY)

## 2022-04-08 PROCEDURE — 94761 N-INVAS EAR/PLS OXIMETRY MLT: CPT

## 2022-04-08 PROCEDURE — 36415 COLL VENOUS BLD VENIPUNCTURE: CPT | Performed by: THORACIC SURGERY (CARDIOTHORACIC VASCULAR SURGERY)

## 2022-04-08 PROCEDURE — 99900035 HC TECH TIME PER 15 MIN (STAT)

## 2022-04-08 PROCEDURE — 83735 ASSAY OF MAGNESIUM: CPT | Performed by: THORACIC SURGERY (CARDIOTHORACIC VASCULAR SURGERY)

## 2022-04-08 PROCEDURE — 94660 CPAP INITIATION&MGMT: CPT

## 2022-04-08 RX ORDER — METOPROLOL SUCCINATE 100 MG/1
100 TABLET, EXTENDED RELEASE ORAL DAILY
Qty: 30 TABLET | Refills: 11 | Status: SHIPPED | OUTPATIENT
Start: 2022-04-09 | End: 2022-05-03 | Stop reason: SDUPTHER

## 2022-04-08 RX ORDER — OXYCODONE HYDROCHLORIDE 5 MG/1
5 TABLET ORAL EVERY 4 HOURS PRN
Qty: 42 TABLET | Refills: 0 | Status: SHIPPED | OUTPATIENT
Start: 2022-04-08 | End: 2022-05-01

## 2022-04-08 RX ORDER — FUROSEMIDE 80 MG/1
80 TABLET ORAL DAILY
Qty: 3 TABLET | Refills: 0 | Status: SHIPPED | OUTPATIENT
Start: 2022-04-08 | End: 2022-04-11

## 2022-04-08 RX ADMIN — METOPROLOL SUCCINATE 100 MG: 100 TABLET, EXTENDED RELEASE ORAL at 08:04

## 2022-04-08 RX ADMIN — ASPIRIN 325 MG ORAL TABLET 325 MG: 325 PILL ORAL at 08:04

## 2022-04-08 RX ADMIN — POTASSIUM BICARBONATE 25 MEQ: 978 TABLET, EFFERVESCENT ORAL at 08:04

## 2022-04-08 RX ADMIN — POLYETHYLENE GLYCOL 3350 17 G: 17 POWDER, FOR SOLUTION ORAL at 08:04

## 2022-04-08 RX ADMIN — ESCITALOPRAM OXALATE 10 MG: 5 TABLET, FILM COATED ORAL at 08:04

## 2022-04-08 RX ADMIN — ATORVASTATIN CALCIUM 40 MG: 20 TABLET, FILM COATED ORAL at 08:04

## 2022-04-08 RX ADMIN — FUROSEMIDE 80 MG: 10 INJECTION, SOLUTION INTRAMUSCULAR; INTRAVENOUS at 08:04

## 2022-04-08 RX ADMIN — ACETAMINOPHEN 650 MG: 325 TABLET ORAL at 05:04

## 2022-04-08 NOTE — PLAN OF CARE
Patient sitting on side of the bed during initial rounds without n/c oxygen in place, r/a say 87/88% O2 replaced at 2 l/min, reinforced education surrounding oxygen demand requirements, deep breathing exercises and necessity for supplemental O2 at this time, verbalized understanding.    Goals and plan of care reviewed and mutually agreed upon including: continue treatments as ordered, deep breathing exercises, increase diet and activity as tolerated, remain fever/fall/pain free. Walk in the halls and shower today. Patient encouraged to call for assistance with ambulation, bed in lowest and locked position, all belongings and call bell in reach.

## 2022-04-08 NOTE — PT/OT/SLP PROGRESS
Occupational Therapy      Patient Name:  Sonya Armendariz   MRN:  [id number]    Patient not seen today secondary to attempted to see pt twice.  On first attempt pt states that she wants to get up a little later and on second attempt pt states that she wanted PCT to assist c bathing and dressing. Will follow-up next OT visit.    4/8/2022

## 2022-04-08 NOTE — PLAN OF CARE
"Patient has been scheduled for a [facility] hospital follow up appointment with Sivakumar Wing MD on Tuesday April 12, 2022 at 1:30 PM.    Please arrive approximately 15 minutes before your scheduled appointment time and ensure that you have a valid government issued ID and your insurance card. ePre-Check is available and completion prior to your arrival will assist with a quicker registration process.     Three Options to Check-In for Your Appointment     With [facility] Mobile Check-In simply complete ePre-Check before your appointment and click "I'm Here" in the chente when you park.  Don't see the Mobile Check-In option? In some locations you can call from the parking area to let us know you've arrived. Just look for the banners with the phone number to call.  Or Visit the registration desk to check-in for your appointment.     Masks are required for all patients and visitors.    Please park in Lot C or D and use [place] [facility]. Take Medical Office Bldg elevators.     [facility]  [address], [facility] [address]   [place] [id number] [phone]                            Esteban Vela    Case Management  Ext. [phone]      "

## 2022-04-08 NOTE — PLAN OF CARE
04/08/22 1158   Post-Acute Status   Post-Acute Authorization Home Health   Home Health Status Referrals Sent     SW met with pt who voiced agreement with plan for HH with no preference of provider.  Referrals sent via [facility] to [facility] and [facility].    Myra King LMSW  [facility]  [phone]

## 2022-04-08 NOTE — NURSING
Patient is currently unable to maintain O2 SAT greater than 84% without oxygen. Patient O2 sat decreases with activity.  Patient 86% while on oxygen and ambulating in room.

## 2022-04-08 NOTE — NURSING
Discharge instructions and education provided to patient including: s/s of worsening condition, home oxygen necessity, use and equipment management, indication for new medications and s/s of adverse reactions. Patient made ready for discharge, PIV removed without trauma, ambulating in room denies sob/weakness/dizziness/pain. All belongings packed, medications delivered to the bedside, awaiting transport.

## 2022-04-08 NOTE — PLAN OF CARE
Problem: Physical Therapy  Goal: Physical Therapy Goal  Description: Goals to be met by: 2022    Patient will increase functional independence with mobility by performin. Supine to sit with CGA -met   Supine to sit with modified independence  2. Sit to stand transfer with Supervision -not met  3. Gait  x 250 feet with Supervision -not met  4. Ascend/descend 4 stair with Contact Guard Assistance -Met   Ascend/descned 4 steps with unilateral HR and supervision assistance     Outcome: Ongoing, Progressing   Updated POC to reflect patient progress. Continue with plan of care.   Agnes Bernal, PT  2022

## 2022-04-08 NOTE — PLAN OF CARE
"Per EPI Neil home O2 has been delivered to the bedside and pt reported to her that she is waiting for a Lyft to be set up to take her home.  After speaking with pt again, Jolynn reported that pt did not even attempt to call her family about transportation because the "doctor" had told her the hospital could get her transportation home via Lyft.  IVAN met with pt at bedside and explained that Lyft is not a good idea since pt now has oxygen so transportation would need to be via wheelchair van, which is likely to arrive for her no earlier than the middle of the night.  IVAN informed pt that if a wheelchair van was going to be set up for her that first she would need to contact her family to ensure that none of them were able to come pick her up.  Charge EPI Gonzalez notified of the above.    Myra King, IVAN  [facility]  [id number]    "

## 2022-04-08 NOTE — PLAN OF CARE
Problem: Adult Inpatient Plan of Care  Goal: Plan of Care Review  Outcome: Ongoing, Progressing  Goal: Patient-Specific Goal (Individualized)  Outcome: Ongoing, Progressing  Goal: Absence of Hospital-Acquired Illness or Injury  Outcome: Ongoing, Progressing  Goal: Optimal Comfort and Wellbeing  Outcome: Ongoing, Progressing  Goal: Readiness for Transition of Care  Outcome: Ongoing, Progressing     Problem: Bariatric Environmental Safety  Goal: Safety Maintained with Care  Outcome: Ongoing, Progressing     Problem: Activity Intolerance (Cardiovascular Surgery)  Goal: Improved Activity Tolerance  Outcome: Ongoing, Progressing     Problem: Adjustment to Surgery (Cardiovascular Surgery)  Goal: Optimal Coping with Heart Surgery  Outcome: Ongoing, Progressing     Problem: Bleeding (Cardiovascular Surgery)  Goal: Bleeding (Cardiovascular Surgery)  Outcome: Ongoing, Progressing     Problem: Bowel Motility Impaired (Cardiovascular Surgery)  Goal: Effective Bowel Elimination (Cardiovascular Surgery)  Outcome: Ongoing, Progressing   Cardiology Step Down. See progress note and flowsheet. Plan: Continue to monitor patient and report any abnormalities in labs, vitals signs, and body system functions to physician as indicated. Patient was given education on their disease process and medications.

## 2022-04-08 NOTE — PLAN OF CARE
04/08/22 1423   Post-Acute Status   Post-Acute Authorization Home Health   Home Health Status Set-up Complete/Auth obtained     Pt accepted by [facility] for admit [place] 4/10.  Treatment team notified.    Myra King LMSW  [facility]  [phone]

## 2022-04-08 NOTE — HPI
Ms. Armendariz is a pleasant 61 year old female smoker (1.5ppd x 45 years) with heart failure preserved ejection fraction (60% ejection fraction), coronary artery disease s/p LAD stent in 2012, hypertension, and BMI of 50 who presented with unstable angina, given Brilinta, and underwent coronary angiogram showing left main disease.  Coronary angiogram details: 60% left main disease with a moderate sized mid and distal LAD, 90% ostial LCx (dominant) with a moderate sized left posterolateral ventricular artery, small to moderate sized left PDA, and nondominant RCA.  The transthoracic echo shows 60% ejection fraction with no significant valvulopathy.       CT chest noncontrast shows minimal ascending aortic and severe aortic arch calcifications.  Carotid ultrasound showed nonsignificant disease.     Given the severity of disease and the symptoms, I recommend coronary artery bypass surgery x 3 (LIMA-LAD, SVG-LPL, SVG-LPDA).  The risks and benefits were explained and informed consent was obtained.

## 2022-04-08 NOTE — PT/OT/SLP PROGRESS
"Physical Therapy Treatment    Patient Name:  Sonya Armendariz   MRN:  [id number]    Recommendations:     Discharge Recommendations:  home with home health   Discharge Equipment Recommendations: none   Barriers to discharge: None    Assessment:     Sonya Armendariz is a 61 y.o. female admitted with a medical diagnosis of CAD (coronary artery disease).  She presents with the following impairments/functional limitations:  impaired functional mobilty, weakness, impaired endurance, impaired self care skills, gait instability, impaired cardiopulmonary response to activity. The patient demonstrates good activity tolerance and adherence to sternal precautions. Patient dependent on O2 with activity, on RA with gait 2 min, she desatted to 86%. Within 2 min standing rest break on 3L O2, sats increased back up to 96%. She ambulated 200' with no AD and stand by assistance, one standing rest break. Ascended/descended 4 steps with R HR and contact guard assist. She would benefit from HHPT to address the above deficits.     Rehab Prognosis: Good; patient would benefit from acute skilled PT services to address these deficits and reach maximum level of function.    Recent Surgery: Procedure(s) (LRB):  CORONARY ARTERY BYPASS GRAFT (CABG) (N/A)  SURGICAL PROCUREMENT, VEIN, ENDOSCOPIC (Left) 7 Days Post-Op    Plan:     During this hospitalization, patient to be seen 5 x/week to address the identified rehab impairments via gait training, therapeutic activities, therapeutic exercises, neuromuscular re-education and progress toward the following goals:    · Plan of Care Expires:  05/01/22    Subjective     Chief Complaint: "I'm doing good"  Patient/Family Comments/goals: ready to return home, see family  Pain/Comfort:  · Pain Rating 1: 0/10      Objective:     Communicated with RN prior to session.  Patient found HOB elevated with telemetry, peripheral IV upon PT entry to room.     General Precautions: Standard, fall, sternal "   Orthopedic Precautions:N/A   Braces: N/A  Respiratory Status: Nasal cannula, flow 3 L/min     Functional Mobility:    Bed Mobility  Supine to Sit on the R side:  contact guard assist, cues to avoid use of UE with bed mobility   Transfers Sit to Stand:  stand by assistance, no use of UE from edge of bed   Gait  Gait Distance: 200 ft with no AD, one standing rest break  Assistance Level: stand by assistance   Description: wide MIHIR, decreased gait speed, limited by dyspnea on RA, decreased weight shift   Stairs Ascended/descended: 4 stairs   Quality: step to, decreased gait speed, minimal reliance on R HR for balance  Level of assist: contact guard assist   Rails used: R HR      Seated rest on 3L O2, 96% spO2  Seated rest on RA 2 min, 91%  Gait 2 min on RA, 86%, dyspneic   Gait on 3L O2, 96%      AM-PAC 6 CLICK MOBILITY  Turning over in bed (including adjusting bedclothes, sheets and blankets)?: 4  Sitting down on and standing up from a chair with arms (e.g., wheelchair, bedside commode, etc.): 4  Moving from lying on back to sitting on the side of the bed?: 4  Moving to and from a bed to a chair (including a wheelchair)?: 4  Need to walk in hospital room?: 4  Climbing 3-5 steps with a railing?: 4  Basic Mobility Total Score: 24       Therapeutic Activities and Exercises:   Patient educated on role of therapy, goals of session, benefits of out of bed mobility. Patient agreeable to mobilize with therapy.      Patient recalled 3/3 sternal precautions, required occasional cues to maintain sternal precautions.     Gait training: cued for pacing, energy conservation, importance of frequent rest breaks     Patient educated on PT schedule.  Encouraged patient to ambulate, sit up in chair 3x/day to prevent deconditioning during hospitalization. Patient verbalized understanding and agreement not to mobilize without RN assist. Patient in agreement with PT POC, HHPT.      Patient left sitting edge of bed with all lines intact  and call button in reach..    GOALS:   Multidisciplinary Problems     Physical Therapy Goals        Problem: Physical Therapy    Goal Priority Disciplines Outcome Goal Variances Interventions   Physical Therapy Goal     PT, PT/OT Ongoing, Progressing     Description: Goals to be met by: 2022    Patient will increase functional independence with mobility by performin. Supine to sit with CGA -met   Supine to sit with modified independence  2. Sit to stand transfer with Supervision -not met  3. Gait  x 250 feet with Supervision -not met  4. Ascend/descend 4 stair with Contact Guard Assistance -Met   Ascend/descned 4 steps with unilateral HR and supervision assistance                      Time Tracking:     PT Received On: 22  PT Start Time: 1055     PT Stop Time: 1118  PT Total Time (min): 23 min     Billable Minutes: Therapeutic Activity 23    Treatment Type: Treatment  PT/PTA: PT     PTA Visit Number: 1     2022

## 2022-04-08 NOTE — HOSPITAL COURSE
On 4/1/2022 the patient was taken to the Operating Room for the above stated procedure. Please see the previously dictated operative report for complete details. Postoperatively, the patient was taken from the  Operating Room to the ICU where the vital signs were monitored and pain was kept under control. The patient was weaned from the drips and extubated in the ICU per protocol. Once hemodynamically stable, the patient was transferred to the Cardiac Step-Down floor for continued strengthening and ambulation. On postoperative day 7, the patient was ready for discharge to home. At the time of discharge, the patient was ambulating unassisted. Pain was well controlled with oral analgesics and the patient was tolerating the diet.  Patient will be discharged home with home health.  She is still requiring oxygen with activity and will be discharge home with home oxygen.      MOBILITY AND ACTIVITY: As tolerated. Patient may shower. No heavy lifting of greater than 5 pounds and no driving.     DIET: A Cardiac diet      WOUND CARE INSTRUCTIONS: Check for redness, swelling and drainage around the  incision or wound. Patient is to call for any obvious bleeding, drainage, pus from the wound, unusual problems or difficulties or temperature of greater than 101   degrees.     FOLLOWUP: Follow up with Dr. Howard in approximately 5 weeks. Prior to this  appointment, the patient will have an EKG.     Patient not placed on Ace-Inhibitor at the time of discharge due to potential for hypotension      DISCHARGE CONDITION: At the time of discharge, the patient was in sinus rhythm and afebrile with stable vital signs.

## 2022-04-08 NOTE — DISCHARGE SUMMARY
[facility] - Cardiology Stepdown  Cardiothoracic Surgery  Discharge Summary      Patient Name: Sonay Armendariz  MRN: [id number]  Admission Date: 4/1/2022  Hospital Length of Stay: 7 days  Discharge Date and Time:  04/08/2022 12:11 PM  Attending Physician: Leandro Howard MD   Discharging Provider: Natasha Marques NP  Primary Care Provider: Sivakumar Wing MD    HPI:   Ms. Armendariz is a pleasant 61 year old female smoker (1.5ppd x 45 years) with heart failure preserved ejection fraction (60% ejection fraction), coronary artery disease s/p LAD stent in 2012, hypertension, and BMI of 50 who presented with unstable angina, given Brilinta, and underwent coronary angiogram showing left main disease.  Coronary angiogram details: 60% left main disease with a moderate sized mid and distal LAD, 90% ostial LCx (dominant) with a moderate sized left posterolateral ventricular artery, small to moderate sized left PDA, and nondominant RCA.  The transthoracic echo shows 60% ejection fraction with no significant valvulopathy.       CT chest noncontrast shows minimal ascending aortic and severe aortic arch calcifications.  Carotid ultrasound showed nonsignificant disease.     Given the severity of disease and the symptoms, I recommend coronary artery bypass surgery x 3 (LIMA-LAD, SVG-LPL, SVG-LPDA).  The risks and benefits were explained and informed consent was obtained.          Procedure(s) (LRB):  CORONARY ARTERY BYPASS GRAFT (CABG) (N/A)  SURGICAL PROCUREMENT, VEIN, ENDOSCOPIC (Left)      Indwelling Lines/Drains at time of discharge:   Lines/Drains/Airways     None               Hospital Course: On 4/1/2022 the patient was taken to the Operating Room for the above stated procedure. Please see the previously dictated operative report for complete details. Postoperatively, the patient was taken from the  Operating Room to the ICU where the vital signs were monitored and pain was kept under control. The patient was weaned from  the drips and extubated in the ICU per protocol. Once hemodynamically stable, the patient was transferred to the Cardiac Step-Down floor for continued strengthening and ambulation. On postoperative day 7, the patient was ready for discharge to home. At the time of discharge, the patient was ambulating unassisted. Pain was well controlled with oral analgesics and the patient was tolerating the diet.  Patient will be discharged home with home health.  She is still requiring oxygen with activity and will be discharge home with home oxygen.      MOBILITY AND ACTIVITY: As tolerated. Patient may shower. No heavy lifting of greater than 5 pounds and no driving.     DIET: A Cardiac diet      WOUND CARE INSTRUCTIONS: Check for redness, swelling and drainage around the  incision or wound. Patient is to call for any obvious bleeding, drainage, pus from the wound, unusual problems or difficulties or temperature of greater than 101   degrees.     FOLLOWUP: Follow up with Dr. Howard in approximately 5 weeks. Prior to this  appointment, the patient will have an EKG.     Patient not placed on Ace-Inhibitor at the time of discharge due to potential for hypotension      DISCHARGE CONDITION: At the time of discharge, the patient was in sinus rhythm and afebrile with stable vital signs.         Goals of Care Treatment Preferences:  Code Status: Full Code      Consults (From admission, onward)        Status Ordering Provider     Inpatient consult to Midline team  Once        Provider:  (Not yet assigned)    Completed KADEEM SMITH     Consult to Endocrinology  Once        Provider:  (Not yet assigned)    Completed NIDIA GUARDADO     Consult Case Management/Social Work  Once        Provider:  (Not yet assigned)    Acknowledged NIDIA GUARDADO          Pending Diagnostic Studies:     None          No new Assessment & Plan notes have been filed under this hospital service since the last note was generated.  Service:  "Cardiothoracic Surgery    Final Active Diagnoses:    Diagnosis Date Noted POA    PRINCIPAL PROBLEM:  CAD (coronary artery disease) [I25.10]  Yes    Surgical wound present [T14.8XXA] 04/02/2022 Unknown    S/P CABG (coronary artery bypass graft) [Z95.1] 04/01/2022 Not Applicable    Transient hyperglycemia post procedure [R73.9] 04/01/2022 No    Obesity [E66.9]  Yes    Hyperlipidemia [E78.5]  Yes      Problems Resolved During this Admission:      Discharged Condition: stable    Disposition: Home-Health Care [facility] [place]    Follow Up:    Patient Instructions:      OXYGEN FOR HOME USE     Order Specific Question Answer Comments   Liter Flow 3    Duration Continuous    Qualifying Test Performed at: Activity    Oxygen saturation at rest 89    Oxygen saturation with activity 80    Oxygen saturation with activity on oxygen 90 3L   Portable mode: continuous    Route nasal cannula    Device: home concentrator with portable concentrator    Length of need (in months): 3 mos    Patient condition with qualifying saturation CHF    Height: 5' 2" (1.575 m)    Weight: 124.2 kg (273 lb 13 oz)    Alternative treatment measures have been tried or considered and deemed clinically ineffective. Yes      Ambulatory referral/consult to Home Health   Standing Status: Future   Referral Priority: Routine Referral Type: Home Health   Referral Reason: Specialty Services Required   Requested Specialty: Home Health Services   Number of Visits Requested: 1     Medications:  Reconciled Home Medications:      Medication List      START taking these medications    furosemide 80 MG tablet  Commonly known as: LASIX  Take 1 tablet (80 mg total) by mouth once daily. for 3 days     oxyCODONE 5 MG immediate release tablet  Commonly known as: ROXICODONE  Take 1 tablet (5 mg total) by mouth every 4 (four) hours as needed for Pain.     potassium bicarbonate disintegrating tablet  Commonly known as: K-LYTE  Take 1 tablet (25 mEq total) by mouth 2 (two) times daily. " for 3 days        CHANGE how you take these medications    metoprolol succinate 100 MG 24 hr tablet  Commonly known as: TOPROL-XL  Take 1 tablet (100 mg total) by mouth once daily.  Start taking on: April 9, 2022  What changed:   · medication strength  · how much to take        CONTINUE taking these medications    aspirin 325 MG tablet  Take 1 tablet (325 mg total) by mouth once daily.     atorvastatin 40 MG tablet  Commonly known as: LIPITOR  Take 1 tablet (40 mg total) by mouth once daily.     EScitalopram oxalate 10 MG tablet  Commonly known as: LEXAPRO  Take 1 tablet (10 mg total) by mouth once daily.     fluticasone propionate 50 mcg/actuation nasal spray  Commonly known as: FLONASE  SPRAY ONCE IN EACH NOSTRIL EVERY DAY     multivitamin per tablet  Commonly known as: THERAGRAN  Take 1 tablet by mouth once daily.     solifenacin 10 MG tablet  Commonly known as: VESICARE  TAKE 1 TABLET BY MOUTH ONCE DAILY FOR BLADDER URGENCY        STOP taking these medications    buPROPion 150 MG TBSR 12 hr tablet  Commonly known as: WELLBUTRIN SR     diclofenac sodium 1 % Gel  Commonly known as: VOLTAREN     losartan-hydrochlorothiazide 50-12.5 mg 50-12.5 mg per tablet  Commonly known as: HYZAAR     nicotine 21 mg/24 hr  Commonly known as: NICODERM CQ     nitroGLYCERIN 0.4 MG SL tablet  Commonly known as: NITROSTAT          Time spent on the discharge of patient: 40 minutes    Natasha Marques NP  Cardiothoracic Surgery  [facility] - Cardiology Stepdown

## 2022-04-08 NOTE — PLAN OF CARE
[facility] - Cardiology Stepdown  Discharge Final Note    Primary Care Provider: Sivakumar Wing MD    Expected Discharge Date: 4/8/2022    Final Discharge Note (most recent)     Final Note - 04/08/22 1632        Final Note    Assessment Type Final Discharge Note     Anticipated Discharge Disposition Home-Health Care [facility]     Hospital Resources/Appts/Education Provided Post-Acute resouces added to AVS;Appointments scheduled and added to AVS             Per RN Jolynn pt found her own ride home and has since left.      Contact Info     [facility]   Specialty: Home Health Services, Home Therapy Services, Home Living Aide Services    [address] [address]   Phone: [phone]       Next Steps: Follow up    Instructions: They will contact you to schedule date and time.          Myra King LMSW  [facility]  [phone]

## 2022-04-10 PROCEDURE — G0180 MD CERTIFICATION HHA PATIENT: HCPCS | Mod: ,,, | Performed by: THORACIC SURGERY (CARDIOTHORACIC VASCULAR SURGERY)

## 2022-04-10 PROCEDURE — G0180 PR HOME HEALTH MD CERTIFICATION: ICD-10-PCS | Mod: ,,, | Performed by: THORACIC SURGERY (CARDIOTHORACIC VASCULAR SURGERY)

## 2022-04-11 ENCOUNTER — PATIENT OUTREACH (OUTPATIENT)
Dept: ADMINISTRATIVE | Facility: CLINIC | Age: 62
End: 2022-04-11
Payer: COMMERCIAL

## 2022-04-11 NOTE — PROGRESS NOTES
C3 nurse spoke with Sonya Armendariz for a TCC post hospital discharge follow up call. The patient has a scheduled [facility] appointment with Sivakumar Wing MD on 4/12/2022 @ 4516.

## 2022-04-12 ENCOUNTER — OFFICE VISIT (OUTPATIENT)
Dept: FAMILY MEDICINE | Facility: CLINIC | Age: 62
End: 2022-04-12
Payer: COMMERCIAL

## 2022-04-12 VITALS
WEIGHT: 274.25 LBS | HEART RATE: 65 BPM | SYSTOLIC BLOOD PRESSURE: 116 MMHG | HEIGHT: 62 IN | TEMPERATURE: 98 F | OXYGEN SATURATION: 100 % | BODY MASS INDEX: 50.47 KG/M2 | DIASTOLIC BLOOD PRESSURE: 64 MMHG

## 2022-04-12 DIAGNOSIS — D64.9 POSTOPERATIVE ANEMIA: ICD-10-CM

## 2022-04-12 DIAGNOSIS — R73.01 IFG (IMPAIRED FASTING GLUCOSE): ICD-10-CM

## 2022-04-12 DIAGNOSIS — F32.1 CURRENT MODERATE EPISODE OF MAJOR DEPRESSIVE DISORDER WITHOUT PRIOR EPISODE: ICD-10-CM

## 2022-04-12 DIAGNOSIS — Z95.1 S/P CABG (CORONARY ARTERY BYPASS GRAFT): ICD-10-CM

## 2022-04-12 DIAGNOSIS — I25.10 CORONARY ARTERY DISEASE INVOLVING NATIVE CORONARY ARTERY OF NATIVE HEART, UNSPECIFIED WHETHER ANGINA PRESENT: Primary | ICD-10-CM

## 2022-04-12 DIAGNOSIS — I10 HYPERTENSION, UNSPECIFIED TYPE: ICD-10-CM

## 2022-04-12 PROCEDURE — 1159F PR MEDICATION LIST DOCUMENTED IN MEDICAL RECORD: ICD-10-PCS | Mod: CPTII,S$GLB,, | Performed by: FAMILY MEDICINE

## 2022-04-12 PROCEDURE — 3008F PR BODY MASS INDEX (BMI) DOCUMENTED: ICD-10-PCS | Mod: CPTII,S$GLB,, | Performed by: FAMILY MEDICINE

## 2022-04-12 PROCEDURE — 3044F PR MOST RECENT HEMOGLOBIN A1C LEVEL <7.0%: ICD-10-PCS | Mod: CPTII,S$GLB,, | Performed by: FAMILY MEDICINE

## 2022-04-12 PROCEDURE — 3074F SYST BP LT 130 MM HG: CPT | Mod: CPTII,S$GLB,, | Performed by: FAMILY MEDICINE

## 2022-04-12 PROCEDURE — 99999 PR PBB SHADOW E&M-EST. PATIENT-LVL IV: CPT | Mod: PBBFAC,,, | Performed by: FAMILY MEDICINE

## 2022-04-12 PROCEDURE — 99214 PR OFFICE/OUTPT VISIT, EST, LEVL IV, 30-39 MIN: ICD-10-PCS | Mod: S$GLB,,, | Performed by: FAMILY MEDICINE

## 2022-04-12 PROCEDURE — 3078F PR MOST RECENT DIASTOLIC BLOOD PRESSURE < 80 MM HG: ICD-10-PCS | Mod: CPTII,S$GLB,, | Performed by: FAMILY MEDICINE

## 2022-04-12 PROCEDURE — 3008F BODY MASS INDEX DOCD: CPT | Mod: CPTII,S$GLB,, | Performed by: FAMILY MEDICINE

## 2022-04-12 PROCEDURE — 99999 PR PBB SHADOW E&M-EST. PATIENT-LVL IV: ICD-10-PCS | Mod: PBBFAC,,, | Performed by: FAMILY MEDICINE

## 2022-04-12 PROCEDURE — 3078F DIAST BP <80 MM HG: CPT | Mod: CPTII,S$GLB,, | Performed by: FAMILY MEDICINE

## 2022-04-12 PROCEDURE — 99214 OFFICE O/P EST MOD 30 MIN: CPT | Mod: S$GLB,,, | Performed by: FAMILY MEDICINE

## 2022-04-12 PROCEDURE — 3044F HG A1C LEVEL LT 7.0%: CPT | Mod: CPTII,S$GLB,, | Performed by: FAMILY MEDICINE

## 2022-04-12 PROCEDURE — 1159F MED LIST DOCD IN RCRD: CPT | Mod: CPTII,S$GLB,, | Performed by: FAMILY MEDICINE

## 2022-04-12 PROCEDURE — 3074F PR MOST RECENT SYSTOLIC BLOOD PRESSURE < 130 MM HG: ICD-10-PCS | Mod: CPTII,S$GLB,, | Performed by: FAMILY MEDICINE

## 2022-04-12 NOTE — PROGRESS NOTES
Transitional Care Note  Subjective:       Patient ID: Sonya Armendariz is a 61 y.o. female.  Chief Complaint: No chief complaint on file.    Family and/or Caretaker present at visit?   Diagnostic tests reviewed/disposition: I have reviewed all completed as well as pending diagnostic tests at the time of discharge.  Disease/illness education: y  Home health/community services discussion/referrals:  Home established therapy.   Establishment or re-establishment of referral orders for community resources: No other necessary community resources.   Discussion with other health care providers: No discussion with other health care providers necessary.   HPI  Review of Systems    Objective:      Physical Exam    Assessment:       1. Coronary artery disease involving native coronary artery of native heart, unspecified whether angina present    2. S/P CABG (coronary artery bypass graft)        Plan:         (Portions of this note were dictated using voice recognition software and may contain dictation related errors in spelling/grammar/syntax not found on text review)    CC: hosp f/u    HPI: 61 y.o. female Admitted to hospital 03/18/2022 for chest pain, partially relieved with nitroglycerin.  Troponins negative.  EKG showing no STEMI.  Nuclear stress showed reversible defect.   left heart catheterization 322 showed extensive disease with 60% distal left main stenosis, 80% ostial left circumflex stenosis, 80% proximal left circumflex stenosis distal to om1 origin.  Cardiothoracic surgery consulted, stated that she should have outpatient CABG.  Started on aspirin 325 on discharge    Additional studies included CT chest showing no acute pulmonary abnormalities, although micro nodules noted 2-3 mm, recommending CT chest in 12 months.  Echo showed EF 60%, normal right ventricular size and right ventricular systolic function.  Carotid ultrasound did not show any significant carotid artery stenosis     Had subsequent 3 vessel  CABG on 04/01/2022 (LIMA-LAD, SVG-LPL, SVG-LPDA)    Current meds include Lipitor 40 mg daily, aspirin 325 mg daily, metoprolol 100 mg daily, Lexapro 10 mg daily for depression    Interval history:  Currently on O2.  Feeling better  Trying to active at home  Has not smoked since this incident  Does feel like she is probably eating more since she is off cigarettes    Past Medical History:   Diagnosis Date    Anticoagulant long-term use     CAD (coronary artery disease)     s/p LAD stent.  Had 3 vessel CABG done April 2022    CHF (congestive heart failure)     Current moderate episode of major depressive disorder without prior episode 1/28/2022    Gastroduodenitis     Helicobacter pylori (H. pylori) infection     Hyperlipidemia     Hypertension     MI (myocardial infarction)     x2    Obesity     Tobacco use     Urinary tract infection     Vaginal infection        Past Surgical History:   Procedure Laterality Date    ANGIOGRAM, CORONARY, WITH LEFT HEART CATHETERIZATION N/A 3/22/2022    Procedure: Angiogram, Coronary, with Left Heart Cath;  Surgeon: Giuseppe Merchant MD;  Location: [facility] CATH LAB;  Service: Cardiology;  Laterality: N/A;    COLONOSCOPY N/A 7/14/2017    Procedure: COLONOSCOPY;  Surgeon: Cathy Collier MD;  Location: [facility] ENDO;  Service: Endoscopy;  Laterality: N/A;    CORONARY ARTERY BYPASS GRAFT (CABG) N/A 4/1/2022    Procedure: CORONARY ARTERY BYPASS GRAFT (CABG);  Surgeon: Leandor Howard MD;  Location: [address];  Service: Cardiovascular;  Laterality: N/A;    ENDOSCOPIC HARVEST OF VEIN Left 4/1/2022    Procedure: SURGICAL PROCUREMENT, VEIN, ENDOSCOPIC;  Surgeon: Leandro Howard MD;  Location: [address];  Service: Cardiovascular;  Laterality: Left;  Vein [place] Start: 0808  Vein [place] End: 0913  Vein Prep Start: 0915  Vein Prep End: 0927    NO PAST SURGERIES         Family History   Problem Relation Age of Onset    Hypertension Other         multiple  "   Diabetes Other         multiple    Stomach cancer Mother     Cancer Father         unknown    Breast cancer Sister     Throat cancer Brother     Cancer Brother         x 2,. unknown    Coronary artery disease Cousin        Social History     Tobacco Use    Smoking status: Current Every Day Smoker     Packs/day: 1.00     Years: 40.00     Pack years: 40.00     Types: Cigarettes    Smokeless tobacco: Never Used    Tobacco comment: Restarted 1 .5 yrs. ago   Substance Use Topics    Alcohol use: No     Alcohol/week: 0.0 standard drinks    Drug use: No       Lab Results   Component Value Date    WBC 9.39 04/08/2022    HGB 10.1 (L) 04/08/2022    HCT 32.2 (L) 04/08/2022    MCV 97 04/08/2022     04/08/2022    CHOL 130 03/19/2022    TRIG 119 03/19/2022    HDL 32 (L) 03/19/2022    ALT 21 04/08/2022    AST 25 04/08/2022    BILITOT 1.1 (H) 04/08/2022    ALKPHOS 80 04/08/2022     04/08/2022    K 3.5 04/08/2022    CL 96 04/08/2022    CREATININE 0.9 04/08/2022    ESTGFRAFRICA >60.0 04/08/2022    EGFRNONAA >60.0 04/08/2022    CALCIUM 9.4 04/08/2022    ALBUMIN 3.3 (L) 04/08/2022    BUN 18 04/08/2022    CO2 33 (H) 04/08/2022    TSH 0.764 03/19/2022    INR 1.1 04/02/2022    HGBA1C 6.1 (H) 03/19/2022    LDLCALC 74.2 03/19/2022     (H) 04/08/2022         Hemoglobin (g/dL)   Date Value   04/08/2022 10.1 (L)   04/07/2022 10.0 (L)   04/06/2022 9.9 (L)   04/05/2022 9.9 (L)   04/04/2022 9.6 (L)   04/03/2022 9.7 (L)   04/02/2022 9.8 (L)   04/01/2022 11.2 (L)   03/22/2022 14.3   03/18/2022 14.2         Vital signs reviewed  Vitals:    04/12/22 1344   BP: 116/64   BP Location: Right arm   Patient Position: Sitting   BP Method: Medium (Manual)   Pulse: 65   Temp: 98.2 °F (36.8 °C)   TempSrc: Oral   SpO2: 100%   Weight: 124.4 kg (274 lb 4 oz)   Height: 5' 2" (1.575 m)       Wt Readings from Last 4 Encounters:   04/08/22 119.9 kg (264 lb 5.3 oz)   03/30/22 123 kg (271 lb 2.7 oz)   03/23/22 120.2 kg (265 lb) "   01/10/22 123.3 kg (271 lb 13.2 oz)       PE:   APPEARANCE: Well nourished, well developed, in no acute distress.    HEAD: Normocephalic, atraumatic.  EYES: PERRL. EOMI.   Conjunctivae noninjected.  EARS: TM's intact. Light reflex normal. No retraction or perforation  NOSE: Mucosa pink. Airway clear.  MOUTH & THROAT: No tonsillar enlargement. No pharyngeal erythema or exudate.   NECK: Supple with no cervical lymphadenopathy.  No carotid bruits.  No thyromegaly  CHEST: Good inspiratory effort. Lungs clear to auscultation with no wheezes or crackles.  CARDIOVASCULAR: Normal S1, S2. No rubs, murmurs, or gallops.  ABDOMEN: Bowel sounds normal. Not distended. Soft. No tenderness or masses. No organomegaly.  EXTREMITIES: No edema, cyanosis, or clubbing.  Skin:  Sternal and left leg wound from vein harvest look intact without any sign of infection    IMPRESSION  1. Coronary artery disease involving native coronary artery of native heart, unspecified whether angina present    2. S/P CABG (coronary artery bypass graft)    3. Hypertension, unspecified type    4. IFG (impaired fasting glucose)    5. Current moderate episode of major depressive disorder without prior episode    6. Postoperative anemia            PLAN    Blood pressure stable.  Stable on her current medication management    Encourage activity as tolerated, follow-up with Cardiothoracic surgery at upcoming appointment.  Discuss she may be enrolled in formal cardiac rehab.    Advised on conscious effort at dietary modification and portion reduction especially since she has recently quit smoking    Offered smoking cessation referral for support although she declines    Depression stable on Lexapro    IFG on labs, continue to monitor with lifestyle change    Postop anemia stable    Follow-up in 3 months      HEALTH SCREENINGS  Immunizations:  Tdap 2012, can repeat at next visit  Offered PVX but pt declines. Counseling provided   CoVID:  YESSENIA/YESSENIA 03/05/2021, booster with  J/J on 10/27/2021    Age/Gender Appropriate screenings:  MMG 03/14/2022, left breast calcifications middle 12 o'clock position, or diagnostic mammogram scheduled  GYN:  Pap 2022 Dr. Vaz , negative Co test

## 2022-04-15 ENCOUNTER — HOSPITAL ENCOUNTER (EMERGENCY)
Facility: HOSPITAL | Age: 62
Discharge: HOME | End: 2022-04-15
Attending: EMERGENCY MEDICINE
Payer: COMMERCIAL

## 2022-04-15 ENCOUNTER — NURSE TRIAGE (OUTPATIENT)
Dept: ADMINISTRATIVE | Facility: CLINIC | Age: 62
End: 2022-04-15
Payer: COMMERCIAL

## 2022-04-15 VITALS
RESPIRATION RATE: 25 BRPM | TEMPERATURE: 98 F | HEART RATE: 65 BPM | DIASTOLIC BLOOD PRESSURE: 79 MMHG | SYSTOLIC BLOOD PRESSURE: 127 MMHG | BODY MASS INDEX: 49.93 KG/M2 | OXYGEN SATURATION: 99 % | WEIGHT: 273 LBS

## 2022-04-15 DIAGNOSIS — I50.9 ACUTE CONGESTIVE HEART FAILURE, UNSPECIFIED HEART FAILURE TYPE: Primary | ICD-10-CM

## 2022-04-15 DIAGNOSIS — R06.02 SHORTNESS OF BREATH: ICD-10-CM

## 2022-04-15 LAB
ALBUMIN SERPL BCP-MCNC: 3.4 G/DL (ref 3.5–5.2)
ALP SERPL-CCNC: 90 U/L (ref 55–135)
ALT SERPL W/O P-5'-P-CCNC: 20 U/L (ref 10–44)
ANION GAP SERPL CALC-SCNC: 10 MMOL/L (ref 8–16)
AST SERPL-CCNC: 18 U/L (ref 10–40)
BASOPHILS # BLD AUTO: 0.04 K/UL (ref 0–0.2)
BASOPHILS NFR BLD: 0.6 % (ref 0–1.9)
BILIRUB SERPL-MCNC: 0.4 MG/DL (ref 0.1–1)
BNP SERPL-MCNC: 732 PG/ML (ref 0–99)
BUN SERPL-MCNC: 10 MG/DL (ref 6–20)
CALCIUM SERPL-MCNC: 9.1 MG/DL (ref 8.7–10.5)
CHLORIDE SERPL-SCNC: 110 MMOL/L (ref 95–110)
CO2 SERPL-SCNC: 24 MMOL/L (ref 23–29)
CREAT SERPL-MCNC: 1 MG/DL (ref 0.5–1.4)
DIFFERENTIAL METHOD: ABNORMAL
EOSINOPHIL # BLD AUTO: 0.3 K/UL (ref 0–0.5)
EOSINOPHIL NFR BLD: 4.1 % (ref 0–8)
ERYTHROCYTE [DISTWIDTH] IN BLOOD BY AUTOMATED COUNT: 13.9 % (ref 11.5–14.5)
EST. GFR  (AFRICAN AMERICAN): >60 ML/MIN/1.73 M^2
EST. GFR  (NON AFRICAN AMERICAN): >60 ML/MIN/1.73 M^2
GLUCOSE SERPL-MCNC: 112 MG/DL (ref 70–110)
HCT VFR BLD AUTO: 31.5 % (ref 37–48.5)
HGB BLD-MCNC: 9.8 G/DL (ref 12–16)
IMM GRANULOCYTES # BLD AUTO: 0.03 K/UL (ref 0–0.04)
IMM GRANULOCYTES NFR BLD AUTO: 0.4 % (ref 0–0.5)
LYMPHOCYTES # BLD AUTO: 2 K/UL (ref 1–4.8)
LYMPHOCYTES NFR BLD: 28.8 % (ref 18–48)
MCH RBC QN AUTO: 31.4 PG (ref 27–31)
MCHC RBC AUTO-ENTMCNC: 31.1 G/DL (ref 32–36)
MCV RBC AUTO: 101 FL (ref 82–98)
MONOCYTES # BLD AUTO: 0.6 K/UL (ref 0.3–1)
MONOCYTES NFR BLD: 8.4 % (ref 4–15)
NEUTROPHILS # BLD AUTO: 4.1 K/UL (ref 1.8–7.7)
NEUTROPHILS NFR BLD: 57.7 % (ref 38–73)
NRBC BLD-RTO: 0 /100 WBC
PLATELET # BLD AUTO: 251 K/UL (ref 150–450)
PMV BLD AUTO: 10.7 FL (ref 9.2–12.9)
POTASSIUM SERPL-SCNC: 4.1 MMOL/L (ref 3.5–5.1)
PROT SERPL-MCNC: 6.7 G/DL (ref 6–8.4)
RBC # BLD AUTO: 3.12 M/UL (ref 4–5.4)
SODIUM SERPL-SCNC: 144 MMOL/L (ref 136–145)
TROPONIN I SERPL DL<=0.01 NG/ML-MCNC: 0.02 NG/ML (ref 0–0.03)
WBC # BLD AUTO: 7.05 K/UL (ref 3.9–12.7)

## 2022-04-15 PROCEDURE — 85025 COMPLETE CBC W/AUTO DIFF WBC: CPT | Performed by: EMERGENCY MEDICINE

## 2022-04-15 PROCEDURE — 99284 EMERGENCY DEPT VISIT MOD MDM: CPT | Mod: 25

## 2022-04-15 PROCEDURE — 80053 COMPREHEN METABOLIC PANEL: CPT | Performed by: EMERGENCY MEDICINE

## 2022-04-15 PROCEDURE — 83880 ASSAY OF NATRIURETIC PEPTIDE: CPT | Performed by: EMERGENCY MEDICINE

## 2022-04-15 PROCEDURE — 84484 ASSAY OF TROPONIN QUANT: CPT | Performed by: EMERGENCY MEDICINE

## 2022-04-15 PROCEDURE — 25000003 PHARM REV CODE 250: Performed by: EMERGENCY MEDICINE

## 2022-04-15 RX ORDER — FUROSEMIDE 40 MG/1
40 TABLET ORAL DAILY
Qty: 30 TABLET | Refills: 0 | Status: SHIPPED | OUTPATIENT
Start: 2022-04-15 | End: 2022-05-19 | Stop reason: SDUPTHER

## 2022-04-15 RX ORDER — FUROSEMIDE 10 MG/ML
80 INJECTION INTRAMUSCULAR; INTRAVENOUS
Status: DISCONTINUED | OUTPATIENT
Start: 2022-04-15 | End: 2022-04-15 | Stop reason: HOSPADM

## 2022-04-15 RX ORDER — FUROSEMIDE 40 MG/1
80 TABLET ORAL
Status: COMPLETED | OUTPATIENT
Start: 2022-04-15 | End: 2022-04-15

## 2022-04-15 RX ADMIN — FUROSEMIDE 80 MG: 40 TABLET ORAL at 06:04

## 2022-04-15 NOTE — ED NOTES
"Attempted IV access, unsuccessful. Pt states, "I dont want to get stuck again." refusing 2nd IV access attempt. MD notified. Will order oral furosemide.   "

## 2022-04-15 NOTE — TELEPHONE ENCOUNTER
Swelling noted in Left Leg, this was her procedure leg. Post CABG 4/1. ,  nurse looked at leg and patient states she told her to call provider. Was sent home with 3 day dose of Lasix. The last day she took it was Monday. Swelling started yesterday. Trying to keep them propped up but is not working. Triage done- dispo see provider within 4 hours or PCP triage. Advised I would reach out to cardio surgeon. Verb understanding.     Page to Dr. Otoole.    3 pm, Second page to on call via , advised via card to always use cell phone. Dr. Arias called via cell. Report re post CABG 4/1 and swelling of graft leg, 3 days worth of Lasix given and patient request for more. Per Dr. Arias, patient needs to be assessed in the ED, can go the closest ED.    Patient called back and notified of Dr. Arias wish.she verb understanding. Instructed to call back for any further questions or concerns.     Reason for Disposition   [1] Thigh, calf, or ankle swelling AND [2] only 1 side   New or worsening leg swelling    Additional Information   Negative: SEVERE difficulty breathing (e.g., struggling for each breath, speaks in single words)   Negative: Looks like a broken bone or dislocated joint (e.g., crooked or deformed)   Negative: Sounds like a life-threatening emergency to the triager   Negative: Chest pain   Negative: Followed a leg injury   Negative: [1] Small area of swelling AND [2] followed an insect bite to the area   Negative: Swelling of one ankle joint   Negative: Swelling of knee is main symptom   Negative: Pregnant   Negative: Postpartum (from 0 to 6 weeks after delivery)   Negative: Difficulty breathing at rest   Negative: Entire foot is cool or blue in comparison to other side   Negative: [1] Can't walk or can barely walk AND [2] new-onset   Negative: [1] Difficulty breathing with exertion (e.g., walking) AND [2] new-onset or worsening   Negative: [1] Red area or streak AND [2] fever   Negative:  [1] Swelling is painful to touch AND [2] fever   Negative: [1] Cast on leg or ankle AND [2] now increased pain   Negative: Patient sounds very sick or weak to the triager   Negative: [1] Thigh or calf pain AND [2] only 1 side AND [3] present > 1 hour   Negative: [1] Red area or streak [2] large (> 2 in. or 5 cm)   Negative: SEVERE leg swelling (e.g., swelling extends above knee, entire leg is swollen, weeping fluid)    Protocols used: LEG SWELLING AND EDEMA-A-AH, POST-OP SYMPTOMS AND [id number]

## 2022-04-15 NOTE — ED NOTES
Patient identifiers verified and correct for Ms. Armendariz  C/C: leg swelling bilaterally  APPEARANCE: awake and alert in NAD.  SKIN: warm, dry and intact. No breakdown or bruising.  MUSCULOSKELETAL: Patient moving all extremities spontaneously, no obvious swelling or deformities noted. Ambulates independently.  RESPIRATORY: Denies shortness of breath.Respirations unlabored.   CARDIAC: Denies CP, 2+ distal pulses; no peripheral edema  ABDOMEN: S/ND/NT, Denies nausea  : voids spontaneously, denies difficulty  Neurologic: AAO x 4; follows commands equal strength in all extremities; denies numbness/tingling. Denies dizziness

## 2022-04-15 NOTE — ED PROVIDER NOTES
Encounter Date: 4/15/2022       History     Chief Complaint   Patient presents with    Leg Swelling     Bilateral leg edema x3 days and SOB on exertion for the past week. Reports having open heart surgery 2 weeks ago.      7-year-old female with history of coronary disease 2 weeks status post CABG presents for gradually worsening shortness of breath and lower extremity swelling for the past 3 days.  Patient states she was discharged with 3 days of diuretics however no further prescription was given.  She denies fever or cough.  She is experiencing mild chest discomfort today at the very top of her incision site.    The history is provided by the patient.     Review of patient's allergies indicates:   Allergen Reactions    Amoxicillin Diarrhea    Plavix [clopidogrel]      Past Medical History:   Diagnosis Date    Anticoagulant long-term use     CAD (coronary artery disease)     s/p LAD stent.  Had 3 vessel CABG done April 2022    CHF (congestive heart failure)     Current moderate episode of major depressive disorder without prior episode 1/28/2022    Gastroduodenitis     Helicobacter pylori (H. pylori) infection     Hyperlipidemia     Hypertension     MI (myocardial infarction)     x2    Obesity     Tobacco use     Urinary tract infection     Vaginal infection      Past Surgical History:   Procedure Laterality Date    ANGIOGRAM, CORONARY, WITH LEFT HEART CATHETERIZATION N/A 3/22/2022    Procedure: Angiogram, Coronary, with Left Heart Cath;  Surgeon: Giuseppe Merchant MD;  Location: [facility] CATH LAB;  Service: Cardiology;  Laterality: N/A;    COLONOSCOPY N/A 7/14/2017    Procedure: COLONOSCOPY;  Surgeon: Cathy Collier MD;  Location: [facility] ENDO;  Service: Endoscopy;  Laterality: N/A;    CORONARY ARTERY BYPASS GRAFT (CABG) N/A 4/1/2022    Procedure: CORONARY ARTERY BYPASS GRAFT (CABG);  Surgeon: Leandro Howard MD;  Location: [facility] OR [facility] FLR;  Service: Cardiovascular;  Laterality: N/A;     ENDOSCOPIC HARVEST OF VEIN Left 2022    Procedure: SURGICAL PROCUREMENT, VEIN, ENDOSCOPIC;  Surgeon: Leandro Howard MD;  Location: [facility] OR [address] [place] [address];  Service: Cardiovascular;  Laterality: Left;  Vein [place] Start: 808  Vein [place] End: 913  Vein Prep Start: 915  Vein Prep End: 927    NO PAST SURGERIES       Family History   Problem Relation Age of Onset    Hypertension Other         multiple    Diabetes Other         multiple    Stomach cancer Mother     Cancer Father         unknown    Breast cancer Sister     Throat cancer Brother     Cancer Brother         x 2,. unknown    Coronary artery disease Cousin      Social History     Tobacco Use    Smoking status: Former Smoker     Packs/day: 1.00     Years: 40.00     Pack years: 40.00     Types: Cigarettes     Quit date: 3/22/2022     Years since quittin.0    Smokeless tobacco: Never Used    Tobacco comment: Restarted 1 .5 yrs. ago   Substance Use Topics    Alcohol use: No     Alcohol/week: 0.0 standard drinks    Drug use: No     Review of Systems   Constitutional: Negative for chills and fever.   HENT: Negative for congestion, ear pain, rhinorrhea and sore throat.    Eyes: Negative for visual disturbance.   Respiratory: Positive for shortness of breath. Negative for cough.    Cardiovascular: Positive for chest pain and leg swelling.   Gastrointestinal: Negative for abdominal pain, diarrhea, nausea and vomiting.   Genitourinary: Negative for dysuria and hematuria.   Musculoskeletal: Negative for arthralgias and myalgias.   Skin: Negative for pallor and rash.   Neurological: Negative for weakness, numbness and headaches.   All other systems reviewed and are negative.      Physical Exam     Initial Vitals [04/15/22 1658]   BP Pulse Resp Temp SpO2   133/68 66 (!) 22 98.3 °F (36.8 °C) 100 %      MAP       --         Physical Exam    Nursing note and vitals reviewed.  Constitutional: She appears well-developed and well-nourished. No  distress.   HENT:   Head: Normocephalic and atraumatic.   Mouth/Throat: Oropharynx is clear and moist.   Eyes: Conjunctivae and EOM are normal. Pupils are equal, round, and reactive to light.   Neck: Neck supple.   Normal range of motion.  Cardiovascular: Normal rate, regular rhythm and intact distal pulses.   Pulmonary/Chest: Breath sounds normal. No stridor. No respiratory distress. She exhibits tenderness.   Mild tenderness at the top of the incision site, no discharge from the wound is clean dry and intact.  No erythema.   Abdominal: Abdomen is soft. She exhibits no distension. There is no abdominal tenderness.   Musculoskeletal:         General: Normal range of motion.      Cervical back: Normal range of motion and neck supple.      Comments: Moving all extremities with grossly equal strength.  Mild symmetric bilateral lower extremity edema     Neurological: She is alert and oriented to person, place, and time.   CN 2-12 appear grossly intact   Skin: Skin is warm and dry.   Psychiatric: She has a normal mood and affect.         ED Course   Procedures  Labs Reviewed   CBC W/ AUTO DIFFERENTIAL - Abnormal; Notable for the following components:       Result Value    RBC 3.12 (*)     Hemoglobin 9.8 (*)     Hematocrit 31.5 (*)      (*)     MCH 31.4 (*)     MCHC 31.1 (*)     All other components within normal limits   COMPREHENSIVE METABOLIC PANEL - Abnormal; Notable for the following components:    Glucose 112 (*)     Albumin 3.4 (*)     All other components within normal limits   B-TYPE NATRIURETIC PEPTIDE - Abnormal; Notable for the following components:     (*)     All other components within normal limits   TROPONIN I     EKG Readings: (Independently Interpreted)   Initial Reading: No STEMI. Rhythm: Normal Sinus Rhythm. Heart Rate: 64. Ectopy: No Ectopy. Conduction: Normal. T Waves Flipped: V3, V2 and V1. Axis: Normal. Clinical Impression: Normal Sinus Rhythm       Imaging Results          X-Ray  Chest AP Portable (Final result)  Result time 04/15/22 17:43:34    Final result by Valdez Wilkinson MD (04/15/22 17:43:34)                 Impression:      Cardiomegaly without evidence of pulmonary edema.      Electronically signed by: Valdez Wilkinson MD  Date:    04/15/2022  Time:    17:43             Narrative:    EXAMINATION:  XR CHEST AP PORTABLE    CLINICAL HISTORY:  CHF;    TECHNIQUE:  Single frontal view of the chest was performed.    COMPARISON:  04/04/2022.    FINDINGS:  There has been interval removal of the right-sided IJ catheter.  There are postoperative changes of mediastinum.  The sternal wires are intact.  There is stable enlargement cardiomediastinal silhouette there is no evidence of free air beneath the hemidiaphragms.  There are no pleural effusions.  There is no evidence of a pneumothorax.  There is no evidence of pneumomediastinum.  No airspace opacity is present.  There are degenerative changes in the osseous structures.                              X-Rays:   Independently Interpreted Readings:   Other Readings:  Imaging Results          X-Ray Chest AP Portable (Final result)  Result time 04/15/22 17:43:34    Final result by Valdez Wilkinson MD (04/15/22 17:43:34)                 Impression:      Cardiomegaly without evidence of pulmonary edema.      Electronically signed by: Valdez Wilkinson MD  Date:    04/15/2022  Time:    17:43             Narrative:    EXAMINATION:  XR CHEST AP PORTABLE    CLINICAL HISTORY:  CHF;    TECHNIQUE:  Single frontal view of the chest was performed.    COMPARISON:  04/04/2022.    FINDINGS:  There has been interval removal of the right-sided IJ catheter.  There are   postoperative changes of mediastinum.  The sternal wires are intact.    There is stable enlargement cardiomediastinal silhouette there is no   evidence of free air beneath the hemidiaphragms.  There are no pleural   effusions.  There is no evidence of a pneumothorax.  There is no evidence   of pneumomediastinum.   No airspace opacity is present.  There are   degenerative changes in the osseous structures.                                Medications   furosemide injection 80 mg (80 mg Intravenous Not Given 4/15/22 1745)   furosemide tablet 80 mg (80 mg Oral Given 4/15/22 1803)     Medical Decision Making:   History:   Old Medical Records: I decided to obtain old medical records.  Initial Assessment:   Well-appearing nontoxic normal vitals no respiratory distress 100% on room air  Differential Diagnosis:   CHF, cardiorenal syndrome, pulmonary edema  Independently Interpreted Test(s):   I have ordered and independently interpreted EKG Reading(s) - see prior notes  Clinical Tests:   Lab Tests: Ordered and Reviewed  The following lab test(s) were unremarkable: CBC, CMP and Troponin       <> Summary of Lab: Mildly elevated BNP  Radiological Study: Ordered and Reviewed  Medical Tests: Reviewed and Ordered  ED Management:  Patient's labs and imaging reassuring no large pleural effusion or evidence of pulmonary edema.  Her vital signs remained stable without any respiratory distress.  Recommend restarting Lasix and following up with cardiology.  Return for worsening                      Clinical Impression:   Final diagnoses:  [R06.02] Shortness of breath  [I50.9] Acute congestive heart failure, unspecified heart failure type (Primary)          ED Disposition Condition    Discharge Stable        ED Prescriptions     Medication Sig Dispense Start Date End Date Auth. Provider    furosemide (LASIX) 40 MG tablet Take 1 tablet (40 mg total) by mouth once daily. 30 tablet 4/15/2022 4/15/2023 Joseph Brink, DO        Follow-up Information     Follow up With Specialties Details Why Contact Info Additional Information    David Garcia - Cardiology - [facility] Cardiology Call in 1 day  [address]  [facility] [address] [phone] Cardiology Services Clinics - 3rd floor    [place] - Emergency Dept Emergency Medicine  If  symptoms worsen [address] [facility] [address]  [phone]            Joseph Brink,   04/15/22 1843

## 2022-04-15 NOTE — ED TRIAGE NOTES
Pt presents to the ED with chief complaint of bilateral leg swelling. Pt states she had open heart surgery x2 weeks ago and is still recovering. Pt AAOx4. In NAD. Denies CP, SOB, or dizziness.

## 2022-04-19 ENCOUNTER — TELEPHONE (OUTPATIENT)
Dept: CARDIOTHORACIC SURGERY | Facility: CLINIC | Age: 62
End: 2022-04-19
Payer: COMMERCIAL

## 2022-04-19 NOTE — TELEPHONE ENCOUNTER
Contacted patient and provided contact # and fax to disability desk. Very satisfied. ----- Message from Jessica Ya sent at 4/19/2022  2:07 PM CDT -----  Regarding: employee needs paper work  Contact: [phone]  Patient is requesting a call back regarding sending in paper work for her employer. She needs the paperwork completed with information about her being in the hospital. How can she send this in?   Would the patient rather a call back or a response via [facility]?  call  Best Call Back Number:  [phone]   Additional Information:

## 2022-04-20 ENCOUNTER — PATIENT MESSAGE (OUTPATIENT)
Dept: CARDIOTHORACIC SURGERY | Facility: CLINIC | Age: 62
End: 2022-04-20
Payer: COMMERCIAL

## 2022-04-28 ENCOUNTER — PATIENT MESSAGE (OUTPATIENT)
Dept: CARDIOTHORACIC SURGERY | Facility: CLINIC | Age: 62
End: 2022-04-28
Payer: COMMERCIAL

## 2022-05-01 ENCOUNTER — HOSPITAL ENCOUNTER (EMERGENCY)
Facility: HOSPITAL | Age: 62
Discharge: HOME | End: 2022-05-01
Attending: EMERGENCY MEDICINE
Payer: COMMERCIAL

## 2022-05-01 VITALS
HEART RATE: 67 BPM | RESPIRATION RATE: 18 BRPM | DIASTOLIC BLOOD PRESSURE: 71 MMHG | SYSTOLIC BLOOD PRESSURE: 129 MMHG | OXYGEN SATURATION: 97 % | TEMPERATURE: 96 F

## 2022-05-01 DIAGNOSIS — R07.9 CHEST PAIN: Primary | ICD-10-CM

## 2022-05-01 LAB
ALBUMIN SERPL BCP-MCNC: 3.9 G/DL (ref 3.5–5.2)
ALP SERPL-CCNC: 94 U/L (ref 55–135)
ALT SERPL W/O P-5'-P-CCNC: 12 U/L (ref 10–44)
ANION GAP SERPL CALC-SCNC: 13 MMOL/L (ref 8–16)
AST SERPL-CCNC: 38 U/L (ref 10–40)
BASOPHILS # BLD AUTO: 0.04 K/UL (ref 0–0.2)
BASOPHILS NFR BLD: 0.6 % (ref 0–1.9)
BILIRUB SERPL-MCNC: 0.6 MG/DL (ref 0.1–1)
BNP SERPL-MCNC: 200 PG/ML (ref 0–99)
BUN SERPL-MCNC: 10 MG/DL (ref 6–20)
CALCIUM SERPL-MCNC: 10 MG/DL (ref 8.7–10.5)
CHLORIDE SERPL-SCNC: 105 MMOL/L (ref 95–110)
CO2 SERPL-SCNC: 22 MMOL/L (ref 23–29)
CREAT SERPL-MCNC: 1.1 MG/DL (ref 0.5–1.4)
DIFFERENTIAL METHOD: ABNORMAL
EOSINOPHIL # BLD AUTO: 0.6 K/UL (ref 0–0.5)
EOSINOPHIL NFR BLD: 8.9 % (ref 0–8)
ERYTHROCYTE [DISTWIDTH] IN BLOOD BY AUTOMATED COUNT: 13.5 % (ref 11.5–14.5)
EST. GFR  (AFRICAN AMERICAN): >60 ML/MIN/1.73 M^2
EST. GFR  (NON AFRICAN AMERICAN): 55 ML/MIN/1.73 M^2
GLUCOSE SERPL-MCNC: 98 MG/DL (ref 70–110)
HCT VFR BLD AUTO: 41.2 % (ref 37–48.5)
HGB BLD-MCNC: 13.3 G/DL (ref 12–16)
IMM GRANULOCYTES # BLD AUTO: 0.01 K/UL (ref 0–0.04)
IMM GRANULOCYTES NFR BLD AUTO: 0.2 % (ref 0–0.5)
LYMPHOCYTES # BLD AUTO: 2.8 K/UL (ref 1–4.8)
LYMPHOCYTES NFR BLD: 42.4 % (ref 18–48)
MCH RBC QN AUTO: 30.9 PG (ref 27–31)
MCHC RBC AUTO-ENTMCNC: 32.3 G/DL (ref 32–36)
MCV RBC AUTO: 96 FL (ref 82–98)
MONOCYTES # BLD AUTO: 0.6 K/UL (ref 0.3–1)
MONOCYTES NFR BLD: 8.9 % (ref 4–15)
NEUTROPHILS # BLD AUTO: 2.5 K/UL (ref 1.8–7.7)
NEUTROPHILS NFR BLD: 39 % (ref 38–73)
NRBC BLD-RTO: 0 /100 WBC
PLATELET # BLD AUTO: 255 K/UL (ref 150–450)
PMV BLD AUTO: 10.7 FL (ref 9.2–12.9)
POTASSIUM SERPL-SCNC: 5.3 MMOL/L (ref 3.5–5.1)
PROT SERPL-MCNC: 8 G/DL (ref 6–8.4)
RBC # BLD AUTO: 4.31 M/UL (ref 4–5.4)
SODIUM SERPL-SCNC: 140 MMOL/L (ref 136–145)
TROPONIN I SERPL DL<=0.01 NG/ML-MCNC: 0.01 NG/ML (ref 0–0.03)
TROPONIN I SERPL DL<=0.01 NG/ML-MCNC: 0.02 NG/ML (ref 0–0.03)
WBC # BLD AUTO: 6.49 K/UL (ref 3.9–12.7)

## 2022-05-01 PROCEDURE — 83880 ASSAY OF NATRIURETIC PEPTIDE: CPT | Performed by: NURSE PRACTITIONER

## 2022-05-01 PROCEDURE — 84484 ASSAY OF TROPONIN QUANT: CPT | Performed by: NURSE PRACTITIONER

## 2022-05-01 PROCEDURE — 80053 COMPREHEN METABOLIC PANEL: CPT | Performed by: NURSE PRACTITIONER

## 2022-05-01 PROCEDURE — 99285 EMERGENCY DEPT VISIT HI MDM: CPT | Mod: 25

## 2022-05-01 PROCEDURE — 93005 ELECTROCARDIOGRAM TRACING: CPT

## 2022-05-01 PROCEDURE — 85025 COMPLETE CBC W/AUTO DIFF WBC: CPT | Performed by: NURSE PRACTITIONER

## 2022-05-01 PROCEDURE — 93010 EKG 12-LEAD: ICD-10-PCS | Mod: ,,, | Performed by: INTERNAL MEDICINE

## 2022-05-01 PROCEDURE — 93010 ELECTROCARDIOGRAM REPORT: CPT | Mod: ,,, | Performed by: INTERNAL MEDICINE

## 2022-05-01 RX ORDER — ACETAMINOPHEN 500 MG
500 TABLET ORAL EVERY 6 HOURS PRN
COMMUNITY
End: 2022-07-12

## 2022-05-01 RX ORDER — ASPIRIN 325 MG
325 TABLET ORAL
Status: DISCONTINUED | OUTPATIENT
Start: 2022-05-01 | End: 2022-05-01

## 2022-05-01 NOTE — ED PROVIDER NOTES
"Encounter Date: 5/1/2022    SCRIBE #1 NOTE: I, Carlos Hernandez, am scribing for, and in the presence of, Erika Young NP.       History     Chief Complaint   Patient presents with    Chest Pain     Pt presents to ed with c/o having chest pain earlier today. Pt is post-op, open heart surgery on 4/1 (CABG X4), AND she would like to be evaluated for CP all across earlier today at 1257 that: "Felt similar to the pain I used to have before the surgery." Pt states her incision site, open to air, is just sore, but she denies having CP now.     Shortness of Breath     Since open heart surgery, pt has been Sob at rest and is on 3L of O2 at home.      Sonya Armendariz is a 61 y.o. female who  has a past medical history of Anticoagulant long-term use, CAD (coronary artery disease), CHF (congestive heart failure), Current moderate episode of major depressive disorder without prior episode (1/28/2022), Gastroduodenitis, Helicobacter pylori (H. pylori) infection, Hyperlipidemia, Hypertension, MI (myocardial infarction), Obesity, Tobacco use, Urinary tract infection, and Vaginal infection.    Patient presents to the ER with an evaluation of chest pain.  Patient reports having open heart surgery on Friday (04/01/22). Patient reports the last couple of days she has felt lightheaded. She reports at 1315 today, she was cooking when she had a sudden onset of chest pain and shortness of breath that lasted for 15 minutes. Patient reports a 0/10 pain level at this time and states she feels much better. Patient states her incision site feels sore which is normal to her. She denies any chest pain at this time. Associated symptoms include cough and rhinorrhea that started yesterday (04/30/22). She denies any fever, nausea, vomiting, diarrhea, urinary complications, or leg swelling. She reports taking halls cough drops and robitussin with relief. Patient is on 3 L of oxygen at home. Patient notes her next appointment with her " surgeon is Wednesday (05/11/22). Patient has been compliant with her medications, fluid pill, and aspirin 300 mg.         The history is provided by the patient and medical records.     Review of patient's allergies indicates:   Allergen Reactions    Amoxicillin Diarrhea    Plavix [clopidogrel]      Past Medical History:   Diagnosis Date    Anticoagulant long-term use     CAD (coronary artery disease)     s/p LAD stent.  Had 3 vessel CABG done April 2022    CHF (congestive heart failure)     Current moderate episode of major depressive disorder without prior episode 1/28/2022    Gastroduodenitis     Helicobacter pylori (H. pylori) infection     Hyperlipidemia     Hypertension     MI (myocardial infarction)     x2    Obesity     Tobacco use     Urinary tract infection     Vaginal infection      Past Surgical History:   Procedure Laterality Date    ANGIOGRAM, CORONARY, WITH LEFT HEART CATHETERIZATION N/A 3/22/2022    Procedure: Angiogram, Coronary, with Left Heart Cath;  Surgeon: Giuseppe Merchant MD;  Location: [facility] CATH LAB;  Service: Cardiology;  Laterality: N/A;    COLONOSCOPY N/A 7/14/2017    Procedure: COLONOSCOPY;  Surgeon: Cathy Collier MD;  Location: [facility] ENDO;  Service: Endoscopy;  Laterality: N/A;    CORONARY ARTERY BYPASS GRAFT (CABG) N/A 4/1/2022    Procedure: CORONARY ARTERY BYPASS GRAFT (CABG);  Surgeon: Leandro Howard MD;  Location: [address];  Service: Cardiovascular;  Laterality: N/A;    ENDOSCOPIC HARVEST OF VEIN Left 4/1/2022    Procedure: SURGICAL PROCUREMENT, VEIN, ENDOSCOPIC;  Surgeon: Leandro Howard MD;  Location: [address];  Service: Cardiovascular;  Laterality: Left;  Vein [place] Start: 0808  Vein [place] End: 0913  Vein Prep Start: 0915  Vein Prep End: 0927    NO PAST SURGERIES       Family History   Problem Relation Age of Onset    Hypertension Other         multiple    Diabetes Other         multiple    Stomach cancer Mother      Cancer Father         unknown    Breast cancer Sister     Throat cancer Brother     Cancer Brother         x 2,. unknown    Coronary artery disease Cousin      Social History     Tobacco Use    Smoking status: Former Smoker     Packs/day: 1.00     Years: 40.00     Pack years: 40.00     Types: Cigarettes     Quit date: 3/22/2022     Years since quittin.1    Smokeless tobacco: Never Used    Tobacco comment: Restarted 1 .5 yrs. ago   Substance Use Topics    Alcohol use: No     Alcohol/week: 0.0 standard drinks    Drug use: No     Review of Systems   Constitutional: Negative for fever.   HENT: Positive for rhinorrhea. Negative for sore throat.    Respiratory: Positive for cough and shortness of breath.    Cardiovascular: Negative for chest pain (no CP at this time) and leg swelling.   Gastrointestinal: Negative for abdominal pain, diarrhea, nausea and vomiting.   Genitourinary: Negative for dysuria.   Musculoskeletal: Negative for back pain.   Skin: Negative for rash.   Neurological: Positive for light-headedness. Negative for weakness.   Hematological: Does not bruise/bleed easily.   All other systems reviewed and are negative.      Physical Exam     Initial Vitals [22 1632]   BP Pulse Resp Temp SpO2   (!) 146/80 80 15 97.6 °F (36.4 °C) 98 %      MAP       --         Physical Exam    Nursing note and vitals reviewed.  Constitutional: She appears well-developed and well-nourished. She is Obese . She is active and cooperative. She is easily aroused.  Non-toxic appearance. She does not have a sickly appearance. She does not appear ill. No distress. Nasal cannula in place.   HENT:   Head: Normocephalic and atraumatic.   Eyes: Conjunctivae and EOM are normal.   Neck: Neck supple.   Normal range of motion.  Cardiovascular: Normal rate, regular rhythm and normal heart sounds.   Pulmonary/Chest: Effort normal and breath sounds normal. She exhibits tenderness. She exhibits no bony tenderness, no laceration,  no crepitus and no edema.   Well-approximated vertical chest surgical incision site. No drainage, signs of infection or dehiscence.     Abdominal: Abdomen is soft. Bowel sounds are normal.   Musculoskeletal:      Cervical back: Normal range of motion and neck supple.      Comments: Trace BLE edema.        Neurological: She is alert, oriented to person, place, and time and easily aroused. She has normal strength. GCS eye subscore is 4. GCS verbal subscore is 5. GCS motor subscore is 6.   Skin: Skin is warm, dry and intact. No rash noted. No pallor.   Left upper posterior thigh without drainage. Minimal tenderness to palpation.  No surrounding erythema.  Distal area of incision site with small superficial dehiscence.    Psychiatric: She has a normal mood and affect. Her speech is normal and behavior is normal. Judgment and thought content normal. Cognition and memory are normal.         ED Course   Procedures  Labs Reviewed   CBC W/ AUTO DIFFERENTIAL - Abnormal; Notable for the following components:       Result Value    Eos # 0.6 (*)     Eosinophil % 8.9 (*)     All other components within normal limits   COMPREHENSIVE METABOLIC PANEL - Abnormal; Notable for the following components:    Potassium 5.3 (*)     CO2 22 (*)     eGFR if non  55 (*)     All other components within normal limits   B-TYPE NATRIURETIC PEPTIDE - Abnormal; Notable for the following components:     (*)     All other components within normal limits   TROPONIN I   TROPONIN I          Imaging Results          X-Ray Chest AP Portable (Final result)  Result time 05/01/22 16:06:36    Final result by Toney Thomas MD (05/01/22 16:06:36)                 Impression:      As above.      Electronically signed by: Toney Thomas  Date:    05/01/2022  Time:    16:06             Narrative:    EXAMINATION:  XR CHEST AP PORTABLE    CLINICAL HISTORY:  Chest Pain;    TECHNIQUE:  Single frontal view of the chest was  performed.    COMPARISON:  04/15/2022    FINDINGS:  Stable cardiomediastinal silhouette.  Mediastinal structures are midline.  Sternotomy wires appear intact.    Lungs are symmetrically expanded.  No focal airspace opacity.  No evidence of pleural effusion or pneumothorax.    Visualized osseous structures appear intact.                                 Medications - No data to display  Medical Decision Making:   Differential Diagnosis:   Myocardial ischemia, pericarditis, pulmonary embolus, chest wall pain, pleural inflammation, pulmonary infectious causes, aortic dissection.     Clinical Tests:   Lab Tests: Ordered and Reviewed  Radiological Study: Ordered and Reviewed  Medical Tests: Ordered and Reviewed  ED Management:  The patient was on continuous cardiac monitoring while in the ED without significant ectopy or event. She had no CP or SOB while in the ED.  Trop negative X2.  K 5.3 but specimen was moderately hemolyzed.  Pt reports compliance with home medications.    CXR negative for acute changes.  Possible muscle spasm vs chest wall pain vs vasospasm.  Pt is not tachycardic or hypoxic to suggest PE.   Advised continuation of prescription medications and f/u with PCP or cardiology within 2-3 days.  Reviewed strict return precautions.   Pt states that she is ready for discharge and feels comfortable with outpatient f/u.  She verbalized understanding and compliance with my recommendations.      Reviewed with  who agrees with ED course and disposition.   Other:   I have discussed this case with another health care provider.          Scribe Attestation:   Scribe #1: I performed the above scribed service and the documentation accurately describes the services I performed. I attest to the accuracy of the note.               I, Erika Young SHANI, personally performed the services described in this documentation. All medical record entries made by the scribe were at my direction and in my presence.  I  have reviewed the chart and agree that the record reflects my personal performance and is accurate and complete.     ZEV Whitehead-BC  3:28 PM 05/01/2022  Clinical Impression:   Final diagnoses:  [R07.9] Chest pain (Primary)          ED Disposition Condition    Discharge Stable        ED Prescriptions     None        Follow-up Information     Follow up With Specialties Details Why Contact Info    Sivakumar Wing MD Family Medicine Schedule an appointment as soon as possible for a visit in 1 week  [address] [facility]  [address]  [facility] [id number] [phone]             ZEV Blackman  05/01/22 1920

## 2022-05-01 NOTE — PHARMACY MED REC
"    Admission Medication History     The home medication history was taken by Zoey Horton CPhT.    Medication history obtained from, Patient Verified    You may go to "Admission" then "Reconcile Home Medications" tabs to review and/or act upon these items.      The home medication list has been updated by the Pharmacy department.    Please read ALL comments highlighted in yellow.    Please address this information as you see fit.     Feel free to contact us if you have any questions or require assistance.      The medications listed below were removed from the home medication list.  Please reorder if appropriate:  Patient reports no longer taking the following medication(s):   Lexapro 10 mg   Oxycodone 5 mg        Zoey Horton CPhT.  Ext [phone]            .          "

## 2022-05-02 NOTE — DISCHARGE INSTRUCTIONS
Please continue your medications as prescribed.      Return to the ED if your condition changes, progresses, or if you have any concerns.      Thank you for choosing [facility]! We appreciate you coming to us for your medical care. We hope you feel better soon! Please come back to [facility] for all of your future medical needs.      Our goal in the emergency department is to always give you outstanding care and exceptional service. You may receive a survey by mail or e-mail in the next week regarding your experience in our ED. We would greatly appreciate your completing and returning the survey. Your feedback provides us with a way to recognize our staff who give very good care and it helps us learn how to improve when your experience was below our aspiration of excellence.      Sincerely,  RUIZ Whitehead  [facility] [place] [facility]

## 2022-05-03 RX ORDER — METOPROLOL SUCCINATE 100 MG/1
100 TABLET, EXTENDED RELEASE ORAL DAILY
Qty: 30 TABLET | Refills: 11 | Status: SHIPPED | OUTPATIENT
Start: 2022-05-03 | End: 2023-05-23

## 2022-05-03 RX ORDER — ATORVASTATIN CALCIUM 40 MG/1
40 TABLET, FILM COATED ORAL DAILY
Qty: 90 TABLET | Refills: 0 | Status: SHIPPED | OUTPATIENT
Start: 2022-05-03 | End: 2022-06-22

## 2022-05-03 RX ORDER — ASPIRIN 325 MG
325 TABLET ORAL DAILY
Qty: 30 TABLET | Refills: 0 | Status: SHIPPED | OUTPATIENT
Start: 2022-05-03 | End: 2022-06-22

## 2022-05-03 NOTE — TELEPHONE ENCOUNTER
----- Message from Tegan Ac sent at 5/3/2022 11:10 AM CDT -----  Type:  RX Refill Request    Who Called:  Pt   Refill or New Rx: Refill   RX Name and Strength: aspirin 325 MG tablet, metoprolol succinate (TOPROL-XL) 100 MG 24 hr tablet, atorvastatin (LIPITOR) 40 MG tablet  How is the patient currently taking it? (ex. 1XDay): 1XDay   Is this a 30 day or 90 day RX: 30   Preferred Pharmacy with phone number: [id number] [place] [address] [address] AT [facility] [address]   Phone: [phone]  Fax:  [phone]  Would the patient rather a call back or a response via [facility]? Call back   Best Call Back Number: [phone]  Additional Information:

## 2022-05-03 NOTE — TELEPHONE ENCOUNTER
No new care gaps identified.  Powered by [employer]. Reference number: [id number].   5/03/2022 11:26:12 AM CDT

## 2022-05-04 ENCOUNTER — TELEPHONE (OUTPATIENT)
Dept: FAMILY MEDICINE | Facility: CLINIC | Age: 62
End: 2022-05-04
Payer: COMMERCIAL

## 2022-05-04 NOTE — TELEPHONE ENCOUNTER
----- Message from Gordy Edmond sent at 5/4/2022 10:57 AM CDT -----  Contact: [phone]/self  Who Called: [facility]   Regarding: he needs verbal orders to continue home therapy   Would the patient rather a call back or a response via [facility]? Call back  Best Call Back Number: [phone]  Additional Information: n/a

## 2022-05-11 ENCOUNTER — DOCUMENTATION ONLY (OUTPATIENT)
Dept: CARDIOTHORACIC SURGERY | Facility: CLINIC | Age: 62
End: 2022-05-11
Payer: COMMERCIAL

## 2022-05-11 ENCOUNTER — HOSPITAL ENCOUNTER (OUTPATIENT)
Dept: CARDIOLOGY | Facility: CLINIC | Age: 62
Discharge: HOME | End: 2022-05-11
Payer: COMMERCIAL

## 2022-05-11 ENCOUNTER — OFFICE VISIT (OUTPATIENT)
Dept: CARDIOTHORACIC SURGERY | Facility: CLINIC | Age: 62
End: 2022-05-11
Payer: COMMERCIAL

## 2022-05-11 VITALS
OXYGEN SATURATION: 93 % | WEIGHT: 271.19 LBS | BODY MASS INDEX: 49.9 KG/M2 | SYSTOLIC BLOOD PRESSURE: 147 MMHG | HEIGHT: 62 IN | DIASTOLIC BLOOD PRESSURE: 78 MMHG | HEART RATE: 78 BPM

## 2022-05-11 DIAGNOSIS — Z95.1 S/P CABG (CORONARY ARTERY BYPASS GRAFT): Primary | ICD-10-CM

## 2022-05-11 DIAGNOSIS — I25.110 CORONARY ARTERY DISEASE INVOLVING NATIVE CORONARY ARTERY OF NATIVE HEART WITH UNSTABLE ANGINA PECTORIS: ICD-10-CM

## 2022-05-11 PROCEDURE — 99024 PR POST-OP FOLLOW-UP VISIT: ICD-10-PCS | Mod: S$GLB,,, | Performed by: THORACIC SURGERY (CARDIOTHORACIC VASCULAR SURGERY)

## 2022-05-11 PROCEDURE — 3044F PR MOST RECENT HEMOGLOBIN A1C LEVEL <7.0%: ICD-10-PCS | Mod: CPTII,S$GLB,, | Performed by: THORACIC SURGERY (CARDIOTHORACIC VASCULAR SURGERY)

## 2022-05-11 PROCEDURE — 99999 PR PBB SHADOW E&M-EST. PATIENT-LVL II: ICD-10-PCS | Mod: PBBFAC,,, | Performed by: THORACIC SURGERY (CARDIOTHORACIC VASCULAR SURGERY)

## 2022-05-11 PROCEDURE — 99999 PR PBB SHADOW E&M-EST. PATIENT-LVL II: CPT | Mod: PBBFAC,,, | Performed by: THORACIC SURGERY (CARDIOTHORACIC VASCULAR SURGERY)

## 2022-05-11 PROCEDURE — 3077F SYST BP >= 140 MM HG: CPT | Mod: CPTII,S$GLB,, | Performed by: THORACIC SURGERY (CARDIOTHORACIC VASCULAR SURGERY)

## 2022-05-11 PROCEDURE — 93005 EKG 12-LEAD: ICD-10-PCS | Mod: S$GLB,,, | Performed by: THORACIC SURGERY (CARDIOTHORACIC VASCULAR SURGERY)

## 2022-05-11 PROCEDURE — 3078F PR MOST RECENT DIASTOLIC BLOOD PRESSURE < 80 MM HG: ICD-10-PCS | Mod: CPTII,S$GLB,, | Performed by: THORACIC SURGERY (CARDIOTHORACIC VASCULAR SURGERY)

## 2022-05-11 PROCEDURE — 99024 POSTOP FOLLOW-UP VISIT: CPT | Mod: S$GLB,,, | Performed by: THORACIC SURGERY (CARDIOTHORACIC VASCULAR SURGERY)

## 2022-05-11 PROCEDURE — 3008F BODY MASS INDEX DOCD: CPT | Mod: CPTII,S$GLB,, | Performed by: THORACIC SURGERY (CARDIOTHORACIC VASCULAR SURGERY)

## 2022-05-11 PROCEDURE — 3078F DIAST BP <80 MM HG: CPT | Mod: CPTII,S$GLB,, | Performed by: THORACIC SURGERY (CARDIOTHORACIC VASCULAR SURGERY)

## 2022-05-11 PROCEDURE — 93010 EKG 12-LEAD: ICD-10-PCS | Mod: S$GLB,,, | Performed by: INTERNAL MEDICINE

## 2022-05-11 PROCEDURE — 93005 ELECTROCARDIOGRAM TRACING: CPT | Mod: S$GLB,,, | Performed by: THORACIC SURGERY (CARDIOTHORACIC VASCULAR SURGERY)

## 2022-05-11 PROCEDURE — 93010 ELECTROCARDIOGRAM REPORT: CPT | Mod: S$GLB,,, | Performed by: INTERNAL MEDICINE

## 2022-05-11 PROCEDURE — 3044F HG A1C LEVEL LT 7.0%: CPT | Mod: CPTII,S$GLB,, | Performed by: THORACIC SURGERY (CARDIOTHORACIC VASCULAR SURGERY)

## 2022-05-11 PROCEDURE — 3008F PR BODY MASS INDEX (BMI) DOCUMENTED: ICD-10-PCS | Mod: CPTII,S$GLB,, | Performed by: THORACIC SURGERY (CARDIOTHORACIC VASCULAR SURGERY)

## 2022-05-11 PROCEDURE — 3077F PR MOST RECENT SYSTOLIC BLOOD PRESSURE >= 140 MM HG: ICD-10-PCS | Mod: CPTII,S$GLB,, | Performed by: THORACIC SURGERY (CARDIOTHORACIC VASCULAR SURGERY)

## 2022-05-11 NOTE — PROGRESS NOTES
Patient seen and examined. Patient is progressively increasing activity. No significant complaints.     Sternum: stable, incision CDI  EKG: NSR     Assessment:  Double vessel coronary artery bypass grafting  ? Left internal mammary artery to the distal left anterior descending artery  ? Saphenous vein graft to the left posterolateral ventricular artery  Endoscopic saphenous vein harvest from the left lower extremity        Plan:  Can begin driving   Can begin cardiac rehab 6 weeks after operation   We will refer to cardiology to assume care         No scheduled appointment, RTC prn    I have seen the patient and reviewed the nurse practitioner's note above. I have personally interviewed and examined the patient at bedside and agree with the findings.     Ms. Armendariz is doing well after her double vessel coronary artery bypass surgery on April 1, 2022.  She is walking only a little each day but feeling stronger.  We had a long talk about increasing her daily ambulation with a goal of walking one mile daily.  She understands and will try to comply.  She can see me in clinic as needed.      Leandro Howard MD  Cardiothoracic Surgery  [facility]

## 2022-05-11 NOTE — PROGRESS NOTES
Pt referred to Dr. Malloy to establish care s/p CABG.  Pt may advance to lifting, pushing, and pulling up to 20 pounds for 6 weeks, for a total of 12 weeks of restrictions.  Pt reminded to continue washing her incisions everyday with soap and water.  Pt declined referral to phase II cardiac rehab. Pt verbalized understanding of all instructions.

## 2022-05-11 NOTE — LETTER
[facility] - Cardiovasc Surg [facility]  [address]  [facility] [address]  Phone: [phone] May 11, 2022          Elvis Malloy III, MD  [address]  [facility] [address]    Patient: Sonya Armendariz   MR Number: [id number]   YOB: 1960   Date of Visit: 5/11/2022     Dear Dr. Malloy:     It was a pleasure seeing Ms. Armendairz in our follow-up clinic today after her double vessel coronary artery bypass surgery on April 1, 2022.  Her postoperative recovery was normal and she is doing excellent.  Her most recent echocardiogram showed good left ventricular function.  She is a mildly active individual who walks daily and has no respiratory issues.     On examination today, her vital signs are normal and EKG is sinus rhythm.  The incision has healed very well and the sternum is stable.  Lungs are clear bilaterally and there is no significant heart murmur.  There are no signs of heart failure or peripheral edema.  She did not have a prior cardiologist and I think you would be best to help her in the future.  She is now discharged from our clinic but please do not hesitate to contact me if there is any problem.     Thank you for your trust and confidence in our [facility].  For convenience, attached below is a copy of the operative report.     Kindest regards,    Leandro Howard MD  Cardiothoracic Surgery  [facility]  Cardiothoracic Surgery Operative Report     Patient Name:  Sonya Armendariz; [id number]     Preoperative Diagnosis: Coronary artery disease     Postoperative Diagnosis:  Same     Date of Operation:  04/01/2022      Operation:  Double vessel coronary artery bypass grafting  ? Left internal mammary artery to the distal left anterior descending artery  ? Saphenous vein graft to the left posterolateral ventricular artery  Endoscopic saphenous vein harvest from the left lower extremity     Surgeon:  Leandro  MD Lee     Assistant Surgeon:  none     Fellow:  Nevaeh Singh MD     Anesthesiologist:  Eugene Celis MD     ---------------------------------------------------------------------------------------------------------        Indications for surgery: Ms. Armendariz is a pleasant 61 year old female smoker (1.5ppd x 45 years) with heart failure preserved ejection fraction (60% ejection fraction), coronary artery disease s/p LAD stent in 2012, hypertension, and BMI of 50 who presented with unstable angina, given Brilinta, and underwent coronary angiogram showing left main disease.  Coronary angiogram details: 60% left main disease with a moderate sized mid and distal LAD, 90% ostial LCx (dominant) with a moderate sized left posterolateral ventricular artery, small to moderate sized left PDA, and nondominant RCA.  The transthoracic echo shows 60% ejection fraction with no significant valvulopathy.       CT chest noncontrast shows minimal ascending aortic and severe aortic arch calcifications.  Carotid ultrasound showed nonsignificant disease.     Given the severity of disease and the symptoms, I recommend coronary artery bypass surgery x 3 (LIMA-LAD, SVG-LPL, SVG-LPDA).  The risks and benefits were explained and informed consent was obtained.      Gross findings: No pericardial adhesions.  All targets much smaller than appeared on angiogram.  Small to moderate LAD target, small to moderate LPL target, small and nonbypassable PDA target.  Good biventricular function pre and post bypass.        Procedure:  The patient was brought to the holding area and the indications for surgery were reviewed.  Afterwards, the patient was placed supine on the operating room table and prepped and draped in the usual sterile fashion. A surgical time out was performed.      A midline incision was followed with division of the sternum. The left internal mammary artery was harvested. Simultaneously, two pieces of saphenous vein were harvested  "endoscopically from the leg. ACT guided heparinization was administered.  The ascending aorta and right atrium were cannulated.  Cardiopulmonary bypass was commenced.  The ascending aorta was cannulated for administration of cardioplegia.  A cross-clamp was applied and 1500cc of antegrade cold blood cardioplegia was administered. Subsequent doses of 500cc of antegrade cardioplegia were administered every 20 minutes.     Attention was turned to the left posterolateral ventricular artery. The heart was positioned and the vessel was exposed. The artery was identified and arteriotomy performed with an 11 blade scalpel and elongated with scissors.  A segment of vein was prepared for use.  An end to side anastomosis was constructed with continuous 7-0 prolene. The vein was infused with 100cc of cardioplegia to confirm patency and hemostasis.  Afterwards, an aortotomy was made and the proximal anastomosis was constructed with continuous 6-0 prolene suture.     Attention was next turned to the distal left anterior descending artery. The heart was repositioned and the LAD was identified. The vessel was opened and the arteriotomy elongated with scissors.  The left internal mammary artery was brought to the field and prepared for use.  The left internal mammary artery was sewn end to side to the LAD with continuous 7-0 prolene. The vessel was briefly unclamped to assess for hemostasis.      A dose of warm "hot shot" cardioplegia was given antegrade.  Air was evacuated and the cross-clamp was removed. All anastomoses were checked for hemostasis and the patient was weaned from bypass on a minimal amount of inotropic support.  The total cardiopulmonary bypass time was 57 minutes and cross clamp time was 40 minutes.  The cannulae were removed and heparin was reversed.  Temporary ventricular pacing wires were placed on the heart.  Drainage tubes were placed behind the sternum and in the pleural spaces. The chest was closed with " steel wires and the soft tissue was closed with absorbable suture.  A sterile dressing was applied and the patient was brought to the ICU in stable condition.       I was present in the operating room for the entire operation and immediately available thereafter.

## 2022-05-19 RX ORDER — FUROSEMIDE 40 MG/1
40 TABLET ORAL DAILY
Qty: 30 TABLET | Refills: 0 | Status: SHIPPED | OUTPATIENT
Start: 2022-05-19 | End: 2023-05-19

## 2022-05-19 NOTE — TELEPHONE ENCOUNTER
No new care gaps identified.  [facility] Embedded Care Gaps. Reference number: [id number]. 5/19/2022   11:35:04 AM ANTWONT

## 2022-05-19 NOTE — TELEPHONE ENCOUNTER
----- Message from Katie Berry sent at 5/19/2022 11:15 AM CDT -----  Type: Requesting to speak with nurse         Who Called: PT  Regarding:Pt needing refill on medication furosemide (LASIX) 40 MG tablet please advise    Would the patient rather a call back or a response via [facility]? Call back  Best Call Back Number: [phone]  Additional Information: [id number] [place] [address] [address] AT [facility] [address]

## 2022-05-27 ENCOUNTER — PATIENT MESSAGE (OUTPATIENT)
Dept: ADMINISTRATIVE | Facility: CLINIC | Age: 62
End: 2022-05-27
Payer: COMMERCIAL

## 2022-05-27 ENCOUNTER — HOSPITAL ENCOUNTER (EMERGENCY)
Facility: HOSPITAL | Age: 62
Discharge: HOME | End: 2022-05-27
Attending: EMERGENCY MEDICINE
Payer: COMMERCIAL

## 2022-05-27 VITALS
DIASTOLIC BLOOD PRESSURE: 82 MMHG | SYSTOLIC BLOOD PRESSURE: 140 MMHG | OXYGEN SATURATION: 100 % | HEART RATE: 98 BPM | TEMPERATURE: 99 F | RESPIRATION RATE: 20 BRPM

## 2022-05-27 DIAGNOSIS — U07.1 COVID-19: Primary | ICD-10-CM

## 2022-05-27 LAB
CTP QC/QA: YES
INFLUENZA A, MOLECULAR: NEGATIVE
INFLUENZA B, MOLECULAR: NEGATIVE
SARS-COV-2 RDRP RESP QL NAA+PROBE: POSITIVE
SPECIMEN SOURCE: NORMAL

## 2022-05-27 PROCEDURE — 87502 INFLUENZA DNA AMP PROBE: CPT | Performed by: PHYSICIAN ASSISTANT

## 2022-05-27 PROCEDURE — U0002 COVID-19 LAB TEST NON-CDC: HCPCS | Performed by: NURSE PRACTITIONER

## 2022-05-27 PROCEDURE — 99284 EMERGENCY DEPT VISIT MOD MDM: CPT | Mod: 25

## 2022-05-27 RX ORDER — FLUTICASONE PROPIONATE 50 MCG
1 SPRAY, SUSPENSION (ML) NASAL 2 TIMES DAILY PRN
Qty: 15 G | Refills: 0 | Status: SHIPPED | OUTPATIENT
Start: 2022-05-27

## 2022-05-27 RX ORDER — BENZONATATE 100 MG/1
100 CAPSULE ORAL 3 TIMES DAILY PRN
Qty: 20 CAPSULE | Refills: 0 | Status: SHIPPED | OUTPATIENT
Start: 2022-05-27 | End: 2022-06-06

## 2022-05-27 NOTE — FIRST PROVIDER EVALUATION
Emergency Department TeleTriage Encounter Note      CHIEF COMPLAINT    Chief Complaint   Patient presents with    Fever     Pt presents to ED today c/o temp of 101. And sinus issues onset yesterday pt reports taking OTC sinus medication with some relief        VITAL SIGNS   Initial Vitals [05/27/22 1451]   BP Pulse Resp Temp SpO2   (!) 140/82 98 20 99.2 °F (37.3 °C) 100 %      MAP       --            ALLERGIES    Review of patient's allergies indicates:   Allergen Reactions    Amoxicillin Diarrhea    Plavix [clopidogrel]        PROVIDER TRIAGE NOTE  This is a teletriage evaluation of a 61 y.o. female presenting to the ED complaining of cough. Patient reports URI symptoms and chest tightness for the past couple days. She also has had a fever.     Patient appears well and is in no apparent distress. She is speaking in complete sentences.    Initial orders will be placed and care will be transferred to an alternate provider when patient is roomed for a full evaluation. Any additional orders and the final disposition will be determined by that provider.           ORDERS  Labs Reviewed   INFLUENZA A & B BY MOLECULAR   SARS-COV-2 RDRP GENE       ED Orders (720h ago, onward)    Start Ordered     Status Ordering Provider    05/27/22 1455 05/27/22 1454  POCT COVID-19 Rapid Screening  Once         Ordered JAELYN WILLIAMSON    05/27/22 1455 05/27/22 1455  Airborne and Contact and Droplet Isolation Status  Continuous         Ordered WILLIAM OLIVA    05/27/22 1455 05/27/22 1455  Influenza A & B by Molecular  STAT         Ordered WILLIAM OLIVA    05/27/22 1455 05/27/22 1455  X-Ray Chest AP Portable  1 time imaging         Ordered WILLIAM OLIVA            Virtual Visit Note: The provider triage portion of this emergency department evaluation and documentation was performed via [employer], a HIPAA-compliant telemedicine application, in concert with a tele-presenter in the room. A face to face patient evaluation with one  of my colleagues will occur once the patient is placed in an emergency department room.      DISCLAIMER: This note was prepared with [employer] voice recognition transcription software. Garbled syntax, mangled pronouns, and other bizarre constructions may be attributed to that software system.

## 2022-05-27 NOTE — DISCHARGE INSTRUCTIONS

## 2022-05-27 NOTE — ED PROVIDER NOTES
Encounter Date: 5/27/2022       History     Chief Complaint   Patient presents with    Fever     Pt presents to ED today c/o temp of 101. And sinus issues onset yesterday pt reports taking OTC sinus medication with some relief      61-year-old female presents emergency room with reports of body aches, temperature up to 101, congestion and sinus pressure that began yesterday.  Patient reports that she was at a wedding over the past weekend and believes she may have come in contact with some body then who had COVID.  She denies chest pain or shortness of breath.  She denies hemoptysis.  No vomiting or diarrhea.  She is nontoxic in appearance at this time.  She has a past medical history of coronary artery disease with CABG completed on April 2022. Congestive heart failure, depression, mi, hypertension, hyperlipidemia, H pylori.  See physical examination.    The history is provided by the patient. No  was used.     Review of patient's allergies indicates:   Allergen Reactions    Amoxicillin Diarrhea    Plavix [clopidogrel]      Past Medical History:   Diagnosis Date    Anticoagulant long-term use     CAD (coronary artery disease)     s/p LAD stent.  Had 3 vessel CABG done April 2022    CHF (congestive heart failure)     Current moderate episode of major depressive disorder without prior episode 1/28/2022    Gastroduodenitis     Helicobacter pylori (H. pylori) infection     Hyperlipidemia     Hypertension     MI (myocardial infarction)     x2    Obesity     Tobacco use     Urinary tract infection     Vaginal infection      Past Surgical History:   Procedure Laterality Date    ANGIOGRAM, CORONARY, WITH LEFT HEART CATHETERIZATION N/A 3/22/2022    Procedure: Angiogram, Coronary, with Left Heart Cath;  Surgeon: Giuseppe Merchant MD;  Location: [facility] CATH LAB;  Service: Cardiology;  Laterality: N/A;    COLONOSCOPY N/A 7/14/2017    Procedure: COLONOSCOPY;  Surgeon: Cathy MAYORGA  MD Nando;  Location: [facility] ENDO;  Service: Endoscopy;  Laterality: N/A;    CORONARY ARTERY BYPASS GRAFT (CABG) N/A 2022    Procedure: CORONARY ARTERY BYPASS GRAFT (CABG);  Surgeon: Leandro Howard MD;  Location: [address];  Service: Cardiovascular;  Laterality: N/A;    ENDOSCOPIC HARVEST OF VEIN Left 2022    Procedure: SURGICAL PROCUREMENT, VEIN, ENDOSCOPIC;  Surgeon: Leandro Howard MD;  Location: [address];  Service: Cardiovascular;  Laterality: Left;  Vein [place] Start: 808  Vein [place] End: 913  Vein Prep Start: 915  Vein Prep End: 927    NO PAST SURGERIES       Family History   Problem Relation Age of Onset    Hypertension Other         multiple    Diabetes Other         multiple    Stomach cancer Mother     Cancer Father         unknown    Breast cancer Sister     Throat cancer Brother     Cancer Brother         x 2,. unknown    Coronary artery disease Cousin      Social History     Tobacco Use    Smoking status: Former Smoker     Packs/day: 1.00     Years: 40.00     Pack years: 40.00     Types: Cigarettes     Quit date: 3/22/2022     Years since quittin.1    Smokeless tobacco: Never Used    Tobacco comment: Restarted 1 .5 yrs. ago   Substance Use Topics    Alcohol use: No     Alcohol/week: 0.0 standard drinks    Drug use: No     Review of Systems   Constitutional: Positive for fever.   HENT: Positive for congestion and sinus pressure.    Respiratory: Positive for cough. Negative for shortness of breath.    Cardiovascular: Negative for chest pain.   Gastrointestinal: Negative for abdominal pain, diarrhea and vomiting.   Genitourinary: Negative for dysuria.   Musculoskeletal: Positive for myalgias. Negative for neck pain and neck stiffness.   Skin: Negative for rash and wound.       Physical Exam     Initial Vitals [22 1451]   BP Pulse Resp Temp SpO2   (!) 140/82 98 20 99.2 °F (37.3 °C) 100 %      MAP       --         Physical  Exam    Constitutional: She appears well-developed. She is Obese .  Non-toxic appearance.   HENT:   Head: Normocephalic.   Right Ear: Hearing and tympanic membrane normal.   Left Ear: Hearing and tympanic membrane normal.   Nose: Nose normal.   Mouth/Throat: Oropharynx is clear and moist.   Eyes: Lids are normal. Pupils are equal, round, and reactive to light.   Neck:   Normal range of motion.  Cardiovascular: Normal rate.   Pulmonary/Chest: Breath sounds normal. No respiratory distress. She has no wheezes. She has no rhonchi.   Abdominal: Abdomen is soft. There is no abdominal tenderness.   Musculoskeletal:         General: Normal range of motion.      Cervical back: Normal range of motion. No rigidity.     Neurological: She is alert and oriented to person, place, and time.   Skin: Skin is warm and dry. No rash noted.   Psychiatric: She has a normal mood and affect. Her behavior is normal. Judgment and thought content normal.         ED Course   Procedures  Labs Reviewed   SARS-COV-2 RDRP GENE - Abnormal; Notable for the following components:       Result Value    POC Rapid COVID Positive (*)     All other components within normal limits   INFLUENZA A & B BY MOLECULAR          Imaging Results          X-Ray Chest AP Portable (Final result)  Result time 05/27/22 15:39:05    Final result by Woo Hull MD (05/27/22 15:39:05)                 Impression:      See above      Electronically signed by: Woo Hull MD  Date:    05/27/2022  Time:    15:39             Narrative:    EXAMINATION:  XR CHEST AP PORTABLE    CLINICAL HISTORY:  productive cough;    TECHNIQUE:  Single frontal view of the chest was performed.    COMPARISON:  05/01/2022    FINDINGS:  Lungs are clear with no significant infiltrate or consolidation.  Wire sutures seen in the sternum.                                 Medications - No data to display  Medical Decision Making:   Clinical Tests:   Lab Tests: Ordered and Reviewed  Radiological  Study: Ordered and Reviewed             ED Course as of 05/27/22 1600   Fri May 27, 2022   1540 Covid +, chest xray pending.  [DT]   1552 Pt notified of the need for follow up care with pcp and the recommendations for antibody infusion. Pt is not toxic in appearance and is stable at this time for dc.  [DT]   1558 Strict return precautions given. [DT]      ED Course User Index  [DT] Shefali Chaidez NP             Clinical Impression:   Final diagnoses:  [U07.1] COVID-19 (Primary)          ED Disposition Condition    Discharge Stable        ED Prescriptions     Medication Sig Dispense Start Date End Date Auth. Provider    pulse oximeter (PULSE OXIMETER) device by Apply Externally route 2 (two) times a day. Use twice daily at 8 AM and 3 PM and record the value in [employer] as directed. 1 each 5/27/2022  Shefali Chaidez NP    benzonatate (TESSALON) 100 MG capsule Take 1 capsule (100 mg total) by mouth 3 (three) times daily as needed for Cough. 20 capsule 5/27/2022 6/6/2022 Shefali Chaidez NP    fluticasone propionate (FLONASE) 50 mcg/actuation nasal spray 1 spray (50 mcg total) by Each Nostril route 2 (two) times daily as needed. 15 g 5/27/2022  Shefali Chaidez NP        Follow-up Information     Follow up With Specialties Details Why Contact Info    Sivakumar Wing MD Family Medicine Schedule an appointment as soon as possible for a visit in 2 days  [address]  [address]  [facility] [address]  [phone]             Shefali Chaidez NP  05/27/22 1600

## 2022-05-27 NOTE — Clinical Note
"Sonya "Manjula Armendariz was seen and treated in our emergency department on 5/27/2022.     COVID-19 is present in our communities across the state. There is limited testing for COVID at this time, so not all patients can be tested. In this situation, your employee meets the following criteria:    Sonya Armendariz has met the criteria for COVID-19 testing and has a POSITIVE result. She can return to work once they are asymptomatic for 24 hours without the use of fever reducing medications AND at least five days from the first positive result. A mask is recommended for 5 days post quarantine.     If you have any questions or concerns, or if I can be of further assistance, please do not hesitate to contact me.    Sincerely,             Sajan Ansari MD"

## 2022-05-28 ENCOUNTER — NURSE TRIAGE (OUTPATIENT)
Dept: ADMINISTRATIVE | Facility: CLINIC | Age: 62
End: 2022-05-28
Payer: COMMERCIAL

## 2022-05-28 ENCOUNTER — PATIENT MESSAGE (OUTPATIENT)
Dept: ADMINISTRATIVE | Facility: OTHER | Age: 62
End: 2022-05-28
Payer: COMMERCIAL

## 2022-05-28 NOTE — TELEPHONE ENCOUNTER
Enrollment call assessment:    SPO2: 1  Pulse: 1  Temperature: afebrile  SOB at rest (0-5): 0  SOB with activity (0-5): 2  Worsening symptoms?: no  Fever Symptoms: no   Increased home oxygen?: 3L NC     Called patient to review COVID-19 Surveillance Program enrollment process.    Smartphone: yes    [facility]: yes    Program consent: yes     Pulse oximeter status: pending     Verified emergency contacts: yes    Program Overview: Reviewed , no response process, and importance of correct emergency contacts in event that well-being check is warranted. Patient will call [phone] 24/7 to speak with an OnCall nurse, if needed. Pt aware that if Sp02 less than or equal to 93% or if they have any worsening symptoms, they need to go to the emergency department. If they are having a medical emergency, they will call [phone]. Otherwise, patient will continue to submit data as scheduled. Reviewed importance of wearing mask if self or family members leave the house.     Patient had no further questions.          Reason for Disposition   [1] Follow-up call to recent contact AND [2] information only call, no triage required    Protocols used: INFORMATION ONLY CALL - NO TRIAGE-A-

## 2022-05-29 ENCOUNTER — PATIENT MESSAGE (OUTPATIENT)
Dept: ADMINISTRATIVE | Facility: CLINIC | Age: 62
End: 2022-05-29
Payer: COMMERCIAL

## 2022-05-29 ENCOUNTER — NURSE TRIAGE (OUTPATIENT)
Dept: ADMINISTRATIVE | Facility: CLINIC | Age: 62
End: 2022-05-29
Payer: COMMERCIAL

## 2022-05-29 ENCOUNTER — PATIENT MESSAGE (OUTPATIENT)
Dept: ADMINISTRATIVE | Facility: OTHER | Age: 62
End: 2022-05-29
Payer: COMMERCIAL

## 2022-05-29 NOTE — TELEPHONE ENCOUNTER
Pt called for escalation and spoke with the EC  And she said that she was doing better she wasn't home and not with the Pt told her to call us back if any issues of worsening SOB. Pt sent my chart message as well  Reason for Disposition   Message left on unidentified voice mail.  Phone number verified.    Protocols used: NO CONTACT OR DUPLICATE CONTACT CALL-A-    
Statement Selected

## 2022-05-29 NOTE — TELEPHONE ENCOUNTER
Patient will be picking pulse ox tomorrow, triage done- dispo home care. Instructed to call back for any further questions or concerns or worsening S/S. Verb understanding.     Reason for Disposition   [1] COVID-19 infection diagnosed or suspected AND [2] mild symptoms (fever, cough) AND [3] no trouble breathing or other complications    Additional Information   Negative: Severe difficulty breathing (e.g., struggling for each breath, speaks in single words)   Negative: Difficult to awaken or acting confused (e.g., disoriented, slurred speech)   Negative: Bluish (or gray) lips or face now   Negative: Shock suspected (e.g., cold/pale/clammy skin, too weak to stand, low BP, rapid pulse)   Negative: Sounds like a life-threatening emergency to the triager   Negative: [1] COVID-19 suspected (e.g., cough, fever, shortness of breath) AND [2] mild symptoms AND [3] public health department recommends testing   Negative: [1] COVID-19 exposure AND [2] no symptoms   Negative: COVID-19 and Breastfeeding, questions about   Negative: SEVERE or constant chest pain (Exception: mild central chest pain, present only when coughing)   Negative: MODERATE difficulty breathing (e.g., speaks in phrases, SOB even at rest, pulse 100-120)   Negative: Patient sounds very sick or weak to the triager   Negative: MILD difficulty breathing (e.g., minimal/no SOB at rest, SOB with walking, pulse <100)   Negative: Chest pain   Negative: Fever > 103 F (39.4 C)   Negative: [1] Fever > 101 F (38.3 C) AND [2] age > 60   Negative: [1] Fever > 100.0 F (37.8 C) AND [2] bedridden (e.g., nursing home patient, CVA, chronic illness, recovering from surgery)   Negative: HIGH RISK patient (e.g., age > 64 years, diabetes, heart or lung disease, weak immune system)   Negative: Fever present > 3 days (72 hours)   Negative: [1] Fever returns after gone for over 24 hours AND [2] symptoms worse or not improved   Negative: [1] Continuous (nonstop)  coughing interferes with work or school AND [2] no improvement using cough treatment per protocol   Negative: Cough present > 3 weeks    Protocols used: CORONAVIRUS (COVID-19) - DIAGNOSED OR [id number]

## 2022-05-30 ENCOUNTER — NURSE TRIAGE (OUTPATIENT)
Dept: ADMINISTRATIVE | Facility: CLINIC | Age: 62
End: 2022-05-30
Payer: COMMERCIAL

## 2022-05-30 ENCOUNTER — PATIENT MESSAGE (OUTPATIENT)
Dept: ADMINISTRATIVE | Facility: CLINIC | Age: 62
End: 2022-05-30
Payer: COMMERCIAL

## 2022-05-30 NOTE — TELEPHONE ENCOUNTER
Pt escalated for no response, NT unable to make phone contact. As per program protocol all contact numbers called once and my chart message sent.NT did speak with EC, who reports pt has not picked up pulse ox but does not have worse symptoms.EC is not currently with pt but will have pt call OOC if sx worsen.     Reason for Disposition   Message left with person in household.    Protocols used: NO CONTACT OR DUPLICATE CONTACT CALL-A-

## 2022-05-30 NOTE — TELEPHONE ENCOUNTER
"Pt called for no response to cc push.    [email or web address] text sent:    This inbox is not monitored 24/7, so please do not reply to this message unless you want to opt out of the surveillance program.    We are contacting you from [facility]'s Covid-19 Surveillance Program, as we did not receive your symptoms and vital signs earlier today. We are calling to make sure that you are okay. If you need anything, please call [facility] ([phone]) to speak with one of our nurses. If you are having a medical emergency, call 911 or go to your nearest emergency room. Thank you.    Participation in this program is voluntary. If you no longer want to be monitored by the Surveillance Program, there are two possible options for opting out:  1. If you are able to reply to this message, you can opt out of the program by replying "Opt out, or  2. Call [phone] from 8:00 am to 8:00 pm and ask to speak with a surveillance nurse.    This inbox is not monitored 24/7, so please do not reply to this message unless you want to opt out of the surveillance program. If you have questions or concerns, please call [phone]. If you are having a medical emergency, please proceed to your nearest emergency department or dial 911.    VM left for Pt.    Spoke to EC. She said that Pt tried to  her pulse ox but was unsuccessful. Education provided on how to place 1s as a place villanueva and to submit questionnaire. EC agrees to call Pt to relay message. Instructed EC to call ooc at [phone] if any questions or concerns arise. She agrees.  "

## 2022-05-31 ENCOUNTER — PATIENT MESSAGE (OUTPATIENT)
Dept: ADMINISTRATIVE | Facility: CLINIC | Age: 62
End: 2022-05-31
Payer: COMMERCIAL

## 2022-05-31 ENCOUNTER — NURSE TRIAGE (OUTPATIENT)
Dept: ADMINISTRATIVE | Facility: CLINIC | Age: 62
End: 2022-05-31
Payer: COMMERCIAL

## 2022-05-31 NOTE — TELEPHONE ENCOUNTER
3rd time surveillance pt escalates for no response.  Pt asked to be removed from surveillance program and declined enrollment in [facility] program.  Tasks removed.    Reason for Disposition   General information question, no triage required and triager able to answer question    Additional Information   Negative: [1] Caller is not with the adult (patient) AND [2] reporting urgent symptoms   Negative: Lab result questions   Negative: Medication questions   Negative: Caller can't be reached by phone   Negative: Caller has already spoken to PCP or another triager   Negative: RN needs further essential information from caller in order to complete triage   Negative: Requesting regular office appointment   Negative: [1] Caller requesting NON-URGENT health information AND [2] PCP's office is the best resource   Negative: Health Information question, no triage required and triager able to answer question    Protocols used: INFORMATION ONLY CALL - NO TRIAGE-A-

## 2022-06-03 ENCOUNTER — PATIENT MESSAGE (OUTPATIENT)
Dept: CARDIOLOGY | Facility: CLINIC | Age: 62
End: 2022-06-03
Payer: COMMERCIAL

## 2022-06-03 ENCOUNTER — DOCUMENT SCAN (OUTPATIENT)
Dept: HOME HEALTH SERVICES | Facility: HOSPITAL | Age: 62
End: 2022-06-03
Payer: COMMERCIAL

## 2022-06-13 ENCOUNTER — EXTERNAL HOME HEALTH (OUTPATIENT)
Dept: HOME HEALTH SERVICES | Facility: HOSPITAL | Age: 62
End: 2022-06-13
Payer: COMMERCIAL

## 2022-06-21 NOTE — PROGRESS NOTES
"       Cardiology Clinic Note  Reason for Visit: s/p CABG    HPI:     Soyna Armendariz is a 61 y.o. F, who presents for s/p CABG.    In 2012 prior to PCI, she felt like she was having "bronchitis."  [place] [facility] [place] for unstable angina. She was at work and had sudden onset sweating, followed by dyspnea.  She underwent LHC that revealed high grade LM and LCx disease (L dominant coronary circulation). She underwent 2v CABG on 4/1/22.   Echo with EF 60%. Carotid US with minimal carotid plaque. CT chest with aortic plaque, worst in arch.    She presents for follow up.  She has been sedentary since surgery.   She is gaining weight, depressed. Diet has worsened.  She was not on oxygen prior to surgery. Now on 3L NC.  LDL not at goal.  Has mild L>R LE edema. No orthopnea, PND.  No symptoms of angina, HF, arrhythmia, claudication.     Medical: CAD s/p MI s/p PCI to LAD (2012) s/p CABG (4/2022; LIMA-dLAD, SVG-L PL; SVG from L lower extremity), HTN, HLD, aortic arch atherosclerosis (CT)   Surgical: Reviewed, as below.  Family: Reviewed, as below. No premature CAD, HF, SCD.  Social: Reviewed, as below. Former smoker of 1 PPD x 40y, quit 3/2022.  Allergies/side effects: plavix (poor responder)    ROS:    Pertinent ROS included in HPI and below.  PMH:     Past Medical History:   Diagnosis Date    Anticoagulant long-term use     CAD (coronary artery disease)     s/p LAD stent.  Had 3 vessel CABG done April 2022    CHF (congestive heart failure)     Current moderate episode of major depressive disorder without prior episode 1/28/2022    Gastroduodenitis     Helicobacter pylori (H. pylori) infection     Hyperlipidemia     Hypertension     MI (myocardial infarction)     x2    Obesity     Tobacco use     Urinary tract infection     Vaginal infection      Past Surgical History:   Procedure Laterality Date    ANGIOGRAM, CORONARY, WITH LEFT HEART CATHETERIZATION N/A 3/22/2022    Procedure: Angiogram, Coronary, " with Left Heart Cath;  Surgeon: Gisueppe Merchant MD;  Location: [facility] CATH LAB;  Service: Cardiology;  Laterality: N/A;    COLONOSCOPY N/A 7/14/2017    Procedure: COLONOSCOPY;  Surgeon: Cathy Collier MD;  Location: [facility] ENDO;  Service: Endoscopy;  Laterality: N/A;    CORONARY ARTERY BYPASS GRAFT (CABG) N/A 4/1/2022    Procedure: CORONARY ARTERY BYPASS GRAFT (CABG);  Surgeon: Leandro Howard MD;  [facility];  Service: Cardiovascular;  Laterality: N/A;    ENDOSCOPIC HARVEST OF VEIN Left 4/1/2022    Procedure: SURGICAL PROCUREMENT, VEIN, ENDOSCOPIC;  Surgeon: Leandro Howard MD;  [facility];  Service: Cardiovascular;  Laterality: Left;  Vein Jones Start: 0808  Vein Jones End: 0913  Vein Prep Start: 0915  Vein Prep End: 0927    NO PAST SURGERIES       Allergies:     Review of patient's allergies indicates:   Allergen Reactions    Amoxicillin Diarrhea    Plavix [clopidogrel]      Medications:     Current Outpatient Medications on File Prior to Visit   Medication Sig Dispense Refill    acetaminophen (TYLENOL) 500 MG tablet Take 500 mg by mouth every 6 (six) hours as needed for Pain.      aspirin 325 MG tablet Take 1 tablet (325 mg total) by mouth once daily. 30 tablet 0    atorvastatin (LIPITOR) 40 MG tablet Take 1 tablet (40 mg total) by mouth once daily. 90 tablet 0    fluticasone propionate (FLONASE) 50 mcg/actuation nasal spray 1 spray (50 mcg total) by Each Nostril route 2 (two) times daily as needed. 15 g 0    furosemide (LASIX) 40 MG tablet Take 1 tablet (40 mg total) by mouth once daily. 30 tablet 0    metoprolol succinate (TOPROL-XL) 100 MG 24 hr tablet Take 1 tablet (100 mg total) by mouth once daily. 30 tablet 11    multivitamin (THERAGRAN) per tablet Take 1 tablet by mouth once daily.      pulse oximeter (PULSE OXIMETER) device by Apply Externally route 2 (two) times a day. Use twice daily at 8 AM and 3 PM and record the value in orderTopiahart as  "directed. 1 each 0    solifenacin (VESICARE) 10 MG tablet TAKE 1 TABLET BY MOUTH ONCE DAILY FOR BLADDER URGENCY 30 tablet 11    [DISCONTINUED] buPROPion (WELLBUTRIN SR) 150 MG TBSR 12 hr tablet TAKE 1 TABLET(150 MG) BY MOUTH TWICE DAILY 60 tablet 11    [DISCONTINUED] diclofenac sodium (VOLTAREN) 1 % Gel Apply 2 g topically 3 (three) times daily as needed. (Patient not taking: No sig reported) 100 g 3    [DISCONTINUED] losartan-hydrochlorothiazide 50-12.5 mg (HYZAAR) 50-12.5 mg per tablet Take 1 tablet by mouth once daily. 30 tablet 11    [DISCONTINUED] nitroGLYCERIN (NITROSTAT) 0.4 MG SL tablet PLACE 1 TABLET UNDER THE TONGUE EVERY 5 MINUTES AS NEEDED 25 tablet 0     No current facility-administered medications on file prior to visit.     Social History:     Social History     Tobacco Use    Smoking status: Former Smoker     Packs/day: 1.00     Years: 40.00     Pack years: 40.00     Types: Cigarettes     Quit date: 3/22/2022     Years since quittin.2    Smokeless tobacco: Never Used    Tobacco comment: Restarted 1 .5 yrs. ago   Substance Use Topics    Alcohol use: No     Alcohol/week: 0.0 standard drinks     Family History:     Family History   Problem Relation Age of Onset    Hypertension Other         multiple    Diabetes Other         multiple    Stomach cancer Mother     Cancer Father         unknown    Breast cancer Sister     Throat cancer Brother     Cancer Brother         x 2,. unknown    Coronary artery disease Cousin      Physical Exam:   BP (!) 118/59 (BP Location: Left arm, Patient Position: Sitting, BP Method: Large (Automatic))   Pulse 72   Ht 5' 2" (1.575 m)   Wt 127.8 kg (281 lb 12 oz)   LMP 2015   SpO2 100%   BMI 51.53 kg/m²      Constitutional: No apparent distress, conversant  HEENT: Sclera anicteric, extraocular movements intact  Neck: No jugular venous distension, no carotid bruits  CV: Regular rate and rhythm, no murmurs rubs or gallops, normal S1/S2  Pulm: " Clear to auscultation bilaterally  GI: Abdomen soft, no palpable masses  Extremities: Trace L lower extremity edema, warm with palpable pulses  Skin: No ecchymosis, erythema, or ulcers  Psych: Alert and oriented to person place location, appropriate affect  Neuro: No focal deficits    Labs:     Blood Tests:  Lab Results   Component Value Date     (H) 05/01/2022     05/01/2022    K 5.3 (H) 05/01/2022     05/01/2022    CO2 22 (L) 05/01/2022    BUN 10 05/01/2022    CREATININE 1.1 05/01/2022    GLU 98 05/01/2022    HGBA1C 6.1 (H) 03/19/2022    MG 2.1 04/08/2022    AST 38 05/01/2022    ALT 12 05/01/2022    ALBUMIN 3.9 05/01/2022    PROT 8.0 05/01/2022    BILITOT 0.6 05/01/2022    WBC 6.49 05/01/2022    HGB 13.3 05/01/2022    HCT 41.2 05/01/2022    HCT 29 (L) 04/02/2022    MCV 96 05/01/2022     05/01/2022    INR 1.1 04/02/2022    TSH 0.764 03/19/2022       Lab Results   Component Value Date    CHOL 130 03/19/2022    HDL 32 (L) 03/19/2022    TRIG 119 03/19/2022       Lab Results   Component Value Date    LDLCALC 74.2 03/19/2022       Urine Tests:  Lab Results   Component Value Date    COLORU Yellow 01/16/2020    APPEARANCEUA Clear 01/16/2020    PHUR 6.0 01/16/2020    SPECGRAV >=1.030 (A) 01/16/2020    PROTEINUA Negative 01/16/2020    GLUCUA Negative 01/16/2020    KETONESU Negative 01/16/2020    BILIRUBINUA Negative 01/16/2020    OCCULTUA Negative 01/16/2020    NITRITE Negative 01/16/2020    UROBILINOGEN 1.0 01/16/2020    UROBILINOGEN NEG 06/07/2012    LEUKOCYTESUR Negative 01/16/2020       Imaging:     Echocardiogram  TTE 3/23/22  · The left ventricle is normal in size with normal systolic function. The estimated ejection fraction is 60%.  · Normal right ventricular size with normal right ventricular systolic function.  · Normal left ventricular diastolic function.  · Normal central venous pressure (3 mmHg).    Stress testing  SPECT 3/21/22    During stress, rare PVCs are noted.    The EKG  portion of this study is negative for ischemia.     The patient reported no chest pain during the stress test.    The nuclear portion of this study will be reported separately.    1. Mild reversible anterior wall defect comprising approximately 15-20% of the LV myocardium consistent with ischemia.  2. The global left ventricular systolic function is preserved with an LV ejection fraction of 66 % and no evidence of LV dilatation. Wall motion is normal.    Cath Lab  LHC 3/22/22  · LVEDP=16mmHg  · 60% distal LM stenosis  · 80% ostial LCx stenosis  · 80% proximal LCx stenosis (distal to OM1) origin)  · The estimated blood loss was <50 mL.    Other  CTS 4/1/22 (Dr Howard)  Date of Operation:  04/01/2022    Operation:  Double vessel coronary artery bypass grafting  ? Left internal mammary artery to the distal left anterior descending artery  ? Saphenous vein graft to the left posterolateral ventricular artery  Endoscopic saphenous vein harvest from the left lower extremity    Indications for surgery: Ms. Armendariz is a pleasant 61 year old female smoker (1.5ppd x 45 years) with heart failure preserved ejection fraction (60% ejection fraction), coronary artery disease s/p LAD stent in 2012, hypertension, and BMI of 50 who presented with unstable angina, given Brilinta, and underwent coronary angiogram showing left main disease.  Coronary angiogram details: 60% left main disease with a moderate sized mid and distal LAD, 90% ostial LCx (dominant) with a moderate sized left posterolateral ventricular artery, small to moderate sized left PDA, and nondominant RCA.  The transthoracic echo shows 60% ejection fraction with no significant valvulopathy.       CT chest noncontrast shows minimal ascending aortic and severe aortic arch calcifications.  Carotid ultrasound showed nonsignificant disease.     Given the severity of disease and the symptoms, I recommend coronary artery bypass surgery x 3 (LIMA-LAD, SVG-LPL, SVG-LPDA).  The  risks and benefits were explained and informed consent was obtained.      Gross findings: No pericardial adhesions.  All targets much smaller than appeared on angiogram.  Small to moderate LAD target, small to moderate LPL target, small and nonbypassable PDA target.  Good biventricular function pre and post bypass.    Carotid US 3/23/22  · There is 0-19% right Internal Carotid Stenosis.  · There is 0-19% left Internal Carotid Stenosis.    CT chest 3/22/22  Aorta: Left-sided aortic arch with normal vessel branching pattern.  Aorta maintains normal caliber, contour, and course.  Mild calcific atherosclerosis at the arch.  The remaining thoracic aorta is unremarkable.  Minimal calcific atherosclerosis of the visualized abdominal aorta.     Heart: Normal size with physiologic pericardial fluid.  No pericardial calcifications.  Coronary artery calcific atherosclerosis in a multi vessel distribution.  LAD stent in place.    EK22 - NSR, LAE, ST/T wave changes of ischemia [anterior TWI, lateral flattening] (personally reviewed)    Assessment:     1. SCOTT (dyspnea on exertion)    2. Chronic respiratory failure with hypoxia, on home oxygen therapy    3. Coronary artery disease involving native coronary artery of native heart, unspecified whether angina present    4. S/P CABG (coronary artery bypass graft)    5. History of placement of stent in LAD coronary artery    6. Primary hypertension    7. Mixed hyperlipidemia    8. JONH (obstructive sleep apnea)    9. Morbid obesity        Plan:     SCOTT (dyspnea on exertion)  Chronic respiratory failure with hypoxia, on home oxygen therapy  Euvolemic.  Obtain PFTs    Coronary artery disease involving native coronary artery of native heart, unspecified whether angina present  S/P CABG (coronary artery bypass graft)  History of placement of stent in LAD coronary artery  Continue ASA, BB  Increase atorvastatin to 80mg qd  Encouraged participation in cardiac rehab    Primary  hypertension  BP at goal  Continue meds    Mixed hyperlipidemia  Increase statin, as above    JONH (obstructive sleep apnea)  Morbid obesity  Encouraged to reschedule with sleep medicine    Signed:  Chance Malloy MD  Cardiology     6/22/2022 11:13 AM    Follow-up:     Future Appointments   Date Time Provider Department Center   6/22/2022 10:30 AM Elvis Malloy III, MD [facility] CARDIO [facility]   7/12/2022 10:00 AM Sivakumar Wing MD [facility]

## 2022-06-22 ENCOUNTER — DOCUMENT SCAN (OUTPATIENT)
Dept: HOME HEALTH SERVICES | Facility: HOSPITAL | Age: 62
End: 2022-06-22
Payer: COMMERCIAL

## 2022-06-22 ENCOUNTER — OFFICE VISIT (OUTPATIENT)
Dept: CARDIOLOGY | Facility: CLINIC | Age: 62
End: 2022-06-22
Payer: COMMERCIAL

## 2022-06-22 VITALS
BODY MASS INDEX: 51.85 KG/M2 | SYSTOLIC BLOOD PRESSURE: 118 MMHG | HEART RATE: 72 BPM | WEIGHT: 281.75 LBS | DIASTOLIC BLOOD PRESSURE: 59 MMHG | OXYGEN SATURATION: 100 % | HEIGHT: 62 IN

## 2022-06-22 DIAGNOSIS — I10 PRIMARY HYPERTENSION: ICD-10-CM

## 2022-06-22 DIAGNOSIS — J96.11 CHRONIC RESPIRATORY FAILURE WITH HYPOXIA, ON HOME OXYGEN THERAPY: ICD-10-CM

## 2022-06-22 DIAGNOSIS — G47.33 OSA (OBSTRUCTIVE SLEEP APNEA): ICD-10-CM

## 2022-06-22 DIAGNOSIS — Z99.81 CHRONIC RESPIRATORY FAILURE WITH HYPOXIA, ON HOME OXYGEN THERAPY: ICD-10-CM

## 2022-06-22 DIAGNOSIS — E66.01 MORBID OBESITY: ICD-10-CM

## 2022-06-22 DIAGNOSIS — Z95.1 S/P CABG (CORONARY ARTERY BYPASS GRAFT): ICD-10-CM

## 2022-06-22 DIAGNOSIS — R06.09 DOE (DYSPNEA ON EXERTION): Primary | ICD-10-CM

## 2022-06-22 DIAGNOSIS — Z95.5 HISTORY OF PLACEMENT OF STENT IN LAD CORONARY ARTERY: ICD-10-CM

## 2022-06-22 DIAGNOSIS — E78.2 MIXED HYPERLIPIDEMIA: ICD-10-CM

## 2022-06-22 DIAGNOSIS — I25.10 CORONARY ARTERY DISEASE INVOLVING NATIVE CORONARY ARTERY OF NATIVE HEART, UNSPECIFIED WHETHER ANGINA PRESENT: ICD-10-CM

## 2022-06-22 PROCEDURE — 99204 OFFICE O/P NEW MOD 45 MIN: CPT | Mod: S$GLB,,, | Performed by: INTERNAL MEDICINE

## 2022-06-22 PROCEDURE — 3008F BODY MASS INDEX DOCD: CPT | Mod: CPTII,S$GLB,, | Performed by: INTERNAL MEDICINE

## 2022-06-22 PROCEDURE — 3044F HG A1C LEVEL LT 7.0%: CPT | Mod: CPTII,S$GLB,, | Performed by: INTERNAL MEDICINE

## 2022-06-22 PROCEDURE — 99999 PR PBB SHADOW E&M-EST. PATIENT-LVL III: CPT | Mod: PBBFAC,,, | Performed by: INTERNAL MEDICINE

## 2022-06-22 PROCEDURE — 1159F PR MEDICATION LIST DOCUMENTED IN MEDICAL RECORD: ICD-10-PCS | Mod: CPTII,S$GLB,, | Performed by: INTERNAL MEDICINE

## 2022-06-22 PROCEDURE — 3078F DIAST BP <80 MM HG: CPT | Mod: CPTII,S$GLB,, | Performed by: INTERNAL MEDICINE

## 2022-06-22 PROCEDURE — 1160F RVW MEDS BY RX/DR IN RCRD: CPT | Mod: CPTII,S$GLB,, | Performed by: INTERNAL MEDICINE

## 2022-06-22 PROCEDURE — 1160F PR REVIEW ALL MEDS BY PRESCRIBER/CLIN PHARMACIST DOCUMENTED: ICD-10-PCS | Mod: CPTII,S$GLB,, | Performed by: INTERNAL MEDICINE

## 2022-06-22 PROCEDURE — 1159F MED LIST DOCD IN RCRD: CPT | Mod: CPTII,S$GLB,, | Performed by: INTERNAL MEDICINE

## 2022-06-22 PROCEDURE — 3074F SYST BP LT 130 MM HG: CPT | Mod: CPTII,S$GLB,, | Performed by: INTERNAL MEDICINE

## 2022-06-22 PROCEDURE — 3044F PR MOST RECENT HEMOGLOBIN A1C LEVEL <7.0%: ICD-10-PCS | Mod: CPTII,S$GLB,, | Performed by: INTERNAL MEDICINE

## 2022-06-22 PROCEDURE — 99999 PR PBB SHADOW E&M-EST. PATIENT-LVL III: ICD-10-PCS | Mod: PBBFAC,,, | Performed by: INTERNAL MEDICINE

## 2022-06-22 PROCEDURE — 3008F PR BODY MASS INDEX (BMI) DOCUMENTED: ICD-10-PCS | Mod: CPTII,S$GLB,, | Performed by: INTERNAL MEDICINE

## 2022-06-22 PROCEDURE — 99204 PR OFFICE/OUTPT VISIT, NEW, LEVL IV, 45-59 MIN: ICD-10-PCS | Mod: S$GLB,,, | Performed by: INTERNAL MEDICINE

## 2022-06-22 PROCEDURE — 3074F PR MOST RECENT SYSTOLIC BLOOD PRESSURE < 130 MM HG: ICD-10-PCS | Mod: CPTII,S$GLB,, | Performed by: INTERNAL MEDICINE

## 2022-06-22 PROCEDURE — 3078F PR MOST RECENT DIASTOLIC BLOOD PRESSURE < 80 MM HG: ICD-10-PCS | Mod: CPTII,S$GLB,, | Performed by: INTERNAL MEDICINE

## 2022-06-22 RX ORDER — ATORVASTATIN CALCIUM 80 MG/1
80 TABLET, FILM COATED ORAL DAILY
Qty: 90 TABLET | Refills: 3 | Status: SHIPPED | OUTPATIENT
Start: 2022-06-22 | End: 2022-09-20

## 2022-06-24 ENCOUNTER — HOSPITAL ENCOUNTER (OUTPATIENT)
Dept: PULMONOLOGY | Facility: CLINIC | Age: 62
Discharge: HOME | End: 2022-06-24
Payer: COMMERCIAL

## 2022-06-24 DIAGNOSIS — Z99.81 CHRONIC RESPIRATORY FAILURE WITH HYPOXIA, ON HOME OXYGEN THERAPY: ICD-10-CM

## 2022-06-24 DIAGNOSIS — J96.11 CHRONIC RESPIRATORY FAILURE WITH HYPOXIA, ON HOME OXYGEN THERAPY: ICD-10-CM

## 2022-06-24 DIAGNOSIS — R06.09 DOE (DYSPNEA ON EXERTION): ICD-10-CM

## 2022-06-24 PROCEDURE — 94729 DIFFUSING CAPACITY: CPT | Mod: S$GLB,,, | Performed by: INTERNAL MEDICINE

## 2022-06-24 PROCEDURE — 94729 PR C02/MEMBANE DIFFUSE CAPACITY: ICD-10-PCS | Mod: S$GLB,,, | Performed by: INTERNAL MEDICINE

## 2022-06-24 PROCEDURE — 94727 GAS DIL/WSHOT DETER LNG VOL: CPT | Mod: S$GLB,,, | Performed by: INTERNAL MEDICINE

## 2022-06-24 PROCEDURE — 94060 EVALUATION OF WHEEZING: CPT | Mod: S$GLB,,, | Performed by: INTERNAL MEDICINE

## 2022-06-24 PROCEDURE — 94060 PR EVAL OF BRONCHOSPASM: ICD-10-PCS | Mod: S$GLB,,, | Performed by: INTERNAL MEDICINE

## 2022-06-24 PROCEDURE — 94727 PR PULM FUNCTION TEST BY GAS: ICD-10-PCS | Mod: S$GLB,,, | Performed by: INTERNAL MEDICINE

## 2022-06-30 ENCOUNTER — PATIENT MESSAGE (OUTPATIENT)
Dept: CARDIOLOGY | Facility: CLINIC | Age: 62
End: 2022-06-30
Payer: COMMERCIAL

## 2022-06-30 DIAGNOSIS — R06.09 DOE (DYSPNEA ON EXERTION): Primary | ICD-10-CM

## 2022-07-12 ENCOUNTER — OFFICE VISIT (OUTPATIENT)
Dept: FAMILY MEDICINE | Facility: CLINIC | Age: 62
End: 2022-07-12
Payer: COMMERCIAL

## 2022-07-12 VITALS
SYSTOLIC BLOOD PRESSURE: 126 MMHG | TEMPERATURE: 98 F | DIASTOLIC BLOOD PRESSURE: 72 MMHG | WEIGHT: 282.44 LBS | OXYGEN SATURATION: 99 % | HEART RATE: 66 BPM | HEIGHT: 62 IN | BODY MASS INDEX: 51.97 KG/M2

## 2022-07-12 DIAGNOSIS — I25.10 CORONARY ARTERY DISEASE INVOLVING NATIVE CORONARY ARTERY OF NATIVE HEART WITHOUT ANGINA PECTORIS: ICD-10-CM

## 2022-07-12 DIAGNOSIS — R73.09 ABNORMAL GLUCOSE: ICD-10-CM

## 2022-07-12 DIAGNOSIS — D64.9 POSTOPERATIVE ANEMIA: ICD-10-CM

## 2022-07-12 DIAGNOSIS — I10 HYPERTENSION, UNSPECIFIED TYPE: ICD-10-CM

## 2022-07-12 DIAGNOSIS — F32.1 CURRENT MODERATE EPISODE OF MAJOR DEPRESSIVE DISORDER WITHOUT PRIOR EPISODE: Primary | ICD-10-CM

## 2022-07-12 DIAGNOSIS — Z95.1 S/P CABG (CORONARY ARTERY BYPASS GRAFT): ICD-10-CM

## 2022-07-12 PROCEDURE — 3074F PR MOST RECENT SYSTOLIC BLOOD PRESSURE < 130 MM HG: ICD-10-PCS | Mod: CPTII,S$GLB,, | Performed by: FAMILY MEDICINE

## 2022-07-12 PROCEDURE — 99214 PR OFFICE/OUTPT VISIT, EST, LEVL IV, 30-39 MIN: ICD-10-PCS | Mod: S$GLB,,, | Performed by: FAMILY MEDICINE

## 2022-07-12 PROCEDURE — 1160F PR REVIEW ALL MEDS BY PRESCRIBER/CLIN PHARMACIST DOCUMENTED: ICD-10-PCS | Mod: CPTII,S$GLB,, | Performed by: FAMILY MEDICINE

## 2022-07-12 PROCEDURE — 3074F SYST BP LT 130 MM HG: CPT | Mod: CPTII,S$GLB,, | Performed by: FAMILY MEDICINE

## 2022-07-12 PROCEDURE — 99214 OFFICE O/P EST MOD 30 MIN: CPT | Mod: S$GLB,,, | Performed by: FAMILY MEDICINE

## 2022-07-12 PROCEDURE — 99999 PR PBB SHADOW E&M-EST. PATIENT-LVL V: ICD-10-PCS | Mod: PBBFAC,,, | Performed by: FAMILY MEDICINE

## 2022-07-12 PROCEDURE — 1160F RVW MEDS BY RX/DR IN RCRD: CPT | Mod: CPTII,S$GLB,, | Performed by: FAMILY MEDICINE

## 2022-07-12 PROCEDURE — 3078F DIAST BP <80 MM HG: CPT | Mod: CPTII,S$GLB,, | Performed by: FAMILY MEDICINE

## 2022-07-12 PROCEDURE — 1159F MED LIST DOCD IN RCRD: CPT | Mod: CPTII,S$GLB,, | Performed by: FAMILY MEDICINE

## 2022-07-12 PROCEDURE — 99999 PR PBB SHADOW E&M-EST. PATIENT-LVL V: CPT | Mod: PBBFAC,,, | Performed by: FAMILY MEDICINE

## 2022-07-12 PROCEDURE — 3008F PR BODY MASS INDEX (BMI) DOCUMENTED: ICD-10-PCS | Mod: CPTII,S$GLB,, | Performed by: FAMILY MEDICINE

## 2022-07-12 PROCEDURE — 3078F PR MOST RECENT DIASTOLIC BLOOD PRESSURE < 80 MM HG: ICD-10-PCS | Mod: CPTII,S$GLB,, | Performed by: FAMILY MEDICINE

## 2022-07-12 PROCEDURE — 3008F BODY MASS INDEX DOCD: CPT | Mod: CPTII,S$GLB,, | Performed by: FAMILY MEDICINE

## 2022-07-12 PROCEDURE — 1159F PR MEDICATION LIST DOCUMENTED IN MEDICAL RECORD: ICD-10-PCS | Mod: CPTII,S$GLB,, | Performed by: FAMILY MEDICINE

## 2022-07-12 PROCEDURE — 3044F PR MOST RECENT HEMOGLOBIN A1C LEVEL <7.0%: ICD-10-PCS | Mod: CPTII,S$GLB,, | Performed by: FAMILY MEDICINE

## 2022-07-12 PROCEDURE — 3044F HG A1C LEVEL LT 7.0%: CPT | Mod: CPTII,S$GLB,, | Performed by: FAMILY MEDICINE

## 2022-07-12 RX ORDER — BUPROPION HYDROCHLORIDE 150 MG/1
150 TABLET ORAL DAILY
Qty: 30 TABLET | Refills: 11 | Status: SHIPPED | OUTPATIENT
Start: 2022-07-12 | End: 2023-07-12

## 2022-07-12 NOTE — PROGRESS NOTES
(Portions of this note were dictated using voice recognition software and may contain dictation related errors in spelling/grammar/syntax not found on text review)    CC:   Chief Complaint   Patient presents with    Follow-up       HPI: 61 y.o. female status post CABG in April.  From a cardiac perspective she is doing well.  Does have chronic dyspnea, feels like it is stable from last time.  Did have PFT done recently significant for COPD.  Has quit smoking since her heart surgery.  Denies any coughing or wheezing.    Main issue is depression, is very sedentary, states that she just stays in her room and eats all day.  Feels like she has low mood, loss of interest enjoys and things.  No suicidality.  Has overeating as the result of this.  Has gained weight.  Was on Lexapro 10 mg prior and was reported to be stable although she states that she eventually stopped this medication because she felt it was not working.  In remote past she was on Wellbutrin for smoking cessation but has not been on this recently.  She is interested in pharmacotherapy.  She is also interested in counseling    Past Medical History:   Diagnosis Date    Anticoagulant long-term use     CAD (coronary artery disease)     s/p LAD stent.  Had 3 vessel CABG done April 2022    CHF (congestive heart failure)     Current moderate episode of major depressive disorder without prior episode 1/28/2022    Gastroduodenitis     Helicobacter pylori (H. pylori) infection     Hyperlipidemia     Hypertension     MI (myocardial infarction)     x2    Obesity     Tobacco use     Urinary tract infection     Vaginal infection        Past Surgical History:   Procedure Laterality Date    ANGIOGRAM, CORONARY, WITH LEFT HEART CATHETERIZATION N/A 3/22/2022    Procedure: Angiogram, Coronary, with Left Heart Cath;  Surgeon: Giuseppe Merchant MD;  Location: [facility] CATH LAB;  Service: Cardiology;  Laterality: N/A;    COLONOSCOPY N/A 7/14/2017    Procedure:  COLONOSCOPY;  Surgeon: Cathy Collier MD;  Location: [facility] ENDO;  Service: Endoscopy;  Laterality: N/A;    CORONARY ARTERY BYPASS GRAFT (CABG) N/A 2022    Procedure: CORONARY ARTERY BYPASS GRAFT (CABG);  Surgeon: Leandro Howard MD;  Location: [facility] OR [address] [place] [address];  Service: Cardiovascular;  Laterality: N/A;    ENDOSCOPIC HARVEST OF VEIN Left 2022    Procedure: SURGICAL PROCUREMENT, VEIN, ENDOSCOPIC;  Surgeon: Leandro Howard MD;  Location: [facility] OR [address] [place] [address];  Service: Cardiovascular;  Laterality: Left;  Vein [place] Start: 808  Vein [place] End: 913  Vein Prep Start: 915  Vein Prep End: 927    NO PAST SURGERIES         Family History   Problem Relation Age of Onset    Hypertension Other         multiple    Diabetes Other         multiple    Stomach cancer Mother     Cancer Father         unknown    Breast cancer Sister     Throat cancer Brother     Cancer Brother         x 2,. unknown    Coronary artery disease Cousin        Social History     Tobacco Use    Smoking status: Former Smoker     Packs/day: 1.00     Years: 40.00     Pack years: 40.00     Types: Cigarettes     Quit date: 3/22/2022     Years since quittin.3    Smokeless tobacco: Never Used    Tobacco comment: Restarted 1 .5 yrs. ago   Substance Use Topics    Alcohol use: No     Alcohol/week: 0.0 standard drinks    Drug use: No       Lab Results   Component Value Date    WBC 6.49 2022    HGB 13.3 2022    HCT 41.2 2022    MCV 96 2022     2022    CHOL 130 2022    TRIG 119 2022    HDL 32 (L) 2022    ALT 12 2022    AST 38 2022    BILITOT 0.6 2022    ALKPHOS 94 2022     2022    K 5.3 (H) 2022     2022    CREATININE 1.1 2022    ESTGFRAFRICA >60 2022    EGFRNONAA 55 (A) 2022    CALCIUM 10.0 2022    ALBUMIN 3.9 2022    BUN 10 2022    CO2 22 (L) 2022    TSH 0.764 2022     "INR 1.1 04/02/2022    HGBA1C 6.1 (H) 03/19/2022    LDLCALC 74.2 03/19/2022    GLU 98 05/01/2022         Creatinine (mg/dL)   Date Value   05/01/2022 1.1   04/15/2022 1.0   04/08/2022 0.9   04/07/2022 0.9   04/06/2022 0.8   04/05/2022 0.8   04/04/2022 0.8   04/03/2022 0.9   04/02/2022 0.8   04/01/2022 0.9     eGFR if non African American (mL/min/1.73 m^2)   Date Value   05/01/2022 55 (A)   04/15/2022 >60   04/08/2022 >60.0   04/07/2022 >60.0   04/06/2022 >60.0   04/05/2022 >60.0   04/04/2022 >60.0   04/03/2022 >60.0   04/02/2022 >60.0   04/01/2022 >60.0         Vital signs reviewed  Vitals:    07/12/22 1007   BP: 126/72   BP Location: Left arm   Patient Position: Sitting   BP Method: Medium (Manual)   Pulse: 66   Temp: 97.9 °F (36.6 °C)   TempSrc: Oral   SpO2: 99%   Weight: 128.1 kg (282 lb 6.6 oz)   Height: 5' 2" (1.575 m)       Wt Readings from Last 4 Encounters:   07/12/22 128.1 kg (282 lb 6.6 oz)   06/22/22 127.8 kg (281 lb 12 oz)   05/11/22 123 kg (271 lb 2.7 oz)   04/15/22 123.8 kg (273 lb)       PE:   APPEARANCE: Well nourished, well developed, in no acute distress.  HEAD: Normocephalic, atraumatic.  CHEST: Good inspiratory effort. Lungs clear to auscultation with no wheezes or crackles.  CARDIOVASCULAR: Normal S1, S2. No rubs, murmurs, or gallops.  ABDOMEN: Bowel sounds normal. Not distended. Soft. No tenderness or masses. No organomegaly.  EXTREMITIES: No edema  PSYCH:  Mood is down, affect slightly flattened      IMPRESSION  1. Current moderate episode of major depressive disorder without prior episode    2. Coronary artery disease involving native coronary artery of native heart without angina pectoris    3. S/P CABG (coronary artery bypass graft)    4. Hypertension, unspecified type    5. Postoperative anemia    6. Abnormal glucose            PLAN  Orders Placed This Encounter   Procedures    CBC Auto Differential    Comprehensive Metabolic Panel    Hemoglobin A1C    Ambulatory referral/consult to " Social Work     Blood pressure stable.  Discussed counseling for depression.  Resources for counseling provided on  visit summary.  Also placed referral for [facility] counseling services through [facility] [place].  Gave her number so she can call to make an appointment.    Pharmacotherapy:  Start Wellbutrin 150 mg extended release, may help with her overall sedentary aspect of her depression and lack of motivation.  Denies any severe anxiety type symptoms.  If no improvement over the next few weeks, can titrate upward further.    I will see her back in 1 month to discuss further mood management, or sooner if needed    Notify for acutely worsening depression certainly with any active suicidal ideation which necessitates emergency care    COPD:  Reviewed PFT results.  Has pulmonology appointment coming up in August.  At this time feels like her breathing is stable, can defer any further management at the moment to pulmonology.  She does feel like her breathing has perhaps improved just a little bit and could be the result of not smoking any longer.

## 2022-07-12 NOTE — PATIENT INSTRUCTIONS
Medications Ordered This Encounter   Medications    buPROPion (WELLBUTRIN XL) 150 MG TB24 tablet     Sig: Take 1 tablet (150 mg total) by mouth once daily.     Dispense:  30 tablet     Refill:  11         Opportunities to get set up for counseling services       1. Employee Assistance Program (EAP) through work (can check with your human resources dept to see what counseling services are available through this.)    2. Through insurance company list of covered counselors/social workers    3. [employer] website database of mental health providers    4. Counseling [facility] Contact Information (looking for psychologist or  for talk therapy)  Phone        [phone]        Address [address] [address]  [facility] [address]      List of some mental health providers in the area

## 2022-08-06 ENCOUNTER — LAB VISIT (OUTPATIENT)
Dept: LAB | Facility: HOSPITAL | Age: 62
End: 2022-08-06
Attending: FAMILY MEDICINE
Payer: COMMERCIAL

## 2022-08-06 DIAGNOSIS — F32.1 CURRENT MODERATE EPISODE OF MAJOR DEPRESSIVE DISORDER WITHOUT PRIOR EPISODE: ICD-10-CM

## 2022-08-06 DIAGNOSIS — I25.10 CORONARY ARTERY DISEASE INVOLVING NATIVE CORONARY ARTERY OF NATIVE HEART WITHOUT ANGINA PECTORIS: ICD-10-CM

## 2022-08-06 DIAGNOSIS — Z95.1 S/P CABG (CORONARY ARTERY BYPASS GRAFT): ICD-10-CM

## 2022-08-06 DIAGNOSIS — D64.9 POSTOPERATIVE ANEMIA: ICD-10-CM

## 2022-08-06 DIAGNOSIS — R73.09 ABNORMAL GLUCOSE: ICD-10-CM

## 2022-08-06 DIAGNOSIS — I10 HYPERTENSION, UNSPECIFIED TYPE: ICD-10-CM

## 2022-08-06 LAB
ALBUMIN SERPL BCP-MCNC: 3.7 G/DL (ref 3.5–5.2)
ALP SERPL-CCNC: 114 U/L (ref 55–135)
ALT SERPL W/O P-5'-P-CCNC: 16 U/L (ref 10–44)
ANION GAP SERPL CALC-SCNC: 7 MMOL/L (ref 8–16)
AST SERPL-CCNC: 18 U/L (ref 10–40)
BASOPHILS # BLD AUTO: 0.03 K/UL (ref 0–0.2)
BASOPHILS NFR BLD: 0.5 % (ref 0–1.9)
BILIRUB SERPL-MCNC: 0.6 MG/DL (ref 0.1–1)
BUN SERPL-MCNC: 7 MG/DL (ref 6–20)
CALCIUM SERPL-MCNC: 9.4 MG/DL (ref 8.7–10.5)
CHLORIDE SERPL-SCNC: 108 MMOL/L (ref 95–110)
CO2 SERPL-SCNC: 27 MMOL/L (ref 23–29)
CREAT SERPL-MCNC: 1 MG/DL (ref 0.5–1.4)
DIFFERENTIAL METHOD: NORMAL
EOSINOPHIL # BLD AUTO: 0.3 K/UL (ref 0–0.5)
EOSINOPHIL NFR BLD: 4.7 % (ref 0–8)
ERYTHROCYTE [DISTWIDTH] IN BLOOD BY AUTOMATED COUNT: 13.5 % (ref 11.5–14.5)
EST. GFR  (NO RACE VARIABLE): >60 ML/MIN/1.73 M^2
ESTIMATED AVG GLUCOSE: 131 MG/DL (ref 68–131)
GLUCOSE SERPL-MCNC: 109 MG/DL (ref 70–110)
HBA1C MFR BLD: 6.2 % (ref 4–5.6)
HCT VFR BLD AUTO: 45.1 % (ref 37–48.5)
HGB BLD-MCNC: 14.8 G/DL (ref 12–16)
IMM GRANULOCYTES # BLD AUTO: 0.01 K/UL (ref 0–0.04)
IMM GRANULOCYTES NFR BLD AUTO: 0.2 % (ref 0–0.5)
LYMPHOCYTES # BLD AUTO: 2.3 K/UL (ref 1–4.8)
LYMPHOCYTES NFR BLD: 40 % (ref 18–48)
MCH RBC QN AUTO: 29.4 PG (ref 27–31)
MCHC RBC AUTO-ENTMCNC: 32.8 G/DL (ref 32–36)
MCV RBC AUTO: 90 FL (ref 82–98)
MONOCYTES # BLD AUTO: 0.4 K/UL (ref 0.3–1)
MONOCYTES NFR BLD: 7.7 % (ref 4–15)
NEUTROPHILS # BLD AUTO: 2.7 K/UL (ref 1.8–7.7)
NEUTROPHILS NFR BLD: 46.9 % (ref 38–73)
NRBC BLD-RTO: 0 /100 WBC
PLATELET # BLD AUTO: 202 K/UL (ref 150–450)
PMV BLD AUTO: 11.5 FL (ref 9.2–12.9)
POTASSIUM SERPL-SCNC: 4.2 MMOL/L (ref 3.5–5.1)
PROT SERPL-MCNC: 7.4 G/DL (ref 6–8.4)
RBC # BLD AUTO: 5.03 M/UL (ref 4–5.4)
SODIUM SERPL-SCNC: 142 MMOL/L (ref 136–145)
WBC # BLD AUTO: 5.73 K/UL (ref 3.9–12.7)

## 2022-08-06 PROCEDURE — 83036 HEMOGLOBIN GLYCOSYLATED A1C: CPT | Performed by: FAMILY MEDICINE

## 2022-08-06 PROCEDURE — 85025 COMPLETE CBC W/AUTO DIFF WBC: CPT | Performed by: FAMILY MEDICINE

## 2022-08-06 PROCEDURE — 80053 COMPREHEN METABOLIC PANEL: CPT | Performed by: FAMILY MEDICINE

## 2022-08-06 PROCEDURE — 36415 COLL VENOUS BLD VENIPUNCTURE: CPT | Performed by: FAMILY MEDICINE

## 2022-08-24 ENCOUNTER — OFFICE VISIT (OUTPATIENT)
Dept: PULMONOLOGY | Facility: CLINIC | Age: 62
End: 2022-08-24
Payer: COMMERCIAL

## 2022-08-24 VITALS
BODY MASS INDEX: 51.93 KG/M2 | DIASTOLIC BLOOD PRESSURE: 69 MMHG | HEIGHT: 62 IN | HEART RATE: 67 BPM | OXYGEN SATURATION: 98 % | SYSTOLIC BLOOD PRESSURE: 130 MMHG | WEIGHT: 282.19 LBS

## 2022-08-24 DIAGNOSIS — R06.09 DOE (DYSPNEA ON EXERTION): ICD-10-CM

## 2022-08-24 DIAGNOSIS — Z95.1 S/P CABG (CORONARY ARTERY BYPASS GRAFT): ICD-10-CM

## 2022-08-24 DIAGNOSIS — Z87.891 HISTORY OF TOBACCO ABUSE: ICD-10-CM

## 2022-08-24 DIAGNOSIS — G47.33 OSA (OBSTRUCTIVE SLEEP APNEA): Primary | ICD-10-CM

## 2022-08-24 DIAGNOSIS — E66.01 MORBID OBESITY: ICD-10-CM

## 2022-08-24 PROCEDURE — 3008F BODY MASS INDEX DOCD: CPT | Mod: CPTII,S$GLB,, | Performed by: INTERNAL MEDICINE

## 2022-08-24 PROCEDURE — 1159F PR MEDICATION LIST DOCUMENTED IN MEDICAL RECORD: ICD-10-PCS | Mod: CPTII,S$GLB,, | Performed by: INTERNAL MEDICINE

## 2022-08-24 PROCEDURE — 99999 PR PBB SHADOW E&M-EST. PATIENT-LVL IV: ICD-10-PCS | Mod: PBBFAC,,, | Performed by: INTERNAL MEDICINE

## 2022-08-24 PROCEDURE — 3044F HG A1C LEVEL LT 7.0%: CPT | Mod: CPTII,S$GLB,, | Performed by: INTERNAL MEDICINE

## 2022-08-24 PROCEDURE — 3044F PR MOST RECENT HEMOGLOBIN A1C LEVEL <7.0%: ICD-10-PCS | Mod: CPTII,S$GLB,, | Performed by: INTERNAL MEDICINE

## 2022-08-24 PROCEDURE — 3075F PR MOST RECENT SYSTOLIC BLOOD PRESS GE 130-139MM HG: ICD-10-PCS | Mod: CPTII,S$GLB,, | Performed by: INTERNAL MEDICINE

## 2022-08-24 PROCEDURE — 3075F SYST BP GE 130 - 139MM HG: CPT | Mod: CPTII,S$GLB,, | Performed by: INTERNAL MEDICINE

## 2022-08-24 PROCEDURE — 3078F PR MOST RECENT DIASTOLIC BLOOD PRESSURE < 80 MM HG: ICD-10-PCS | Mod: CPTII,S$GLB,, | Performed by: INTERNAL MEDICINE

## 2022-08-24 PROCEDURE — 99203 OFFICE O/P NEW LOW 30 MIN: CPT | Mod: S$GLB,,, | Performed by: INTERNAL MEDICINE

## 2022-08-24 PROCEDURE — 1159F MED LIST DOCD IN RCRD: CPT | Mod: CPTII,S$GLB,, | Performed by: INTERNAL MEDICINE

## 2022-08-24 PROCEDURE — 3078F DIAST BP <80 MM HG: CPT | Mod: CPTII,S$GLB,, | Performed by: INTERNAL MEDICINE

## 2022-08-24 PROCEDURE — 3008F PR BODY MASS INDEX (BMI) DOCUMENTED: ICD-10-PCS | Mod: CPTII,S$GLB,, | Performed by: INTERNAL MEDICINE

## 2022-08-24 PROCEDURE — 99999 PR PBB SHADOW E&M-EST. PATIENT-LVL IV: CPT | Mod: PBBFAC,,, | Performed by: INTERNAL MEDICINE

## 2022-08-24 PROCEDURE — 99203 PR OFFICE/OUTPT VISIT, NEW, LEVL III, 30-44 MIN: ICD-10-PCS | Mod: S$GLB,,, | Performed by: INTERNAL MEDICINE

## 2022-08-24 RX ORDER — ALBUTEROL SULFATE 90 UG/1
2 AEROSOL, METERED RESPIRATORY (INHALATION) EVERY 6 HOURS PRN
Qty: 18 G | Refills: 0 | Status: SHIPPED | OUTPATIENT
Start: 2022-08-24 | End: 2023-08-24

## 2022-08-24 NOTE — PROGRESS NOTES
"  Subjective:     Reason for visit: abnormal PFTs    Patient ID:  Sonya Armendariz is a 62 y.o. female    History:  Ms. Mcgovern is a 63 yo that presents to be evaluated for abnormal breathing tests. She had a CABGx4 in April and had to be sent home with oxygen. She has since been weaning off and rarely uses it now. She does have some dyspnea with exertion but no wheezing and has never required an inhaler. She does not feel that She has any limitations secondary to her breathing.         Additional Pulmonary History:  Childhood Illnesses:  none  Occupational Exposures:  none  Environmental Exposures:  none  Tobacco/Smoking History:  Previous smoker quit in March 2022  Travel History:  none    Review of Systems   Constitutional: Negative for chills, fever, malaise/fatigue and weight loss.   HENT: Negative for ear pain and hearing loss.    Eyes: Negative for blurred vision, photophobia, pain and discharge.   Respiratory: Negative for hemoptysis, sputum production, shortness of breath and wheezing.    Cardiovascular: Negative for chest pain and orthopnea.   Gastrointestinal: Negative for blood in stool, constipation, diarrhea and heartburn.   Genitourinary: Negative for dysuria, flank pain, frequency and hematuria.   Musculoskeletal: Negative for back pain, joint pain and myalgias.   Skin: Negative for rash.   Neurological: Negative for tingling, tremors and sensory change.   Endo/Heme/Allergies: Does not bruise/bleed easily.   Psychiatric/Behavioral: Negative for depression and hallucinations. The patient is not nervous/anxious.         Objective:     Vitals:    08/24/22 1307   BP: 130/69   BP Location: Right arm   Patient Position: Sitting   Pulse: 67   SpO2: 98%   Weight: 128 kg (282 lb 3 oz)   Height: 5' 2" (1.575 m)         Physical Exam  Vitals and nursing note reviewed.   Constitutional:       General: She is not in acute distress.     Appearance: She is morbidly obese. She is not diaphoretic.   HENT:      " Head: Normocephalic and atraumatic.      Right Ear: External ear normal.      Left Ear: External ear normal.   Eyes:      Conjunctiva/sclera: Conjunctivae normal.      Pupils: Pupils are equal, round, and reactive to light.   Neck:      Trachea: No tracheal deviation.   Cardiovascular:      Rate and Rhythm: Normal rate and regular rhythm.      Heart sounds: Normal heart sounds. No murmur heard.  Pulmonary:      Effort: Pulmonary effort is normal. No respiratory distress.      Breath sounds: Normal breath sounds. No stridor. No wheezing or rales.   Abdominal:      General: Bowel sounds are normal. There is no distension.      Palpations: Abdomen is soft.      Tenderness: There is no abdominal tenderness.   Musculoskeletal:         General: Normal range of motion.      Cervical back: Normal range of motion and neck supple.   Skin:     General: Skin is warm and dry.      Findings: No erythema.   Neurological:      Mental Status: She is alert and oriented to person, place, and time.      Gait: Gait is intact.   Psychiatric:         Mood and Affect: Mood and affect normal.         Cognition and Memory: Memory normal.         Judgment: Judgment normal.          Personal Diagnostic Review and Interpretation  No concerning features.       Pertinent Studies Reviewed and Interpreted:     Pulmonary Function Tests:   No restriction- FEV1/FVC ratio is exactly the LLN.     Echocardiograms:   Results for orders placed during the hospital encounter of 03/18/22    Echo    Interpretation Summary  · The left ventricle is normal in size with normal systolic function. The estimated ejection fraction is 60%.  · Normal right ventricular size with normal right ventricular systolic function.  · Normal left ventricular diastolic function.  · Normal central venous pressure (3 mmHg).      Assessment:     1. JONH (obstructive sleep apnea)  Polysomnogram (CPAP will be added if patient meets diagnostic criteria.)   2. SCOTT (dyspnea on exertion)   Ambulatory referral/consult to Pulmonology    albuterol (PROVENTIL/VENTOLIN HFA) 90 mcg/actuation inhaler   3. S/P CABG (coronary artery bypass graft)     4. Morbid obesity     5. History of tobacco abuse         Current Outpatient Medications   Medication Instructions    albuterol (PROVENTIL/VENTOLIN HFA) 90 mcg/actuation inhaler 2 puffs, Inhalation, Every 6 hours PRN, Rescue    aspirin 81 mg, Oral    atorvastatin (LIPITOR) 80 mg, Oral, Daily    buPROPion (WELLBUTRIN XL) 150 mg, Oral, Daily    fluticasone propionate (FLONASE) 50 mcg, Each Nostril, 2 times daily PRN    furosemide (LASIX) 40 mg, Oral, Daily    metoprolol succinate (TOPROL-XL) 100 mg, Oral, Daily    multivitamin (THERAGRAN) per tablet 1 tablet, Daily    pulse oximeter (PULSE OXIMETER) device Apply Externally, 2 times daily, Use twice daily at 8 AM and 3 PM and record the value in MyChart as directed.    solifenacin (VESICARE) 10 MG tablet TAKE 1 TABLET BY MOUTH ONCE DAILY FOR BLADDER URGENCY      Sonya Froilan Marcello had no medications administered during this visit.     Orders Placed This Encounter   Procedures    Polysomnogram (CPAP will be added if patient meets diagnostic criteria.)     Standing Status:   Future     Standing Expiration Date:   8/24/2023      Plan:       Problem List Items Addressed This Visit        Cardiac/Vascular    S/P CABG (coronary artery bypass graft)    Current Assessment & Plan     S/p CABG in April, doing really well.            SCOTT (dyspnea on exertion)    Relevant Medications    albuterol (PROVENTIL/VENTOLIN HFA) 90 mcg/actuation inhaler       Endocrine    Morbid obesity    Current Assessment & Plan     Discussed her diet and portion control to help lose weight.               Other    History of tobacco abuse    Current Assessment & Plan     Previous smoker with 30 pack year history -quit in March 2022  - PFTs technically normal but on the verge of obstruction  - no symptoms so will monitor for now  -  applauded her for quitting and encouraged her to continue to abstain.            JONH (obstructive sleep apnea) - Primary    Current Assessment & Plan     Previously had a cpap machine but it was several years ago, she needs a repeat study to get a new machine.   Order placed           Relevant Orders    Polysomnogram (CPAP will be added if patient meets diagnostic criteria.)         Krystina Saleh M.D.  Pulmonary/Critical Care

## 2022-08-24 NOTE — ASSESSMENT & PLAN NOTE
Previous smoker with 30 pack year history -quit in March 2022  - PFTs technically normal but on the verge of obstruction  - no symptoms so will monitor for now  - applauded her for quitting and encouraged her to continue to abstain.

## 2022-08-24 NOTE — ASSESSMENT & PLAN NOTE
Previously had a cpap machine but it was several years ago, she needs a repeat study to get a new machine.   Order placed

## 2022-08-26 ENCOUNTER — PATIENT MESSAGE (OUTPATIENT)
Dept: ADMINISTRATIVE | Facility: OTHER | Age: 62
End: 2022-08-26
Payer: COMMERCIAL

## 2022-10-03 ENCOUNTER — TELEPHONE (OUTPATIENT)
Dept: SMOKING CESSATION | Facility: CLINIC | Age: 62
End: 2022-10-03

## 2022-11-28 ENCOUNTER — TELEPHONE (OUTPATIENT)
Dept: SMOKING CESSATION | Facility: CLINIC | Age: 62
End: 2022-11-28

## 2022-11-28 NOTE — TELEPHONE ENCOUNTER
1st attempt, patient called in regards to 12 month f/u for quit 4 with Smoking Cessation.  Voicemail not available, no answer.  Quit 4 completed and resolved per protocol.

## 2023-03-02 ENCOUNTER — TELEPHONE (OUTPATIENT)
Dept: SMOKING CESSATION | Facility: CLINIC | Age: 63
End: 2023-03-02
Payer: COMMERCIAL

## 2023-04-20 ENCOUNTER — TELEPHONE (OUTPATIENT)
Dept: FAMILY MEDICINE | Facility: CLINIC | Age: 63
End: 2023-04-20
Payer: COMMERCIAL

## 2023-04-20 NOTE — TELEPHONE ENCOUNTER
----- Message from Rosa Zimmer sent at 4/20/2023  9:29 AM CDT -----  Caller is requesting to schedule their Lab appointment prior to annual appointment.  Order is not listed in EPIC.  Please enter order and contact patient to schedule.    Name of Caller:  Sonya    Preferred Date and Time of Labs: Any day and time    Date of EPP Appointment: n/a    Where would they like the lab performed? [facility]    Would the patient rather a call back or a response via [facility]?  call    Best Call Back Number: [phone]    Additional Information:

## 2023-06-01 DIAGNOSIS — Z12.31 SCREENING MAMMOGRAM, ENCOUNTER FOR: Primary | ICD-10-CM

## 2023-06-12 ENCOUNTER — HOSPITAL ENCOUNTER (OUTPATIENT)
Dept: RADIOLOGY | Facility: HOSPITAL | Age: 63
Discharge: HOME | End: 2023-06-12
Payer: COMMERCIAL

## 2023-06-12 DIAGNOSIS — Z12.31 SCREENING MAMMOGRAM, ENCOUNTER FOR: ICD-10-CM

## 2023-06-12 PROCEDURE — 77067 SCR MAMMO BI INCL CAD: CPT | Mod: TC

## 2023-06-12 PROCEDURE — 77067 MAMMO DIGITAL SCREENING BILAT WITH TOMO: ICD-10-PCS | Mod: 26,,, | Performed by: RADIOLOGY

## 2023-06-12 PROCEDURE — 77063 BREAST TOMOSYNTHESIS BI: CPT | Mod: 26,,, | Performed by: RADIOLOGY

## 2023-06-12 PROCEDURE — 77067 SCR MAMMO BI INCL CAD: CPT | Mod: 26,,, | Performed by: RADIOLOGY

## 2023-06-12 PROCEDURE — 77063 MAMMO DIGITAL SCREENING BILAT WITH TOMO: ICD-10-PCS | Mod: 26,,, | Performed by: RADIOLOGY

## 2023-06-28 DIAGNOSIS — R92.1 MAMMOGRAPHIC CALCIFICATION FOUND ON DIAGNOSTIC IMAGING OF BREAST: Primary | ICD-10-CM

## 2023-11-25 NOTE — TELEPHONE ENCOUNTER
Care Due:                  Date            Visit Type   Department     Provider  --------------------------------------------------------------------------------                                EP -                              PRIMARY      [facility] FAMILY  Last Visit: 07-      CARE ([facility])   MEDICINE       [facility]  Next Visit: None Scheduled  None         None Found                                                            Last  Test          Frequency    Reason                     Performed    Due Date  --------------------------------------------------------------------------------    Office Visit  15 months..  buPROPion, furosemide,     07-   10-                             metoprolol...............    CMP.........  12 months..  furosemide...............  08- 08-    [facility] Embedded Care Due Messages. Reference number: [id number].   11/25/2023 11:57:25 AM CST

## 2023-11-27 RX ORDER — METOPROLOL SUCCINATE 100 MG/1
100 TABLET, EXTENDED RELEASE ORAL DAILY
Qty: 90 TABLET | Refills: 0 | Status: SHIPPED | OUTPATIENT
Start: 2023-11-27

## 2025-01-24 NOTE — HPI
"Ms. Armendariz is a 61 AAF with following problem list:  History of MI X2 (2012 s/p reported PCI LAD, unknown anatomy at [facility] following by adverse reaction to plavix 2 days later resulting MI)  HTN  Current smoker  Obesity  Pre diabetic    Who is admitted after exertional substernal chest pressure while working on Friday relieved by SL nitro X 3. She states she has been having ongoing chest pressure and dyspnea for past two weeks when she is picking up heavy things at work (30 lbs). She is a school lunch lady and has been ignoring her chest tightness because she thought she just needed further "breathing treatments." She has been chest pain free since 3/18/22 however had SPECT stress showing large anterior wall defect. In terms of MI history, states she had similar pain however increased severity in 2012 and had PCI to LAD but does not have stent card and no caths in system to review. 2 days later, she had an unclear allergic reaction to plavix which caused "another heart attack". CAD + include obesity, HTN, smoking, post menopausal and immobility.    SPECT  Images demonstrate a mild defect within the anterior wall comprising 15-20% of the LV myocardium.  There is otherwise preserved uptake in the remainder of the left ventricle.  On the resting images, there is mismatched distribution of the tracer throughout the left ventricle with improved counts at the anterior wall.    The gated post-stress images reveal normal wall motion and thickening with an estimated post-stress LVEF of 66 %. The LV cavity is not dilated with an end-diastolic volume of 118 ml (normal is less than 140 mL) and an end-systolic volume of 41 ml (normal is less than 70 mL).   Volume measurements may be underestimated and ejection fractions overestimated in patients with small hearts.  Impression:  1. Mild reversible anterior wall defect comprising approximately 15-20% of the LV myocardium consistent with ischemia.  2. The global left " ventricular systolic function is preserved with an LV ejection fraction of 66 % and no evidence of LV dilatation. Wall motion is normal.  This report was flagged in Epic as abnormal.           [SOB] : shortness of breath [Dyspnea on exertion] : dyspnea during exertion [Negative] : Heme/Lymph

## (undated) DEVICE — DRESSING ADH ISLAND 3.6 X 14

## (undated) DEVICE — DRESSING AQUACEL SACRAL 9 X 9

## (undated) DEVICE — KIT SAHARA DRAPE DRAW/LIFT

## (undated) DEVICE — PAD RADIOLUCENT STAT ADULT

## (undated) DEVICE — KIT GLIDESHEATH SLEND 6FR 10CM

## (undated) DEVICE — CATH FL 3.5 5FR

## (undated) DEVICE — BLADE SAW STERNAL 5/BX

## (undated) DEVICE — CATH IMPULSE 5F 100CM FR4

## (undated) DEVICE — SUT 4-0 12-18IN SILK BLACK

## (undated) DEVICE — INSERTS STEALTH FIBRA SIZE 5

## (undated) DEVICE — WIRE GUIDE SAFE-T-J .035 260CM

## (undated) DEVICE — SEE MEDLINE ITEM 157117

## (undated) DEVICE — NDL BOX COUNTER

## (undated) DEVICE — SUT CTD VICRYL 0 UND CR/CTX

## (undated) DEVICE — APPLIER LIGACLIP SM 9.38IN

## (undated) DEVICE — ADHESIVE DERMABOND ADVANCED

## (undated) DEVICE — TOWEL OR DISP STRL BLUE 4/PK

## (undated) DEVICE — SUT 1 36IN COATED VICRYL UN

## (undated) DEVICE — ELECTRODE REM PLYHSV RETURN 9

## (undated) DEVICE — SEE MEDLINE ITEM 146292

## (undated) DEVICE — CLIP SPRING 6MM

## (undated) DEVICE — APPLIER CLIP LIAGCLIP 9.375IN

## (undated) DEVICE — OMNIPAQUE 350 200ML

## (undated) DEVICE — RETRACTOR OCTOBASE INSERT HOLD

## (undated) DEVICE — PROTECTION STATION PLUS

## (undated) DEVICE — BLADE 4IN EDGE INSULATED

## (undated) DEVICE — GAUZE SPONGE 4X4 12PLY

## (undated) DEVICE — BLADE ELECTRO EDGE INSULATED

## (undated) DEVICE — SYR IRRIGATION BULB STER 60ML

## (undated) DEVICE — SUT MONOCRYL 4-0 PS-1 UND

## (undated) DEVICE — TRAY HEART

## (undated) DEVICE — SUT 6 18IN STEEL MONO CCS

## (undated) DEVICE — GLOVE BIOGEL PI MICRO SZ 7.5

## (undated) DEVICE — SPIKE CONTRAST CONTROLLER

## (undated) DEVICE — SYR 50CC LL

## (undated) DEVICE — SUT MCRYL PLUS 4-0 PS2 27IN

## (undated) DEVICE — DRAIN CHEST DRY SUCTION

## (undated) DEVICE — BANDAGE ELAS SOFTWRAP ST 4X5YD

## (undated) DEVICE — SUT SILK 2-0 SH 18IN BLACK

## (undated) DEVICE — SUT 2/0 30IN SILK BLK BRAI

## (undated) DEVICE — PUNCH AORTIC 4.8MM

## (undated) DEVICE — SYR 3CC LUER LOC

## (undated) DEVICE — SUT SILK BLK BR. 2 2-60

## (undated) DEVICE — DRAPE SPLIT STERILE

## (undated) DEVICE — SEE MEDLINE ITEM 157187

## (undated) DEVICE — BRA POST SURGICAL WHT 52-55IN

## (undated) DEVICE — CATH JACKY RADIAL 5FR 100CM

## (undated) DEVICE — DRAIN CHANNEL ROUND 19FR

## (undated) DEVICE — BANDAGE ELAS SOFTWRAP ST 6X5YD

## (undated) DEVICE — MARKER SKIN STND TIP BLUE BARR

## (undated) DEVICE — SEE MEDLINE ITEM 156894

## (undated) DEVICE — DRAPE SLUSH WARMER WITH DISC

## (undated) DEVICE — CONNECTOR Y 3/8X3/8X3/8

## (undated) DEVICE — Device

## (undated) DEVICE — PAD K-THERMIA 24IN X 60IN

## (undated) DEVICE — SUT PROLENE 7-0 BV-1 30

## (undated) DEVICE — SUT 6/0 4-24IN PROLENE

## (undated) DEVICE — TOURNIQUET TOURNIKWIK 2 TB 6IN

## (undated) DEVICE — HEMOSTAT VASC BAND LONG 27CM

## (undated) DEVICE — PLEDGET SUT SOFT 3/8X3/16X1/16

## (undated) DEVICE — KIT URINARY CATH URINE METER

## (undated) DEVICE — TIP YANKAUERS BULB NO VENT

## (undated) DEVICE — SET DECANTER MEDICHOICE

## (undated) DEVICE — DRESSING TELFA STRL 4X3 LF

## (undated) DEVICE — NDL 18GA X1 1/2 REG BEVEL

## (undated) DEVICE — DRESSING TRANS 4X4 TEGADERM

## (undated) DEVICE — SUT PROLENE 4-0 SH BLU 36IN

## (undated) DEVICE — KIT PROBE COVER WITH GEL

## (undated) DEVICE — TUBING HF INSUFFLATION W/ FLTR

## (undated) DEVICE — BLOWER MISTER

## (undated) DEVICE — SUT PROLENE 4-0 RB-1 BL MO

## (undated) DEVICE — CANNULA VESSEL FREE FLOW

## (undated) DEVICE — KIT CUSTOM MANIFOLD

## (undated) DEVICE — WIRE INTRAMYOCARDIAL TEMP

## (undated) DEVICE — SYS VIRTUOSAPH PLUS EVM

## (undated) DEVICE — SYR ONLY LUER LOCK 20CC

## (undated) DEVICE — SUT 2/0 30IN ETHIBOND